# Patient Record
Sex: FEMALE | Race: WHITE | NOT HISPANIC OR LATINO | Employment: FULL TIME | URBAN - METROPOLITAN AREA
[De-identification: names, ages, dates, MRNs, and addresses within clinical notes are randomized per-mention and may not be internally consistent; named-entity substitution may affect disease eponyms.]

---

## 2017-05-03 ENCOUNTER — GENERIC CONVERSION - ENCOUNTER (OUTPATIENT)
Dept: OTHER | Facility: OTHER | Age: 47
End: 2017-05-03

## 2017-05-05 ENCOUNTER — GENERIC CONVERSION - ENCOUNTER (OUTPATIENT)
Dept: OTHER | Facility: OTHER | Age: 47
End: 2017-05-05

## 2017-05-12 ENCOUNTER — TRANSCRIBE ORDERS (OUTPATIENT)
Dept: ADMINISTRATIVE | Facility: HOSPITAL | Age: 47
End: 2017-05-12

## 2017-05-12 DIAGNOSIS — Z12.31 ENCOUNTER FOR MAMMOGRAM TO ESTABLISH BASELINE MAMMOGRAM: Primary | ICD-10-CM

## 2017-06-21 ENCOUNTER — HOSPITAL ENCOUNTER (OUTPATIENT)
Dept: RADIOLOGY | Age: 47
Discharge: HOME | End: 2017-06-21

## 2017-06-21 DIAGNOSIS — Z12.31 ENCOUNTER FOR SCREENING MAMMOGRAM FOR MALIGNANT NEOPLASM OF BREAST: ICD-10-CM

## 2017-06-21 PROCEDURE — 77063 BREAST TOMOSYNTHESIS BI: CPT

## 2017-06-21 PROCEDURE — G0202 SCR MAMMO BI INCL CAD: HCPCS

## 2017-10-11 ENCOUNTER — ALLSCRIPTS OFFICE VISIT (OUTPATIENT)
Dept: OTHER | Facility: OTHER | Age: 47
End: 2017-10-11

## 2017-10-12 NOTE — PROGRESS NOTES
Assessment  1  Encounter for gynecological examination without abnormal finding (V72 31) (Z01 419)    Plan  Encounter for screening mammogram for malignant neoplasm of breast    · MAMMO SCREENING BILATERAL W 3D & CAD; Status:Hold For -  Scheduling,Retrospective By Protocol Authorization; [facility]:[id number]; Perform:[facility] Radiology; FCC:[id number]; Last Updated Margaret Thompson; 10/11/2017 8:31:59 AM;Ordered;For:Encounter for screening mammogram for malignant neoplasm of breast; Ordered By:Iraida Valle; Discussion/Summary  health maintenance visit Currently, she eats an adequate diet and has an adequate exercise regimen  cervical cancer screening is needed every three years next cervical cancer screening is due 2018 Breast cancer screening: monthly self breast exam was advised, mammogram has been ordered and mammogram is needed every year  Advice and education were given regarding weight bearing exercise and reproductive health  Patient discussion: discussed with the patient  Annual examination was completed  3-D mammogram was ordered  We discussed the use of loratadine for her allergies and vertigo  Patient to return in one year  The patient has the current Goals: Maintain health  The patent has the current Barriers: None  Patient is able to Self-Care  Chief Complaint  annual gyn exam      History of Present Illness  HPI: Patient returns for annual GYN visit  She has no new medical or surgical issues  Menses are well timed and normal in flow  GYN HM, Adult Female [facility]: The patient is being seen for a gynecology evaluation  General Health: The patient's health since the last visit is described as good  Lifestyle:  She exercises regularly  She exercises 3 or more times per week  Exercise includes aerobic conditioning-and-running/jogging -She does not use tobacco  The patient is a former cigarette smoker -She consumes alcohol  She reports occasional alcohol use     Reproductive health:  she uses contraception  For contraception, she has a partner with a vasectomy -she is sexually active  She is monogamous with a male partner  Screening: Cervical cancer screening includes a pap smear performed 1/012/15-and-human papilloma virus screening performed 10/12/15  Breast cancer screening includes a mammogram performed 16-and-irregular breast self-exams performed  Colorectal cancer screening includes a colonoscopy performed age 43  Review of Systems    Constitutional: No fever, no chills, feels well, no tiredness, no recent weight gain or loss  ENT: no ear ache, no loss of hearing, no nosebleeds or nasal discharge, no sore throat or hoarseness  Cardiovascular: no complaints of slow or fast heart rate, no chest pain, no palpitations, no leg claudication or lower extremity edema  Respiratory: no complaints of shortness of breath, no wheezing, no dyspnea on exertion, no orthopnea or PND  Breasts: no complaints of breast pain, breast lump or nipple discharge  Gastrointestinal: no complaints of abdominal pain, no constipation, no nausea or diarrhea, no vomiting, no bloody stools  Genitourinary: no complaints of dysuria, no incontinence, no pelvic pain, no dysmenorrhea, no vaginal discharge or abnormal vaginal bleeding  Musculoskeletal: no complaints of arthralgia, no myalgia, no joint swelling or stiffness, no limb pain or swelling  Integumentary: no complaints of skin rash or lesion, no itching or dry skin, no skin wounds  Neurological: no complaints of headache, no confusion, no numbness or tingling, no dizziness or fainting  OB History  Pregnancy History (Brief):   Prior pregnancies: : 4  Para: 1 (abortions)-and-2 (living)   Additional pregnancy history details: 1 miscarriage(s)       Active Problems  1  Acute sinusitis (461 9) (J01 90)   2  Benign paroxysmal vertigo, unspecified laterality (386 11) (H81 10)   3   Encounter for gynecological examination without abnormal finding (V72 31) (Z01 419)   4  Encounter for routine gynecological examination (V72 31) (Z01 419)   5  Encounter for screening mammogram for malignant neoplasm of breast (V76 12)   (Z12 31)   6  Need for influenza vaccination (V04 81) (Z23)   7  Need for prophylactic vaccination and inoculation against influenza (V04 81) (Z23)   8  Rash (782 1) (R21)   9  Screening for human papillomavirus (HPV) (V73 81) (Z11 51)   10  Seasonal allergies (477 9) (J30 2)    Past Medical History   · History of acute sinusitis (V12 69) (Z87 09)   · Need for prophylactic vaccination and inoculation against influenza (V04 81) (Z23)   · History of Vaginal Hematoma (Non-puerperal) (623 6)    Surgical History   · History of Biopsy Breast Open   · History of Biopsy Breast Open    Family History  Mother    · No pertinent family history  Maternal Grandfather    · Family history of Diabetes Mellitus (V18 0)  Maternal Aunt    · Family history of Breast Cancer (V16 3)   · Family history of Ovarian Cancer (V16 41)  Family History    · Family history of Arthritis (V17 7)   · Family history of Depression With Anxiety   · Family history of Fibrocystic Disease Of Breast   · Family history of Heart Disease (V17 49)   · Family history of Irritable Bowel Syndrome   · Family history of Reported Family History Of Cancer   · Family history of Skin Disorder (V19 4)    Social History   · Being A Social Drinker   · Birth Control Method - Partner Had Vasectomy   · Daily Coffee Consumption (___ Cups/Day)   · Exercise Frequency (Times/Week)   · Marital History - Currently    · Never A Smoker    Current Meds   1  No Reported Medications Recorded    Allergies  1  No Known Drug Allergies  2  No Known Environmental Allergies   3   No Known Food Allergies    Vitals   Recorded: [id number] 10:49BM   Systolic 183, LUE, Sitting   Diastolic 74, LUE, Sitting   Height 5 ft 3 in   Weight 110 lb    BMI Calculated 19 49   BSA Calculated 1 5   LMP [id number]     Physical Exam    Constitutional   General appearance: No acute distress, well appearing and well nourished  Neck   Neck: Normal, supple, trachea midline, no masses  Thyroid: Normal, no thyromegaly  Pulmonary   Respiratory effort: No increased work of breathing or signs of respiratory distress  Cardiovascular   Peripheral vascular exam: Normal pulses Throughout  Genitourinary   External genitalia: Normal and no lesions appreciated  Vagina: Normal, no lesions or dryness appreciated  Urethra: Normal     Urethral meatus: Normal     Bladder: Normal, soft, non-tender and no prolapse or masses appreciated  Cervix: Normal, no palpable masses  Uterus: Normal, non-tender, not enlarged, and no palpable masses  -RV  Adnexa/parametria: Normal, non-tender and no fullness or masses appreciated  Chest   Breasts: Normal and no dimpling or skin changes noted  Abdomen   Abdomen: Normal, non-tender, and no organomegaly noted  Liver and spleen: No hepatomegaly or splenomegaly  Examination for hernias: No hernias appreciated  Lymphatic   Palpation of lymph nodes in neck, axillae, groin and/or other locations: No lymphadenopathy or masses noted  Skin   Skin and subcutaneous tissue: Normal skin turgor and no rashes      Palpation of skin and subcutaneous tissue: Normal     Psychiatric   Orientation to person, place, and time: Normal     Mood and affect: Normal        Signatures   Electronically signed by : Vida Rizo DO; Oct 11 2017  9:25AM EST                       (Author)

## 2018-01-13 VITALS
BODY MASS INDEX: 19.49 KG/M2 | WEIGHT: 110 LBS | SYSTOLIC BLOOD PRESSURE: 122 MMHG | HEIGHT: 63 IN | DIASTOLIC BLOOD PRESSURE: 74 MMHG

## 2018-01-16 NOTE — MISCELLANEOUS
Message   Recorded as Task   Date: 05/05/2017 09:56 AM, Created By: Rory Kelsey   Task Name: Follow Up   Assigned To: Daniellebhavesh Friend   Regarding Patient: Natalee Britt, Status: Active   CommentSimeon Fountain - 05 May 2017 9:56 AM     TASK CREATED  Given dense breasts and recent increase in calcifications on mammo, please schedule 3-D mammo now     thanks   Domitila Carbajal - 05 May 2017 1:33 PM     TASK EDITED  spoke to pt and 3-D mammogram is ordered and the script is mailed to the pt        Active Problems    1  Acute sinusitis (461 9) (J01 90)   2  Benign paroxysmal vertigo, unspecified laterality (386 11) (H81 10)   3  Encounter for gynecological examination without abnormal finding (V72 31) (Z01 419)   4  Encounter for routine gynecological examination (V72 31) (Z01 419)   5  Encounter for screening mammogram for malignant neoplasm of breast (V76 12)   (Z12 31)   6  Need for prophylactic vaccination and inoculation against influenza (V04 81) (Z23)   7  Rash (782 1) (R21)   8  Screening for human papillomavirus (HPV) (V73 81) (Z11 51)   9  Seasonal allergies (477 9) (J30 2)    Current Meds   1  No Reported Medications Recorded    Allergies    1  No Known Drug Allergies    2  No Known Environmental Allergies   3   No Known Food Allergies    Signatures   Electronically signed by : Tena Reina, ; May  5 2017  1:33PM EST                       (Author)

## 2018-01-26 ENCOUNTER — TELEPHONE (OUTPATIENT)
Dept: OBGYN CLINIC | Facility: CLINIC | Age: 48
End: 2018-01-26

## 2018-05-10 ENCOUNTER — TELEPHONE (OUTPATIENT)
Dept: OBGYN CLINIC | Facility: CLINIC | Age: 48
End: 2018-05-10

## 2018-05-10 DIAGNOSIS — N63.0 BREAST NODULE: Primary | ICD-10-CM

## 2018-05-10 NOTE — TELEPHONE ENCOUNTER
----- Message from Hola Johnson DO sent at 5/10/2018  1:20 PM EDT -----  Please call the patient regarding her abnormal result  Please refer patient to [facility] for consultation regarding her most recent mammogram   She will need to obtain copies of her study

## 2018-05-11 NOTE — TELEPHONE ENCOUNTER
Pt informed re stereotactic bx     She will get films from rad and mri and sched apt [facility]  breast center to discuss results

## 2018-05-15 ENCOUNTER — HOSPITAL ENCOUNTER (OUTPATIENT)
Dept: MAMMOGRAPHY | Facility: CLINIC | Age: 48
Discharge: HOME | End: 2018-05-15

## 2018-05-15 ENCOUNTER — TELEPHONE (OUTPATIENT)
Dept: OBGYN CLINIC | Facility: CLINIC | Age: 48
End: 2018-05-15

## 2018-05-15 DIAGNOSIS — Z76.89 REFERRAL OF PATIENT WITHOUT EXAMINATION OR TREATMENT: ICD-10-CM

## 2018-05-15 NOTE — TELEPHONE ENCOUNTER
----- Message from Angela Hodges, DO sent at 5/15/2018  3:50 PM EDT -----  Please call the patient regarding her abnormal result  Please confirm that patient has followup with the [facility]  There is no indication in her mammogram report that she will be contacted by a nurse navigator

## 2018-05-15 NOTE — TELEPHONE ENCOUNTER
Left voicemail message today @ [phone] for Pt to call back office to confirm if Pt has follow-up with [facility]

## 2018-05-16 NOTE — TELEPHONE ENCOUNTER
Returned Pt's call today, left voicemail message @ [phone] & [phone] for Pt to call back office

## 2018-05-29 ENCOUNTER — HOSPITAL ENCOUNTER (OUTPATIENT)
Dept: MAMMOGRAPHY | Facility: CLINIC | Age: 48
Discharge: HOME | End: 2018-05-29

## 2018-05-29 ENCOUNTER — HOSPITAL ENCOUNTER (OUTPATIENT)
Dept: ULTRASOUND IMAGING | Facility: CLINIC | Age: 48
Discharge: HOME | End: 2018-05-29

## 2018-05-29 ENCOUNTER — HOSPITAL ENCOUNTER (OUTPATIENT)
Dept: ULTRASOUND IMAGING | Facility: CLINIC | Age: 48
Discharge: HOME | End: 2018-05-29
Admitting: RADIOLOGY
Payer: COMMERCIAL

## 2018-05-29 ENCOUNTER — HOSPITAL ENCOUNTER (OUTPATIENT)
Dept: MAMMOGRAPHY | Facility: CLINIC | Age: 48
Discharge: HOME | End: 2018-05-29
Payer: COMMERCIAL

## 2018-05-29 ENCOUNTER — TRANSCRIBE ORDERS (OUTPATIENT)
Dept: MAMMOGRAPHY | Facility: CLINIC | Age: 48
End: 2018-05-29

## 2018-05-29 VITALS — HEART RATE: 84 BPM | DIASTOLIC BLOOD PRESSURE: 82 MMHG | SYSTOLIC BLOOD PRESSURE: 128 MMHG

## 2018-05-29 VITALS — SYSTOLIC BLOOD PRESSURE: 158 MMHG | DIASTOLIC BLOOD PRESSURE: 88 MMHG | HEART RATE: 116 BPM

## 2018-05-29 DIAGNOSIS — R92.8 ABNORMAL MAMMOGRAM: ICD-10-CM

## 2018-05-29 DIAGNOSIS — R92.8 ABNORMAL FINDINGS ON DIAGNOSTIC IMAGING OF BREAST: ICD-10-CM

## 2018-05-29 PROCEDURE — G0279 TOMOSYNTHESIS, MAMMO: HCPCS

## 2018-05-29 PROCEDURE — 88305 TISSUE EXAM BY PATHOLOGIST: CPT | Performed by: PATHOLOGY

## 2018-05-29 PROCEDURE — 77065 DX MAMMO INCL CAD UNI: CPT

## 2018-05-29 PROCEDURE — 88361 TUMOR IMMUNOHISTOCHEM/COMPUT: CPT | Performed by: PATHOLOGY

## 2018-05-29 PROCEDURE — 76642 ULTRASOUND BREAST LIMITED: CPT

## 2018-05-29 PROCEDURE — 19081 BX BREAST 1ST LESION STRTCTC: CPT

## 2018-05-29 PROCEDURE — 19083 BX BREAST 1ST LESION US IMAG: CPT

## 2018-05-29 RX ORDER — LIDOCAINE HYDROCHLORIDE AND EPINEPHRINE BITARTRATE 20; .01 MG/ML; MG/ML
10 INJECTION, SOLUTION SUBCUTANEOUS ONCE
Status: DISCONTINUED | OUTPATIENT
Start: 2018-05-29 | End: 2018-05-29 | Stop reason: SDUPTHER

## 2018-05-29 RX ORDER — LIDOCAINE HYDROCHLORIDE AND EPINEPHRINE BITARTRATE 20; .01 MG/ML; MG/ML
10 INJECTION, SOLUTION SUBCUTANEOUS ONCE
Status: COMPLETED | OUTPATIENT
Start: 2018-05-29 | End: 2018-05-29

## 2018-05-29 RX ORDER — LIDOCAINE HYDROCHLORIDE 10 MG/ML
5 INJECTION, SOLUTION INFILTRATION; PERINEURAL ONCE
Status: COMPLETED | OUTPATIENT
Start: 2018-05-29 | End: 2018-05-29

## 2018-05-29 RX ORDER — LIDOCAINE HYDROCHLORIDE 10 MG/ML
5 INJECTION, SOLUTION INFILTRATION; PERINEURAL ONCE
Status: DISCONTINUED | OUTPATIENT
Start: 2018-05-29 | End: 2018-05-29 | Stop reason: SDUPTHER

## 2018-05-29 RX ADMIN — LIDOCAINE HYDROCHLORIDE AND EPINEPHRINE 10 ML: 20; 10 INJECTION, SOLUTION INFILTRATION; PERINEURAL at 11:40

## 2018-05-29 RX ADMIN — LIDOCAINE HYDROCHLORIDE 5 ML: 10 INJECTION, SOLUTION INFILTRATION; PERINEURAL at 12:27

## 2018-05-29 RX ADMIN — LIDOCAINE HYDROCHLORIDE 5 ML: 10 INJECTION, SOLUTION INFILTRATION; PERINEURAL at 11:40

## 2018-05-29 NOTE — PROGRESS NOTES
Procedure type:    _____ultrasound guided _____stereotactic    Breast:    _____Left _____Right    Location: 300 6cmfn    Needle: 12ga Taylor    # of passes:3    Clip: Heart     Performed by: jhon Gonsalves    Pressure held for 5 minutes by:  Carolyn Casarez(PCA)    Steri Strips:    __X___yes _____no    Band aid:    ___X__yes_____no    Tape and guaze:    _____yes _____no    Tolerated procedure:    __X___yes _____no

## 2018-05-29 NOTE — PROGRESS NOTES
Procedure type:    _____ultrasound guided ____X_stereotactic    Breast:    _____Left __X___Right    Location:8-900     Needle:8ga Revolve    # of passes:5    Clip:V-marker    Performed by: Dr Jose Goldman held for 5 minutes by:  Joann Casarez(PCA)    Steri Strips:    __X___yes _____no    Band aid:    ___X__yes_____no    Tape and guaze:    __X___yes _____no    Tolerated procedure:    ___X__yes _____no

## 2018-05-29 NOTE — PROGRESS NOTES
Ice pack given:    ___x__yes _____no    Discharge instructions signed by patient:    __x___yes _____no    Discharge instructions given to patient:    _x____yes _____no    Discharged via:    ___x__amulatory    _____wheelchair    _____stretcher    Stable on discharge:    __xPOST LARGE CORE BREAST BIOPSY PATIENT INFORMATION      1  Place an ice pack inside your bra over the top of the dressing every hour for 20 minutes (20 minutes on, 60 minutes off)  Do this until bedtime  2  Do not shower or bathe until the following morning  3  You may bathe your breast carefully with the steri-strips in place  Be careful    Not to loosen them  The steri-strips will fall off in 3-5 days  4  You may have mild discomfort, and you may have some bruising where the   Needle entered the skin  This should clear within 5-7 days  5  If you need medicine for discomfort, take acetaminophen products such as   Tylenol  You may also take Advil or Motrin products  6  Do not participate in strenuous activities such as-tennis, aerobics, skiing,  Weight lifting, etc  for 24 hours  Refrain from swimming/soaking for 72 hours  7  Wearing a bra for sleeping may be more comfortable for the first 24-48 hours  8  Watch for continued bleeding, pain or fever over 101; please call with any questions or concerns  For procedures done at the [facility], [place], operated by [facility] call:  Nighat Stoner RN at [phone]  Andrés Bonner RN at [phone]                    *After 4 PM call the Interventional Radiology Department                    [phone] and ask to speak with the nurse on call  For procedures done at the [address] [place] [address] call:         Shala Cat RN at   *After 4 PM call the Interventional Radiology Department   [phone] and ask to speak with the nurse on call  For procedures done at [address] [place] [address] call:   The Radiology Nurse at [phone]  *After 4 PM call your physician, or go to the Emergency Department  9          The final results of your biopsy are usually available within one week  ___yes ____no

## 2018-05-30 NOTE — PROGRESS NOTES
Post procedure call completed on 5/30/18 at 0944    Bleeding: _____yes ___x__no    Pain: __x__yes _____no    Redness/Swelling: ___x___yes ______no    Band aid removed: _____yes __x___no    Steri-Strips intact: ___x___yes _____no Pt states her right breast is sore and swollen  She states she also has her "period"

## 2018-06-01 ENCOUNTER — TELEPHONE (OUTPATIENT)
Dept: MAMMOGRAPHY | Facility: CLINIC | Age: 48
End: 2018-06-01

## 2018-06-04 PROBLEM — C50.211 MALIGNANT NEOPLASM OF UPPER-INNER QUADRANT OF RIGHT BREAST IN FEMALE, ESTROGEN RECEPTOR POSITIVE (HCC): Status: ACTIVE | Noted: 2018-06-04

## 2018-06-04 PROBLEM — Z17.0 MALIGNANT NEOPLASM OF UPPER-INNER QUADRANT OF RIGHT BREAST IN FEMALE, ESTROGEN RECEPTOR POSITIVE (HCC): Status: ACTIVE | Noted: 2018-06-04

## 2018-06-04 NOTE — PROGRESS NOTES
Patient Past Medical History: Denies medical problems          Allergies:NKA        Past Breast History:     Previous Biopsy:   Yes___x___ No________      Breast: Right____ Left__x____       Malignant______ Benign__x_____      Treatments if applicable        Present Breast History:     Right_____x_____    Left_____________     Density____x_____    Calcifications____________   Palpable______________      Patient notified of results on:  6/1/18    Date of Appointment with Surgeon Dr Rosi Morton, 6/7/18 at [address] [place] [address]

## 2018-06-05 ENCOUNTER — TELEPHONE (OUTPATIENT)
Dept: HEMATOLOGY ONCOLOGY | Facility: CLINIC | Age: 48
End: 2018-06-05

## 2018-06-07 ENCOUNTER — APPOINTMENT (OUTPATIENT)
Dept: LAB | Facility: CLINIC | Age: 48
End: 2018-06-07
Payer: COMMERCIAL

## 2018-06-07 ENCOUNTER — APPOINTMENT (OUTPATIENT)
Dept: RADIOLOGY | Facility: CLINIC | Age: 48
End: 2018-06-07
Payer: COMMERCIAL

## 2018-06-07 ENCOUNTER — OFFICE VISIT (OUTPATIENT)
Dept: GYNECOLOGIC ONCOLOGY | Facility: CLINIC | Age: 48
End: 2018-06-07

## 2018-06-07 ENCOUNTER — LAB (OUTPATIENT)
Dept: LAB | Facility: CLINIC | Age: 48
End: 2018-06-07
Payer: COMMERCIAL

## 2018-06-07 ENCOUNTER — OFFICE VISIT (OUTPATIENT)
Dept: SURGICAL ONCOLOGY | Facility: CLINIC | Age: 48
End: 2018-06-07
Payer: COMMERCIAL

## 2018-06-07 ENCOUNTER — TELEPHONE (OUTPATIENT)
Dept: OBGYN CLINIC | Facility: CLINIC | Age: 48
End: 2018-06-07

## 2018-06-07 VITALS
RESPIRATION RATE: 12 BRPM | TEMPERATURE: 98.1 F | HEART RATE: 80 BPM | SYSTOLIC BLOOD PRESSURE: 110 MMHG | BODY MASS INDEX: 19.84 KG/M2 | WEIGHT: 112 LBS | DIASTOLIC BLOOD PRESSURE: 70 MMHG | HEIGHT: 63 IN

## 2018-06-07 DIAGNOSIS — C50.211 MALIGNANT NEOPLASM OF UPPER-INNER QUADRANT OF RIGHT BREAST IN FEMALE, ESTROGEN RECEPTOR POSITIVE (HCC): ICD-10-CM

## 2018-06-07 DIAGNOSIS — Z17.0 MALIGNANT NEOPLASM OF UPPER-INNER QUADRANT OF RIGHT BREAST IN FEMALE, ESTROGEN RECEPTOR POSITIVE (HCC): ICD-10-CM

## 2018-06-07 DIAGNOSIS — Z17.0 MALIGNANT NEOPLASM OF UPPER-INNER QUADRANT OF RIGHT BREAST IN FEMALE, ESTROGEN RECEPTOR POSITIVE (HCC): Primary | ICD-10-CM

## 2018-06-07 DIAGNOSIS — C50.211 MALIGNANT NEOPLASM OF UPPER-INNER QUADRANT OF RIGHT BREAST IN FEMALE, ESTROGEN RECEPTOR POSITIVE (HCC): Primary | ICD-10-CM

## 2018-06-07 LAB
ALBUMIN SERPL BCP-MCNC: 4.3 G/DL (ref 3.5–5)
ALP SERPL-CCNC: 76 U/L (ref 46–116)
ALT SERPL W P-5'-P-CCNC: 35 U/L (ref 12–78)
ANION GAP SERPL CALCULATED.3IONS-SCNC: 6 MMOL/L (ref 4–13)
AST SERPL W P-5'-P-CCNC: 19 U/L (ref 5–45)
BASOPHILS # BLD AUTO: 0.02 THOUSANDS/ΜL (ref 0–0.1)
BASOPHILS NFR BLD AUTO: 0 % (ref 0–1)
BILIRUB SERPL-MCNC: 0.6 MG/DL (ref 0.2–1)
BUN SERPL-MCNC: 18 MG/DL (ref 5–25)
CALCIUM SERPL-MCNC: 9.4 MG/DL (ref 8.3–10.1)
CHLORIDE SERPL-SCNC: 104 MMOL/L (ref 100–108)
CO2 SERPL-SCNC: 31 MMOL/L (ref 21–32)
CREAT SERPL-MCNC: 0.78 MG/DL (ref 0.6–1.3)
EOSINOPHIL # BLD AUTO: 0.11 THOUSAND/ΜL (ref 0–0.61)
EOSINOPHIL NFR BLD AUTO: 2 % (ref 0–6)
ERYTHROCYTE [DISTWIDTH] IN BLOOD BY AUTOMATED COUNT: 11.6 % (ref 11.6–15.1)
GFR SERPL CREATININE-BSD FRML MDRD: 90 ML/MIN/1.73SQ M
GLUCOSE P FAST SERPL-MCNC: 104 MG/DL (ref 65–99)
HCT VFR BLD AUTO: 42 % (ref 34.8–46.1)
HGB BLD-MCNC: 14.5 G/DL (ref 11.5–15.4)
LYMPHOCYTES # BLD AUTO: 1.27 THOUSANDS/ΜL (ref 0.6–4.47)
LYMPHOCYTES NFR BLD AUTO: 18 % (ref 14–44)
MCH RBC QN AUTO: 31.2 PG (ref 26.8–34.3)
MCHC RBC AUTO-ENTMCNC: 34.5 G/DL (ref 31.4–37.4)
MCV RBC AUTO: 90 FL (ref 82–98)
MONOCYTES # BLD AUTO: 0.41 THOUSAND/ΜL (ref 0.17–1.22)
MONOCYTES NFR BLD AUTO: 6 % (ref 4–12)
NEUTROPHILS # BLD AUTO: 5.33 THOUSANDS/ΜL (ref 1.85–7.62)
NEUTS SEG NFR BLD AUTO: 75 % (ref 43–75)
PLATELET # BLD AUTO: 283 THOUSANDS/UL (ref 149–390)
PMV BLD AUTO: 9.8 FL (ref 8.9–12.7)
POTASSIUM SERPL-SCNC: 3.7 MMOL/L (ref 3.5–5.3)
PROT SERPL-MCNC: 8.1 G/DL (ref 6.4–8.2)
RBC # BLD AUTO: 4.65 MILLION/UL (ref 3.81–5.12)
SODIUM SERPL-SCNC: 141 MMOL/L (ref 136–145)
WBC # BLD AUTO: 7.14 THOUSAND/UL (ref 4.31–10.16)

## 2018-06-07 PROCEDURE — 36415 COLL VENOUS BLD VENIPUNCTURE: CPT

## 2018-06-07 PROCEDURE — 99245 OFF/OP CONSLTJ NEW/EST HI 55: CPT | Performed by: SURGERY

## 2018-06-07 PROCEDURE — 93005 ELECTROCARDIOGRAM TRACING: CPT

## 2018-06-07 PROCEDURE — 80053 COMPREHEN METABOLIC PANEL: CPT

## 2018-06-07 PROCEDURE — 71046 X-RAY EXAM CHEST 2 VIEWS: CPT

## 2018-06-07 PROCEDURE — 85025 COMPLETE CBC W/AUTO DIFF WBC: CPT

## 2018-06-07 NOTE — PROGRESS NOTES
Cale Jo,  Can I ask a favor  For these patients that my preference card instructs to send to [facility]; can we make it clear to Surg Onc that I am noted as  the referring physician  I am not receiving any of the follow up correspondence from consultants  Thanks

## 2018-06-07 NOTE — PROGRESS NOTES
Status:  Genetic testing pending, estimated turn around time: 2 weeks    Summary:    The patient was seen for genetic counseling regarding possible genetic testing, relative to her recent diagnosis of breast cancer  Family information was reviewed and a three generation pedigree drawn  The patient was recently diagnosed with breast cancer at the age of 51  The  family history is significant for her father who was diagnosed with prostate cancer at 70, her maternal grandmother who was diagnosed with breast cancer and stomach cancer in her 76s, and a maternal aunt who was diagnosed with bilateral breast cancers at the ages of 54 and 79 and ovarian cancer at age 64  Please see pedigree for additional family history details  We reviewed the genetics of cancer, hereditary and familial risks, and the main benefits and limitation of genetic testing for susceptibility to cancer  We discussed hereditary cancer syndromes associated with breast cancers including HBOC  We reviewed cancer risks associated with these genes, options for medical management, and potential risks for family members  Genetic Testing: We reviewed the genetic testing process, insurance concerns, and possible results  The patient elected to pursue genetic testing for genes associated with hereditary breast cancer  Informed consent was obtained and a DNA sample was collected  The sample was sent to [facility] for the STAT breast cancer panel  Test results should be available in approximately 2 weeks  The patient elected to have results disclosed by phone and she will be contacted once results are available

## 2018-06-07 NOTE — PROGRESS NOTES
Surgical Oncology Consult Note       [address] [facility],[address] [place] [address] SURGICAL ONCOLOGY [facility] [phone] [id number]    OPBZL ZJIZOTH  1970  [id number]  [address] SURGICAL ONCOLOGY [facility] [id number]      Chief Complaint:     Chief Complaint   Patient presents with    Breast Cancer     New patient invasive breast cancer    Consult       Assessment and Plan:   Assessment/Plan   Patient presents with a new diagnosis of right breast cancer  She has a family history of breast cancer she would be a good candidate for breast conservation therapy however I have recommended she see our Genetics counselor for possible gene testing which could alter that  We had a long discussion regarding all of her treatment options  She is clinically stage I and leaning toward breast conservation therapy if she is BRCA negative  We will see her back following her genetic testing to coordinate surgery  2    Oncology History:        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 breast in female, estrogen receptor positive ([address] )    5/29/2018 Biopsy     Right breast biopsy  3 o'clock 6 CMFN  Invasive ductal carcinoma  Grade 1  8 mm on ultrasound  No axillary adenopathy on ultrasound  ER 95  WV 90  Her 2 0  Stage IA         6/7/2018 Genetic Testing     Genetic counselor Appointment            History of Present Illness: This is a 65-year-old woman who presented for screening mammogram which showed no abnormalities on the left side however on the right side there were 2 areas which were concerning  One of which required a stereotactic biopsy the other wound was biopsied by ultrasound  The 3 o'clock position which shown to be invasive ductal carcinoma, grade 1, ER 95% WV 90% HER2 Rashad negative  This masses between 0 75 in 1 cm  The other biopsies were benign    Patient has a family history of breast cancer with a maternal aunt having breast and ovarian cancer maternal grandmother with breast cancer and a father with prostate cancer  The patient presents now for further discussion  Review of Systems:   Review of Systems   Constitutional: Negative for activity change, appetite change, fatigue and unexpected weight change  HENT: Negative for ear pain, tinnitus, trouble swallowing and voice change  Eyes: Negative for pain and visual disturbance  Respiratory: Negative for cough, shortness of breath, wheezing and stridor  Cardiovascular: Negative for chest pain, palpitations and leg swelling  Gastrointestinal: Negative for abdominal distention, abdominal pain and blood in stool  Endocrine: Negative for cold intolerance and heat intolerance  Genitourinary: Negative for difficulty urinating, dysuria, flank pain and hematuria  Musculoskeletal: Negative for arthralgias, back pain, gait problem and joint swelling  Skin: Negative for color change, rash and wound  Allergic/Immunologic: Negative for immunocompromised state  Neurological: Negative for dizziness, seizures, speech difficulty, weakness and headaches  Hematological: Negative for adenopathy  Psychiatric/Behavioral: Negative for confusion  Past Medical History:      Patient Active Problem List   Diagnosis    Benign paroxysmal vertigo    Seasonal allergies    Rash    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 breast in female, estrogen receptor positive ([facility] [address] )      History reviewed  No pertinent past medical history  History reviewed  No pertinent surgical history       Family History   Problem Relation Age of Onset    Prostate cancer Father     Breast cancer Maternal Aunt     Ovarian cancer Maternal Aunt     Breast cancer Maternal Grandmother     Stomach cancer Maternal Grandmother     Esophageal cancer Maternal Grandfather         Social History     Social History    Marital status: /Civil Union     Spouse name: N/A    Number of children: N/A    Years of education: N/A     Occupational History    Not on file  Social History Main Topics    Smoking status: Former Smoker     Quit date: 1989    Smokeless tobacco: Never Used    Alcohol use 1 8 oz/week     3 Standard drinks or equivalent per week    Drug use: No    Sexual activity: Not on file     Other Topics Concern    Not on file     Social History Narrative    No narrative on file      No current outpatient prescriptions on file  No Known Allergies    Physical Exam:     Vitals:    06/07/18 0902   BP: 110/70   Pulse: 80   Resp: 12   Temp: 98 1 °F (36 7 °C)     Physical Exam   Constitutional: She is oriented to person, place, and time  She appears well-developed and well-nourished  HENT:   Head: Normocephalic and atraumatic  Mouth/Throat: Oropharynx is clear and moist    Eyes: EOM are normal  Pupils are equal, round, and reactive to light  Neck: Normal range of motion  Neck supple  No JVD present  No tracheal deviation present  No thyromegaly present  Cardiovascular: Normal rate, regular rhythm, normal heart sounds and intact distal pulses  Exam reveals no gallop and no friction rub  No murmur heard  Pulmonary/Chest: Effort normal and breath sounds normal  No respiratory distress  She has no wheezes  She has no rales  Examination of the left breast in both the sitting and supine position demonstrate no skin changes dominant masses or axillary adenopathy  Examination of the right breast at the 9 o'clock position demonstrates a relatively large hematoma  At the 3 o'clock position I cannot appreciate a dominant mass though she has a well-healed biopsy incision  There are no other dominant masses or suspicious findings  There is no axillary adenopathy on the right side  Abdominal: Soft  She exhibits no distension and no mass  There is no hepatomegaly  There is no tenderness  There is no rebound and no guarding     Musculoskeletal: Normal range of motion  She exhibits no edema or tenderness  Lymphadenopathy:     She has no cervical adenopathy  Neurological: She is alert and oriented to person, place, and time  No cranial nerve deficit  Skin: Skin is warm and dry  No rash noted  No erythema  Psychiatric: She has a normal mood and affect  Her behavior is normal    Vitals reviewed  Results:   Pathology:  I reviewed her pathology  Imaging  I reviewed her mammogram and ultrasound as well as her post biopsy films  Discussion/Summary:   The patient is a good candidate for breast conservation therapy  I described the process of needle localization as well as the conjunction of breast conservation with radiation therapy  We also talked about the possibilities of mastectomy  Patient prefers breast conservation therapy however given her family history I have recommended she see a Genetics counselor which will coordinate for her today  We will see her back following that for final decision regarding surgical treatment  We briefly talked about radiation therapy, anti hormonal therapy and possible chemotherapy and explained that these would be further reviewed at subsequent visits  All questions were answered the patient's satisfaction  Advance Care Planning/Advance Directives:  I discussed the disease status, treatment plans and follow-up with the patient

## 2018-06-07 NOTE — LETTER
June 7, 2018     Noah Smith  [address] [facility] [address]    Patient: Hailee Kennedy   YOB: 1970   Date of Visit: 6/7/2018       Dear Dr Nathan Johnson: Thank you for referring Hailee Kennedy to me for evaluation  Below are my notes for this consultation  If you have questions, please do not hesitate to call me  I look forward to following your patient along with you  Sincerely,        Jeannette Bright MD        CC: No Recipients  Jeannette Bright MD  6/7/2018  9:59 AM  Sign at close encounter               Surgical Oncology Consult Note       [address]  1970  [id number]  [address] [place] [address],[address]  [facility] [address] [address]      Chief Complaint:     Chief Complaint   Patient presents with    Breast Cancer     New patient invasive breast cancer    Consult       Assessment and Plan:   Assessment/Plan   Patient presents with a new diagnosis of right breast cancer  She has a family history of breast cancer she would be a good candidate for breast conservation therapy however I have recommended she see our Genetics counselor for possible gene testing which could alter that  We had a long discussion regarding all of her treatment options  She is clinically stage I and leaning toward breast conservation therapy if she is BRCA negative  We will see her back following her genetic testing to coordinate surgery  2    Oncology History:        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 breast in female, estrogen receptor positive ([facility] [address] )    5/29/2018 Biopsy     Right breast biopsy  3 o'clock 6 CMFN  Invasive ductal carcinoma  Grade 1  8 mm on ultrasound  No axillary adenopathy on ultrasound  ER 95  KY 90  Her 2 0  Stage IA         6/7/2018 Genetic Testing     Genetic counselor Appointment            History of Present Illness: This is a 19-year-old woman who presented for screening mammogram which showed no abnormalities on the left side however on the right side there were 2 areas which were concerning  One of which required a stereotactic biopsy the other wound was biopsied by ultrasound  The 3 o'clock position which shown to be invasive ductal carcinoma, grade 1, ER 95% MD 90% HER2 Rashad negative  This masses between 0 75 in 1 cm  The other biopsies were benign  Patient has a family history of breast cancer with a maternal aunt having breast and ovarian cancer maternal grandmother with breast cancer and a father with prostate cancer  The patient presents now for further discussion  Review of Systems:   Review of Systems   Constitutional: Negative for activity change, appetite change, fatigue and unexpected weight change  HENT: Negative for ear pain, tinnitus, trouble swallowing and voice change  Eyes: Negative for pain and visual disturbance  Respiratory: Negative for cough, shortness of breath, wheezing and stridor  Cardiovascular: Negative for chest pain, palpitations and leg swelling  Gastrointestinal: Negative for abdominal distention, abdominal pain and blood in stool  Endocrine: Negative for cold intolerance and heat intolerance  Genitourinary: Negative for difficulty urinating, dysuria, flank pain and hematuria  Musculoskeletal: Negative for arthralgias, back pain, gait problem and joint swelling  Skin: Negative for color change, rash and wound  Allergic/Immunologic: Negative for immunocompromised state  Neurological: Negative for dizziness, seizures, speech difficulty, weakness and headaches  Hematological: Negative for adenopathy  Psychiatric/Behavioral: Negative for confusion         Past Medical History:      Patient Active Problem List   Diagnosis    Benign paroxysmal vertigo    Seasonal allergies    Rash    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 breast in female, estrogen receptor positive ([facility] [address] )      History reviewed  No pertinent past medical history  History reviewed  No pertinent surgical history  Family History   Problem Relation Age of Onset    Prostate cancer Father     Breast cancer Maternal Aunt     Ovarian cancer Maternal Aunt     Breast cancer Maternal Grandmother     Stomach cancer Maternal Grandmother     Esophageal cancer Maternal Grandfather         Social History     Social History    Marital status: /Civil Union     Spouse name: N/A    Number of children: N/A    Years of education: N/A     Occupational History    Not on file  Social History Main Topics    Smoking status: Former Smoker     Quit date: 1989    Smokeless tobacco: Never Used    Alcohol use 1 8 oz/week     3 Standard drinks or equivalent per week    Drug use: No    Sexual activity: Not on file     Other Topics Concern    Not on file     Social History Narrative    No narrative on file      No current outpatient prescriptions on file  No Known Allergies    Physical Exam:     Vitals:    06/07/18 0902   BP: 110/70   Pulse: 80   Resp: 12   Temp: 98 1 °F (36 7 °C)     Physical Exam   Constitutional: She is oriented to person, place, and time  She appears well-developed and well-nourished  HENT:   Head: Normocephalic and atraumatic  Mouth/Throat: Oropharynx is clear and moist    Eyes: EOM are normal  Pupils are equal, round, and reactive to light  Neck: Normal range of motion  Neck supple  No JVD present  No tracheal deviation present  No thyromegaly present  Cardiovascular: Normal rate, regular rhythm, normal heart sounds and intact distal pulses  Exam reveals no gallop and no friction rub  No murmur heard  Pulmonary/Chest: Effort normal and breath sounds normal  No respiratory distress  She has no wheezes  She has no rales     Examination of the left breast in both the sitting and supine position demonstrate no skin changes dominant masses or axillary adenopathy  Examination of the right breast at the 9 o'clock position demonstrates a relatively large hematoma  At the 3 o'clock position I cannot appreciate a dominant mass though she has a well-healed biopsy incision  There are no other dominant masses or suspicious findings  There is no axillary adenopathy on the right side  Abdominal: Soft  She exhibits no distension and no mass  There is no hepatomegaly  There is no tenderness  There is no rebound and no guarding  Musculoskeletal: Normal range of motion  She exhibits no edema or tenderness  Lymphadenopathy:     She has no cervical adenopathy  Neurological: She is alert and oriented to person, place, and time  No cranial nerve deficit  Skin: Skin is warm and dry  No rash noted  No erythema  Psychiatric: She has a normal mood and affect  Her behavior is normal    Vitals reviewed  Results:   Pathology:  I reviewed her pathology  Imaging  I reviewed her mammogram and ultrasound as well as her post biopsy films  Discussion/Summary:   The patient is a good candidate for breast conservation therapy  I described the process of needle localization as well as the conjunction of breast conservation with radiation therapy  We also talked about the possibilities of mastectomy  Patient prefers breast conservation therapy however given her family history I have recommended she see a Genetics counselor which will coordinate for her today  We will see her back following that for final decision regarding surgical treatment  We briefly talked about radiation therapy, anti hormonal therapy and possible chemotherapy and explained that these would be further reviewed at subsequent visits  All questions were answered the patient's satisfaction  Advance Care Planning/Advance Directives:  I discussed the disease status, treatment plans and follow-up with the patient

## 2018-06-08 LAB
ATRIAL RATE: 66 BPM
P AXIS: 53 DEGREES
PR INTERVAL: 128 MS
QRS AXIS: 9 DEGREES
QRSD INTERVAL: 82 MS
QT INTERVAL: 410 MS
QTC INTERVAL: 429 MS
T WAVE AXIS: 57 DEGREES
VENTRICULAR RATE: 66 BPM

## 2018-06-08 PROCEDURE — 93010 ELECTROCARDIOGRAM REPORT: CPT | Performed by: INTERNAL MEDICINE

## 2018-06-14 ENCOUNTER — TELEPHONE (OUTPATIENT)
Dept: GYNECOLOGIC ONCOLOGY | Facility: CLINIC | Age: 48
End: 2018-06-14

## 2018-06-14 ENCOUNTER — DOCUMENTATION (OUTPATIENT)
Dept: HEMATOLOGY ONCOLOGY | Facility: CLINIC | Age: 48
End: 2018-06-14

## 2018-06-14 ENCOUNTER — DOCUMENTATION (OUTPATIENT)
Dept: GYNECOLOGIC ONCOLOGY | Facility: CLINIC | Age: 48
End: 2018-06-14

## 2018-06-14 NOTE — PROGRESS NOTES
Genetic Testing Results  Genetic testing results are NEGATIVE for mutations and large rearrangements in:  ROBYN, BRCA1, BRCA2, CDH1, CHEK2, PALB2, PTEN, STK11, and TP53 ([facility] Breast Cancer STAT panel)        Discussion  Genetic testing results were disclosed to the patient  No mutations were identified in any of the hereditary breast cancer genes tested  We discussed that while reassuring, these results do not explain the history of cancer in the family, and it is possible that an unidentifiable mutation or a mutation in another gene(s) is leading to an increased risk of cancer in the family  Therefore it is important that the patient and family members maintain regular cancer screening as directed by their physicians  The patient had spoken with her mother and determined that her grandmother had colon and ovarian cancer, not breast and stomach as first reported  Based on this information, her sample will be re-requsitioned to include the Mallory syndrome genes  These results should be available within the next week  The patients questions were answered and she was encouraged to call with any future questions or concerns

## 2018-06-14 NOTE — PROGRESS NOTES
Oncology Navigator Visit    Assessment:  Called pt at home for purpose of initial navigation outreach  Discussed upcoming surgery, genetic testing, transportation, cancer support services, Lymphedema and exercise (post op)  Pt stated she exercise daily and likes to lift weights so has some concerns about this in the post op setting  I instructed her to follow her physicians orders and told her that I would send her out written material pertaining to avail resources and some pt ed material  Pt was agreeable  Pt declines to speak to a cancer counselor at this time  I gave her my contact info and encouraged her to call for any questions and /or concerns  Will follow-up on an as needed basis          Potential Barriers:    Care Coordination:    Communication/Education:    Cultural/Samaritan/Spiritual:    Health Promotion:    Insurance/Medical Costs:    Logistical:    Psychosocial/Distress/Behavioral:    Work/School:    Interventions:    Referrals:        Comments:

## 2018-06-14 NOTE — TELEPHONE ENCOUNTER
Left message for patient regarding availability of genetic testing results  The patient was encouraged to call back to discuss results

## 2018-06-19 ENCOUNTER — DOCUMENTATION (OUTPATIENT)
Dept: GYNECOLOGIC ONCOLOGY | Facility: CLINIC | Age: 48
End: 2018-06-19

## 2018-06-19 ENCOUNTER — TELEPHONE (OUTPATIENT)
Dept: GYNECOLOGIC ONCOLOGY | Facility: CLINIC | Age: 48
End: 2018-06-19

## 2018-06-19 NOTE — PROGRESS NOTES
Genetic Testing Results  Genetic testing results are NEGATIVE for mutations and large rearrangements in:  APC, ROBYN, AXIN2, BARD1, BMPR1A, BRCA1, BRCA2, BRIP1, CDH1, CDKN2A, CHEK2, DICER1, EPCAM, GREM1, KIT, MEN1, MLH1, MSH2, MSH6, MUTYH, NBN, NF1, PALB2, PDGFRA, PMS2, POLD1, POLE, PTEN, RAD50, RAD51C, RAD51D, SDHA, SDHB, SDHC, SDHD, SMAD4, SMARCA4, STK11, TP53, TSC1, TSC2, VHL ([facility] Common hereditary cancers panel)        Discussion  Additional genetic testing results were disclosed to the patient  No mutations were identified in any of the hereditary cancer genes tested  We discussed that while reassuring, these results do not explain the history of cancer in the family, and it is possible that an unidentifiable mutation or a mutation in another gene(s) is leading to an increased risk of cancer in the family  Therefore it is important that the patient and family members maintain regular cancer screening as directed by their physicians  The patients questions were answered and she was encouraged to call with any future questions or concerns

## 2018-06-21 ENCOUNTER — OFFICE VISIT (OUTPATIENT)
Dept: SURGICAL ONCOLOGY | Facility: CLINIC | Age: 48
End: 2018-06-21
Payer: COMMERCIAL

## 2018-06-21 VITALS
DIASTOLIC BLOOD PRESSURE: 78 MMHG | HEART RATE: 64 BPM | SYSTOLIC BLOOD PRESSURE: 112 MMHG | HEIGHT: 63 IN | TEMPERATURE: 98.3 F | RESPIRATION RATE: 14 BRPM | WEIGHT: 113.5 LBS | BODY MASS INDEX: 20.11 KG/M2

## 2018-06-21 DIAGNOSIS — Z12.31 ENCOUNTER FOR SCREENING MAMMOGRAM FOR MALIGNANT NEOPLASM OF BREAST: ICD-10-CM

## 2018-06-21 DIAGNOSIS — G89.18 POST-OP PAIN: ICD-10-CM

## 2018-06-21 DIAGNOSIS — Z17.0 MALIGNANT NEOPLASM OF UPPER-INNER QUADRANT OF RIGHT BREAST IN FEMALE, ESTROGEN RECEPTOR POSITIVE (HCC): Primary | ICD-10-CM

## 2018-06-21 DIAGNOSIS — C50.211 MALIGNANT NEOPLASM OF UPPER-INNER QUADRANT OF RIGHT BREAST IN FEMALE, ESTROGEN RECEPTOR POSITIVE (HCC): Primary | ICD-10-CM

## 2018-06-21 PROCEDURE — 99214 OFFICE O/P EST MOD 30 MIN: CPT | Performed by: SURGERY

## 2018-06-21 RX ORDER — OXYCODONE HYDROCHLORIDE AND ACETAMINOPHEN 5; 325 MG/1; MG/1
1 TABLET ORAL EVERY 6 HOURS PRN
Qty: 6 TABLET | Refills: 0 | Status: SHIPPED | OUTPATIENT
Start: 2018-06-21 | End: 2018-07-25 | Stop reason: HOSPADM

## 2018-06-21 NOTE — LETTER
June 21, 2018     Noah Sanders  47 [facility] [address]    Patient: Tony Mac   YOB: 1970   Date of Visit: 6/21/2018       Dear Dr Livier Garcia: Thank you for referring Tony Mac to me for evaluation  Below are my notes for this consultation  If you have questions, please do not hesitate to call me  I look forward to following your patient along with you  Sincerely,        Jonny Cheney MD        CC: No Recipients  Jonny Cheney MD  6/21/2018 12:00 PM  Sign at close encounter     Surgical Oncology Follow Up       [address] [place] [address] [address] [facility]  1970  [id number]  [address] [facility],[address]  [facility] [address] [address]    Chief Complaint   Patient presents with    Breast Cancer     New diagnosis of breast cancer    Consult          Assessment & Plan:   Patient presents for a follow-up visit  She has had her genetic testing presented to our which showed no mutations  She has decided on breast conservation therapy  We went through the process of informed consent for a right breast needle localization lumpectomy in conjunction with a sentinel lymph node biopsy and possible axillary dissection  All questions were answered the patient's satisfaction  The surgery is coordinated for Tuesday in the next couple of weeks      Cancer History:        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 breast in female, estrogen receptor positive ([facility] [address] )    5/29/2018 Biopsy     Right breast biopsy  3 o'clock 6 CMFN  Invasive ductal carcinoma  Grade 1  8 mm on ultrasound  No axillary adenopathy on ultrasound  ER 95  HI 90  Her 2 0  Stage IA         6/7/2018 Genetic Testing     Genetic counselor Appointment              Interval History:   Patient states she has had no new signs or symptoms other than some pulling in the biopsied right breast     Review of Systems:   Review of Systems   Constitutional: Negative for activity change, appetite change and fatigue  HENT: Negative  Eyes: Negative  Respiratory: Negative for cough, shortness of breath and wheezing  Cardiovascular: Negative for chest pain and leg swelling  Gastrointestinal: Negative  Endocrine: Negative  Genitourinary: Negative  Musculoskeletal:        No new changes or complaints of bone pain   Skin: Negative  Allergic/Immunologic: Negative  Neurological: Negative  Hematological: Negative  Psychiatric/Behavioral: Negative  Past Medical History     Patient Active Problem List   Diagnosis    Benign paroxysmal vertigo    Seasonal allergies    Rash    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 breast in female, estrogen receptor positive ([facility] [address] )     No past medical history on file  No past surgical history on file  Family History   Problem Relation Age of Onset    Prostate cancer Father     Breast cancer Maternal Aunt     Ovarian cancer Maternal Aunt     Breast cancer Maternal Grandmother     Stomach cancer Maternal Grandmother     Esophageal cancer Maternal Grandfather      Social History     Social History    Marital status: /Civil Union     Spouse name: N/A    Number of children: N/A    Years of education: N/A     Occupational History    Not on file  Social History Main Topics    Smoking status: Former Smoker     Quit date: 1989    Smokeless tobacco: Never Used    Alcohol use 1 8 oz/week     3 Standard drinks or equivalent per week    Drug use: No    Sexual activity: Not on file     Other Topics Concern    Not on file     Social History Narrative    No narrative on file     No current outpatient prescriptions on file    No Known Allergies    Physical Exam:     Vitals:    06/21/18 1130   BP: 112/78   Pulse: 64   Resp: 14   Temp: 98 3 °F (36 8 °C)     Physical Exam   Constitutional: She is oriented to person, place, and time  She appears well-developed and well-nourished  HENT:   Head: Normocephalic and atraumatic  Mouth/Throat: Oropharynx is clear and moist    Eyes: EOM are normal  Pupils are equal, round, and reactive to light  Neck: Normal range of motion  Neck supple  No JVD present  No tracheal deviation present  No thyromegaly present  Cardiovascular: Normal rate, regular rhythm, normal heart sounds and intact distal pulses  Exam reveals no gallop and no friction rub  No murmur heard  Pulmonary/Chest: Effort normal and breath sounds normal  No respiratory distress  She has no wheezes  She has no rales  Examination of the right breast demonstrate several nodules consistent with her previous biopsies  There is no axillary adenopathy  The left breast was entirely normal    Abdominal: Soft  She exhibits no distension and no mass  There is no hepatomegaly  There is no tenderness  There is no rebound and no guarding  Musculoskeletal: Normal range of motion  She exhibits no edema or tenderness  Lymphadenopathy:     She has no cervical adenopathy  Neurological: She is alert and oriented to person, place, and time  No cranial nerve deficit  Skin: Skin is warm and dry  No rash noted  No erythema  Psychiatric: She has a normal mood and affect  Her behavior is normal    Vitals reviewed  Results:             Advance Care Planning/Advance Directives:  I discussed the disease status, treatment plans and follow-up with the patient

## 2018-06-21 NOTE — PROGRESS NOTES
Surgical Oncology Follow Up       [address] [facility],[address] [place] [address]  [facility] [address] [address]    Dot Porter  1970  [id number]  [address] [facility],[address] [place] [address]  [facility] [place] [id number]    Chief Complaint   Patient presents with    Breast Cancer     New diagnosis of breast cancer    Consult          Assessment & Plan:   Patient presents for a follow-up visit  She has had her genetic testing presented to our which showed no mutations  She has decided on breast conservation therapy  We went through the process of informed consent for a right breast needle localization lumpectomy in conjunction with a sentinel lymph node biopsy and possible axillary dissection  All questions were answered the patient's satisfaction  The surgery is coordinated for Tuesday in the next couple of weeks  Cancer History:        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 breast in female, estrogen receptor positive ([facility] [address] )    5/29/2018 Biopsy     Right breast biopsy  3 o'clock 6 CMFN  Invasive ductal carcinoma  Grade 1  8 mm on ultrasound  No axillary adenopathy on ultrasound  ER 95  MI 90  Her 2 0  Stage IA         6/7/2018 Genetic Testing     Genetic counselor Appointment              Interval History:   Patient states she has had no new signs or symptoms other than some pulling in the biopsied right breast     Review of Systems:   Review of Systems   Constitutional: Negative for activity change, appetite change and fatigue  HENT: Negative  Eyes: Negative  Respiratory: Negative for cough, shortness of breath and wheezing  Cardiovascular: Negative for chest pain and leg swelling  Gastrointestinal: Negative  Endocrine: Negative  Genitourinary: Negative  Musculoskeletal:        No new changes or complaints of bone pain   Skin: Negative  Allergic/Immunologic: Negative      Neurological: Negative  Hematological: Negative  Psychiatric/Behavioral: Negative  Past Medical History     Patient Active Problem List   Diagnosis    Benign paroxysmal vertigo    Seasonal allergies    Rash    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 breast in female, estrogen receptor positive ([facility] [address] )     No past medical history on file  No past surgical history on file  Family History   Problem Relation Age of Onset    Prostate cancer Father     Breast cancer Maternal Aunt     Ovarian cancer Maternal Aunt     Breast cancer Maternal Grandmother     Stomach cancer Maternal Grandmother     Esophageal cancer Maternal Grandfather      Social History     Social History    Marital status: /Civil Union     Spouse name: N/A    Number of children: N/A    Years of education: N/A     Occupational History    Not on file  Social History Main Topics    Smoking status: Former Smoker     Quit date: 1989    Smokeless tobacco: Never Used    Alcohol use 1 8 oz/week     3 Standard drinks or equivalent per week    Drug use: No    Sexual activity: Not on file     Other Topics Concern    Not on file     Social History Narrative    No narrative on file     No current outpatient prescriptions on file  No Known Allergies    Physical Exam:     Vitals:    06/21/18 1130   BP: 112/78   Pulse: 64   Resp: 14   Temp: 98 3 °F (36 8 °C)     Physical Exam   Constitutional: She is oriented to person, place, and time  She appears well-developed and well-nourished  HENT:   Head: Normocephalic and atraumatic  Mouth/Throat: Oropharynx is clear and moist    Eyes: EOM are normal  Pupils are equal, round, and reactive to light  Neck: Normal range of motion  Neck supple  No JVD present  No tracheal deviation present  No thyromegaly present  Cardiovascular: Normal rate, regular rhythm, normal heart sounds and intact distal pulses  Exam reveals no gallop and no friction rub      No murmur heard   Pulmonary/Chest: Effort normal and breath sounds normal  No respiratory distress  She has no wheezes  She has no rales  Examination of the right breast demonstrate several nodules consistent with her previous biopsies  There is no axillary adenopathy  The left breast was entirely normal    Abdominal: Soft  She exhibits no distension and no mass  There is no hepatomegaly  There is no tenderness  There is no rebound and no guarding  Musculoskeletal: Normal range of motion  She exhibits no edema or tenderness  Lymphadenopathy:     She has no cervical adenopathy  Neurological: She is alert and oriented to person, place, and time  No cranial nerve deficit  Skin: Skin is warm and dry  No rash noted  No erythema  Psychiatric: She has a normal mood and affect  Her behavior is normal    Vitals reviewed  Results:             Advance Care Planning/Advance Directives:  I discussed the disease status, treatment plans and follow-up with the patient

## 2018-06-21 NOTE — PATIENT INSTRUCTIONS
[place] Lymph Node Biopsy   AMBULATORY CARE:   What you need to know about a sentinel lymph node biopsy:  A sentinel lymph node (SLN) is usually the lymph node closest to a tumor  A biopsy is a procedure used to find and remove a SLN  During the biopsy, the SLN will be tested for cancer cells  If the test is positive, it may mean that cancer has spread to other places in your body  This information can help your healthcare provider decide what other treatments you need  How to prepare for a sentinel lymph node biopsy:   · You may need a nuclear scan before your procedure  During a nuclear scan, healthcare providers will inject a small amount of radioactive liquid near the tumor  Radioactive liquid will move to the location of your lymph nodes and help them show up better in pictures  A camera will take pictures of the lymph nodes  The pictures will help your healthcare provider plan for your procedure  · Your healthcare provider will talk to you about how to prepare for your procedure  He may tell you not to eat or drink anything after midnight on the day of your procedure  He will tell you what medicines to take or not take on the day of your procedure  You may be given contrast liquid during your biopsy  Tell your healthcare provider if you have ever had an allergic reaction to contrast liquid  Arrange for someone to drive you home and stay with you after your procedure  What will happen during a sentinel lymph node biopsy:   · You may be given an antibiotic through your IV to help prevent a bacterial infection  You may be given general anesthesia to keep you asleep and free from pain during procedure  You may instead be given local anesthesia to numb the area  With local anesthesia, you may still feel pressure or pushing during the procedure, but you should not feel any pain  · Your healthcare provider will inject blue contrast liquid, radioactive liquid, or both near the tumor   The liquid will move to the SLN  Your healthcare provider may use an instrument to help find the SLN  He will do this by gently moving an instrument over your skin  The instrument will show pictures of the SLN on a monitor  Your healthcare provider will make a small incision in the skin that covers the SLN  The SLN will be removed and checked for cancer cells  If cancer is found, your healthcare provider may remove several more lymph nodes for testing  Your incision may be closed with stitches or strips of medical tape and covered with a bandage  What will happen after a sentinel lymph node biopsy:  Healthcare providers will monitor you until you are awake  You may be able to go home after you are awake and your pain is controlled  Your urine or bowel movement may be blue for 24 to 48 hours after your procedure  This is caused by the blue contrast liquid given to you during the procedure  You may have bruising or swelling at the biopsy site  This is normal and expected  The arm or leg closest to the biopsy site may be sore  This should get better within 48 to 72 hours  Risks of a sentinel lymph node biopsy: You may bleed more than expected or get an infection  You may develop a condition called lymphedema  Lymphedema is tissue swelling in the body part nearest to where the SLN was removed  You may have long-term pain or discomfort in this area  Your skin in this area may be permanently thick or hard  Your nerves may be damaged during the procedure  This may cause numbness or tingling where the SLN was removed  It may also cause difficulty moving the body part closest to the SLN  You may have an allergic reaction to the contrast liquid  This may require medicine or other treatments  Seek care immediately if:   · Blood soaks through your bandage  · Your stitches come apart  · Your bruise suddenly gets larger and feels firm  Contact your healthcare provider if:   · You have a fever or chills      · Your wound is red, swollen, or draining pus  · You have nausea or are vomiting  · Your skin is itchy, swollen, or you have a rash  · Your pain does not get better after you take medicine for pain  · You have questions or concerns about your condition or care  Medicines: You may need any of the following:  · NSAIDs , such as ibuprofen, help decrease swelling, pain, and fever  This medicine is available with or without a doctor's order  NSAIDs can cause stomach bleeding or kidney problems in certain people  If you take blood thinner medicine, always ask your healthcare provider if NSAIDs are safe for you  Always read the medicine label and follow directions  · Acetaminophen  decreases pain and fever  It is available without a doctor's order  Ask how much to take and how often to take it  Follow directions  Read the labels of all other medicines you are using to see if they also contain acetaminophen, or ask your doctor or pharmacist  Acetaminophen can cause liver damage if not taken correctly  Do not use more than 4 grams (4,000 milligrams) total of acetaminophen in one day  · Prescription pain medicine  may be given  Ask your healthcare provider how to take this medicine safely  Some prescription pain medicines contain acetaminophen  Do not take other medicines that contain acetaminophen without talking to your healthcare provider  Too much acetaminophen may cause liver damage  Prescription pain medicine may cause constipation  Ask your healthcare provider how to prevent or treat constipation  · Take your medicine as directed  Contact your healthcare provider if you think your medicine is not helping or if you have side effects  Tell him or her if you are allergic to any medicine  Keep a list of the medicines, vitamins, and herbs you take  Include the amounts, and when and why you take them  Bring the list or the pill bottles to follow-up visits   Carry your medicine list with you in case of an emergency  Care for your wound as directed:  Ask your healthcare provider when your incision can get wet  Carefully wash around the incision with soap and water  It is okay to let soap and water gently run over your incision  Do not  scrub your incision  Gently pat dry the area and put on new, clean bandages as directed  Change your bandages when they get wet or dirty  If you have strips of medical tape, let them fall of on their own  It may take 10 to 14 days for them to fall off  Check your incision every day for signs of infection, such as redness, swelling, or pus  Do not put powders or lotions on your incision  If lymph nodes have been taken from your armpit, ask your healthcare provider when you can wear deodorant  Self-care:   · Apply ice  on your incision for 15 to 20 minutes every hour or as directed  Use an ice pack, or put crushed ice in a plastic bag  Cover it with a towel before you apply it to your skin  Ice helps prevent tissue damage and decreases swelling and pain  · Elevate  your arm or leg nearest to the biopsy site as often as you can  This will help decrease swelling and pain  Prop your arm or leg on pillows or blankets to keep it elevated above the level of your heart comfortably  · Do not do strenuous activities  for 24 to 48 hours  Strenuous activities include heavy lifting, sports, or running  If lymph nodes were taken from your armpit, do not push or pull with your arm  These activities may put too much stress on your incision  Rest and take short walks around the house  Ask your healthcare provider when you can return to your normal activities  · Drink plenty of liquids  as directed  This will help flush out contrast liquid from your body  Ask how much liquid to drink each day and which liquids are best for you  Ask your healthcare provider how to prevent lymphedema and infection:  Lymphedema is fluid buildup in fatty tissues under your skin   Lymphedema may happen where lymph nodes were removed or in the arm or leg closest to this area  An infection in your skin can make lymphedema worse  Ask your healthcare provider how you can decrease your risk for skin infections and lymphedema  Follow up with your healthcare provider as directed:  Write down your questions so you remember to ask them during your visits  © 2017 [employer] Information is for End User's use only and may not be sold, redistributed or otherwise used for commercial purposes  All illustrations and images included in CareNotes® are the copyrighted property of [employer]  or [employer]  The above information is an  only  It is not intended as medical advice for individual conditions or treatments  Talk to your doctor, nurse or pharmacist before following any medical regimen to see if it is safe and effective for you  Breast Lumpectomy   AMBULATORY CARE:   What you need to know about a lumpectomy:  A lumpectomy is surgery to remove a mass in your breast  Breast tissue that surrounds the mass may also be taken  A lumpectomy is also known as breast-conserving surgery, a partial mastectomy, or a segmental mastectomy  How to prepare for a lumpectomy:   · You may need a mammogram or ultrasound before surgery  These tests may be done the same day as your surgery or at an earlier time  Your healthcare provider may use pictures from these tests to javed the location of the mass  The marker will show him where to make your incision  · Your healthcare provider will talk to you about how to prepare for surgery  He may tell you not to eat or drink anything after midnight on the day of your surgery  He will tell you what medicines to take or not take on the day of your surgery  You may need to stop taking blood thinners or aspirin several days before your surgery  Arrange for someone to drive you home and stay with you for 24 hours after surgery   This person can help care for you, and monitor for any problems  What will happen during a lumpectomy:  You will be given general anesthesia to keep you asleep and free from pain during surgery  You may be given an antibiotic through your IV to help prevent a bacterial infection  Your healthcare provider will make an incision in your breast and remove the mass  He may also remove breast tissue or lymph nodes that are close to the mass  A drain may be inserted near your incision to remove extra fluid  This will decrease swelling and help your incision heal  Your healthcare provider will close your incision with stitches or strips of medical tape and cover it with a bandage  He may also wrap a tight-fitting bandage around both of your breasts  This may decrease swelling, bleeding, and pain  What will happen after a lumpectomy:  Healthcare providers will monitor you until you are awake  You may able to go home when you are awake and your pain is controlled  Instead you may need to spend the night in the hospital    Risks of a lumpectomy:  You may bleed more than expected or get an infection  Nerves, blood vessels, and muscles may be damaged during your surgery  You may have swelling in your arm closest to the lumpectomy or where lymph nodes were removed  This swelling is called lymphedema  Lymphedema may cause tingling, numbness, stiffness, and weakness in your arm  This may be permanent  You may get a blood clot in your arm or leg  The blood clot may travel to your heart lungs, or brain  This may become life-threatening  Call 911 for any of the following:   · You feel lightheaded, short of breath, and have chest pain  · You cough up blood  · You have trouble breathing  Seek care immediately if:   · Blood soaks through your bandage  · Your stitches come apart  · Your bruise suddenly gets bigger  · Your leg or arm is larger than normal and painful    Contact your healthcare provider if:   · You have a fever or chills  · Your wound is red, swollen, or draining pus  · You have nausea or are vomiting  · Your skin is itchy, swollen, or you have a rash  · Your pain does not get better after you take pain medicine  · Your drain falls out or stops draining fluid  · Your drain has pus or foul-smelling fluid coming out of it  · You have questions or concerns about your condition or care  Medicines: You may need any of the following:  · Antibiotics  help prevent a bacterial infection  · Prescription pain medicine  may be given  Ask your healthcare provider how to take this medicine safely  Some prescription pain medicines contain acetaminophen  Do not take other medicines that contain acetaminophen without talking to your healthcare provider  Too much acetaminophen may cause liver damage  Prescription pain medicine may cause constipation  Ask your healthcare provider how to prevent or treat constipation  · NSAIDs , such as ibuprofen, help decrease swelling, pain, and fever  NSAIDs can cause stomach bleeding or kidney problems in certain people  If you take blood thinner medicine, always ask your healthcare provider if NSAIDs are safe for you  Always read the medicine label and follow directions  · Take your medicine as directed  Contact your healthcare provider if you think your medicine is not helping or if you have side effects  Tell him or her if you are allergic to any medicine  Keep a list of the medicines, vitamins, and herbs you take  Include the amounts, and when and why you take them  Bring the list or the pill bottles to follow-up visits  Carry your medicine list with you in case of an emergency  Care for your wound as directed: If you have a tight-fitting bandage, you can remove it in 24 to 48 hours, or as directed  Ask your healthcare provider when your incision can get wet  You may need to take a sponge bath until your drain is removed   Carefully wash around the incision with soap and water  It is okay to allow the soap and water to gently run over your incision  Gently pat dry the area and put on new, clean bandages as directed  Change your bandages when they get wet or dirty  Check your incision every day for redness, pus, or swelling  Self-care:   · Apply ice  on your breast for 15 to 20 minutes every hour or as directed  Use an ice pack, or put crushed ice in a plastic bag  Cover it with a towel  Ice helps prevent tissue damage and decreases swelling and pain  · Rest  as directed  Do not lift anything heavy  Do not push or pull with your arms  Take short walks around the house  Gradually walk further as you feel better  Ask your healthcare provider when you can return to your normal activities  · Empty your drain  as directed  You may need to write down how much you empty from your drain each day  Ask your healthcare provider for more information about how to empty your drain  · Wear a supportive bra  as directed  Wait until you remove the tight-fitting bandage to wear a bra  You may be given a surgical bra or told to wear a sports bra  A supportive bra may help hold your bandages in place  It may also help with swelling and pain  Do not  wear bras with lace or underwire  They may rub against your incision and cause discomfort  Arm stretches: Your healthcare provider may show you how to do arm stretches  Arm stretches may prevent stiff arms or shoulders  You may need to wait until after your drains are removed to begin stretching  Do not do arm stretches until your healthcare provider says it is okay  Ask your healthcare provider for more information about arm stretches  Follow up with your healthcare provider as directed:  Write down your questions so you remember to ask them during your visits  © 2017 [employer] Information is for End User's use only and may not be sold, redistributed or otherwise used for commercial purposes   All illustrations and images included in CareNotes® are the copyrighted property of [employer]  or [employer]  The above information is an  only  It is not intended as medical advice for individual conditions or treatments  Talk to your doctor, nurse or pharmacist before following any medical regimen to see if it is safe and effective for you

## 2018-07-03 NOTE — PRE-PROCEDURE INSTRUCTIONS
Pre-Surgery Instructions:   Medication Instructions    loratadine (CLARITIN) 5 MG chewable tablet Instructed patient per Anesthesia Guidelines   oxyCODONE-acetaminophen (PERCOCET) 5-325 mg per tablet Instructed patient per Anesthesia Guidelines  Pre-op and bathing instructions given  Pt has hibiclens

## 2018-07-09 ENCOUNTER — ANESTHESIA EVENT (OUTPATIENT)
Dept: PERIOP | Facility: HOSPITAL | Age: 48
End: 2018-07-09
Payer: COMMERCIAL

## 2018-07-10 ENCOUNTER — HOSPITAL ENCOUNTER (OUTPATIENT)
Dept: MAMMOGRAPHY | Facility: HOSPITAL | Age: 48
Discharge: HOME | End: 2018-07-10
Attending: SURGERY
Payer: COMMERCIAL

## 2018-07-10 ENCOUNTER — HOSPITAL ENCOUNTER (OUTPATIENT)
Facility: HOSPITAL | Age: 48
Setting detail: OUTPATIENT SURGERY
Discharge: HOME | End: 2018-07-10
Attending: SURGERY | Admitting: SURGERY
Payer: COMMERCIAL

## 2018-07-10 ENCOUNTER — ANESTHESIA (OUTPATIENT)
Dept: PERIOP | Facility: HOSPITAL | Age: 48
End: 2018-07-10
Payer: COMMERCIAL

## 2018-07-10 ENCOUNTER — APPOINTMENT (OUTPATIENT)
Dept: MAMMOGRAPHY | Facility: HOSPITAL | Age: 48
End: 2018-07-10
Payer: COMMERCIAL

## 2018-07-10 ENCOUNTER — HOSPITAL ENCOUNTER (OUTPATIENT)
Dept: NUCLEAR MEDICINE | Facility: HOSPITAL | Age: 48
Setting detail: OUTPATIENT SURGERY
Discharge: HOME | End: 2018-07-10
Attending: SURGERY
Payer: COMMERCIAL

## 2018-07-10 VITALS
DIASTOLIC BLOOD PRESSURE: 65 MMHG | RESPIRATION RATE: 20 BRPM | OXYGEN SATURATION: 100 % | HEIGHT: 63 IN | TEMPERATURE: 97.3 F | WEIGHT: 113 LBS | SYSTOLIC BLOOD PRESSURE: 119 MMHG | BODY MASS INDEX: 20.02 KG/M2 | HEART RATE: 69 BPM

## 2018-07-10 DIAGNOSIS — Z17.0 MALIGNANT NEOPLASM OF UPPER-INNER QUADRANT OF RIGHT BREAST IN FEMALE, ESTROGEN RECEPTOR POSITIVE (HCC): ICD-10-CM

## 2018-07-10 DIAGNOSIS — C50.211 MALIGNANT NEOPLASM OF UPPER-INNER QUADRANT OF RIGHT BREAST IN FEMALE, ESTROGEN RECEPTOR POSITIVE (HCC): ICD-10-CM

## 2018-07-10 DIAGNOSIS — C50.211 MALIGNANT NEOPLASM OF UPPER-INNER QUADRANT OF RIGHT FEMALE BREAST (HCC): ICD-10-CM

## 2018-07-10 PROCEDURE — 19281 PERQ DEVICE BREAST 1ST IMAG: CPT

## 2018-07-10 PROCEDURE — 88307 TISSUE EXAM BY PATHOLOGIST: CPT | Performed by: PATHOLOGY

## 2018-07-10 PROCEDURE — 38525 BIOPSY/REMOVAL LYMPH NODES: CPT | Performed by: SURGERY

## 2018-07-10 PROCEDURE — 88342 IMHCHEM/IMCYTCHM 1ST ANTB: CPT | Performed by: PATHOLOGY

## 2018-07-10 PROCEDURE — 38900 IO MAP OF SENT LYMPH NODE: CPT | Performed by: SURGERY

## 2018-07-10 PROCEDURE — 38792 RA TRACER ID OF SENTINL NODE: CPT

## 2018-07-10 PROCEDURE — 19301 PARTIAL MASTECTOMY: CPT | Performed by: PHYSICIAN ASSISTANT

## 2018-07-10 PROCEDURE — A9541 TC99M SULFUR COLLOID: HCPCS

## 2018-07-10 PROCEDURE — 38792 RA TRACER ID OF SENTINL NODE: CPT | Performed by: SURGERY

## 2018-07-10 PROCEDURE — 19301 PARTIAL MASTECTOMY: CPT | Performed by: SURGERY

## 2018-07-10 RX ORDER — ONDANSETRON 2 MG/ML
INJECTION INTRAMUSCULAR; INTRAVENOUS AS NEEDED
Status: DISCONTINUED | OUTPATIENT
Start: 2018-07-10 | End: 2018-07-10 | Stop reason: SURG

## 2018-07-10 RX ORDER — SODIUM CHLORIDE, SODIUM LACTATE, POTASSIUM CHLORIDE, CALCIUM CHLORIDE 600; 310; 30; 20 MG/100ML; MG/100ML; MG/100ML; MG/100ML
INJECTION, SOLUTION INTRAVENOUS CONTINUOUS PRN
Status: DISCONTINUED | OUTPATIENT
Start: 2018-07-10 | End: 2018-07-10 | Stop reason: SURG

## 2018-07-10 RX ORDER — LIDOCAINE WITH 8.4% SOD BICARB 0.9%(10ML)
5 SYRINGE (ML) INJECTION ONCE
Status: COMPLETED | OUTPATIENT
Start: 2018-07-10 | End: 2018-07-10

## 2018-07-10 RX ORDER — PROPOFOL 10 MG/ML
INJECTION, EMULSION INTRAVENOUS AS NEEDED
Status: DISCONTINUED | OUTPATIENT
Start: 2018-07-10 | End: 2018-07-10 | Stop reason: SURG

## 2018-07-10 RX ORDER — KETOROLAC TROMETHAMINE 30 MG/ML
INJECTION, SOLUTION INTRAMUSCULAR; INTRAVENOUS AS NEEDED
Status: DISCONTINUED | OUTPATIENT
Start: 2018-07-10 | End: 2018-07-10 | Stop reason: SURG

## 2018-07-10 RX ORDER — BUPIVACAINE HYDROCHLORIDE AND EPINEPHRINE 2.5; 5 MG/ML; UG/ML
INJECTION, SOLUTION INFILTRATION; PERINEURAL AS NEEDED
Status: DISCONTINUED | OUTPATIENT
Start: 2018-07-10 | End: 2018-07-10 | Stop reason: HOSPADM

## 2018-07-10 RX ORDER — ONDANSETRON 2 MG/ML
4 INJECTION INTRAMUSCULAR; INTRAVENOUS EVERY 4 HOURS PRN
Status: DISCONTINUED | OUTPATIENT
Start: 2018-07-10 | End: 2018-07-10 | Stop reason: HOSPADM

## 2018-07-10 RX ORDER — FENTANYL CITRATE/PF 50 MCG/ML
25 SYRINGE (ML) INJECTION
Status: DISCONTINUED | OUTPATIENT
Start: 2018-07-10 | End: 2018-07-10 | Stop reason: HOSPADM

## 2018-07-10 RX ORDER — OXYCODONE HYDROCHLORIDE AND ACETAMINOPHEN 5; 325 MG/1; MG/1
1 TABLET ORAL EVERY 4 HOURS PRN
Status: DISCONTINUED | OUTPATIENT
Start: 2018-07-10 | End: 2018-07-10 | Stop reason: HOSPADM

## 2018-07-10 RX ADMIN — PROPOFOL 200 MG: 10 INJECTION, EMULSION INTRAVENOUS at 08:05

## 2018-07-10 RX ADMIN — SODIUM CHLORIDE, SODIUM LACTATE, POTASSIUM CHLORIDE, AND CALCIUM CHLORIDE: .6; .31; .03; .02 INJECTION, SOLUTION INTRAVENOUS at 08:02

## 2018-07-10 RX ADMIN — ONDANSETRON 4 MG: 2 INJECTION INTRAMUSCULAR; INTRAVENOUS at 08:13

## 2018-07-10 RX ADMIN — KETOROLAC TROMETHAMINE 30 MG: 30 INJECTION, SOLUTION INTRAMUSCULAR at 08:48

## 2018-07-10 RX ADMIN — LIDOCAINE HYDROCHLORIDE 100 MG: 20 INJECTION, SOLUTION INTRAVENOUS at 08:05

## 2018-07-10 RX ADMIN — CEFAZOLIN SODIUM 1000 MG: 1 SOLUTION INTRAVENOUS at 08:02

## 2018-07-10 RX ADMIN — LIDOCAINE HYDROCHLORIDE 2 ML: 10 INJECTION, SOLUTION INFILTRATION; PERINEURAL at 07:25

## 2018-07-10 RX ADMIN — DEXAMETHASONE SODIUM PHOSPHATE 4 MG: 10 INJECTION INTRAMUSCULAR; INTRAVENOUS at 08:13

## 2018-07-10 NOTE — ANESTHESIA PREPROCEDURE EVALUATION
Review of Systems/Medical History  Patient summary reviewed  Chart reviewed  No history of anesthetic complications     Cardiovascular  Negative cardio ROS    Pulmonary  Negative pulmonary ROS        GI/Hepatic  Negative GI/hepatic ROS          Negative  ROS        Endo/Other  Negative endo/other ROS      GYN    Breast cancer   Comment: Right sided breast cancer     Hematology  Negative hematology ROS      Musculoskeletal  Negative musculoskeletal ROS        Neurology      Comment: [facility]   Negative psychology ROS              Physical Exam    Airway    Mallampati score: II  TM Distance: >3 FB  Neck ROM: full     Dental   No notable dental hx     Cardiovascular  Comment: Negative ROS, Rhythm: regular, Rate: normal, Cardiovascular exam normal    Pulmonary  Pulmonary exam normal Breath sounds clear to auscultation,     Other Findings        Anesthesia Plan  ASA Score- 3     Anesthesia Type- general with ASA Monitors  Additional Monitors:   Airway Plan: LMA  Plan Factors-    Induction- intravenous  Postoperative Plan- Plan for postoperative opioid use  Informed Consent- Anesthetic plan and risks discussed with patient  I personally reviewed this patient with the CRNA  Discussed and agreed on the Anesthesia Plan with the CRNA  Vera Le Recent labs personally reviewed:  Lab Results   Component Value Date    WBC 7 14 06/07/2018    HGB 14 5 06/07/2018     06/07/2018     Lab Results   Component Value Date     06/07/2018    K 3 7 06/07/2018    BUN 18 06/07/2018    CREATININE 0 78 06/07/2018     I, Coleman Oliva MD, have personally seen and evaluated the patient prior to anesthetic care  I have reviewed the pre-anesthetic record, and other medical records if appropriate to the anesthetic care  If a CRNA is involved in the case, I have reviewed the CRNA assessment, if present, and agree   Risks/benefits and alternatives discussed with patient including possible PONV, sore throat, and possibility of rare anesthetic and surgical emergencies

## 2018-07-10 NOTE — OP NOTE
OPERATIVE REPORT  PATIENT NAME: Simon Arreola    :  1970  MRN: [id number]  Pt Location: AN OR ROOM 02    SURGERY DATE: 7/10/2018    Surgeon(s) and Role:     * Myriam Perry MD - Primary     * Willie Wallace PA-C - Assisting    Preop Diagnosis:  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 breast in female, estrogen receptor positive ([facility] [address] ) [C50 211, Z17 0]    Post-Op Diagnosis Codes:     * 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 breast in female, estrogen receptor positive ([facility] [address] ) [C50 211, Z17 0]    Procedure(s) (LRB):  BREAST LUMPECTOMY; BREAST NEEDLE LOCALIZATION (NEEDLE LOC AT 0700) (Right)  SENTINEL LYMPH NODE BIOPSY; LYMPHATIC MAPPING WITH BLUE DYE RADIOACTIVE DYE (INJECT AT 0800 BY DR CHAUDHARY IN THE OR) (Right)    Specimen(s):  ID Type Source Tests Collected by Time Destination   1 : Right breast lumpectomy Tissue Breast, Right TISSUE Aminata Quinones MD 7/10/2018 2490    2 : Right sentinel lymph node Tissue Lymph Node, [place] TISSUE EXAM Myriam Perry MD 7/10/2018 6178    3 : Right breast inferior margin blue  Tissue Breast, Right TISSUE Aminata Quinones MD 7/10/2018 9809    4 : Right breast lateral margin red Tissue Breast, Right TISSUE Aminata Quinones MD 7/10/2018 7685    5 : Right breast medial margin yellow Tissue Breast, Right MARCIA Quinones MD 7/10/2018 7926    6 : Right breast superior margin orange  Tissue Breast, Right TISSUE EXAM Myriam ePrry MD 7/10/2018 9341        Estimated Blood Loss:   Minimal    Drains:       Anesthesia Type:   General    Operative Indications:  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 breast in female, estrogen receptor positive ([facility] [address] ) [C50 211, Z17 0]      Operative Findings:  sln blue,hot and grossly normal, good specimen radiograph, close posteriorly, but muscle final margin  Complications:   None    Procedure and Technique:  Lumpectomy  The patient was brought to the operation room and placed under general anesthesia    Attention was paid to ensure appropriate padding and correct positioning  The right breast was injected intradermally with 0 3cc of methylene blue and technetium  This area was vigorously massaged to facilitate identification of the sentinel lymph node (SLN)  The breast and axillary region were then prepped and draped in a sterile fashion  I initiated a time-out, identifying the patient, the correct side and the above procedure  All parties agreed with the time out  The planned incision was then injected with 0 25% Marcaine and epinephrine for local anesthesia  The incision was sharply incised  The localizing wire was used to guide the dissection  Superior, inferior, medial and lateral planes were developed around the localizing wire and the specimen truncated posteriorly with electrocautery just beyond the tip of the wire  The specimen was then inked for orientation purposes  A specimen radiograph was taken in two dimensions and additional margins were removed to optimize complete removal of the tumor  The additional margins were inked on the most distal area  Skin was anterior, muscle was final posterior  All specimens were sent to pathology for permanent analysis  Superficial bleeding controlled with electrocautery and the wound was extensively irrigated  The wound was closed with two layers, an inner layer of 3-0 vicryl and a subcuticular layer of 4-0 monocryl  Histocryl and steri-strips were applied  SLN Biopsy  The previously marked location of the sentinel lymph node was injected with 0 25% Marcaine and epinephrine  A curivilinear incision was made and dissection was taken down into the axillar proper with electrocautery  The fadi counter was used to help localize the SLN  The sentinel lymph node was identified and removed with electrocautery  SLN Findings  The SLN was blue and radioactive  The lymph node was grossly normal and sent for permanent pathologic analysis       A qualified resident physician was not available    Patient Disposition:  PACU     SIGNATURE: Carline Lin MD  DATE: July 10, 2018  TIME: 8:49 AM

## 2018-07-10 NOTE — DISCHARGE INSTRUCTIONS
POST-OPERATIVE BREAST CARE INSTRUCTIONS    Wound Closure/Dressing: Your wound is closed with:   Steri strips-white pieces of tape that hold your incision together along with surgical glue           Dissolvable sutures-underneath the skin    Wound care:     If you have gauze over your wound you may remove it the day after surgery  Leave the Steri-strips on, they will fall off on their own in 1-2 weeks   You may shower using soap and water to clean your wound  Gently pat dry  Drain Care: If you do not have a drain, disregard this section   Visiting Nursing should have been arranged upon discharge from hospital   A nurse will call your home to schedule an initial visit  Please call the number below if you have not received a call within 48 hours of hospital discharge   You may shower with the GRACIE drains  Wash area around the drains with soap and water and pat dry   Record drain outputs on chart given to you at pre op visit and bring that chart to office at post op appt   GRACIE drains can be removed in the office by a nurse either at post op visit OR when drain output is 30 ccs or less in a 24 hour period for three days in a row   If you had plastic surgery or reconstructive surgery, the plastic surgeon will manage the drains  Other:       You can begin wearing your own bra when it feels comfortable   You may resume using deodorant the day after surgery                 Post-op visit:  your post-op visit is scheduled for:  2018 @ Noon    Call our office at [phone] if you have any  of the followin  Redness, swelling, heat, unusual drainage or heavy bleeding from wound  2  Fever greater than 101 °  3  Pain not relieved by medication

## 2018-07-10 NOTE — ANESTHESIA POSTPROCEDURE EVALUATION
Post-Op Assessment Note      CV Status:  Stable    Mental Status:  Alert and awake    Hydration Status:  Euvolemic    PONV Controlled:  Controlled    Airway Patency:  Patent    Post Op Vitals Reviewed: Yes          Staff: CRNA, Anesthesiologist           BP   110/57   Temp   97 1   Pulse  74   Resp   12   SpO2   100

## 2018-07-10 NOTE — H&P (VIEW-ONLY)
Surgical Oncology Follow Up       [address] [facility],[address] [place] [address]  [facility] [address] [address]    Ab Pavon  1970  [id number]  [address]  [facility] [place] [id number]    Chief Complaint   Patient presents with    Breast Cancer     New diagnosis of breast cancer    Consult          Assessment & Plan:   Patient presents for a follow-up visit  She has had her genetic testing presented to our which showed no mutations  She has decided on breast conservation therapy  We went through the process of informed consent for a right breast needle localization lumpectomy in conjunction with a sentinel lymph node biopsy and possible axillary dissection  All questions were answered the patient's satisfaction  The surgery is coordinated for Tuesday in the next couple of weeks  Cancer History:        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 breast in female, estrogen receptor positive ([facility] [address] )    5/29/2018 Biopsy     Right breast biopsy  3 o'clock 6 CMFN  Invasive ductal carcinoma  Grade 1  8 mm on ultrasound  No axillary adenopathy on ultrasound  ER 95  DE 90  Her 2 0  Stage IA         6/7/2018 Genetic Testing     Genetic counselor Appointment              Interval History:   Patient states she has had no new signs or symptoms other than some pulling in the biopsied right breast     Review of Systems:   Review of Systems   Constitutional: Negative for activity change, appetite change and fatigue  HENT: Negative  Eyes: Negative  Respiratory: Negative for cough, shortness of breath and wheezing  Cardiovascular: Negative for chest pain and leg swelling  Gastrointestinal: Negative  Endocrine: Negative  Genitourinary: Negative  Musculoskeletal:        No new changes or complaints of bone pain   Skin: Negative  Allergic/Immunologic: Negative      Neurological: Negative  Hematological: Negative  Psychiatric/Behavioral: Negative  Past Medical History     Patient Active Problem List   Diagnosis    Benign paroxysmal vertigo    Seasonal allergies    Rash    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 breast in female, estrogen receptor positive ([facility] [address] )     No past medical history on file  No past surgical history on file  Family History   Problem Relation Age of Onset    Prostate cancer Father     Breast cancer Maternal Aunt     Ovarian cancer Maternal Aunt     Breast cancer Maternal Grandmother     Stomach cancer Maternal Grandmother     Esophageal cancer Maternal Grandfather      Social History     Social History    Marital status: /Civil Union     Spouse name: N/A    Number of children: N/A    Years of education: N/A     Occupational History    Not on file  Social History Main Topics    Smoking status: Former Smoker     Quit date: 1989    Smokeless tobacco: Never Used    Alcohol use 1 8 oz/week     3 Standard drinks or equivalent per week    Drug use: No    Sexual activity: Not on file     Other Topics Concern    Not on file     Social History Narrative    No narrative on file     No current outpatient prescriptions on file  No Known Allergies    Physical Exam:     Vitals:    06/21/18 1130   BP: 112/78   Pulse: 64   Resp: 14   Temp: 98 3 °F (36 8 °C)     Physical Exam   Constitutional: She is oriented to person, place, and time  She appears well-developed and well-nourished  HENT:   Head: Normocephalic and atraumatic  Mouth/Throat: Oropharynx is clear and moist    Eyes: EOM are normal  Pupils are equal, round, and reactive to light  Neck: Normal range of motion  Neck supple  No JVD present  No tracheal deviation present  No thyromegaly present  Cardiovascular: Normal rate, regular rhythm, normal heart sounds and intact distal pulses  Exam reveals no gallop and no friction rub      No murmur heard   Pulmonary/Chest: Effort normal and breath sounds normal  No respiratory distress  She has no wheezes  She has no rales  Examination of the right breast demonstrate several nodules consistent with her previous biopsies  There is no axillary adenopathy  The left breast was entirely normal    Abdominal: Soft  She exhibits no distension and no mass  There is no hepatomegaly  There is no tenderness  There is no rebound and no guarding  Musculoskeletal: Normal range of motion  She exhibits no edema or tenderness  Lymphadenopathy:     She has no cervical adenopathy  Neurological: She is alert and oriented to person, place, and time  No cranial nerve deficit  Skin: Skin is warm and dry  No rash noted  No erythema  Psychiatric: She has a normal mood and affect  Her behavior is normal    Vitals reviewed  Results:             Advance Care Planning/Advance Directives:  I discussed the disease status, treatment plans and follow-up with the patient

## 2018-07-25 ENCOUNTER — OFFICE VISIT (OUTPATIENT)
Dept: SURGICAL ONCOLOGY | Facility: CLINIC | Age: 48
End: 2018-07-25

## 2018-07-25 ENCOUNTER — OFFICE VISIT (OUTPATIENT)
Dept: HEMATOLOGY ONCOLOGY | Facility: CLINIC | Age: 48
End: 2018-07-25
Payer: COMMERCIAL

## 2018-07-25 VITALS
RESPIRATION RATE: 16 BRPM | HEART RATE: 71 BPM | BODY MASS INDEX: 20.38 KG/M2 | HEIGHT: 63 IN | SYSTOLIC BLOOD PRESSURE: 108 MMHG | OXYGEN SATURATION: 98 % | DIASTOLIC BLOOD PRESSURE: 54 MMHG | TEMPERATURE: 97.1 F | WEIGHT: 115 LBS

## 2018-07-25 VITALS
HEIGHT: 63 IN | TEMPERATURE: 98.3 F | BODY MASS INDEX: 20.38 KG/M2 | WEIGHT: 115 LBS | HEART RATE: 72 BPM | RESPIRATION RATE: 16 BRPM | SYSTOLIC BLOOD PRESSURE: 128 MMHG | DIASTOLIC BLOOD PRESSURE: 74 MMHG

## 2018-07-25 DIAGNOSIS — C50.211 MALIGNANT NEOPLASM OF UPPER-INNER QUADRANT OF RIGHT BREAST IN FEMALE, ESTROGEN RECEPTOR POSITIVE (HCC): Primary | ICD-10-CM

## 2018-07-25 DIAGNOSIS — Z17.0 MALIGNANT NEOPLASM OF UPPER-INNER QUADRANT OF RIGHT BREAST IN FEMALE, ESTROGEN RECEPTOR POSITIVE (HCC): Primary | ICD-10-CM

## 2018-07-25 PROCEDURE — 99024 POSTOP FOLLOW-UP VISIT: CPT | Performed by: SURGERY

## 2018-07-25 PROCEDURE — 99245 OFF/OP CONSLTJ NEW/EST HI 55: CPT | Performed by: INTERNAL MEDICINE

## 2018-07-25 RX ORDER — TAMOXIFEN CITRATE 20 MG/1
20 TABLET ORAL DAILY
Qty: 90 TABLET | Refills: 1 | Status: SHIPPED | OUTPATIENT
Start: 2018-07-25 | End: 2019-01-17 | Stop reason: SDUPTHER

## 2018-07-25 NOTE — LETTER
July 25, 2018     Jonny Cheney MD  [facility] [address] [id number]    Patient: Tony Mac   YOB: 1970   Date of Visit: 7/25/2018       Dear Dr Malinda Araujo:    Thank you for referring Tony Mac to me for evaluation  Below are my notes for this consultation  If you have questions, please do not hesitate to call me  I look forward to following your patient along with you  Sincerely,        Joelle Callaway MD        CC: MD Dominick Castillo DO Glenice Ryder, MD  7/25/2018  2:31 PM  Sign at close encounter  Hematology / Oncology Outpatient Consult Note    Tony Mac 51 y o  female [id number]         Date:  7/25/2018    Assessment / Plan:  A 26-year-old premenopausal woman with newly diagnosed stage IA right breast cancer, grade 1, ER 95% positive, ME 90% positive, HER2 negative disease  She underwent lumpectomy with sentinel lymph node biopsy, resulting in LESLI  She presents today to discuss adjuvant treatment options  We had extensive discussion regarding the diagnosis, tumor characteristics, staging information, good prognosis and treatment options  Based on her tumor size and well-differentiated ER strongly positive disease, adjuvant chemotherapy is not indicated  I do not think genomic testing is indicated either  I recommended her to have adjuvant hormonal therapy which will be tamoxifen since she is premenopausal   Side effects of tamoxifen was thoroughly discussed, including but not limited to hot flashes, irregularity of menstrual cycle, small risk of DVT, stroke and endometrial cancer  She understood and wished to proceed  Since she is not going to have adjuvant chemotherapy, she may start adjuvant radiation therapy any time  I will see her again in 6 months for routine follow-up  All the patient questions were answered to her satisfaction          Subjective:     HPI:  A 26-year-old premenopausal woman who was found to have abnormality in her right breast, based on a screening mammography  Therefore, she underwent stereotactic right breast biopsy in May 29, 2018 which showed invasive ductal carcinoma, grade 1  Subsequently, she had genetic testing which was negative for BRCA gene mutation  She elected to have lumpectomy which she did in July 10, 2018 by Dr Douglas Quintero  She had 9 x 6 mm of invasive ductal carcinoma, grade 1  There is no evidence of lymphovascular invasion  One sentinel lymph nodes was negative for metastatic disease  This was ER 95% positive, IA 90% positive, HER2 negative disease  She presents today to discuss adjuvant treatment options  She feels well  She has no complaint of pain  She has regular menstrual cycle  She has no respiratory symptoms  She has no other past medical history  She does not take any medications  Her performance status is normal         Interval History:          Objective:     Primary Diagnosis:    Right breast cancer, stage IA (pT1b, pN0, M0) grade 1, ER 95% positive, IA 90% positive, HER2 negative disease  Diagnosed in July 2018  Cancer Staging:  Cancer Staging  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 breast in female, estrogen receptor positive ([facility] [address] )  Staging form: Breast, AJCC 8th Edition  - Clinical: Stage IA (cT1b, cN0(sn), cM0, G1, ER: Positive, IA: Positive, HER2: Negative) - Unsigned        Previous Hematologic/ Oncologic Treatment:         Current Hematologic/ Oncologic Treatment:      Adjuvant hormonal therapy with tamoxifen to be started in July 2018  Disease Status:     LESLI status post lumpectomy with sentinel lymph node biopsy  Test Results:    Pathology:    9 x 6 mm of invasive ductal carcinoma, grade 1  No evidence of lymphovascular invasion  One sentinel lymph nodes were negative for metastatic disease  ER 95% positive, IA 90% positive, HER2 negative disease    Stage IA (pT1b, pN0, M0)    Radiology:    Chest x-ray was negative for pulmonary disease  Laboratory:    See below  Physical Exam:      General Appearance:    Alert, oriented        Eyes:    PERRL   Ears:    Normal external ear canals, both ears   Nose:   Nares normal, septum midline   Throat:   Mucosa moist  Pharynx without injection  Neck:   Supple       Lungs:     Clear to auscultation bilaterally   Chest Wall:    No tenderness or deformity    Heart:    Regular rate and rhythm       Abdomen:     Soft, non-tender, bowel sounds +, no organomegaly           Extremities:   Extremities no cyanosis or edema       Skin:   no rash or icterus  Lymph nodes:   Cervical, supraclavicular, and axillary nodes normal   Neurologic:   CNII-XII intact, normal strength, sensation and reflexes     Throughout          Breast exam: Lumpectomy scar at inner aspect of her right breast with no palpable abnormality  Left breast exam is negative  ROS: Review of Systems   All other systems reviewed and are negative  Imaging: Mammo Needle Localization Right ([employer])    Result Date: 7/10/2018  Narrative: Mammo Needle Localization Right ([employer]): July 10, 2018 - Check In #: [de-identified] CC and ML view(s) were taken of the right breast  Technologist: SEGUNDO Tavarez (SEGUNDO)(M) INDICATION:  Wire localization of right 3:00 invasive breast cancer  PROCEDURE: Risks and benefits of the procedure were discussed with the patient  All questions were answered  Written documentation of informed consent was obtained  Procedural timeout was taken  The patient was positioned in lateral compression in the mammographic unit in preparation for medial to lateral approach  Right-sided clip position was marked on the skin using lettered and numbered grid  Right breast skin was prepped and draped using standard aseptic technique  1% lidocaine was used at the skin and deep tissues for anesthesia  Under mammographic guidance 5 cm localization needle and wire were guided adjacent to the clip    Positioning was verified on orthogonal view  When adequate position was obtained, needle was removed with the wire remaining in place  The patient was cleaned and bandaged  She reported no immediate complications  FINDINGS: On final CC view, the targeted biopsy clip projects adjacent to the deep tactile portion of the wire  Specimen radiograph after surgery contains the biopsy clip centrally within the sample  SUMMARY: 1  Procedurally successful right breast wire localization  No immediate complications  2   Surgical specimen contains the targeted clip  Recommendation: Clinical management  Transcription Location: [facility] 143: [id number]    Nm Sentinal Node Inj    Result Date: 7/10/2018  Narrative: SENTINEL NODE INJECTION INDICATION:  C50 211: 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 female breast Z17 0: Estrogen receptor positive status (ER+)  FINDINGS: 0 53 mCi Tc-99m sulfur colloid (0 6 cc volume) was administered intradermally into the right breast by DANELLE CHAUDHARY in the OR  No imaging was performed  Impression: Injection of  0 53 mCi Tc-99m sulfur colloid into the right breast in the OR  Workstation performed: [id number]     Mammo Breast Specimen (no Charge)    Result Date: 7/10/2018  Narrative: Mammo Breast Specimen No Charge: Right Breast - July 10, 2018 - Check In #: [de-identified] CC and ML view(s) were taken of the right breast  Technologist: SEGUNDO Alcantar (SEGUNDO)(M) INDICATION:  Wire localization of right 3:00 invasive breast cancer  PROCEDURE: Risks and benefits of the procedure were discussed with the patient  All questions were answered  Written documentation of informed consent was obtained  Procedural timeout was taken  The patient was positioned in lateral compression in the mammographic unit in preparation for medial to lateral approach  Right-sided clip position was marked on the skin using lettered and numbered grid    Right breast skin was prepped and draped using standard aseptic technique  1% lidocaine was used at the skin and deep tissues for anesthesia  Under mammographic guidance 5 cm localization needle and wire were guided adjacent to the clip  Positioning was verified on orthogonal view  When adequate position was obtained, needle was removed with the wire remaining in place  The patient was cleaned and bandaged  She reported no immediate complications  FINDINGS: On final CC view, the targeted biopsy clip projects adjacent to the deep tactile portion of the wire  Specimen radiograph after surgery contains the biopsy clip centrally within the sample  SUMMARY: 1  Procedurally successful right breast wire localization  No immediate complications  2   Surgical specimen contains the targeted clip  Recommendation: Clinical management  Transcription Location: [facility] 143: [id number]        Labs:   Lab Results   Component Value Date    WBC 7 14 06/07/2018    HGB 14 5 06/07/2018    HCT 42 0 06/07/2018    MCV 90 06/07/2018     06/07/2018     Lab Results   Component Value Date     06/07/2018    K 3 7 06/07/2018     06/07/2018    CO2 31 06/07/2018    ANIONGAP 6 06/07/2018    BUN 18 06/07/2018    CREATININE 0 78 06/07/2018    GLUF 104 (H) 06/07/2018    CALCIUM 9 4 06/07/2018    AST 19 06/07/2018    ALT 35 06/07/2018    ALKPHOS 76 06/07/2018    PROT 8 1 06/07/2018    BILITOT 0 60 06/07/2018    EGFR 90 06/07/2018         Vital Sign:    Body surface area is 1 53 meters squared      Wt Readings from Last 3 Encounters:   07/25/18 52 2 kg (115 lb)   07/25/18 52 2 kg (115 lb)   07/10/18 51 3 kg (113 lb)        Temp Readings from Last 3 Encounters:   07/25/18 (!) 97 1 °F (36 2 °C) (Tympanic)   07/25/18 98 3 °F (36 8 °C) (Oral)   07/10/18 (!) 97 3 °F (36 3 °C)        BP Readings from Last 3 Encounters:   07/25/18 108/54   07/25/18 128/74   07/10/18 119/65         Pulse Readings from Last 3 Encounters:   07/25/18 71   07/25/18 72   07/10/18 69     @LASTSAO2(3)@    Active Problems:   Patient Active Problem List   Diagnosis    Benign paroxysmal vertigo    Seasonal allergies    Rash    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 breast in female, estrogen receptor positive ([facility] [address] )       Past Medical History:   Past Medical History:   Diagnosis Date    Seasonal allergies     Vertigo        Surgical History:   Past Surgical History:   Procedure Laterality Date    EMBOLECTOMY      s/p forceps delivery    MI BIOPSY/EXCISION, LYMPH NODE(S) Right 7/10/2018    Procedure: SENTINEL LYMPH NODE BIOPSY; LYMPHATIC MAPPING WITH BLUE DYE RADIOACTIVE DYE (INJECT AT 0800 BY DR CHAUDHARY IN THE OR); Surgeon: aSna Hough MD;  [facility]: [facility];  Service: Surgical Oncology    MI PERQ DEVICE PLACEMT BREAST LOC 1ST LES W HELDERE Right 7/10/2018    Procedure: BREAST LUMPECTOMY; BREAST NEEDLE LOCALIZATION (NEEDLE LOC AT 0700); Surgeon: Sana Hough MD;  Location: [facility];  Service: Surgical Oncology       Family History:    Family History   Problem Relation Age of Onset    Prostate cancer Father     Breast cancer Maternal Aunt     Ovarian cancer Maternal Aunt     Breast cancer Maternal Grandmother     Stomach cancer Maternal Grandmother     Esophageal cancer Maternal Grandfather        Cancer-related family history includes Breast cancer in her maternal aunt and maternal grandmother; Esophageal cancer in her maternal grandfather; Ovarian cancer in her maternal aunt; Prostate cancer in her father; Stomach cancer in her maternal grandmother  Social History:   Social History     Social History    Marital status: /Civil Union     Spouse name: N/A    Number of children: N/A    Years of education: N/A     Occupational History    Not on file       Social History Main Topics    Smoking status: Former Smoker     Quit date: 1988    Smokeless tobacco: Never Used      Comment: only smoked 1-2 cig/day from 1987-88    Alcohol use Yes      Comment: 2-3 glasses of wine on Fri and sat annetta  Drug use: No    Sexual activity: Yes     Other Topics Concern    Not on file     Social History Narrative    No narrative on file       Current Medications:   Current Outpatient Prescriptions   Medication Sig Dispense Refill    loratadine (CLARITIN) 5 MG chewable tablet Chew 5 mg daily      tamoxifen (NOLVADEX) 20 mg tablet Take 1 tablet (20 mg total) by mouth daily 90 tablet 1     No current facility-administered medications for this visit          Allergies: No Known Allergies

## 2018-07-25 NOTE — PROGRESS NOTES
Surgical Oncology Breast Post-Op       [address] [facility]  [facility] [address]  1970  [id number]  [address]  [facility] [address] [place] [address]    Chief Complaint:   Jessica Reyez is seen for a post-operative visit of her recent right breast surgery  The patient states that she was exercising and think she over did it on some of the weight and thinks she may have a swelling underneath her arm  Otherwise she has no complaints referable to her surgery  I reviewed her pathology with her I provided her a copy of her pathology report  On clinical examination she does not have a seroma  She has a small amount of scar tissue in the area  I have recommended she stretch this area out and continue with her range of motion and daily work outs  I will have her see Radiation Oncology as well as Medical Oncology  We will see her back in 3 months  Oncology History:        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 breast in female, estrogen receptor positive ([facility] [address] )    5/29/2018 Biopsy     Right breast biopsy  3 o'clock 6 CMFN  Invasive ductal carcinoma  Grade 1  8 mm on ultrasound  No axillary adenopathy on ultrasound  ER 95  GA 90  Her 2 0  Stage IA         6/7/2018 Genetic Testing     BRCA testing  Negative         7/10/2018 Surgery     Right breast lumpectomy with sentinel lymph node biopsy  Invasive ductal carcinoma  9 mm  Grade 1  0/1 lymph nodes  Margins negative  ER 95  GA 90  Her 2 0  Stage IA            Assessment & Plan:   Assessment/Plan      History of Present Illness:   See above    Interval History:   See above    Review of Systems:   Review of Systems   Constitutional: Negative for activity change, appetite change and fatigue  HENT: Negative  Eyes: Negative  Respiratory: Negative for cough, shortness of breath and wheezing  Cardiovascular: Negative for chest pain and leg swelling  Gastrointestinal: Negative  Endocrine: Negative  Genitourinary: Negative  Musculoskeletal:        No new changes or complaints of bone pain   Skin: Negative  Allergic/Immunologic: Negative  Neurological: Negative  Hematological: Negative  Psychiatric/Behavioral: Negative  Past Medical History:     Patient Active Problem List   Diagnosis    Benign paroxysmal vertigo    Seasonal allergies    Rash    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 breast in female, estrogen receptor positive ([address] )     Past Medical History:   Diagnosis Date    Seasonal allergies     Vertigo      Past Surgical History:   Procedure Laterality Date    EMBOLECTOMY      s/p forceps delivery    FL BIOPSY/EXCISION, LYMPH NODE(S) Right 7/10/2018    Procedure: SENTINEL LYMPH NODE BIOPSY; LYMPHATIC MAPPING WITH BLUE DYE RADIOACTIVE DYE (INJECT AT 0800 BY DR CHAUDHARY IN THE OR); Surgeon: Jonny Cheney MD;  [facility]: [facility];  Service: Surgical Oncology    FL PERQ DEVICE PLACEMT BREAST LOC 1ST LES W MUNIRNCE Right 7/10/2018    Procedure: BREAST LUMPECTOMY; BREAST NEEDLE LOCALIZATION (NEEDLE LOC AT 0700); Surgeon: Jonny Cheney MD;  [facility]: [facility];  Service: Surgical Oncology     Family History   Problem Relation Age of Onset    Prostate cancer Father     Breast cancer Maternal Aunt     Ovarian cancer Maternal Aunt     Breast cancer Maternal Grandmother     Stomach cancer Maternal Grandmother     Esophageal cancer Maternal Grandfather      Social History     Social History    Marital status: /Civil Union     Spouse name: N/A    Number of children: N/A    Years of education: N/A     Occupational History    Not on file       Social History Main Topics    Smoking status: Former Smoker     Quit date: 1988    Smokeless tobacco: Never Used      Comment: only smoked 1-2 cig/day from 1987-88    Alcohol use Yes      Comment: 2-3 glasses of wine on Fri and sat annetta    Drug use: No    Sexual activity: Yes     Other Topics Concern    Not on file     Social History Narrative    No narrative on file       Current Outpatient Prescriptions:     loratadine (CLARITIN) 5 MG chewable tablet, Chew 5 mg daily, Disp: , Rfl:   No Known Allergies    Physical Exams:     Vitals:    07/25/18 1155   BP: 128/74   Pulse: 72   Resp: 16   Temp: 98 3 °F (36 8 °C)     Physical Exam   Pulmonary/Chest:   Examination of the right breast demonstrates a small amount of scar tissue  She has good cosmetic outcome  There is no seroma  Results:   I reviewed her pathology, her margins are clear  Her node is negative  Labs:       Discussion/Summary:   I have recommended she see Medical Oncology for an opinion regarding adjuvant therapy which most likely will be anti hormonal therapy  As well as whole breast radiation therapy

## 2018-07-25 NOTE — LETTER
July 25, 2018     TuyetEdwin Garciachristian  [address] [facility] [address]    Patient: Gisele Andrade   YOB: 1970   Date of Visit: 7/25/2018       Dear Dr Chica Rod: Thank you for referring Gisele Andrade to me for evaluation  Below are my notes for this consultation  If you have questions, please do not hesitate to call me  I look forward to following your patient along with you  Sincerely,        Mackenzie Overton MD        CC: No Recipients  Mackenzie Overton MD  7/25/2018 12:24 PM  Sign at close encounter     Surgical Oncology Breast Post-Op       [address] [place] [address]  1970  [id number]  [address] [facility],[address]  [facility] [address] [address]    Chief Complaint:   Otilia Olsen is seen for a post-operative visit of her recent right breast surgery  The patient states that she was exercising and think she over did it on some of the weight and thinks she may have a swelling underneath her arm  Otherwise she has no complaints referable to her surgery  I reviewed her pathology with her I provided her a copy of her pathology report  On clinical examination she does not have a seroma  She has a small amount of scar tissue in the area  I have recommended she stretch this area out and continue with her range of motion and daily work outs  I will have her see Radiation Oncology as well as Medical Oncology  We will see her back in 3 months      Oncology History:        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 breast in female, estrogen receptor positive ([facility] [address] )    5/29/2018 Biopsy     Right breast biopsy  3 o'clock 6 CMFN  Invasive ductal carcinoma  Grade 1  8 mm on ultrasound  No axillary adenopathy on ultrasound  ER 95  LA 90  Her 2 0  Stage IA         6/7/2018 Genetic Testing     BRCA testing  Negative         7/10/2018 Surgery Right breast lumpectomy with sentinel lymph node biopsy  Invasive ductal carcinoma  9 mm  Grade 1  0/1 lymph nodes  Margins negative  ER 95  NH 90  Her 2 0  Stage IA            Assessment & Plan:   Assessment/Plan      History of Present Illness:   See above    Interval History:   See above    Review of Systems:   Review of Systems   Constitutional: Negative for activity change, appetite change and fatigue  HENT: Negative  Eyes: Negative  Respiratory: Negative for cough, shortness of breath and wheezing  Cardiovascular: Negative for chest pain and leg swelling  Gastrointestinal: Negative  Endocrine: Negative  Genitourinary: Negative  Musculoskeletal:        No new changes or complaints of bone pain   Skin: Negative  Allergic/Immunologic: Negative  Neurological: Negative  Hematological: Negative  Psychiatric/Behavioral: Negative  Past Medical History:     Patient Active Problem List   Diagnosis    Benign paroxysmal vertigo    Seasonal allergies    Rash    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 breast in female, estrogen receptor positive ([facility] [address] )     Past Medical History:   Diagnosis Date    Seasonal allergies     Vertigo      Past Surgical History:   Procedure Laterality Date    EMBOLECTOMY      s/p forceps delivery    NH BIOPSY/EXCISION, LYMPH NODE(S) Right 7/10/2018    Procedure: SENTINEL LYMPH NODE BIOPSY; LYMPHATIC MAPPING WITH BLUE DYE RADIOACTIVE DYE (INJECT AT 0800 BY DR CHAUDHARY IN THE OR); Surgeon: Michelle Lundberg MD;  [facility];  Service: Surgical Oncology    NH PERQ DEVICE PLACEMT BREAST LOC 1ST LES W JORDAN Right 7/10/2018    Procedure: BREAST LUMPECTOMY; BREAST NEEDLE LOCALIZATION (NEEDLE LOC AT 0700);   Surgeon: Michelle Lundberg MD;  [facility];  Service: Surgical Oncology     Family History   Problem Relation Age of Onset    Prostate cancer Father     Breast cancer Maternal Aunt     Ovarian cancer Maternal Aunt     Breast cancer Maternal Grandmother     Stomach cancer Maternal Grandmother     Esophageal cancer Maternal Grandfather      Social History     Social History    Marital status: /Civil Union     Spouse name: N/A    Number of children: N/A    Years of education: N/A     Occupational History    Not on file  Social History Main Topics    Smoking status: Former Smoker     Quit date: 1988    Smokeless tobacco: Never Used      Comment: only smoked 1-2 cig/day from 1987-88    Alcohol use Yes      Comment: 2-3 glasses of wine on Fri and sat annetta    Drug use: No    Sexual activity: Yes     Other Topics Concern    Not on file     Social History Narrative    No narrative on file       Current Outpatient Prescriptions:     loratadine (CLARITIN) 5 MG chewable tablet, Chew 5 mg daily, Disp: , Rfl:   No Known Allergies    Physical Exams:     Vitals:    07/25/18 1155   BP: 128/74   Pulse: 72   Resp: 16   Temp: 98 3 °F (36 8 °C)     Physical Exam   Pulmonary/Chest:   Examination of the right breast demonstrates a small amount of scar tissue  She has good cosmetic outcome  There is no seroma  Results:   I reviewed her pathology, her margins are clear  Her node is negative  Labs:       Discussion/Summary:   I have recommended she see Medical Oncology for an opinion regarding adjuvant therapy which most likely will be anti hormonal therapy  As well as whole breast radiation therapy

## 2018-07-25 NOTE — PROGRESS NOTES
Hematology / Oncology Outpatient Consult Note    Bette Wu 51 y o  female [id number]         Date:  7/25/2018    Assessment / Plan:  A 54-year-old premenopausal woman with newly diagnosed stage IA right breast cancer, grade 1, ER 95% positive, WA 90% positive, HER2 negative disease  She underwent lumpectomy with sentinel lymph node biopsy, resulting in LESLI  She presents today to discuss adjuvant treatment options  We had extensive discussion regarding the diagnosis, tumor characteristics, staging information, good prognosis and treatment options  Based on her tumor size and well-differentiated ER strongly positive disease, adjuvant chemotherapy is not indicated  I do not think genomic testing is indicated either  I recommended her to have adjuvant hormonal therapy which will be tamoxifen since she is premenopausal   Side effects of tamoxifen was thoroughly discussed, including but not limited to hot flashes, irregularity of menstrual cycle, small risk of DVT, stroke and endometrial cancer  She understood and wished to proceed  Since she is not going to have adjuvant chemotherapy, she may start adjuvant radiation therapy any time  I will see her again in 6 months for routine follow-up  All the patient questions were answered to her satisfaction  Subjective:     HPI:  A 54-year-old premenopausal woman who was found to have abnormality in her right breast, based on a screening mammography  Therefore, she underwent stereotactic right breast biopsy in May 29, 2018 which showed invasive ductal carcinoma, grade 1  Subsequently, she had genetic testing which was negative for BRCA gene mutation  She elected to have lumpectomy which she did in July 10, 2018 by Dr Alfonso Oviedo  She had 9 x 6 mm of invasive ductal carcinoma, grade 1  There is no evidence of lymphovascular invasion  One sentinel lymph nodes was negative for metastatic disease    This was ER 95% positive, WA 90% positive, HER2 negative disease  She presents today to discuss adjuvant treatment options  She feels well  She has no complaint of pain  She has regular menstrual cycle  She has no respiratory symptoms  She has no other past medical history  She does not take any medications  Her performance status is normal         Interval History:          Objective:     Primary Diagnosis:    Right breast cancer, stage IA (pT1b, pN0, M0) grade 1, ER 95% positive, VT 90% positive, HER2 negative disease  Diagnosed in July 2018  Cancer Staging:  Cancer Staging  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 breast in female, estrogen receptor positive ([facility] [address] )  Staging form: Breast, AJCC 8th Edition  - Clinical: Stage IA (cT1b, cN0(sn), cM0, G1, ER: Positive, VT: Positive, HER2: Negative) - Unsigned        Previous Hematologic/ Oncologic Treatment:         Current Hematologic/ Oncologic Treatment:      Adjuvant hormonal therapy with tamoxifen to be started in July 2018  Disease Status:     LESLI status post lumpectomy with sentinel lymph node biopsy  Test Results:    Pathology:    9 x 6 mm of invasive ductal carcinoma, grade 1  No evidence of lymphovascular invasion  One sentinel lymph nodes were negative for metastatic disease  ER 95% positive, VT 90% positive, HER2 negative disease  Stage IA (pT1b, pN0, M0)    Radiology:    Chest x-ray was negative for pulmonary disease  Laboratory:    See below  Physical Exam:      General Appearance:    Alert, oriented        Eyes:    PERRL   Ears:    Normal external ear canals, both ears   Nose:   Nares normal, septum midline   Throat:   Mucosa moist  Pharynx without injection      Neck:   Supple       Lungs:     Clear to auscultation bilaterally   Chest Wall:    No tenderness or deformity    Heart:    Regular rate and rhythm       Abdomen:     Soft, non-tender, bowel sounds +, no organomegaly           Extremities:   Extremities no cyanosis or edema       Skin:   no rash or icterus  Lymph nodes:   Cervical, supraclavicular, and axillary nodes normal   Neurologic:   CNII-XII intact, normal strength, sensation and reflexes     Throughout          Breast exam: Lumpectomy scar at inner aspect of her right breast with no palpable abnormality  Left breast exam is negative  ROS: Review of Systems   All other systems reviewed and are negative  Imaging: Mammo Needle Localization Right ([employer])    Result Date: 7/10/2018  Narrative: Mammo Needle Localization Right ([employer]): July 10, 2018 - Check In #: [de-identified] CC and ML view(s) were taken of the right breast  Technologist: SEGUNDO Fajardo (SEGUNDO)(M) INDICATION:  Wire localization of right 3:00 invasive breast cancer  PROCEDURE: Risks and benefits of the procedure were discussed with the patient  All questions were answered  Written documentation of informed consent was obtained  Procedural timeout was taken  The patient was positioned in lateral compression in the mammographic unit in preparation for medial to lateral approach  Right-sided clip position was marked on the skin using lettered and numbered grid  Right breast skin was prepped and draped using standard aseptic technique  1% lidocaine was used at the skin and deep tissues for anesthesia  Under mammographic guidance 5 cm localization needle and wire were guided adjacent to the clip  Positioning was verified on orthogonal view  When adequate position was obtained, needle was removed with the wire remaining in place  The patient was cleaned and bandaged  She reported no immediate complications  FINDINGS: On final CC view, the targeted biopsy clip projects adjacent to the deep tactile portion of the wire  Specimen radiograph after surgery contains the biopsy clip centrally within the sample  SUMMARY: 1  Procedurally successful right breast wire localization  No immediate complications  2   Surgical specimen contains the targeted clip   Recommendation: Clinical management  [facility]: [id number]    Nm Sentinal Node Inj    Result Date: 7/10/2018  Narrative: SENTINEL NODE INJECTION INDICATION:  C50 211: 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 female breast Z17 0: Estrogen receptor positive status (ER+)  FINDINGS: 0 53 mCi Tc-99m sulfur colloid (0 6 cc volume) was administered intradermally into the right breast by DANELLE CHAUDHARY in the OR  No imaging was performed  Impression: Injection of  0 53 mCi Tc-99m sulfur colloid into the right breast in the OR  Workstation performed: [id number]     Mammo Breast Specimen (no Charge)    Result Date: 7/10/2018  Narrative: Mammo Breast Specimen No Charge: Right Breast - July 10, 2018 - Check In #: [de-identified] CC and ML view(s) were taken of the right breast  Technologist: SEGUNDO Anthony (R)(M) INDICATION:  Wire localization of right 3:00 invasive breast cancer  PROCEDURE: Risks and benefits of the procedure were discussed with the patient  All questions were answered  Written documentation of informed consent was obtained  Procedural timeout was taken  The patient was positioned in lateral compression in the mammographic unit in preparation for medial to lateral approach  Right-sided clip position was marked on the skin using lettered and numbered grid  Right breast skin was prepped and draped using standard aseptic technique  1% lidocaine was used at the skin and deep tissues for anesthesia  Under mammographic guidance 5 cm localization needle and wire were guided adjacent to the clip  Positioning was verified on orthogonal view  When adequate position was obtained, needle was removed with the wire remaining in place  The patient was cleaned and bandaged  She reported no immediate complications  FINDINGS: On final CC view, the targeted biopsy clip projects adjacent to the deep tactile portion of the wire   Specimen radiograph after surgery contains the biopsy clip centrally within the sample  SUMMARY: 1  Procedurally successful right breast wire localization  No immediate complications  2   Surgical specimen contains the targeted clip  Recommendation: Clinical management  Transcription Location: [address] [place], [place] [address]way: [id number]        Labs:   Lab Results   Component Value Date    WBC 7 14 06/07/2018    HGB 14 5 06/07/2018    HCT 42 0 06/07/2018    MCV 90 06/07/2018     06/07/2018     Lab Results   Component Value Date     06/07/2018    K 3 7 06/07/2018     06/07/2018    CO2 31 06/07/2018    ANIONGAP 6 06/07/2018    BUN 18 06/07/2018    CREATININE 0 78 06/07/2018    GLUF 104 (H) 06/07/2018    CALCIUM 9 4 06/07/2018    AST 19 06/07/2018    ALT 35 06/07/2018    ALKPHOS 76 06/07/2018    PROT 8 1 06/07/2018    BILITOT 0 60 06/07/2018    EGFR 90 06/07/2018         Vital Sign:    Body surface area is 1 53 meters squared      Wt Readings from Last 3 Encounters:   07/25/18 52 2 kg (115 lb)   07/25/18 52 2 kg (115 lb)   07/10/18 51 3 kg (113 lb)        Temp Readings from Last 3 Encounters:   07/25/18 (!) 97 1 °F (36 2 °C) (Tympanic)   07/25/18 98 3 °F (36 8 °C) (Oral)   07/10/18 (!) 97 3 °F (36 3 °C)        BP Readings from Last 3 Encounters:   07/25/18 108/54   07/25/18 128/74   07/10/18 119/65         Pulse Readings from Last 3 Encounters:   07/25/18 71   07/25/18 72   07/10/18 69     @LASTSAO2(3)@    Active Problems:   Patient Active Problem List   Diagnosis    Benign paroxysmal vertigo    Seasonal allergies    Rash    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 breast in female, estrogen receptor positive ([facility] [address] )       Past Medical History:   Past Medical History:   Diagnosis Date    Seasonal allergies     Vertigo        Surgical History:   Past Surgical History:   Procedure Laterality Date    EMBOLECTOMY      s/p forceps delivery    [place] BIOPSY/EXCISION, LYMPH NODE(S) Right 7/10/2018    Procedure: SENTINEL LYMPH NODE BIOPSY; LYMPHATIC MAPPING WITH BLUE DYE RADIOACTIVE DYE (INJECT AT 0800 BY DR CHAUDHARY IN THE OR); Surgeon: Ingrid Ojeda MD;  [facility];  Service: Surgical Oncology    ME PERQ DEVICE PLACEMT BREAST LOC 1ST LES OLEG [place]TEGA Right 7/10/2018    Procedure: BREAST LUMPECTOMY; BREAST NEEDLE LOCALIZATION (NEEDLE LOC AT 0700); Surgeon: Ingrid Ojeda MD;  [facility];  Service: Surgical Oncology       Family History:    Family History   Problem Relation Age of Onset    Prostate cancer Father     Breast cancer Maternal Aunt     Ovarian cancer Maternal Aunt     Breast cancer Maternal Grandmother     Stomach cancer Maternal Grandmother     Esophageal cancer Maternal Grandfather        Cancer-related family history includes Breast cancer in her maternal aunt and maternal grandmother; Esophageal cancer in her maternal grandfather; Ovarian cancer in her maternal aunt; Prostate cancer in her father; Stomach cancer in her maternal grandmother  Social History:   Social History     Social History    Marital status: /Civil Union     Spouse name: N/A    Number of children: N/A    Years of education: N/A     Occupational History    Not on file  Social History Main Topics    Smoking status: Former Smoker     Quit date: 1988    Smokeless tobacco: Never Used      Comment: only smoked 1-2 cig/day from 1987-88    Alcohol use Yes      Comment: 2-3 glasses of wine on Fri and sat annetta    Drug use: No    Sexual activity: Yes     Other Topics Concern    Not on file     Social History Narrative    No narrative on file       Current Medications:   Current Outpatient Prescriptions   Medication Sig Dispense Refill    loratadine (CLARITIN) 5 MG chewable tablet Chew 5 mg daily      tamoxifen (NOLVADEX) 20 mg tablet Take 1 tablet (20 mg total) by mouth daily 90 tablet 1     No current facility-administered medications for this visit          Allergies: No Known Allergies

## 2018-07-26 ENCOUNTER — CLINICAL SUPPORT (OUTPATIENT)
Dept: RADIATION ONCOLOGY | Facility: HOSPITAL | Age: 48
End: 2018-07-26
Attending: RADIOLOGY

## 2018-07-26 ENCOUNTER — RADIATION ONCOLOGY CONSULT (OUTPATIENT)
Dept: RADIATION ONCOLOGY | Facility: HOSPITAL | Age: 48
End: 2018-07-26
Attending: RADIOLOGY
Payer: COMMERCIAL

## 2018-07-26 VITALS
HEIGHT: 63 IN | TEMPERATURE: 97.8 F | RESPIRATION RATE: 18 BRPM | OXYGEN SATURATION: 99 % | WEIGHT: 115 LBS | BODY MASS INDEX: 20.38 KG/M2 | DIASTOLIC BLOOD PRESSURE: 68 MMHG | HEART RATE: 66 BPM | SYSTOLIC BLOOD PRESSURE: 102 MMHG

## 2018-07-26 DIAGNOSIS — C50.211 MALIGNANT NEOPLASM OF UPPER-INNER QUADRANT OF RIGHT BREAST IN FEMALE, ESTROGEN RECEPTOR POSITIVE (HCC): ICD-10-CM

## 2018-07-26 DIAGNOSIS — C50.211 MALIGNANT NEOPLASM OF UPPER-INNER QUADRANT OF RIGHT BREAST IN FEMALE, ESTROGEN RECEPTOR POSITIVE (HCC): Primary | ICD-10-CM

## 2018-07-26 DIAGNOSIS — Z17.0 MALIGNANT NEOPLASM OF UPPER-INNER QUADRANT OF RIGHT BREAST IN FEMALE, ESTROGEN RECEPTOR POSITIVE (HCC): ICD-10-CM

## 2018-07-26 DIAGNOSIS — Z17.0 MALIGNANT NEOPLASM OF UPPER-INNER QUADRANT OF RIGHT BREAST IN FEMALE, ESTROGEN RECEPTOR POSITIVE (HCC): Primary | ICD-10-CM

## 2018-07-26 PROCEDURE — 99215 OFFICE O/P EST HI 40 MIN: CPT | Performed by: RADIOLOGY

## 2018-07-26 NOTE — PROGRESS NOTES
Argelia Nails  1970   Ms Mellody Buerger is a 51 y o  female       Chief Complaint   Patient presents with    Follow-up     Radiation Oncology       Cancer Staging  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 breast in female, estrogen receptor positive ([facility] [address] )  Staging form: Breast, AJCC 8th Edition  - Clinical: Stage IA (cT1b, cN0(sn), cM0, G1, ER: Positive, OH: Positive, HER2: Negative) - Unsigned      Oncology History    Patient presents today for RT consult for right breast cancer  Referred by Dr Volodymyr Badillo  16-year-old woman who presented for screening mammogram on 5/10/18 which showed no abnormalities on the left side however on the right side there were 2 areas which were concerning  One of which required a stereotactic biopsy the other wound was biopsied by ultrasound  The 3 o'clock position which shown to be invasive ductal carcinoma, grade 1, ER 95% OH 90% HER2 Rashad negative  This mass is between 0 75 in 1 cm  The other biopsies were benign  Patient has a family history of breast cancer with a maternal aunt having breast and ovarian cancer maternal grandmother with breast cancer  6/7/18 Dr Volodymyr Badillo consult  Patient prefers breast conservation therapy, however given her family history of cancer, recommend she meet with genetics counselor and f/u with decision regarding surgical treatment  6/19/18 genetic testing results:   BRCA negative, no mutations    6/21/18 Dr Volodymyr Badillo follow-up   Genetic testing negative, pt decided on breast conservation therapy  Signed consent, surgery scheduled 7/10/18     7/25/18 Dr Volodymyr Badillo post-op follow-up  Reviewed pathology with patient  Examination of the right breast demonstrates a small amount of scar tissue  She has good cosmetic outcome  There is no seroma  Recommended stretching the area and continue range of motion and daily work outs  Refer to Rad Onc for whole breast RT and Med Onc for an opinion on adjuvant hormonal therapy        7/25/18 Dr Olinda Jo consult  Patient will start Tamoxifen    10/17/18 Dr Marisol Jarquin follow-up        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 breast in female, estrogen receptor positive ([facility] [address] )    2018 Biopsy     Right breast biopsy  3 o'clock 6 CMFN  Invasive ductal carcinoma  Grade 1  8 mm on ultrasound  No axillary adenopathy on ultrasound  ER 95  SC 90  Her 2 0  Stage IA         2018 Genetic Testing     BRCA testing  Negative         7/10/2018 Surgery     Right breast lumpectomy with sentinel lymph node biopsy  Invasive ductal carcinoma  9 mm  Grade 1  0/1 lymph nodes  Margins negative  ER 95  SC 90  Her 2 0  Stage IA            OB/GYN history:     LMP started 17      Clinical Trial: No    Screening  Tobacco  Current tobacco user: no  If yes, brief counseling provided: NA    Hypertension  Hypertension screening performed: yes  Normotensive:  yes  If no, referred to PCP: n/a    Depression Screening  Screened for depression using PHQ-2: yes    Screened for depression using PHQ-9:  no  Screening positive or negative:  negative  If score >4, was any of the following actions taken?    Additional evaluation for depression, suicide risk assesment, referral to PCP or psychiatry, medication started:  no    Advanced Care Planning for Patients >65 years  Advanced Care Planning Discussed:  yes  Patient named surrogate decision maker or care plan in chart: no          Health Maintenance   Topic Date Due    HIV SCREENING  1970    Depression Screening PHQ-9  1969    PNEUMOCOCCAL POLYSACCHARIDE VACCINE X 2 AGE 11-64 YEARS IMMUNOCOMPROMISED (1) 1980    DTaP,Tdap,and Td Vaccines (1 - Tdap) 1990    INFLUENZA VACCINE  2018       Patient Active Problem List   Diagnosis    Benign paroxysmal vertigo    Seasonal allergies    Rash    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 breast in female, estrogen receptor positive ([facility] [address] )     Past Medical History:   Diagnosis Date    Breast cancer ([facility] [address] )     Seasonal allergies     Vertigo      Past Surgical History:   Procedure Laterality Date    EMBOLECTOMY      s/p forceps delivery    IA BIOPSY/EXCISION, LYMPH NODE(S) Right 7/10/2018    Procedure: SENTINEL LYMPH NODE BIOPSY; LYMPHATIC MAPPING WITH BLUE DYE RADIOACTIVE DYE (INJECT AT 0800 BY DR CHAUDHARY IN THE OR); Surgeon: Orly Siddiqui MD;  [facility];  Service: Surgical Oncology    IA PERQ DEVICE PLACEMT BREAST LOC 1ST LES W JORDAN Right 7/10/2018    Procedure: BREAST LUMPECTOMY; BREAST NEEDLE LOCALIZATION (NEEDLE LOC AT 0700); Surgeon: Orly Siddiqui MD;  [facility];  Service: Surgical Oncology     Family History   Problem Relation Age of Onset    Prostate cancer Father     Breast cancer Maternal Aunt     Ovarian cancer Maternal Aunt     Breast cancer Maternal Grandmother     Stomach cancer Maternal Grandmother     Esophageal cancer Maternal Grandfather      Social History     Social History    Marital status: /Civil Union     Spouse name: N/A    Number of children: N/A    Years of education: N/A     Occupational History    Not on file  Social History Main Topics    Smoking status: Former Smoker     Quit date: 1988    Smokeless tobacco: Never Used      Comment: only smoked socially as teenager for 1 yr    Alcohol use Yes      Comment: 2-3 glasses of wine on Fri and sat annetta    Drug use: No    Sexual activity: Yes     Other Topics Concern    Not on file     Social History Narrative    No narrative on file       Current Outpatient Prescriptions:     loratadine (CLARITIN) 5 MG chewable tablet, Chew 5 mg daily, Disp: , Rfl:     tamoxifen (NOLVADEX) 20 mg tablet, Take 1 tablet (20 mg total) by mouth daily, Disp: 90 tablet, Rfl: 1  No Known Allergies    Review of Systems:  Review of Systems   Constitutional: Positive for fatigue (at times)  HENT: Negative  Eyes: Negative  Respiratory: Negative  Cardiovascular: Negative  Gastrointestinal: Negative  Endocrine: Negative  Genitourinary: Negative  Musculoskeletal: Negative  Skin: Negative  Allergic/Immunologic: Negative  Neurological: Positive for headaches (dull headache since surgery)  Hematological: Negative  Psychiatric/Behavioral: Positive for sleep disturbance (chronic trouble sleeping, sleeps 4 hrs a night since 35 yrs old)  Vitals:    07/26/18 0847   BP: 102/68   BP Location: Left arm   Pulse: 66   Resp: 18   Temp: 97 8 °F (36 6 °C)   TempSrc: Temporal   SpO2: 99%   Weight: 52 2 kg (115 lb)   Height: 5' 3" (1 6 m)            Imaging:Mammo Needle Localization Right ([employer])    Result Date: 7/10/2018  Narrative: Mammo Needle Localization Right ([employer]): July 10, 2018 - Check In #: [de-identified] CC and ML view(s) were taken of the right breast  Technologist: SEGUNDO Granados (SEGUNDO)(M) INDICATION:  Wire localization of right 3:00 invasive breast cancer  PROCEDURE: Risks and benefits of the procedure were discussed with the patient  All questions were answered  Written documentation of informed consent was obtained  Procedural timeout was taken  The patient was positioned in lateral compression in the mammographic unit in preparation for medial to lateral approach  Right-sided clip position was marked on the skin using lettered and numbered grid  Right breast skin was prepped and draped using standard aseptic technique  1% lidocaine was used at the skin and deep tissues for anesthesia  Under mammographic guidance 5 cm localization needle and wire were guided adjacent to the clip  Positioning was verified on orthogonal view  When adequate position was obtained, needle was removed with the wire remaining in place  The patient was cleaned and bandaged  She reported no immediate complications  FINDINGS: On final CC view, the targeted biopsy clip projects adjacent to the deep tactile portion of the wire   Specimen radiograph after surgery contains the biopsy clip centrally within the sample  SUMMARY: 1  Procedurally successful right breast wire localization  No immediate complications  2   Surgical specimen contains the targeted clip  Recommendation: Clinical management  Transcription Location: [address] [place] [address] [place]: [id number]    Nm Sentinal Node Inj    Result Date: 7/10/2018  Narrative: SENTINEL NODE INJECTION INDICATION:  C50 211: 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 female breast Z17 0: Estrogen receptor positive status (ER+)  FINDINGS: 0 53 mCi Tc-99m sulfur colloid (0 6 cc volume) was administered intradermally into the right breast by DANELLE CHAUDHARY in the OR  No imaging was performed  Impression: Injection of  0 53 mCi Tc-99m sulfur colloid into the right breast in the OR  Workstation performed: [id number]     Mammo Breast Specimen (no Charge)    Result Date: 7/10/2018  Narrative: Mammo Breast Specimen No Charge: Right Breast - July 10, 2018 - Check In #: [de-identified] CC and ML view(s) were taken of the right breast  Technologist: SEGUNDO Delong (R)(M) INDICATION:  Wire localization of right 3:00 invasive breast cancer  PROCEDURE: Risks and benefits of the procedure were discussed with the patient  All questions were answered  Written documentation of informed consent was obtained  Procedural timeout was taken  The patient was positioned in lateral compression in the mammographic unit in preparation for medial to lateral approach  Right-sided clip position was marked on the skin using lettered and numbered grid  Right breast skin was prepped and draped using standard aseptic technique  1% lidocaine was used at the skin and deep tissues for anesthesia  Under mammographic guidance 5 cm localization needle and wire were guided adjacent to the clip  Positioning was verified on orthogonal view  When adequate position was obtained, needle was removed with the wire remaining in place  The patient was cleaned and bandaged  She reported no immediate complications  FINDINGS: On final CC view, the targeted biopsy clip projects adjacent to the deep tactile portion of the wire  Specimen radiograph after surgery contains the biopsy clip centrally within the sample  SUMMARY: 1  Procedurally successful right breast wire localization  No immediate complications  2   Surgical specimen contains the targeted clip  Recommendation: Clinical management   Transcription Location: [address] [place] [address]: [id number]      Teaching RT education packet provided and questions answered

## 2018-07-26 NOTE — PROGRESS NOTES
Simon Clarity  1970  Ms Kenny Cohen is a 51 y o  female    Chief Complaint   Patient presents with    Consult     Radiation Oncology     Assessment:  Stage IA, cT1b (9 mm), cN0 ( one sentinel lymph node negative for metastatic carcinoma), cM0, grade 1, invasive right breast carcinoma, ER 95%, NJ 90% positive, Her 2 negative status post lumpectomy and sentinel lymph node biopsy  Plan:  Adjuvant right breast irradiation hypofractionation course  Discussion and summary:  Discuss treatment course of 21 treatments total dose 52 Gy and she would like to be treated at [facility]  It will be 33 treatments in case she will not meet criteria for short course radiation therapy  We inform her that Dr Alejandra Hammans will take over her care  We counseled her on side effects of fatigue and skin reaction  Possible late sequela of muscle pains and breast edema  These can be easily ameliorated by breast massage  She would like to start her treatments as soon as possible  She is scheduled for CT simulation at [facility] with time for short consult to meet Dr Alejandra Hammans  Her genetic testing showed no BRCA mutations  She has seen Dr Bhavya Diaz and recommended adjuvant tamoxifen  There is no indication for chemotherapy  Physical examination:  Patient is doing well few weeks postop and appears her stated age, cooperative without any complaints  There are no palpable nodes  Lungs are clear  Heart tones are normal with sinus rhythm  No abdominal masses  Breast examination revealed no masses  The right breast showed well healed lumpectomy and axillary incisions without signs of seroma or hematoma  By inspection her breast size and anatomy should qualify for hypofractionation course        Cancer Staging qualify reast, AJCC 8th Edition  - Clinical: Stage IA (cT1b, cN0(sn), cM0, G1, ER: Positive, NJ: Positive, HER2: Negative) - Unsigned         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 breast in female, estrogen receptor positive ([facility] [address] )    2018 Biopsy     Right breast biopsy  3 o'clock 6 CMFN  Invasive ductal carcinoma  Grade 1  8 mm on ultrasound  No axillary adenopathy on ultrasound  ER 95  OH 90  Her 2 0  Stage IA         2018 Genetic Testing     BRCA testing  Negative         7/10/2018 Surgery     Right breast lumpectomy with sentinel lymph node biopsy  Invasive ductal carcinoma  9 mm  Grade 1  0/1 lymph nodes  Margins negative  ER 95  OH 90  Her 2 0  Stage IA            Clinical Trial: No    Screening  Tobacco  Current tobacco user: no  If yes, brief counseling provided: NA    Hypertension  Hypertension screening performed: yes  Normotensive:  yes  If no, referred to PCP: n/a    Depression Screening  Screened for depression using PHQ-2: yes    Screened for depression using PHQ-9:  no  Screening positive or negative:  negative  If score >4, was any of the following actions taken? Additional evaluation for depression, suicide risk assesment, referral to PCP or psychiatry, medication started:  no    Advanced Care Planning for Patients >65 years  Advanced Care Planning Discussed:  yes  Patient named surrogate decision maker or care plan in chart: no    OB/GYN History:  Menopause Status: Premenopausal  Patient's last menstrual period was 2018  Hormone replacement therapy:  Will be starting tamoxifen    4  Para 2      Health Maintenance   Topic Date Due    HIV SCREENING  1970    Depression Screening PHQ-9  1969    PNEUMOCOCCAL POLYSACCHARIDE VACCINE X 2 AGE 11-64 YEARS IMMUNOCOMPROMISED (1) 1980    DTaP,Tdap,and Td Vaccines (1 - Tdap) 1990    INFLUENZA VACCINE  2018       Patient Active Problem List   Diagnosis    Benign paroxysmal vertigo    Seasonal allergies    Rash    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 breast in female, estrogen receptor positive ([facility] [address] )     Past Medical History:   Diagnosis Date    Breast cancer ([facility] [address] )     Seasonal allergies     Vertigo      Past Surgical History:   Procedure Laterality Date    EMBOLECTOMY      s/p forceps delivery    [place] BIOPSY/EXCISION, LYMPH NODE(S) Right 7/10/2018    Procedure: SENTINEL LYMPH NODE BIOPSY; LYMPHATIC MAPPING WITH BLUE DYE RADIOACTIVE DYE (INJECT AT 0800 BY DR CHAUDHARY IN THE OR); Surgeon: Jonny Cheney MD;  [facility];  Service: Surgical Oncology    AZ PERQ DEVICE PLACEMT BREAST LOC 1ST LES W [place] Right 7/10/2018    Procedure: BREAST LUMPECTOMY; BREAST NEEDLE LOCALIZATION (NEEDLE LOC AT 0700); Surgeon: Jonny Cheney MD;  [facility];  Service: Surgical Oncology     Family History   Problem Relation Age of Onset    Prostate cancer Father     Breast cancer Maternal Aunt     Ovarian cancer Maternal Aunt     Breast cancer Maternal Grandmother     Stomach cancer Maternal Grandmother     Esophageal cancer Maternal Grandfather      Social History     Social History    Marital status: /Civil Union     Spouse name: N/A    Number of children: N/A    Years of education: N/A     Occupational History    Not on file  Social History Main Topics    Smoking status: Former Smoker     Quit date: 1988    Smokeless tobacco: Never Used      Comment: only smoked socially as teenager for 1 yr    Alcohol use Yes      Comment: 2-3 glasses of wine on Fri and sat annetta    Drug use: No    Sexual activity: Yes     Other Topics Concern    Not on file     Social History Narrative    No narrative on file       Current Outpatient Prescriptions:     loratadine (CLARITIN) 5 MG chewable tablet, Chew 5 mg daily, Disp: , Rfl:     tamoxifen (NOLVADEX) 20 mg tablet, Take 1 tablet (20 mg total) by mouth daily, Disp: 90 tablet, Rfl: 1    No Known Allergies    Review of Systems:  Review of Systems   Constitutional: Positive for fatigue  HENT: Negative  Eyes: Negative  Respiratory: Negative  Cardiovascular: Negative  Gastrointestinal: Negative  Endocrine: Negative  Genitourinary: Negative  Musculoskeletal: Negative  Skin: Negative  Allergic/Immunologic: Negative  Neurological: Negative  Hematological: Negative  Psychiatric/Behavioral: Positive for sleep disturbance (chronic difficulty sleeping)  Vitals:    07/26/18 0847   BP: 102/68   BP Location: Left arm   Pulse: 66   Resp: 18   Temp: 97 8 °F (36 6 °C)   TempSrc: Temporal   SpO2: 99%   Weight: 52 2 kg (115 lb)   Height: 5' 3" (1 6 m)            Imaging:Mammo Needle Localization Right ([employer])    Result Date: 7/10/2018  Narrative: Mammo Needle Localization Right ([employer]): July 10, 2018 - Check In #: [de-identified] CC and ML view(s) were taken of the right breast  Technologist: SEGUNDO Allen (R)(M) INDICATION:  Wire localization of right 3:00 invasive breast cancer  PROCEDURE: Risks and benefits of the procedure were discussed with the patient  All questions were answered  Written documentation of informed consent was obtained  Procedural timeout was taken  The patient was positioned in lateral compression in the mammographic unit in preparation for medial to lateral approach  Right-sided clip position was marked on the skin using lettered and numbered grid  Right breast skin was prepped and draped using standard aseptic technique  1% lidocaine was used at the skin and deep tissues for anesthesia  Under mammographic guidance 5 cm localization needle and wire were guided adjacent to the clip  Positioning was verified on orthogonal view  When adequate position was obtained, needle was removed with the wire remaining in place  The patient was cleaned and bandaged  She reported no immediate complications  FINDINGS: On final CC view, the targeted biopsy clip projects adjacent to the deep tactile portion of the wire  Specimen radiograph after surgery contains the biopsy clip centrally within the sample  SUMMARY: 1  Procedurally successful right breast wire localization    No immediate complications  2   Surgical specimen contains the targeted clip  Recommendation: Clinical management  Transcription Location: [address] [place], [place] [address]way: [id number]    Nm Sentinal Node Inj    Result Date: 7/10/2018  Narrative: SENTINEL NODE INJECTION INDICATION:  C50 211: 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 female breast Z17 0: Estrogen receptor positive status (ER+)  FINDINGS: 0 53 mCi Tc-99m sulfur colloid (0 6 cc volume) was administered intradermally into the right breast by DANELLE CHAUDHARY in the OR  No imaging was performed  Impression: Injection of  0 53 mCi Tc-99m sulfur colloid into the right breast in the OR  Workstation performed: [id number]     Mammo Breast Specimen (no Charge)    Result Date: 7/10/2018  Narrative: Mammo Breast Specimen No Charge: Right Breast - July 10, 2018 - Check In #: [de-identified] CC and ML view(s) were taken of the right breast  Technologist: SEGUNDO Garcia (R)(M) INDICATION:  Wire localization of right 3:00 invasive breast cancer  PROCEDURE: Risks and benefits of the procedure were discussed with the patient  All questions were answered  Written documentation of informed consent was obtained  Procedural timeout was taken  The patient was positioned in lateral compression in the mammographic unit in preparation for medial to lateral approach  Right-sided clip position was marked on the skin using lettered and numbered grid  Right breast skin was prepped and draped using standard aseptic technique  1% lidocaine was used at the skin and deep tissues for anesthesia  Under mammographic guidance 5 cm localization needle and wire were guided adjacent to the clip  Positioning was verified on orthogonal view  When adequate position was obtained, needle was removed with the wire remaining in place  The patient was cleaned and bandaged  She reported no immediate complications   FINDINGS: On final CC view, the targeted biopsy clip projects adjacent to the deep tactile portion of the wire  Specimen radiograph after surgery contains the biopsy clip centrally within the sample  SUMMARY: 1  Procedurally successful right breast wire localization  No immediate complications  2   Surgical specimen contains the targeted clip  Recommendation: Clinical management  Transcription Location: [address] [place] [address]: [id number]      Teaching RT education packet provided, all questions answered

## 2018-08-02 ENCOUNTER — APPOINTMENT (OUTPATIENT)
Dept: RADIATION ONCOLOGY | Facility: HOSPITAL | Age: 48
End: 2018-08-02
Payer: COMMERCIAL

## 2018-08-02 PROCEDURE — 77332 RADIATION TREATMENT AID(S): CPT | Performed by: RADIOLOGY

## 2018-08-02 PROCEDURE — 77290 THER RAD SIMULAJ FIELD CPLX: CPT | Performed by: RADIOLOGY

## 2018-08-08 PROCEDURE — 77334 RADIATION TREATMENT AID(S): CPT | Performed by: RADIOLOGY

## 2018-08-08 PROCEDURE — 77295 3-D RADIOTHERAPY PLAN: CPT | Performed by: RADIOLOGY

## 2018-08-08 PROCEDURE — 77300 RADIATION THERAPY DOSE PLAN: CPT | Performed by: RADIOLOGY

## 2018-08-09 PROCEDURE — 77280 THER RAD SIMULAJ FIELD SMPL: CPT | Performed by: RADIOLOGY

## 2018-08-13 PROCEDURE — 77331 SPECIAL RADIATION DOSIMETRY: CPT | Performed by: RADIOLOGY

## 2018-08-13 PROCEDURE — 77412 RADIATION TX DELIVERY LVL 3: CPT | Performed by: RADIOLOGY

## 2018-08-14 PROCEDURE — 77412 RADIATION TX DELIVERY LVL 3: CPT | Performed by: RADIOLOGY

## 2018-08-15 DIAGNOSIS — C50.211 MALIGNANT NEOPLASM OF UPPER-INNER QUADRANT OF RIGHT BREAST IN FEMALE, ESTROGEN RECEPTOR POSITIVE (HCC): Primary | ICD-10-CM

## 2018-08-15 DIAGNOSIS — Z17.0 MALIGNANT NEOPLASM OF UPPER-INNER QUADRANT OF RIGHT BREAST IN FEMALE, ESTROGEN RECEPTOR POSITIVE (HCC): Primary | ICD-10-CM

## 2018-08-15 PROCEDURE — 77412 RADIATION TX DELIVERY LVL 3: CPT | Performed by: RADIOLOGY

## 2018-08-16 PROCEDURE — 77412 RADIATION TX DELIVERY LVL 3: CPT | Performed by: RADIOLOGY

## 2018-08-17 PROCEDURE — 77336 RADIATION PHYSICS CONSULT: CPT | Performed by: RADIOLOGY

## 2018-08-17 PROCEDURE — 77417 THER RADIOLOGY PORT IMAGE(S): CPT | Performed by: RADIOLOGY

## 2018-08-17 PROCEDURE — 77412 RADIATION TX DELIVERY LVL 3: CPT | Performed by: RADIOLOGY

## 2018-08-20 PROCEDURE — 77412 RADIATION TX DELIVERY LVL 3: CPT | Performed by: RADIOLOGY

## 2018-08-21 ENCOUNTER — APPOINTMENT (OUTPATIENT)
Dept: LAB | Facility: CLINIC | Age: 48
End: 2018-08-21
Payer: COMMERCIAL

## 2018-08-21 ENCOUNTER — TRANSCRIBE ORDERS (OUTPATIENT)
Dept: LAB | Facility: CLINIC | Age: 48
End: 2018-08-21

## 2018-08-21 DIAGNOSIS — Z17.0 MALIGNANT NEOPLASM OF UPPER-INNER QUADRANT OF RIGHT BREAST IN FEMALE, ESTROGEN RECEPTOR POSITIVE (HCC): ICD-10-CM

## 2018-08-21 DIAGNOSIS — C50.211 MALIGNANT NEOPLASM OF UPPER-INNER QUADRANT OF RIGHT BREAST IN FEMALE, ESTROGEN RECEPTOR POSITIVE (HCC): ICD-10-CM

## 2018-08-21 LAB
BASOPHILS # BLD AUTO: 0.04 THOUSANDS/ΜL (ref 0–0.1)
BASOPHILS NFR BLD AUTO: 1 % (ref 0–1)
EOSINOPHIL # BLD AUTO: 0.08 THOUSAND/ΜL (ref 0–0.61)
EOSINOPHIL NFR BLD AUTO: 1 % (ref 0–6)
ERYTHROCYTE [DISTWIDTH] IN BLOOD BY AUTOMATED COUNT: 11 % (ref 11.6–15.1)
HCT VFR BLD AUTO: 40.8 % (ref 34.8–46.1)
HGB BLD-MCNC: 14.1 G/DL (ref 11.5–15.4)
IMM GRANULOCYTES # BLD AUTO: 0.03 THOUSAND/UL (ref 0–0.2)
IMM GRANULOCYTES NFR BLD AUTO: 0 % (ref 0–2)
LYMPHOCYTES # BLD AUTO: 0.93 THOUSANDS/ΜL (ref 0.6–4.47)
LYMPHOCYTES NFR BLD AUTO: 12 % (ref 14–44)
MCH RBC QN AUTO: 31.7 PG (ref 26.8–34.3)
MCHC RBC AUTO-ENTMCNC: 34.6 G/DL (ref 31.4–37.4)
MCV RBC AUTO: 92 FL (ref 82–98)
MONOCYTES # BLD AUTO: 0.49 THOUSAND/ΜL (ref 0.17–1.22)
MONOCYTES NFR BLD AUTO: 7 % (ref 4–12)
NEUTROPHILS # BLD AUTO: 6.01 THOUSANDS/ΜL (ref 1.85–7.62)
NEUTS SEG NFR BLD AUTO: 79 % (ref 43–75)
NRBC BLD AUTO-RTO: 0 /100 WBCS
PLATELET # BLD AUTO: 247 THOUSANDS/UL (ref 149–390)
PMV BLD AUTO: 10.4 FL (ref 8.9–12.7)
RBC # BLD AUTO: 4.45 MILLION/UL (ref 3.81–5.12)
WBC # BLD AUTO: 7.58 THOUSAND/UL (ref 4.31–10.16)

## 2018-08-21 PROCEDURE — 77412 RADIATION TX DELIVERY LVL 3: CPT | Performed by: RADIOLOGY

## 2018-08-21 PROCEDURE — 36415 COLL VENOUS BLD VENIPUNCTURE: CPT

## 2018-08-21 PROCEDURE — 85025 COMPLETE CBC W/AUTO DIFF WBC: CPT

## 2018-08-22 PROCEDURE — 77412 RADIATION TX DELIVERY LVL 3: CPT | Performed by: RADIOLOGY

## 2018-08-23 PROCEDURE — 77412 RADIATION TX DELIVERY LVL 3: CPT | Performed by: RADIOLOGY

## 2018-08-24 PROCEDURE — 77336 RADIATION PHYSICS CONSULT: CPT | Performed by: RADIOLOGY

## 2018-08-24 PROCEDURE — 77417 THER RADIOLOGY PORT IMAGE(S): CPT | Performed by: RADIOLOGY

## 2018-08-24 PROCEDURE — 77412 RADIATION TX DELIVERY LVL 3: CPT | Performed by: RADIOLOGY

## 2018-08-27 PROCEDURE — 77412 RADIATION TX DELIVERY LVL 3: CPT | Performed by: RADIOLOGY

## 2018-08-28 PROCEDURE — 77412 RADIATION TX DELIVERY LVL 3: CPT | Performed by: RADIOLOGY

## 2018-08-29 PROCEDURE — 77412 RADIATION TX DELIVERY LVL 3: CPT | Performed by: RADIOLOGY

## 2018-08-30 PROCEDURE — 77412 RADIATION TX DELIVERY LVL 3: CPT | Performed by: RADIOLOGY

## 2018-08-30 PROCEDURE — 77334 RADIATION TREATMENT AID(S): CPT | Performed by: RADIOLOGY

## 2018-08-30 PROCEDURE — 77321 SPECIAL TELETX PORT PLAN: CPT | Performed by: RADIOLOGY

## 2018-08-31 PROCEDURE — 77417 THER RADIOLOGY PORT IMAGE(S): CPT | Performed by: RADIOLOGY

## 2018-08-31 PROCEDURE — 77412 RADIATION TX DELIVERY LVL 3: CPT | Performed by: RADIOLOGY

## 2018-08-31 PROCEDURE — 77336 RADIATION PHYSICS CONSULT: CPT | Performed by: RADIOLOGY

## 2018-09-04 ENCOUNTER — RADIATION THERAPY TREATMENT (OUTPATIENT)
Dept: RADIATION ONCOLOGY | Facility: HOSPITAL | Age: 48
End: 2018-09-04
Attending: RADIOLOGY
Payer: COMMERCIAL

## 2018-09-04 PROCEDURE — 77412 RADIATION TX DELIVERY LVL 3: CPT | Performed by: RADIOLOGY

## 2018-09-05 PROCEDURE — 77280 THER RAD SIMULAJ FIELD SMPL: CPT | Performed by: RADIOLOGY

## 2018-09-05 PROCEDURE — 77412 RADIATION TX DELIVERY LVL 3: CPT | Performed by: RADIOLOGY

## 2018-09-06 PROCEDURE — 77412 RADIATION TX DELIVERY LVL 3: CPT | Performed by: RADIOLOGY

## 2018-09-07 PROCEDURE — 77412 RADIATION TX DELIVERY LVL 3: CPT | Performed by: RADIOLOGY

## 2018-09-10 PROCEDURE — 77336 RADIATION PHYSICS CONSULT: CPT | Performed by: RADIOLOGY

## 2018-09-10 PROCEDURE — 77412 RADIATION TX DELIVERY LVL 3: CPT | Performed by: RADIOLOGY

## 2018-09-11 PROCEDURE — 77412 RADIATION TX DELIVERY LVL 3: CPT | Performed by: RADIOLOGY

## 2018-10-08 ENCOUNTER — IMMUNIZATION (OUTPATIENT)
Dept: INTERNAL MEDICINE CLINIC | Facility: CLINIC | Age: 48
End: 2018-10-08
Payer: COMMERCIAL

## 2018-10-08 DIAGNOSIS — Z23 NEED FOR IMMUNIZATION AGAINST INFLUENZA: Primary | ICD-10-CM

## 2018-10-08 PROCEDURE — 90471 IMMUNIZATION ADMIN: CPT

## 2018-10-08 PROCEDURE — 90686 IIV4 VACC NO PRSV 0.5 ML IM: CPT

## 2018-10-16 ENCOUNTER — ANNUAL EXAM (OUTPATIENT)
Dept: OBGYN CLINIC | Facility: CLINIC | Age: 48
End: 2018-10-16
Payer: COMMERCIAL

## 2018-10-16 VITALS — DIASTOLIC BLOOD PRESSURE: 62 MMHG | BODY MASS INDEX: 21.08 KG/M2 | WEIGHT: 119 LBS | SYSTOLIC BLOOD PRESSURE: 132 MMHG

## 2018-10-16 DIAGNOSIS — Z11.51 SCREENING FOR HUMAN PAPILLOMAVIRUS (HPV): ICD-10-CM

## 2018-10-16 DIAGNOSIS — Z12.4 CERVICAL CANCER SCREENING: ICD-10-CM

## 2018-10-16 DIAGNOSIS — Z01.419 ENCOUNTER FOR GYNECOLOGICAL EXAMINATION (GENERAL) (ROUTINE) WITHOUT ABNORMAL FINDINGS: Primary | ICD-10-CM

## 2018-10-16 PROCEDURE — 87624 HPV HI-RISK TYP POOLED RSLT: CPT | Performed by: OBSTETRICS & GYNECOLOGY

## 2018-10-16 PROCEDURE — G0145 SCR C/V CYTO,THINLAYER,RESCR: HCPCS | Performed by: OBSTETRICS & GYNECOLOGY

## 2018-10-16 PROCEDURE — S0612 ANNUAL GYNECOLOGICAL EXAMINA: HCPCS | Performed by: OBSTETRICS & GYNECOLOGY

## 2018-10-16 NOTE — PROGRESS NOTES
Assessment/Plan:    No problem-specific Assessment & Plan notes found for this encounter  Diagnoses and all orders for this visit:    Cervical cancer screening  -     Liquid-based pap, screening    Screening for human papillomavirus (HPV)  -     Liquid-based pap, screening    Encounter for gynecological examination (general) (routine) without abnormal findings  -     Liquid-based pap, screening        Annual examination was completed  Pap with HPV testing was obtained  Patient encouraged to call her colorectal specialist to schedule screening colonoscopy as she has already had 1  Patient will continue her breast care with her medical and surgical team   Patient to return to the office in 1 year  We did discuss some of the side effects of tamoxifen and how may impact her menstrual patterns  Patient to return in 1 year or as necessary  Subjective:      Patient ID: Lauren Millard is a 51 y o  female  Patient returns for annual gyn visit  Since she was last seen she was diagnosed with a right breast cancer  She underwent lumpectomy and radiation as well as sentinel lymph node biopsy  The patient currently on tamoxifen and is due for follow-up with her surgical oncologist in this next week  She has done well emotionally  She does continue to get monthly menses  Gynecologic Exam         The following portions of the patient's history were reviewed and updated as appropriate:   She  has a past medical history of Breast cancer ([facility] [address] ); Seasonal allergies; and Vertigo    She   Patient Active Problem List    Diagnosis Date Noted    Cervical cancer screening 10/16/2018    Screening for human papillomavirus (HPV) 10/16/2018    Encounter for gynecological examination (general) (routine) without abnormal findings 10/16/2018    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 breast in female, estrogen receptor positive ([facility] [address] ) 06/04/2018    Rash 02/23/2016    Seasonal allergies 05/11/2015    Benign paroxysmal vertigo 11/29/2013     She  has a past surgical history that includes Embolectomy; pr perq device placemt breast loc 1st les w guidnce (Right, 7/10/2018); and pr biopsy/excision, lymph node(s) (Right, 7/10/2018)  Her family history includes Breast cancer in her maternal aunt and maternal grandmother; Esophageal cancer in her maternal grandfather; Ovarian cancer in her maternal aunt; Prostate cancer in her father; Stomach cancer in her maternal grandmother  She  reports that she quit smoking about 30 years ago  She has never used smokeless tobacco  She reports that she drinks alcohol  She reports that she does not use drugs  Current Outpatient Prescriptions   Medication Sig Dispense Refill    loratadine (CLARITIN) 5 MG chewable tablet Chew 5 mg daily      tamoxifen (NOLVADEX) 20 mg tablet Take 1 tablet (20 mg total) by mouth daily 90 tablet 1     No current facility-administered medications for this visit  Current Outpatient Prescriptions on File Prior to Visit   Medication Sig    loratadine (CLARITIN) 5 MG chewable tablet Chew 5 mg daily    tamoxifen (NOLVADEX) 20 mg tablet Take 1 tablet (20 mg total) by mouth daily     No current facility-administered medications on file prior to visit  She has No Known Allergies       Review of Systems   All other systems reviewed and are negative  Objective:      /62 (BP Location: Left arm, Patient Position: Sitting, Cuff Size: Standard)   Wt 54 kg (119 lb)   LMP 10/12/2018 (Exact Date)   Breastfeeding? No   BMI 21 08 kg/m²          Physical Exam   Constitutional: She is oriented to person, place, and time  She appears well-developed and well-nourished  HENT:   Head: Normocephalic and atraumatic  Nose: Nose normal    Eyes: Pupils are equal, round, and reactive to light  EOM are normal    Neck: Normal range of motion  Neck supple  No thyromegaly present     Cardiovascular: Normal rate and regular rhythm  Pulmonary/Chest: Effort normal and breath sounds normal  No respiratory distress  Breasts healed surgical scar on right breast as well as right axilla, no evidence of mass or abnormality, radiation changes noted; left breast without mass or skin change, no adenopathy noted   Abdominal: Soft  Bowel sounds are normal  She exhibits no distension and no mass  There is no tenderness  Hernia confirmed negative in the right inguinal area and confirmed negative in the left inguinal area  Genitourinary: Vagina normal and uterus normal  No breast swelling, tenderness, discharge or bleeding  Pelvic exam was performed with patient supine  No labial fusion  There is no rash, tenderness, lesion or injury on the right labia  There is no rash, tenderness, lesion or injury on the left labia  Cervix exhibits no motion tenderness, no discharge and no friability  Genitourinary Comments: Ext genitalia nl female w/o lesions  Vag healthy without lesions or discharge  Cervix healthy w/o lesions or discharge  uterus nl size, NT, no mass  Adnexa NT, no mass   Musculoskeletal: Normal range of motion  She exhibits no edema  Lymphadenopathy:        Right: No inguinal adenopathy present  Left: No inguinal adenopathy present  Neurological: She is alert and oriented to person, place, and time  She has normal reflexes  Skin: Skin is warm and dry  No rash noted  Psychiatric: She has a normal mood and affect  Her behavior is normal  Thought content normal    Nursing note and vitals reviewed

## 2018-10-17 ENCOUNTER — OFFICE VISIT (OUTPATIENT)
Dept: SURGICAL ONCOLOGY | Facility: CLINIC | Age: 48
End: 2018-10-17
Payer: COMMERCIAL

## 2018-10-17 VITALS
BODY MASS INDEX: 20.82 KG/M2 | TEMPERATURE: 97.7 F | HEART RATE: 68 BPM | SYSTOLIC BLOOD PRESSURE: 110 MMHG | DIASTOLIC BLOOD PRESSURE: 68 MMHG | RESPIRATION RATE: 14 BRPM | WEIGHT: 117.5 LBS | HEIGHT: 63 IN

## 2018-10-17 DIAGNOSIS — Z17.0 MALIGNANT NEOPLASM OF UPPER-INNER QUADRANT OF RIGHT BREAST IN FEMALE, ESTROGEN RECEPTOR POSITIVE (HCC): Primary | ICD-10-CM

## 2018-10-17 DIAGNOSIS — C50.211 MALIGNANT NEOPLASM OF UPPER-INNER QUADRANT OF RIGHT BREAST IN FEMALE, ESTROGEN RECEPTOR POSITIVE (HCC): Primary | ICD-10-CM

## 2018-10-17 PROCEDURE — 99214 OFFICE O/P EST MOD 30 MIN: CPT | Performed by: SURGERY

## 2018-10-17 NOTE — LETTER
October 17, 2018     Noah Cao  [address] [facility] [address]    Patient: Filomena Taylor   YOB: 1970   Date of Visit: 10/17/2018       Dear Dr Burgess Pérez: Thank you for referring Filomena Taylor to me for evaluation  Below are my notes for this consultation  If you have questions, please do not hesitate to call me  I look forward to following your patient along with you  Sincerely,        Martita Luong MD        CC: No Recipients  Martita Luong MD  10/17/2018 11:09 AM  Sign at close encounter     Surgical Oncology Follow Up       [address] [place] [address]  1970  [id number]  [address] [facility],[address]  [facility] [place] [id number]    Chief Complaint   Patient presents with    Breast Cancer     Pt is here for a three month follow up          Assessment & Plan:   Patient presents for 3 month follow-up visit for her right breast cancer diagnosed in 2018  She has no complaints referable to her breast other than some occasional soreness when she is lifting weights     She has completed her radiation therapy and is now on tamoxifen and has essentially no symptoms from this  She is agreeable to seeing Paige Marroquin at her next visit  Cancer History:     Oncology History    Presents for 1 month follow up EOT  7/25/18 Pj Truong:   adjuvant chemotherapy is not indicated  I recommended her to have adjuvant hormonal therapy which will be tamoxifen since she is premenopausal    F/U scheduled for 11/29/18 7/25/18 Randolph Munroe:   On clinical examination she does not have a seroma  She has a small amount of scar tissue in the area  I have recommended she stretch this area out and continue with her range of motion and daily work outs  I will have her see Radiation Oncology as well as Medical Oncology    We will see her back in 3 months  F/U scheduled for 10/17/18          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 breast in female, estrogen receptor positive ([facility] [address] )    5/29/2018 Biopsy     Right breast biopsy  3 o'clock 6 CMFN  Invasive ductal carcinoma  Grade 1  8 mm on ultrasound  No axillary adenopathy on ultrasound  ER 95  MT 90  Her 2 0  Stage IA         6/7/2018 Genetic Testing     BRCA testing  Negative         7/10/2018 Surgery     Right breast lumpectomy with sentinel lymph node biopsy  Invasive ductal carcinoma  9 mm  Grade 1  0/1 lymph nodes  Margins negative  ER 95  MT 90  Her 2 0  Stage IA         7/2018 -  Hormone Therapy       Adjuvant hormonal therapy with tamoxifen to be started in July 2018 8/13/2018 - 9/11/2018 Radiation     Course: C1    Plan ID Energy Fractions Dose per Fraction (cGy) Dose Correction (cGy) Total Dose Delivered (cGy) Elapsed Days   R Breast e 9E 5 / 5 200 0 1,000 6   Rt Breast 6X 16 / 16 266 0 4,256 22      Treatment dates:  C1: 8/13/2018 - 9/11/2018                Interval History:   See above    Review of Systems:   Review of Systems   Constitutional: Negative for activity change, appetite change and fatigue  HENT: Negative  Eyes: Negative  Respiratory: Negative for cough, shortness of breath and wheezing  Cardiovascular: Negative for chest pain and leg swelling  Gastrointestinal: Negative  Endocrine: Negative  Genitourinary: Negative  Musculoskeletal:        No new changes or complaints of bone pain   Skin: Negative  Allergic/Immunologic: Negative  Neurological: Negative  Hematological: Negative  Psychiatric/Behavioral: Negative          Past Medical History     Patient Active Problem List   Diagnosis    Benign paroxysmal vertigo    Seasonal allergies    Rash    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 breast in female, estrogen receptor positive ([facility] [address] )    Cervical cancer screening    Screening for human papillomavirus (HPV)    Encounter for gynecological examination (general) (routine) without abnormal findings     Past Medical History:   Diagnosis Date    [facility] cancer ([facility] [address] )     Seasonal allergies     Vertigo      Past Surgical History:   Procedure Laterality Date    EMBOLECTOMY      s/p forceps delivery    OK BIOPSY/EXCISION, LYMPH NODE(S) Right 7/10/2018    Procedure: SENTINEL LYMPH NODE BIOPSY; LYMPHATIC MAPPING WITH BLUE DYE RADIOACTIVE DYE (INJECT AT 0800 BY DR CHAUDHARY IN THE OR); Surgeon: Camden Matthews MD;  [facility];  Service: Surgical Oncology    OK PERQ DEVICE PLACEMT BREAST LOC 1ST LES W HELDERE Right 7/10/2018    Procedure: BREAST LUMPECTOMY; BREAST NEEDLE LOCALIZATION (NEEDLE LOC AT 0700); Surgeon: Camden Matthews MD;  [facility];  Service: Surgical Oncology     Family History   Problem Relation Age of Onset    Prostate cancer Father     Breast cancer Maternal Aunt     Ovarian cancer Maternal Aunt     Breast cancer Maternal Grandmother     Stomach cancer Maternal Grandmother     Esophageal cancer Maternal Grandfather      Social History     Social History    Marital status: /Civil Union     Spouse name: N/A    Number of children: N/A    Years of education: N/A     Occupational History    Not on file       Social History Main Topics    Smoking status: Former Smoker     Quit date: 1988    Smokeless tobacco: Never Used      Comment: only smoked socially as teenager for 1 yr    Alcohol use Yes      Comment: 2-3 glasses of wine on Fri and sat annetta    Drug use: No    Sexual activity: Yes     Other Topics Concern    Not on file     Social History Narrative    No narrative on file       Current Outpatient Prescriptions:     loratadine (CLARITIN) 5 MG chewable tablet, Chew 5 mg daily, Disp: , Rfl:     tamoxifen (NOLVADEX) 20 mg tablet, Take 1 tablet (20 mg total) by mouth daily, Disp: 90 tablet, Rfl: 1  No Known Allergies    Physical Exam:     Vitals:    10/17/18 1041   BP: 110/68 Pulse: 68   Resp: 14   Temp: 97 7 °F (36 5 °C)     Physical Exam   Constitutional: She is oriented to person, place, and time  She appears well-developed and well-nourished  HENT:   Head: Normocephalic and atraumatic  Mouth/Throat: Oropharynx is clear and moist    Eyes: Pupils are equal, round, and reactive to light  EOM are normal    Neck: Normal range of motion  Neck supple  No JVD present  No tracheal deviation present  No thyromegaly present  Cardiovascular: Normal rate, regular rhythm, normal heart sounds and intact distal pulses  Exam reveals no gallop and no friction rub  No murmur heard  Pulmonary/Chest: Effort normal and breath sounds normal  No respiratory distress  She has no wheezes  She has no rales  Examination of the right breast demonstrates 2 well-healed incisions  There is minimal scar tissue  There are no dominant masses and no axillary supraclavicular adenopathy  Examination left breast demonstrates no nipple discharge dominant masses skin changes or axillary/supraclavicular adenopathy  Abdominal: Soft  She exhibits no distension and no mass  There is no hepatomegaly  There is no tenderness  There is no rebound and no guarding  Musculoskeletal: Normal range of motion  She exhibits no edema or tenderness  Lymphadenopathy:     She has no cervical adenopathy  Neurological: She is alert and oriented to person, place, and time  No cranial nerve deficit  Skin: Skin is warm and dry  No rash noted  No erythema  Psychiatric: She has a normal mood and affect  Her behavior is normal    Vitals reviewed  Results:   No current imaging          Advance Care Planning/Advance Directives:  I discussed the disease status, treatment plans and follow-up with the patient

## 2018-10-17 NOTE — PROGRESS NOTES
Surgical Oncology Follow Up       [address] [facility],[address] [place] [address]  [facility] [id number]    Rakan Shook  1970  [id number]  [address] [facility],[address] [place] [address]  [facility] [phone] [id number]    Chief Complaint   Patient presents with    Breast Cancer     Pt is here for a three month follow up          Assessment & Plan:   Patient presents for 3 month follow-up visit for her right breast cancer diagnosed in 2018  She has no complaints referable to her breast other than some occasional soreness when she is lifting weights     She has completed her radiation therapy and is now on tamoxifen and has essentially no symptoms from this  She is agreeable to seeing Ni Kearns at her next visit  Cancer History:     Oncology History    Presents for 1 month follow up EOT  7/25/18 Pj Samano:   adjuvant chemotherapy is not indicated  I recommended her to have adjuvant hormonal therapy which will be tamoxifen since she is premenopausal    F/U scheduled for 11/29/18 7/25/18 Es Counts:   On clinical examination she does not have a seroma  She has a small amount of scar tissue in the area  I have recommended she stretch this area out and continue with her range of motion and daily work outs  I will have her see Radiation Oncology as well as Medical Oncology  We will see her back in 3 months   F/U scheduled for 10/17/18          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 breast in female, estrogen receptor positive ([facility] [address] )    5/29/2018 Biopsy     Right breast biopsy  3 o'clock 6 CMFN  Invasive ductal carcinoma  Grade 1  8 mm on ultrasound  No axillary adenopathy on ultrasound  ER 95  KS 90  Her 2 0  Stage IA         6/7/2018 Genetic Testing     BRCA testing  Negative         7/10/2018 Surgery     Right breast lumpectomy with sentinel lymph node biopsy  Invasive ductal carcinoma  9 mm  Grade 1  0/1 lymph nodes  Margins negative  ER 95  AK 90  Her 2 0  Stage IA         7/2018 -  Hormone Therapy       Adjuvant hormonal therapy with tamoxifen to be started in July 2018 8/13/2018 - 9/11/2018 Radiation     Course: C1    Plan ID Energy Fractions Dose per Fraction (cGy) Dose Correction (cGy) Total Dose Delivered (cGy) Elapsed Days   R Breast e 9E 5 / 5 200 0 1,000 6   Rt Breast 6X 16 / 16 266 0 4,256 22      Treatment dates:  C1: 8/13/2018 - 9/11/2018                Interval History:   See above    Review of Systems:   Review of Systems   Constitutional: Negative for activity change, appetite change and fatigue  HENT: Negative  Eyes: Negative  Respiratory: Negative for cough, shortness of breath and wheezing  Cardiovascular: Negative for chest pain and leg swelling  Gastrointestinal: Negative  Endocrine: Negative  Genitourinary: Negative  Musculoskeletal:        No new changes or complaints of bone pain   Skin: Negative  Allergic/Immunologic: Negative  Neurological: Negative  Hematological: Negative  Psychiatric/Behavioral: Negative  Past Medical History     Patient Active Problem List   Diagnosis    Benign paroxysmal vertigo    Seasonal allergies    Rash    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 breast in female, estrogen receptor positive ([address] )    Cervical cancer screening    Screening for human papillomavirus (HPV)    Encounter for gynecological examination (general) (routine) without abnormal findings     Past Medical History:   Diagnosis Date    Breast cancer ([address] )     Seasonal allergies     Vertigo      Past Surgical History:   Procedure Laterality Date    EMBOLECTOMY      s/p forceps delivery    AK BIOPSY/EXCISION, LYMPH NODE(S) Right 7/10/2018    Procedure: SENTINEL LYMPH NODE BIOPSY; LYMPHATIC MAPPING WITH BLUE DYE RADIOACTIVE DYE (INJECT AT 0800 BY DR CHAUDHARY IN THE OR);   Surgeon: Feng Costa MD;  [facility];  Service: Surgical Oncology    OH PERQ DEVICE PLACEMT BREAST LOC 1ST LES OLEG ORTEGA Right 7/10/2018    Procedure: BREAST LUMPECTOMY; BREAST NEEDLE LOCALIZATION (NEEDLE LOC AT 0700); Surgeon: Beatriz Guevara MD;  [facility];  Service: Surgical Oncology     Family History   Problem Relation Age of Onset    Prostate cancer Father     Breast cancer Maternal Aunt     Ovarian cancer Maternal Aunt     Breast cancer Maternal Grandmother     Stomach cancer Maternal Grandmother     Esophageal cancer Maternal Grandfather      Social History     Social History    Marital status: /Civil Union     Spouse name: N/A    Number of children: N/A    Years of education: N/A     Occupational History    Not on file  Social History Main Topics    Smoking status: Former Smoker     Quit date: 1988    Smokeless tobacco: Never Used      Comment: only smoked socially as teenager for 1 yr    Alcohol use Yes      Comment: 2-3 glasses of wine on Fri and sat annetta    Drug use: No    Sexual activity: Yes     Other Topics Concern    Not on file     Social History Narrative    No narrative on file       Current Outpatient Prescriptions:     loratadine (CLARITIN) 5 MG chewable tablet, Chew 5 mg daily, Disp: , Rfl:     tamoxifen (NOLVADEX) 20 mg tablet, Take 1 tablet (20 mg total) by mouth daily, Disp: 90 tablet, Rfl: 1  No Known Allergies    Physical Exam:     Vitals:    10/17/18 1041   BP: 110/68   Pulse: 68   Resp: 14   Temp: 97 7 °F (36 5 °C)     Physical Exam   Constitutional: She is oriented to person, place, and time  She appears well-developed and well-nourished  HENT:   Head: Normocephalic and atraumatic  Mouth/Throat: Oropharynx is clear and moist    Eyes: Pupils are equal, round, and reactive to light  EOM are normal    Neck: Normal range of motion  Neck supple  No JVD present  No tracheal deviation present  No thyromegaly present     Cardiovascular: Normal rate, regular rhythm, normal heart sounds and intact distal pulses  Exam reveals no gallop and no friction rub  No murmur heard  Pulmonary/Chest: Effort normal and breath sounds normal  No respiratory distress  She has no wheezes  She has no rales  Examination of the right breast demonstrates 2 well-healed incisions  There is minimal scar tissue  There are no dominant masses and no axillary supraclavicular adenopathy  Examination left breast demonstrates no nipple discharge dominant masses skin changes or axillary/supraclavicular adenopathy  Abdominal: Soft  She exhibits no distension and no mass  There is no hepatomegaly  There is no tenderness  There is no rebound and no guarding  Musculoskeletal: Normal range of motion  She exhibits no edema or tenderness  Lymphadenopathy:     She has no cervical adenopathy  Neurological: She is alert and oriented to person, place, and time  No cranial nerve deficit  Skin: Skin is warm and dry  No rash noted  No erythema  Psychiatric: She has a normal mood and affect  Her behavior is normal    Vitals reviewed  Results:   No current imaging          Advance Care Planning/Advance Directives:  I discussed the disease status, treatment plans and follow-up with the patient

## 2018-10-18 ENCOUNTER — RADIATION ONCOLOGY FOLLOW-UP (OUTPATIENT)
Dept: RADIATION ONCOLOGY | Facility: HOSPITAL | Age: 48
End: 2018-10-18
Attending: RADIOLOGY
Payer: COMMERCIAL

## 2018-10-18 ENCOUNTER — CLINICAL SUPPORT (OUTPATIENT)
Dept: RADIATION ONCOLOGY | Facility: HOSPITAL | Age: 48
End: 2018-10-18
Payer: COMMERCIAL

## 2018-10-18 VITALS
SYSTOLIC BLOOD PRESSURE: 104 MMHG | HEART RATE: 60 BPM | TEMPERATURE: 98.2 F | BODY MASS INDEX: 20.77 KG/M2 | DIASTOLIC BLOOD PRESSURE: 68 MMHG | WEIGHT: 117.2 LBS | HEIGHT: 63 IN | RESPIRATION RATE: 16 BRPM

## 2018-10-18 DIAGNOSIS — C50.211 MALIGNANT NEOPLASM OF UPPER-INNER QUADRANT OF RIGHT BREAST IN FEMALE, ESTROGEN RECEPTOR POSITIVE (HCC): Primary | ICD-10-CM

## 2018-10-18 DIAGNOSIS — Z17.0 MALIGNANT NEOPLASM OF UPPER-INNER QUADRANT OF RIGHT BREAST IN FEMALE, ESTROGEN RECEPTOR POSITIVE (HCC): Primary | ICD-10-CM

## 2018-10-18 LAB
HPV HR 12 DNA CVX QL NAA+PROBE: NEGATIVE
HPV16 DNA CVX QL NAA+PROBE: NEGATIVE
HPV18 DNA CVX QL NAA+PROBE: NEGATIVE

## 2018-10-18 PROCEDURE — 99215 OFFICE O/P EST HI 40 MIN: CPT | Performed by: RADIOLOGY

## 2018-10-18 NOTE — PROGRESS NOTES
Follow-up - Radiation Oncology   Maricarmen Garrett 1970 51 y o  female [id number]      History of Present Illness   Cancer Staging  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 breast in female, estrogen receptor positive ([facility] [address] )  Staging form: Breast, AJCC 8th Edition  - Clinical: Stage IA (cT1b, cN0(sn), cM0, G1, ER: Positive, HI: Positive, HER2: Negative) - Unsigned        Interval History:    Presents for 1 month follow up EOT      10/17[facility]18 [facility]   On clinical examination she does not have a seroma   She has a small amount of scar tissue in the area  I have recommended she stretch this area out and continue with her range of motion and daily work outs       4/18/19 Mammogram scheduled     Brennon Baires notes some twinges in the right breast   Screening  Tobacco  Current tobacco user: no  If yes, brief counseling provided: No     Hypertension  Hypertension screening performed: yes  Normotensive:  yes  If no, referred to PCP: no     Depression Screening  Screened for depression using PHQ-2: yes     Screened for depression using PHQ-9:  no  Screening positive or negative:  negative  If score >4, was any of the following actions taken?    Additional evaluation for depression, suicide risk assesment, referral to PCP or psychiatry, medication started:  no     Advanced Care Planning for Patients >65 years  Advanced Care Planning Discussed:  n/a  Patient named surrogate decision maker or care plan in chart: n/a         Historical Information      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 breast in female, estrogen receptor positive ([facility] [address] )    5/29/2018 Biopsy     Right breast biopsy  3 o'clock 6 CMFN  Invasive ductal carcinoma  Grade 1  8 mm on ultrasound  No axillary adenopathy on ultrasound  ER 95  HI 90  Her 2 0  Stage IA         6/7/2018 Genetic Testing     BRCA testing  Negative         7/10/2018 Surgery     Right breast lumpectomy with sentinel lymph node biopsy  Invasive ductal carcinoma  9 mm  Grade 1  0/1 lymph nodes  Margins negative  ER 95  DE 90  Her 2 0  Stage IA         7/2018 -  Hormone Therapy       Adjuvant hormonal therapy with tamoxifen to be started in July 2018 8/13/2018 - 9/11/2018 Radiation     Course: C1    Plan ID Energy Fractions Dose per Fraction (cGy) Dose Correction (cGy) Total Dose Delivered (cGy) Elapsed Days   R Breast e 9E 5 / 5 200 0 1,000 6   Rt Breast 6X 16 / 16 266 0 4,256 22      Treatment dates:  C1: 8/13/2018 - 9/11/2018              Past Medical History:   Diagnosis Date    Breast cancer ([facility] [address] )     Seasonal allergies     Vertigo      Past Surgical History:   Procedure Laterality Date    EMBOLECTOMY      s/p forceps delivery    DE BIOPSY/EXCISION, LYMPH NODE(S) Right 7/10/2018    Procedure: SENTINEL LYMPH NODE BIOPSY; LYMPHATIC MAPPING WITH BLUE DYE RADIOACTIVE DYE (INJECT AT 0800 BY DR CHAUDHARY IN THE OR); Surgeon: Merari Abreu MD;  [facility];  Service: Surgical Oncology    DE PERQ DEVICE PLACEMT BREAST LOC 1ST LES W GUIDNCE Right 7/10/2018    Procedure: BREAST LUMPECTOMY; BREAST NEEDLE LOCALIZATION (NEEDLE LOC AT 0700); Surgeon: Merari Abreu MD;  [facility];  Service: Surgical Oncology       Social History   History   Alcohol Use    Yes     Comment: 2-3 glasses of wine on Fri and sat annetta     History   Drug Use No     History   Smoking Status    Former Smoker    Quit date: 1988   Smokeless Tobacco    Never Used     Comment: only smoked socially as teenager for 1 yr         Meds/Allergies     Current Outpatient Prescriptions:     loratadine (CLARITIN) 5 MG chewable tablet, Chew 5 mg daily, Disp: , Rfl:     tamoxifen (NOLVADEX) 20 mg tablet, Take 1 tablet (20 mg total) by mouth daily, Disp: 90 tablet, Rfl: 1  No Known Allergies      Review of Systems   Constitutional: Negative  HENT: Negative  Eyes: Negative  Respiratory: Negative  Cardiovascular: Negative  Gastrointestinal: Negative  Endocrine: Negative  Genitourinary: Negative  Musculoskeletal:        Right upper arm pain and weakness  Skin: Positive for color change (Mild discoloration in RT site)  Allergic/Immunologic: Negative  Neurological: Negative  Hematological: Negative  Psychiatric/Behavioral: Negative            OBJECTIVE:   /68 (BP Location: Left arm, Patient Position: Sitting, Cuff Size: Standard)   Pulse 60   Temp 98 2 °F (36 8 °C) (Oral)   Resp 16   Ht 5' 3" (1 6 m)   Wt 53 2 kg (117 lb 3 2 oz)   LMP 10/12/2018 (Exact Date)   BMI 20 76 kg/m²   Pain Assessment:  0  ECOG/Zubrod/WHO: 0    Physical Exam   Constitutional: She appears well-developed  HENT:   Head: Normocephalic  Hair is normal    Mouth/Throat: Oropharynx is clear and moist    Eyes: EOM are normal  No scleral icterus  Neck: Neck supple  No spinous process tenderness present  No edema present  Cardiovascular: Normal rate and regular rhythm  Pulmonary/Chest: Effort normal and breath sounds normal  No respiratory distress  She has no wheezes  She exhibits no tenderness  The skin in the radiated field is in good condition  Her edema which was noted pre RT is improved  Mild fibrosis  No desquamation  No arm edema  Good range of motion the right upper extremity   Abdominal: Soft  Bowel sounds are normal  She exhibits no distension, no ascites and no mass  There is no hepatosplenomegaly  There is no tenderness  There is no rebound  Genitourinary: No breast swelling or tenderness  Musculoskeletal: Normal range of motion  She exhibits no edema or tenderness  Lymphadenopathy:     She has no cervical adenopathy  She has no axillary adenopathy  Neurological: She is alert  She has normal strength  No cranial nerve deficit  Coordination and gait normal    Skin: Skin is intact  Psychiatric: She has a normal mood and affect   Her speech is normal and behavior is normal            RESULTS    Lab Results: No results found for this or any previous visit (from the past 672 hour(s))  Imaging Studies:No results found  Assessment/Plan:  No orders of the defined types were placed in this encounter  Rakan Shook is a 51y o  year old female who is 1 month post adjuvant radiation therapy for right breast carcinoma  She has no clinical evidence of recurrence  Her acute radiation reaction has resolved  I have demonstrated breast massage technique  She will return for follow-up visit in 6 months  Bala Greco MD  10/18/2018,11:44 AM    Portions of the record may have been created with voice recognition software   Occasional wrong word or "sound a like" substitutions may have occurred due to the inherent limitations of voice recognition software   Read the chart carefully and recognize, using context, where substitutions have occurred

## 2018-10-18 NOTE — PROGRESS NOTES
Omarjason Castillo  1970   Ms Lisa Green is a 51 y o  female       Chief Complaint   Patient presents with    Follow-up   Cancer Staging  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 breast in female, estrogen receptor positive ([facility] [address] )  Staging form: Breast, AJCC 8th Edition  - Clinical: Stage IA (cT1b, cN0(sn), cM0, G1, ER: Positive, TX: Positive, HER2: Negative) - Unsigned      Oncology History    Presents for 1 month follow up EOT  7/25/18 Pj Brambila:   adjuvant chemotherapy is not indicated  I recommended her to have adjuvant hormonal therapy which will be tamoxifen since she is premenopausal    F/U scheduled for 11/29/18 7/25/18 Stacy Gonzalez:   On clinical examination she does not have a seroma  She has a small amount of scar tissue in the area  I have recommended she stretch this area out and continue with her range of motion and daily work outs  I will have her see Radiation Oncology as well as Medical Oncology  We will see her back in 3 months   F/U scheduled for 10/17/18          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 breast in female, estrogen receptor positive ([facility] [address] )    5/29/2018 Biopsy     Right breast biopsy  3 o'clock 6 CMFN  Invasive ductal carcinoma  Grade 1  8 mm on ultrasound  No axillary adenopathy on ultrasound  ER 95  TX 90  Her 2 0  Stage IA         6/7/2018 Genetic Testing     BRCA testing  Negative         7/10/2018 Surgery     Right breast lumpectomy with sentinel lymph node biopsy  Invasive ductal carcinoma  9 mm  Grade 1  0/1 lymph nodes  Margins negative  ER 95  TX 90  Her 2 0  Stage IA         7/2018 -  Hormone Therapy       Adjuvant hormonal therapy with tamoxifen to be started in July 2018 8/13/2018 - 9/11/2018 Radiation     Course: C1    Plan ID Energy Fractions Dose per Fraction (cGy) Dose Correction (cGy) Total Dose Delivered (cGy) Elapsed Days   R Breast e 9E 5 / 5 200 0 1,000 6   Rt Breast 6X 16 / 16 266 0 4,256 22      Treatment dates:  C1: 8/13/2018 - 9/11/2018              Clinical Trial: no    Interval History  Presents for 1 month follow up EOT  10/17/18 [facility]   On clinical examination she does not have a seroma  She has a small amount of scar tissue in the area  I have recommended she stretch this area out and continue with her range of motion and daily work outs  4/18/19 Mammogram scheduled  Screening  Tobacco  Current tobacco user: no  If yes, brief counseling provided: No    Hypertension  Hypertension screening performed: yes  Normotensive:  yes  If no, referred to PCP: no    Depression Screening  Screened for depression using PHQ-2: yes    Screened for depression using PHQ-9:  no  Screening positive or negative:  negative  If score >4, was any of the following actions taken?    Additional evaluation for depression, suicide risk assesment, referral to PCP or psychiatry, medication started:  no    Advanced Care Planning for Patients >65 years  Advanced Care Planning Discussed:  n/a  Patient named surrogate decision maker or care plan in chart: n/a    [unfilled]  Health Maintenance   Topic Date Due    Depression Screening PHQ  1970    Pneumococcal PPSV23 Highest Risk Adult (1 of 3 - PCV13) 03/26/1988    DTaP,Tdap,and Td Vaccines (1 - Tdap) 03/26/1990    INFLUENZA VACCINE  Completed       Patient Active Problem List   Diagnosis    Benign paroxysmal vertigo    Seasonal allergies    Rash    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 breast in female, estrogen receptor positive ([facility] [address] [facility]    Cervical cancer screening    Screening for human papillomavirus (HPV)    Encounter for gynecological examination (general) (routine) without abnormal findings     Past Medical History:   Diagnosis Date    Breast cancer ([facility] [address]     Seasonal allergies     Vertigo      Past Surgical History:   Procedure Laterality Date    EMBOLECTOMY      s/p forceps delivery    PA BIOPSY/EXCISION, LYMPH NODE(S) Right 7/10/2018    Procedure: SENTINEL LYMPH NODE BIOPSY; LYMPHATIC MAPPING WITH BLUE DYE RADIOACTIVE DYE (INJECT AT 0800 BY DR CHAUDHARY IN THE OR); Surgeon: Mulugeta Barber MD;  [facility];  Service: Surgical Oncology    SD PERQ DEVICE PLACEMT BREAST LOC 1ST LES W JORDAN Right 7/10/2018    Procedure: BREAST LUMPECTOMY; BREAST NEEDLE LOCALIZATION (NEEDLE LOC AT 0700); Surgeon: Mulugeta Barber MD;  [facility];  Service: Surgical Oncology     Family History   Problem Relation Age of Onset    Prostate cancer Father     Breast cancer Maternal Aunt     Ovarian cancer Maternal Aunt     Breast cancer Maternal Grandmother     Stomach cancer Maternal Grandmother     Esophageal cancer Maternal Grandfather      Social History     Social History    Marital status: /Civil Union     Spouse name: N/A    Number of children: 2    Years of education: N/A     Occupational History    Not on file  Social History Main Topics    Smoking status: Former Smoker     Quit date: 1988    Smokeless tobacco: Never Used      Comment: only smoked socially as teenager for 1 yr    Alcohol use Yes      Comment: 2-3 glasses of wine on Fri and sat annetta    Drug use: No    Sexual activity: Yes     Other Topics Concern    Not on file     Social History Narrative    No narrative on file       Current Outpatient Prescriptions:     loratadine (CLARITIN) 5 MG chewable tablet, Chew 5 mg daily, Disp: , Rfl:     tamoxifen (NOLVADEX) 20 mg tablet, Take 1 tablet (20 mg total) by mouth daily, Disp: 90 tablet, Rfl: 1  No Known Allergies    Review of Systems:  Review of Systems   Constitutional: Negative  HENT: Negative  Eyes: Negative  Respiratory: Negative  Cardiovascular: Negative  Gastrointestinal: Negative  Endocrine: Negative  Genitourinary: Negative  Musculoskeletal:        Right upper arm pain and weakness  Skin: Positive for color change (Mild discoloration in RT site)     Allergic/Immunologic: Negative  Neurological: Negative  Hematological: Negative  Psychiatric/Behavioral: Negative  Vitals:    10/18/18 1033   BP: 104/68   BP Location: Left arm   Patient Position: Sitting   Cuff Size: Standard   Pulse: 60   Resp: 16   Temp: 98 2 °F (36 8 °C)   TempSrc: Oral   Weight: 53 2 kg (117 lb 3 2 oz)   Height: 5' 3" (1 6 m)       Pain Score: 0-No pain    Imaging:No results found

## 2018-10-19 ENCOUNTER — TELEPHONE (OUTPATIENT)
Dept: OBGYN CLINIC | Facility: CLINIC | Age: 48
End: 2018-10-19

## 2018-10-19 LAB
LAB AP GYN PRIMARY INTERPRETATION: NORMAL
LAB AP LMP: NORMAL
Lab: NORMAL

## 2018-10-19 NOTE — TELEPHONE ENCOUNTER
----- Message from Marcial Newman DO sent at 10/19/2018  9:51 AM EDT -----  Results within normal limits  Please inform patient

## 2019-01-17 DIAGNOSIS — C50.211 MALIGNANT NEOPLASM OF UPPER-INNER QUADRANT OF RIGHT BREAST IN FEMALE, ESTROGEN RECEPTOR POSITIVE (HCC): ICD-10-CM

## 2019-01-17 DIAGNOSIS — Z17.0 MALIGNANT NEOPLASM OF UPPER-INNER QUADRANT OF RIGHT BREAST IN FEMALE, ESTROGEN RECEPTOR POSITIVE (HCC): ICD-10-CM

## 2019-01-17 RX ORDER — TAMOXIFEN CITRATE 20 MG/1
TABLET ORAL
Qty: 90 TABLET | Refills: 0 | Status: SHIPPED | OUTPATIENT
Start: 2019-01-17 | End: 2019-04-15 | Stop reason: SDUPTHER

## 2019-01-29 ENCOUNTER — OFFICE VISIT (OUTPATIENT)
Dept: HEMATOLOGY ONCOLOGY | Facility: CLINIC | Age: 49
End: 2019-01-29
Payer: COMMERCIAL

## 2019-01-29 VITALS
SYSTOLIC BLOOD PRESSURE: 100 MMHG | RESPIRATION RATE: 14 BRPM | WEIGHT: 118 LBS | TEMPERATURE: 98.3 F | OXYGEN SATURATION: 99 % | HEIGHT: 63 IN | DIASTOLIC BLOOD PRESSURE: 62 MMHG | BODY MASS INDEX: 20.91 KG/M2 | HEART RATE: 67 BPM

## 2019-01-29 DIAGNOSIS — C50.211 MALIGNANT NEOPLASM OF UPPER-INNER QUADRANT OF RIGHT BREAST IN FEMALE, ESTROGEN RECEPTOR POSITIVE (HCC): Primary | ICD-10-CM

## 2019-01-29 DIAGNOSIS — Z17.0 MALIGNANT NEOPLASM OF UPPER-INNER QUADRANT OF RIGHT BREAST IN FEMALE, ESTROGEN RECEPTOR POSITIVE (HCC): Primary | ICD-10-CM

## 2019-01-29 PROCEDURE — 99214 OFFICE O/P EST MOD 30 MIN: CPT | Performed by: INTERNAL MEDICINE

## 2019-01-29 NOTE — PROGRESS NOTES
Hematology / Oncology Outpatient Follow Up Note    Filomena Taylor 51 y o  female :1970 KII:[id number]         Date:  2019    Assessment / Plan:  A 78-year-old premenopausal woman with stage IA right breast cancer, grade 1, ER 95% positive, OH 90% positive, HER2 negative disease  She underwent lumpectomy with sentinel lymph node biopsy, resulting in LESLI  She is currently on adjuvant hormonal therapy with tamoxifen with excellent tolerance  Clinically, she has no evidence recurrent disease  I recommended her to continue with tamoxifen 20 mg daily  She is in agreement with my recommendation  I will see her again in 6 months for routine follow-up                        Subjective:      HPI:  A 78-year-old premenopausal woman who was found to have abnormality in her right breast, based on a screening mammography  Therefore, she underwent stereotactic right breast biopsy in May 29, 2018 which showed invasive ductal carcinoma, grade 1  Subsequently, she had genetic testing which was negative for BRCA gene mutation  She elected to have lumpectomy which she did in July 10, 2018 by Dr Collins Every  She had 9 x 6 mm of invasive ductal carcinoma, grade 1  There is no evidence of lymphovascular invasion  One sentinel lymph nodes was negative for metastatic disease  This was ER 95% positive, OH 90% positive, HER2 negative disease  She presents today to discuss adjuvant treatment options  She feels well  She has no complaint of pain  She has regular menstrual cycle  She has no respiratory symptoms  She has no other past medical history  She does not take any medications  Her performance status is normal            Interval History:  A 51year-old premenopausal woman with stage IA right breast cancer, grade 1, ER 95% positive, OH 90% positive, HER2 negative disease  She underwent lumpectomy with sentinel lymph node biopsy, resulting in LESLI  She started adjuvant hormonal therapy with tamoxifen 2018  She has been tolerating tamoxifen very well  Since November 2018, she has no menstrual cycle  She initially had hot flashes which completely subsided  She has no swelling lower extremity or respiratory symptoms  She denied any pain  Her performance status is normal            Objective:      Primary Diagnosis:     Right breast cancer, stage IA (pT1b, pN0, M0) grade 1, ER 95% positive, IL 90% positive, HER2 negative disease  Diagnosed in July 2018      Cancer Staging:  Cancer Staging  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 breast in female, estrogen receptor positive ([facility] [address] )  Staging form: Breast, AJCC 8th Edition  - Clinical: Stage IA (cT1b, cN0(sn), cM0, G1, ER: Positive, IL: Positive, HER2: Negative) - Unsigned           Previous Hematologic/ Oncologic Treatment:            Current Hematologic/ Oncologic Treatment:       Adjuvant hormonal therapy with tamoxifen since July 2018      Disease Status:      LESLI status post lumpectomy with sentinel lymph node biopsy      Test Results:     Pathology:     9 x 6 mm of invasive ductal carcinoma, grade 1  No evidence of lymphovascular invasion  One sentinel lymph nodes were negative for metastatic disease  ER 95% positive, IL 90% positive, HER2 negative disease  Stage IA (pT1b, pN0, M0)     Radiology:     Chest x-ray was negative for pulmonary disease      Laboratory:     See below      Physical Exam:        General Appearance:    Alert, oriented          Eyes:    PERRL   Ears:    Normal external ear canals, both ears   Nose:   Nares normal, septum midline   Throat:   Mucosa moist  Pharynx without injection  Neck:   Supple         Lungs:     Clear to auscultation bilaterally   Chest Wall:    No tenderness or deformity    Heart:    Regular rate and rhythm         Abdomen:     Soft, non-tender, bowel sounds +, no organomegaly               Extremities:   Extremities no cyanosis or edema         Skin:   no rash or icterus      Lymph nodes:   Cervical, supraclavicular, and axillary nodes normal   Neurologic:   CNII-XII intact, normal strength, sensation and reflexes     Throughout             Breast exam: Lumpectomy scar at inner aspect of her right breast with no palpable abnormality  Left breast exam is negative               ROS: Review of Systems   All other systems reviewed and are negative  Imaging: No results found  Labs:   Lab Results   Component Value Date    WBC 7 58 08/21/2018    HGB 14 1 08/21/2018    HCT 40 8 08/21/2018    MCV 92 08/21/2018     08/21/2018     Lab Results   Component Value Date    K 3 7 06/07/2018     06/07/2018    CO2 31 06/07/2018    BUN 18 06/07/2018    CREATININE 0 78 06/07/2018    GLUF 104 (H) 06/07/2018    CALCIUM 9 4 06/07/2018    AST 19 06/07/2018    ALT 35 06/07/2018    ALKPHOS 76 06/07/2018    EGFR 90 06/07/2018         Current Medications: Reviewed  Allergies: Reviewed  PMH/FH/SH:  Reviewed      Vital Sign:    Body surface area is 1 55 meters squared      Wt Readings from Last 3 Encounters:   01/29/19 53 5 kg (118 lb)   10/18/18 53 2 kg (117 lb 3 2 oz)   10/17/18 53 3 kg (117 lb 8 oz)        Temp Readings from Last 3 Encounters:   01/29/19 98 3 °F (36 8 °C) (Oral)   10/18/18 98 2 °F (36 8 °C) (Oral)   10/17/18 97 7 °F (36 5 °C)        BP Readings from Last 3 Encounters:   01/29/19 100/62   10/18/18 104/68   10/17/18 110/68         Pulse Readings from Last 3 Encounters:   01/29/19 67   10/18/18 60   10/17/18 68     @LASTSAO2(3)@

## 2019-04-15 DIAGNOSIS — Z17.0 MALIGNANT NEOPLASM OF UPPER-INNER QUADRANT OF RIGHT BREAST IN FEMALE, ESTROGEN RECEPTOR POSITIVE (HCC): ICD-10-CM

## 2019-04-15 DIAGNOSIS — C50.211 MALIGNANT NEOPLASM OF UPPER-INNER QUADRANT OF RIGHT BREAST IN FEMALE, ESTROGEN RECEPTOR POSITIVE (HCC): ICD-10-CM

## 2019-04-15 RX ORDER — TAMOXIFEN CITRATE 20 MG/1
TABLET ORAL
Qty: 90 TABLET | Refills: 0 | Status: SHIPPED | OUTPATIENT
Start: 2019-04-15 | End: 2019-07-20 | Stop reason: SDUPTHER

## 2019-04-18 ENCOUNTER — HOSPITAL ENCOUNTER (OUTPATIENT)
Dept: MAMMOGRAPHY | Facility: CLINIC | Age: 49
Discharge: HOME | End: 2019-04-18
Payer: COMMERCIAL

## 2019-04-18 VITALS — HEIGHT: 63 IN | WEIGHT: 116 LBS | BODY MASS INDEX: 20.55 KG/M2

## 2019-04-18 DIAGNOSIS — Z17.0 MALIGNANT NEOPLASM OF UPPER-INNER QUADRANT OF RIGHT BREAST IN FEMALE, ESTROGEN RECEPTOR POSITIVE (HCC): ICD-10-CM

## 2019-04-18 DIAGNOSIS — C50.211 MALIGNANT NEOPLASM OF UPPER-INNER QUADRANT OF RIGHT BREAST IN FEMALE, ESTROGEN RECEPTOR POSITIVE (HCC): ICD-10-CM

## 2019-04-18 PROCEDURE — 77066 DX MAMMO INCL CAD BI: CPT

## 2019-04-18 PROCEDURE — G0279 TOMOSYNTHESIS, MAMMO: HCPCS

## 2019-04-24 ENCOUNTER — OFFICE VISIT (OUTPATIENT)
Dept: SURGICAL ONCOLOGY | Facility: CLINIC | Age: 49
End: 2019-04-24
Payer: COMMERCIAL

## 2019-04-24 VITALS
BODY MASS INDEX: 20.73 KG/M2 | TEMPERATURE: 98.4 F | HEIGHT: 63 IN | SYSTOLIC BLOOD PRESSURE: 110 MMHG | WEIGHT: 117 LBS | HEART RATE: 66 BPM | DIASTOLIC BLOOD PRESSURE: 60 MMHG | RESPIRATION RATE: 16 BRPM

## 2019-04-24 DIAGNOSIS — R92.2 DENSE BREASTS: ICD-10-CM

## 2019-04-24 DIAGNOSIS — Z17.0 MALIGNANT NEOPLASM OF UPPER-INNER QUADRANT OF RIGHT BREAST IN FEMALE, ESTROGEN RECEPTOR POSITIVE (HCC): Primary | ICD-10-CM

## 2019-04-24 DIAGNOSIS — Z79.810 USE OF TAMOXIFEN (NOLVADEX): ICD-10-CM

## 2019-04-24 DIAGNOSIS — C50.211 MALIGNANT NEOPLASM OF UPPER-INNER QUADRANT OF RIGHT BREAST IN FEMALE, ESTROGEN RECEPTOR POSITIVE (HCC): Primary | ICD-10-CM

## 2019-04-24 PROBLEM — R92.30 DENSE BREASTS: Status: ACTIVE | Noted: 2019-04-24

## 2019-04-24 PROCEDURE — 99214 OFFICE O/P EST MOD 30 MIN: CPT | Performed by: NURSE PRACTITIONER

## 2019-04-25 ENCOUNTER — CLINICAL SUPPORT (OUTPATIENT)
Dept: RADIATION ONCOLOGY | Facility: HOSPITAL | Age: 49
End: 2019-04-25
Attending: RADIOLOGY
Payer: COMMERCIAL

## 2019-04-25 VITALS
RESPIRATION RATE: 16 BRPM | BODY MASS INDEX: 20.66 KG/M2 | WEIGHT: 116.6 LBS | DIASTOLIC BLOOD PRESSURE: 60 MMHG | SYSTOLIC BLOOD PRESSURE: 104 MMHG | HEIGHT: 63 IN | HEART RATE: 68 BPM

## 2019-04-25 DIAGNOSIS — C50.211 MALIGNANT NEOPLASM OF UPPER-INNER QUADRANT OF RIGHT BREAST IN FEMALE, ESTROGEN RECEPTOR POSITIVE (HCC): Primary | ICD-10-CM

## 2019-04-25 DIAGNOSIS — Z17.0 MALIGNANT NEOPLASM OF UPPER-INNER QUADRANT OF RIGHT BREAST IN FEMALE, ESTROGEN RECEPTOR POSITIVE (HCC): Primary | ICD-10-CM

## 2019-04-25 PROCEDURE — 99215 OFFICE O/P EST HI 40 MIN: CPT | Performed by: RADIOLOGY

## 2019-05-08 ENCOUNTER — TELEPHONE (OUTPATIENT)
Dept: SURGICAL ONCOLOGY | Facility: CLINIC | Age: 49
End: 2019-05-08

## 2019-07-20 DIAGNOSIS — C50.211 MALIGNANT NEOPLASM OF UPPER-INNER QUADRANT OF RIGHT BREAST IN FEMALE, ESTROGEN RECEPTOR POSITIVE (HCC): ICD-10-CM

## 2019-07-20 DIAGNOSIS — Z17.0 MALIGNANT NEOPLASM OF UPPER-INNER QUADRANT OF RIGHT BREAST IN FEMALE, ESTROGEN RECEPTOR POSITIVE (HCC): ICD-10-CM

## 2019-07-22 RX ORDER — TAMOXIFEN CITRATE 20 MG/1
TABLET ORAL
Qty: 90 TABLET | Refills: 0 | Status: SHIPPED | OUTPATIENT
Start: 2019-07-22 | End: 2019-10-19 | Stop reason: SDUPTHER

## 2019-07-30 ENCOUNTER — OFFICE VISIT (OUTPATIENT)
Dept: HEMATOLOGY ONCOLOGY | Facility: CLINIC | Age: 49
End: 2019-07-30
Payer: COMMERCIAL

## 2019-07-30 VITALS
HEART RATE: 67 BPM | WEIGHT: 115 LBS | HEIGHT: 63 IN | DIASTOLIC BLOOD PRESSURE: 52 MMHG | SYSTOLIC BLOOD PRESSURE: 98 MMHG | BODY MASS INDEX: 20.38 KG/M2 | OXYGEN SATURATION: 98 % | TEMPERATURE: 97.1 F | RESPIRATION RATE: 18 BRPM

## 2019-07-30 DIAGNOSIS — C50.211 MALIGNANT NEOPLASM OF UPPER-INNER QUADRANT OF RIGHT BREAST IN FEMALE, ESTROGEN RECEPTOR POSITIVE (HCC): Primary | ICD-10-CM

## 2019-07-30 DIAGNOSIS — Z17.0 MALIGNANT NEOPLASM OF UPPER-INNER QUADRANT OF RIGHT BREAST IN FEMALE, ESTROGEN RECEPTOR POSITIVE (HCC): Primary | ICD-10-CM

## 2019-07-30 PROCEDURE — 99214 OFFICE O/P EST MOD 30 MIN: CPT | Performed by: INTERNAL MEDICINE

## 2019-07-30 NOTE — PROGRESS NOTES
Hematology / Oncology Outpatient Follow Up Note    Violet Francis 47 y o  female :1970 [facility]:[id number]         Date:  2019    Assessment / Plan:  A 67-year-old premenopausal woman with stage IA right breast cancer, grade 1, ER 95% positive, NM 90% positive, HER2 negative disease   She underwent lumpectomy with sentinel lymph node biopsy, resulting in LESLI  She is currently on adjuvant hormonal therapy with tamoxifen with excellent tolerance  She has regular menstrual cycle  Clinically, she has no evidence recurrent disease  I recommended her to continue with tamoxifen 20 mg daily  She is in agreement with my recommendation  I will see her again in a year for routine follow-up         Subjective:      HPI:  A 79-year-old premenopausal woman who was found to have abnormality in her right breast, based on a screening mammography  Stephany Goldberg, she underwent stereotactic right breast biopsy in May 29, 2018 which showed invasive ductal carcinoma, grade 1   Subsequently, she had genetic testing which was negative for BRCA gene mutation   She elected to have lumpectomy which she did in July 10, 2018 by Dr Jose David Hand Arturo had 9 x 6 mm of invasive ductal carcinoma, grade 1  Sangita Pollack is no evidence of lymphovascular invasion   One sentinel lymph nodes was negative for metastatic disease   This was ER 95% positive, NM 90% positive, HER2 negative disease   She presents today to discuss adjuvant treatment options   She feels well   She has no complaint of pain   She has regular menstrual cycle   She has no respiratory symptoms   She has no other past medical history   She does not take any medications   Her performance status is normal            Interval History:  A 47year-old premenopausal woman with stage IA right breast cancer, grade 1, ER 95% positive, NM 90% positive, HER2 negative disease   She underwent lumpectomy with sentinel lymph node biopsy, resulting in LESLI    She started adjuvant hormonal therapy with tamoxifen July 2018  She presents today for routine follow-up  She tolerates tamoxifen very well with very occasional minimal hot flashes  She continued to have regular menstrual cycle  She has no respiratory symptoms  She denied swelling lower extremities  She has no complaint of pain  Her performance status is normal   She had mammography in April 2018 which was benign        Objective:      Primary Diagnosis:     Right breast cancer, stage IA (pT1b, pN0, M0) grade 1, ER 95% positive, ND 90% positive, HER2 negative disease   Diagnosed in July 2018      Cancer Staging:  Cancer Staging  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 breast in female, estrogen receptor positive ([facility] )  Staging form: Breast, AJCC 8th Edition  - Clinical: Stage IA (cT1b, cN0(sn), cM0, G1, ER: Positive, ND: Positive, HER2: Negative) - Unsigned           Previous Hematologic/ Oncologic Treatment:            Current Hematologic/ Oncologic Treatment:       Adjuvant hormonal therapy with tamoxifen since July 2018      Disease Status:      LESLI status post lumpectomy with sentinel lymph node biopsy      Test Results:     Pathology:     9 x 6 mm of invasive ductal carcinoma, grade 1   No evidence of lymphovascular invasion   One sentinel lymph nodes were negative for metastatic disease   ER 95% positive, ND 90% positive, HER2 negative disease   Stage IA (pT1b, pN0, M0)     Radiology:     Chest x-ray was negative for pulmonary disease  Mammography in April 2019 was benign  BI-RADS 2       Laboratory:     See below      Physical Exam:        General Appearance:    Alert, oriented          Eyes:    PERRL   Ears:    Normal external ear canals, both ears   Nose:   Nares normal, septum midline   Throat:   Mucosa moist  Pharynx without injection      Neck:   Supple         Lungs:     Clear to auscultation bilaterally   Chest Wall:    No tenderness or deformity    Heart:    Regular rate and rhythm         Abdomen:     Soft, non-tender, bowel sounds +, no organomegaly               Extremities:   Extremities no cyanosis or edema         Skin:   no rash or icterus  Lymph nodes:   Cervical, supraclavicular, and axillary nodes normal   Neurologic:   CNII-XII intact, normal strength, sensation and reflexes     Throughout             Breast exam: Lumpectomy scar at inner aspect of her right breast with no palpable abnormality   Left breast exam is negative  ROS: Review of Systems   All other systems reviewed and are negative  Imaging: No results found  Labs:   Lab Results   Component Value Date    WBC 7 58 08/21/2018    HGB 14 1 08/21/2018    HCT 40 8 08/21/2018    MCV 92 08/21/2018     08/21/2018     Lab Results   Component Value Date    K 3 7 06/07/2018     06/07/2018    CO2 31 06/07/2018    BUN 18 06/07/2018    CREATININE 0 78 06/07/2018    GLUF 104 (H) 06/07/2018    CALCIUM 9 4 06/07/2018    AST 19 06/07/2018    ALT 35 06/07/2018    ALKPHOS 76 06/07/2018    EGFR 90 06/07/2018         Current Medications: Reviewed  Allergies: Reviewed  PMH/FH/SH:  Reviewed      Vital Sign:    Body surface area is 1 53 meters squared      Wt Readings from Last 3 Encounters:   07/30/19 52 2 kg (115 lb)   04/25/19 52 9 kg (116 lb 9 6 oz)   04/24/19 53 1 kg (117 lb)        Temp Readings from Last 3 Encounters:   07/30/19 (!) 97 1 °F (36 2 °C) (Tympanic Core)   04/24/19 98 4 °F (36 9 °C) (Temporal)   01/29/19 98 3 °F (36 8 °C) (Oral)        BP Readings from Last 3 Encounters:   07/30/19 98/52   04/25/19 104/60   04/24/19 110/60         Pulse Readings from Last 3 Encounters:   07/30/19 67   04/25/19 68   04/24/19 66     @LASTSAO2(3)@

## 2019-09-13 ENCOUNTER — OFFICE VISIT (OUTPATIENT)
Dept: INTERNAL MEDICINE CLINIC | Facility: CLINIC | Age: 49
End: 2019-09-13
Payer: COMMERCIAL

## 2019-09-13 VITALS
WEIGHT: 113.8 LBS | OXYGEN SATURATION: 98 % | SYSTOLIC BLOOD PRESSURE: 110 MMHG | RESPIRATION RATE: 16 BRPM | DIASTOLIC BLOOD PRESSURE: 78 MMHG | BODY MASS INDEX: 20.16 KG/M2 | HEIGHT: 63 IN | HEART RATE: 73 BPM

## 2019-09-13 DIAGNOSIS — Z00.00 WELLNESS EXAMINATION: ICD-10-CM

## 2019-09-13 DIAGNOSIS — Z13.6 SCREENING FOR CARDIOVASCULAR CONDITION: ICD-10-CM

## 2019-09-13 DIAGNOSIS — Z12.11 SCREENING FOR COLON CANCER: Primary | ICD-10-CM

## 2019-09-13 DIAGNOSIS — Z23 INFLUENZA VACCINE NEEDED: ICD-10-CM

## 2019-09-13 DIAGNOSIS — C50.919 MALIGNANT NEOPLASM OF FEMALE BREAST, UNSPECIFIED ESTROGEN RECEPTOR STATUS, UNSPECIFIED LATERALITY, UNSPECIFIED SITE OF BREAST (HCC): ICD-10-CM

## 2019-09-13 PROCEDURE — 99396 PREV VISIT EST AGE 40-64: CPT | Performed by: INTERNAL MEDICINE

## 2019-09-13 PROCEDURE — 90471 IMMUNIZATION ADMIN: CPT

## 2019-09-13 PROCEDURE — 90682 RIV4 VACC RECOMBINANT DNA IM: CPT

## 2019-09-13 NOTE — PROGRESS NOTES
BMI Counseling: Body mass index is 20 16 kg/m²  Discussed the patient's BMI with her  The BMI is in the normal range I have counselled the pt to follow a healthy and balanced diet ,and recommend routine exercise  Assessment/Plan:    Wellness examination  Assessment and plan 1  Health maintenance annual wellness examination overall the patient is clinically stable and doing well, we encouraged the patient to follow a healthy and balanced diet  We recommend that the patient exercise routinely approximately 30 minutes 5 times per week   We have reviewed the patient's vaccines and have made recommendations for updates if necessary  recommend annual flu vaccine, recommend the new shingles vaccine shingles       We will be ordering screening laboratories which are age appropriate  Return to the office in      call if any problems  Problem List Items Addressed This Visit        Other    Wellness examination     Assessment and plan 1  Health maintenance annual wellness examination overall the patient is clinically stable and doing well, we encouraged the patient to follow a healthy and balanced diet  We recommend that the patient exercise routinely approximately 30 minutes 5 times per week   We have reviewed the patient's vaccines and have made recommendations for updates if necessary  recommend annual flu vaccine, recommend the new shingles vaccine shingles       We will be ordering screening laboratories which are age appropriate  Return to the office in      call if any problems             Other Visit Diagnoses     Screening for colon cancer    -  Primary    Relevant Orders    Ambulatory referral to Gastroenterology    Influenza vaccine needed        Relevant Orders    influenza vaccine, [id number], quadrivalent, recombinant, PF, 0 5 mL, for patients 18 yr+ (FLUBLOK) (Completed)    Screening for cardiovascular condition        Relevant Orders    Comprehensive metabolic panel    Lipid Panel with Direct LDL reflex    Malignant neoplasm of female breast, unspecified estrogen receptor status, unspecified laterality, unspecified site of [facility]        Relevant Orders    Vitamin D 25 hydroxy            Subjective:      Patient ID: Temi Salgado is a 47 y o  female  HPI wellness exam diet good - healthy diet , meditarranian diet , olive oil , wine red  2/ day week ends; no processed foods, proteins and vetebles ,  exercize daily run and lifting weight 1 hour per day for many yrs; sees dentist  Safety     The following portions of the patient's history were reviewed and updated as appropriate: allergies, current medications, past family history, past medical history, past social history, past surgical history and problem list     Review of Systems   Constitutional: Negative for activity change, appetite change and unexpected weight change  Eyes: Negative for visual disturbance  Respiratory: Negative for cough and shortness of breath  Cardiovascular: Negative for chest pain  Gastrointestinal: Negative for abdominal pain, diarrhea, nausea and vomiting  Objective:    No follow-ups on file  No results found    Recent Results (from the past 65941 hour(s))   ECG 12 lead   Result Value    Ventricular Rate 66    Atrial Rate 66    AZ Interval 128    QRSD Interval 82    QT Interval 410    QTC Interval 429    P Axis 53    QRS Axis 9    T Wave Axis 57    Narrative    Normal sinus rhythm  Normal ECG  When compared with ECG of 18-JUL-2006 14:47,  No significant change was found  Confirmed by [id number] ([id number]) on 6/8/2018 7:39:16 AM       No Known Allergies    Past Medical History:   Diagnosis Date    Breast cancer ([facility] ) 05/2018    right breast    History of radiation therapy 2018    right breast ca    Seasonal allergies     Vertigo      Past Surgical History:   Procedure Laterality Date    BREAST LUMPECTOMY Right 07/2018    EMBOLECTOMY      s/p forceps delivery    MAMMO NEEDLE LOCALIZATION RIGHT (ALL INC) Right 7/10/2018    MAMMO STEREOTACTIC BREAST BIOPSY RIGHT (ALL INC) Right 2018    AL BIOPSY/EXCISION, LYMPH NODE(S) Right 7/10/2018    Procedure: SENTINEL LYMPH NODE BIOPSY; LYMPHATIC MAPPING WITH BLUE DYE RADIOACTIVE DYE (INJECT AT 0800 BY DR CHAUDHARY IN THE OR); Surgeon: Giana Gray MD;  [facility]: [facility];  Service: Surgical Oncology    AL PERQ DEVICE PLACEMT BREAST LOC 1ST LES W HELDERE Right 7/10/2018    Procedure: BREAST LUMPECTOMY; BREAST NEEDLE LOCALIZATION (NEEDLE LOC AT 0700); Surgeon: iGana Gray MD;  [facility]: [facility];  Service: Surgical Oncology    US GUIDED BREAST BIOPSY RIGHT COMPLETE Right 2018     Current Outpatient Medications on File Prior to Visit   Medication Sig Dispense Refill    loratadine (CLARITIN) 5 MG chewable tablet Chew 5 mg daily      tamoxifen (NOLVADEX) 20 mg tablet TAKE 1 TABLET(20 MG) BY MOUTH DAILY 90 tablet 0     No current facility-administered medications on file prior to visit        Family History   Problem Relation Age of Onset    Prostate cancer Father     Breast cancer Maternal Aunt 54    Ovarian cancer Maternal Aunt     Colon cancer Maternal Grandmother     Esophageal cancer Maternal Grandfather      Social History     Socioeconomic History    Marital status: /Civil Union     Spouse name: Not on file    Number of children: 2    Years of education: Not on file    Highest education level: Not on file   Occupational History    Not on file   Social Needs    Financial resource strain: Not on file    Food insecurity:     Worry: Not on file     Inability: Not on file    Transportation needs:     Medical: Not on file     Non-medical: Not on file   Tobacco Use    Smoking status: Former Smoker     Last attempt to quit: 1988     Years since quittin 7    Smokeless tobacco: Never Used    Tobacco comment: only smoked socially as teenager for 1 yr   Substance and Sexual Activity    Alcohol use: Yes     Comment: 2-3 glasses of wine on Fri and sat annetta    Drug use: No    Sexual activity: Yes   Lifestyle    Physical activity:     Days per week: Not on file     Minutes per session: Not on file    Stress: Not on file   Relationships    Social connections:     Talks on phone: Not on file     Gets together: Not on file     Attends Tenriism service: Not on file     Active member of club or organization: Not on file     Attends meetings of clubs or organizations: Not on file     Relationship status: Not on file    Intimate partner violence:     Fear of current or ex partner: Not on file     Emotionally abused: Not on file     Physically abused: Not on file     Forced sexual activity: Not on file   Other Topics Concern    Not on file   Social History Narrative    Not on file     Vitals:    09/13/19 1119   BP: 110/78   Pulse: 73   Resp: 16   SpO2: 98%   Weight: 51 6 kg (113 lb 12 8 oz)   Height: 5' 3" (1 6 m)     Results for orders placed or performed in visit on 10/16/18   HPV High Risk   Result Value Ref Range    HPV Other HR Negative Negative    HPV16 Negative Negative    HPV18 Negative Negative   Liquid-based pap, screening   Result Value Ref Range    Case Report       Gynecologic Cytology Report                       Case: [id number]                                  Authorizing Provider:  Nivia Jordan DO          Collected:           10/16/2018 [id number]              Ordering Location:     [facility]      Received:            10/16/2018 [address]                                     [facility]                                                                               Gibran Chavez Screen:          ANITA Gunn                                                         Specimen:    LIQUID-BASED PAP, SCREENING, Cervix, Endocervical                                          Primary Interpretation Negative for intraepithelial lesion or malignancy     Specimen Adequacy       Satisfactory for evaluation  Endocervical/transformation zone component present  Additional Information       [employer] FDA approved ,  and ThinPrep Imaging System are utilized with strict adherence to the 's instruction manual to prepare gynecologic and non-gynecologic cytology specimens for the production of ThinPrep slides as well as for gynecologic ThinPrep imaging  These processes have been validated by our laboratory and/or by the   The Pap test is not a diagnostic procedure and should not be used as the sole means to detect cervical cancer  It is only a screening procedure to aid in the detection of cervical cancer and its precursors  Both false-negative and false-positive results have been experienced  Your patient's test result should be interpreted in this context together with the history and clinical findings  LMP 10/12/2018      Weight (last 2 days)     Date/Time   Weight    09/13/19 1119   51 6 (113 8)            Body mass index is 20 16 kg/m²  BP      Temp      Pulse     Resp      SpO2        Vitals:    09/13/19 1119   Weight: 51 6 kg (113 lb 12 8 oz)     Vitals:    09/13/19 1119   Weight: 51 6 kg (113 lb 12 8 oz)       /78   Pulse 73   Resp 16   Ht 5' 3" (1 6 m)   Wt 51 6 kg (113 lb 12 8 oz)   SpO2 98%   BMI 20 16 kg/m²          Physical Exam   Constitutional: She appears well-developed and well-nourished  HENT:   Head: Normocephalic  Mouth/Throat: Oropharynx is clear and moist    Eyes: Pupils are equal, round, and reactive to light  Conjunctivae are normal  Right eye exhibits no discharge  Left eye exhibits no discharge  No scleral icterus  Neck: Neck supple  Cardiovascular: Normal rate, regular rhythm, normal heart sounds and intact distal pulses  Exam reveals no gallop and no friction rub  No murmur heard  Pulmonary/Chest: Breath sounds normal  No respiratory distress  She has no wheezes   She has no rales  Abdominal: Soft  Bowel sounds are normal  She exhibits no distension and no mass  There is no tenderness  There is no rebound and no guarding  Musculoskeletal: She exhibits no edema or deformity  Lymphadenopathy:     She has no cervical adenopathy  Neurological: She is alert   Coordination normal

## 2019-09-15 NOTE — ASSESSMENT & PLAN NOTE
Assessment and plan 1  Health maintenance annual wellness examination overall the patient is clinically stable and doing well, we encouraged the patient to follow a healthy and balanced diet  We recommend that the patient exercise routinely approximately 30 minutes 5 times per week   We have reviewed the patient's vaccines and have made recommendations for updates if necessary  recommend annual flu vaccine, recommend the new shingles vaccine shingles       We will be ordering screening laboratories which are age appropriate  Return to the office in      call if any problems

## 2019-09-24 ENCOUNTER — TELEPHONE (OUTPATIENT)
Dept: GASTROENTEROLOGY | Facility: AMBULARY SURGERY CENTER | Age: 49
End: 2019-09-24

## 2019-09-24 DIAGNOSIS — Z12.11 COLON CANCER SCREENING: Primary | ICD-10-CM

## 2019-10-17 ENCOUNTER — TRANSCRIBE ORDERS (OUTPATIENT)
Dept: LAB | Facility: CLINIC | Age: 49
End: 2019-10-17

## 2019-10-17 ENCOUNTER — APPOINTMENT (OUTPATIENT)
Dept: LAB | Facility: CLINIC | Age: 49
End: 2019-10-17
Payer: COMMERCIAL

## 2019-10-17 DIAGNOSIS — C50.919 MALIGNANT NEOPLASM OF FEMALE BREAST, UNSPECIFIED ESTROGEN RECEPTOR STATUS, UNSPECIFIED LATERALITY, UNSPECIFIED SITE OF BREAST (HCC): ICD-10-CM

## 2019-10-17 DIAGNOSIS — Z13.6 SCREENING FOR CARDIOVASCULAR CONDITION: ICD-10-CM

## 2019-10-17 LAB
25(OH)D3 SERPL-MCNC: 37.4 NG/ML (ref 30–100)
ALBUMIN SERPL BCP-MCNC: 3.7 G/DL (ref 3.5–5)
ALP SERPL-CCNC: 64 U/L (ref 46–116)
ALT SERPL W P-5'-P-CCNC: 25 U/L (ref 12–78)
ANION GAP SERPL CALCULATED.3IONS-SCNC: 9 MMOL/L (ref 4–13)
AST SERPL W P-5'-P-CCNC: 19 U/L (ref 5–45)
BILIRUB SERPL-MCNC: 0.4 MG/DL (ref 0.2–1)
BUN SERPL-MCNC: 21 MG/DL (ref 5–25)
CALCIUM SERPL-MCNC: 8.8 MG/DL (ref 8.3–10.1)
CHLORIDE SERPL-SCNC: 108 MMOL/L (ref 100–108)
CHOLEST SERPL-MCNC: 169 MG/DL (ref 50–200)
CO2 SERPL-SCNC: 25 MMOL/L (ref 21–32)
CREAT SERPL-MCNC: 0.91 MG/DL (ref 0.6–1.3)
GFR SERPL CREATININE-BSD FRML MDRD: 74 ML/MIN/1.73SQ M
GLUCOSE P FAST SERPL-MCNC: 93 MG/DL (ref 65–99)
HDLC SERPL-MCNC: 60 MG/DL (ref 40–60)
LDLC SERPL CALC-MCNC: 97 MG/DL (ref 0–100)
POTASSIUM SERPL-SCNC: 3.8 MMOL/L (ref 3.5–5.3)
PROT SERPL-MCNC: 7.1 G/DL (ref 6.4–8.2)
SODIUM SERPL-SCNC: 142 MMOL/L (ref 136–145)
TRIGL SERPL-MCNC: 60 MG/DL

## 2019-10-17 PROCEDURE — 82306 VITAMIN D 25 HYDROXY: CPT

## 2019-10-17 PROCEDURE — 80061 LIPID PANEL: CPT

## 2019-10-17 PROCEDURE — 80053 COMPREHEN METABOLIC PANEL: CPT

## 2019-10-17 PROCEDURE — 36415 COLL VENOUS BLD VENIPUNCTURE: CPT

## 2019-10-19 DIAGNOSIS — Z17.0 MALIGNANT NEOPLASM OF UPPER-INNER QUADRANT OF RIGHT BREAST IN FEMALE, ESTROGEN RECEPTOR POSITIVE (HCC): ICD-10-CM

## 2019-10-19 DIAGNOSIS — C50.211 MALIGNANT NEOPLASM OF UPPER-INNER QUADRANT OF RIGHT BREAST IN FEMALE, ESTROGEN RECEPTOR POSITIVE (HCC): ICD-10-CM

## 2019-10-21 DIAGNOSIS — C50.211 MALIGNANT NEOPLASM OF UPPER-INNER QUADRANT OF RIGHT BREAST IN FEMALE, ESTROGEN RECEPTOR POSITIVE (HCC): ICD-10-CM

## 2019-10-21 DIAGNOSIS — Z17.0 MALIGNANT NEOPLASM OF UPPER-INNER QUADRANT OF RIGHT BREAST IN FEMALE, ESTROGEN RECEPTOR POSITIVE (HCC): ICD-10-CM

## 2019-10-21 RX ORDER — TAMOXIFEN CITRATE 20 MG/1
20 TABLET ORAL DAILY
Qty: 90 TABLET | Refills: 0 | Status: CANCELLED | OUTPATIENT
Start: 2019-10-21

## 2019-10-21 RX ORDER — TAMOXIFEN CITRATE 20 MG/1
20 TABLET ORAL DAILY
Qty: 90 TABLET | Refills: 1 | Status: SHIPPED | OUTPATIENT
Start: 2019-10-21 | End: 2020-01-20

## 2019-10-21 RX ORDER — TAMOXIFEN CITRATE 20 MG/1
TABLET ORAL
Qty: 90 TABLET | Refills: 0 | Status: SHIPPED | OUTPATIENT
Start: 2019-10-21 | End: 2019-10-21 | Stop reason: SDUPTHER

## 2019-10-24 ENCOUNTER — OFFICE VISIT (OUTPATIENT)
Dept: SURGICAL ONCOLOGY | Facility: CLINIC | Age: 49
End: 2019-10-24
Payer: COMMERCIAL

## 2019-10-24 VITALS
DIASTOLIC BLOOD PRESSURE: 80 MMHG | RESPIRATION RATE: 16 BRPM | SYSTOLIC BLOOD PRESSURE: 118 MMHG | TEMPERATURE: 97.1 F | HEART RATE: 69 BPM | WEIGHT: 115 LBS | BODY MASS INDEX: 20.38 KG/M2 | HEIGHT: 63 IN

## 2019-10-24 DIAGNOSIS — R92.2 DENSE BREASTS: ICD-10-CM

## 2019-10-24 DIAGNOSIS — Z79.810 USE OF TAMOXIFEN (NOLVADEX): ICD-10-CM

## 2019-10-24 DIAGNOSIS — Z12.31 VISIT FOR SCREENING MAMMOGRAM: ICD-10-CM

## 2019-10-24 DIAGNOSIS — C50.211 MALIGNANT NEOPLASM OF UPPER-INNER QUADRANT OF RIGHT BREAST IN FEMALE, ESTROGEN RECEPTOR POSITIVE (HCC): Primary | ICD-10-CM

## 2019-10-24 DIAGNOSIS — Z17.0 MALIGNANT NEOPLASM OF UPPER-INNER QUADRANT OF RIGHT BREAST IN FEMALE, ESTROGEN RECEPTOR POSITIVE (HCC): Primary | ICD-10-CM

## 2019-10-24 PROCEDURE — 99214 OFFICE O/P EST MOD 30 MIN: CPT | Performed by: NURSE PRACTITIONER

## 2019-10-24 NOTE — PROGRESS NOTES
Surgical Oncology Follow Up       [facility]  [facility] [place]  [address] [facility] [address]  [facility] [address]    Denise Hairston  1970  [id number]  [address]  [facility] [place]  [address] [address]    Chief Complaint   Patient presents with    Breast Cancer     Pt is here for 6 month f/u       Assessment/Plan:  1  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 breast in female, estrogen receptor positive ([address] )  -six-month follow-up visit    2  Use of tamoxifen (Nolvadex)  -continue use per Medical Oncology    3  Visit for screening mammogram  - Mammo screening bilateral w 3d & cad; Future    4  Dense breasts  - US breast screening bilateral complete (ABUS); Future      Discussion/Summary: Patient is a 48-year-old female who presents today for a six-month follow-up visit for right breast cancer diagnosed in May 2018  Her pathology revealed invasive ductal carcinoma, ER 95%, OH 90%, her 2-  She underwent a right lumpectomy and sentinel node biopsy by Dr Jane Hughes  Her genetic testing was negative  She completed whole breast radiation therapy and is currently taking tamoxifen  She had a bilateral mammogram on 4/18/2019 which was BIRADS 2, category 4 density  She has no new complaints today and there are no concerns on today's exam   The patient states that her insurance company would not cover the diagnostic mammogram as she had a very large out-of-pocket cost   I did review the standard of care at our institution is to have a diagnostic mammogram annually for the 1st 5 years after breast cancer diagnosis  The patient is declining diagnostic imaging at this time and therefore I will order a bilateral 3D screening mammogram   She has extremely dense breast tissue and I have also recommended that she be screened with an automated breast ultrasound    I will stagger this with her mammogram   I will call her with the results of the ultrasound  We will plan to see her back in 6 months following her mammogram or sooner if the need arises  She was instructed to call with any new concerns or symptoms prior to that time  All of her questions were answered today  History of Present Illness:        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 breast in female, estrogen receptor positive ([facility] [address] )    5/29/2018 Biopsy     Right breast biopsy  3 o'clock 6 CMFN  Invasive ductal carcinoma  Grade 1  8 mm on ultrasound  No axillary adenopathy on ultrasound  ER 95  ID 90  Her 2 0  Stage IA      6/7/2018 Genetic Testing     BRCA testing  Negative      7/10/2018 Surgery     Right breast lumpectomy with sentinel lymph node biopsy  Invasive ductal carcinoma  9 mm  Grade 1  0/1 lymph nodes  Margins negative  ER 95  ID 90  Her 2 0  Stage IA      7/2018 -  Hormone Therapy       Adjuvant hormonal therapy with tamoxifen       8/13/2018 - 9/11/2018 Radiation     Course: C1    Plan ID Energy Fractions Dose per Fraction (cGy) Dose Correction (cGy) Total Dose Delivered (cGy) Elapsed Days   R Breast e 9E 5 / 5 200 0 1,000 6   Rt Breast 6X 16 / 16 266 0 4,256 22      Treatment dates:  C1: 8/13/2018 - 9/11/2018            -Interval History:  Patient presents today for six-month follow-up visit for right breast cancer diagnosed in 2018  She notices no changes on self-exam   Denies headaches, back pain, bone pain, cough, shortness of breath, abdominal pain  She continues to take tamoxifen  She had a bilateral diagnostic mammogram on April 18, 2019 which was BI-RADS 2, category 4 density  Review of Systems:  Review of Systems   Constitutional: Negative for activity change, appetite change, chills, fatigue, fever and unexpected weight change  HENT: Negative for trouble swallowing  Eyes: Negative for pain, redness and visual disturbance  Respiratory: Negative for cough, shortness of breath and wheezing      Cardiovascular: Negative for chest pain, palpitations and leg swelling  Gastrointestinal: Negative for abdominal pain, constipation, diarrhea, nausea and vomiting  Endocrine: Negative for cold intolerance and heat intolerance  Musculoskeletal: Negative for arthralgias, back pain, gait problem and myalgias  Skin: Negative for color change and rash  Neurological: Negative for dizziness, syncope, light-headedness, numbness and headaches  Hematological: Negative for adenopathy  Psychiatric/Behavioral: Negative for agitation and confusion  All other systems reviewed and are negative  Patient Active Problem List   Diagnosis    Benign paroxysmal vertigo    Seasonal allergies    Rash    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 breast in female, [facility]    Cervical cancer screening    Screening for human papillomavirus (HPV)    Encounter for gynecological examination (general) (routine) without abnormal findings    Use of tamoxifen (Nolvadex)    Dense breasts    Wellness examination     Past Medical History:   Diagnosis Date    [facility] 05/2018    right breast    History of radiation therapy 2018    right breast ca    Seasonal allergies     Vertigo      Past Surgical History:   Procedure Laterality Date    BREAST LUMPECTOMY Right 07/2018    EMBOLECTOMY      s/p forceps delivery    MAMMO NEEDLE LOCALIZATION RIGHT (ALL INC) Right 7/10/2018    MAMMO STEREOTACTIC BREAST BIOPSY RIGHT (ALL INC) Right 5/29/2018    PA BIOPSY/EXCISION, LYMPH NODE(S) Right 7/10/2018    Procedure: SENTINEL LYMPH NODE BIOPSY; LYMPHATIC MAPPING WITH BLUE DYE RADIOACTIVE DYE (INJECT AT 0800 BY DR CHAUDHARY IN THE OR); Surgeon: Jose Luis Fernandes MD;  [facility];  Service: Surgical Oncology    PA PERQ DEVICE PLACEMT BREAST LOC 1ST LES W HELDERE Right 7/10/2018    Procedure: BREAST LUMPECTOMY; BREAST NEEDLE LOCALIZATION (NEEDLE LOC AT 0700);   Surgeon: Jose Luis Fernandes MD;  [facility];  Service: Surgical Oncology    US GUIDED BREAST BIOPSY RIGHT COMPLETE Right 2018     Family History   Problem Relation Age of Onset    Prostate cancer Father     Breast cancer Maternal Aunt 54    Ovarian cancer Maternal Aunt     Colon cancer Maternal Grandmother     Esophageal cancer Maternal Grandfather      Social History     Socioeconomic History    Marital status: /Civil Union     Spouse name: Not on file    Number of children: 2    Years of education: Not on file    Highest education level: Not on file   Occupational History    Not on file   Social Needs    Financial resource strain: Not on file    Food insecurity:     Worry: Not on file     Inability: Not on file    Transportation needs:     Medical: Not on file     Non-medical: Not on file   Tobacco Use    Smoking status: Former Smoker     Last attempt to quit:      Years since quittin 8    Smokeless tobacco: Never Used    Tobacco comment: only smoked socially as teenager for 1 yr   Substance and Sexual Activity    Alcohol use: Yes     Comment: 2-3 glasses of wine on Fri and sat annetta    Drug use: No    Sexual activity: Yes   Lifestyle    Physical activity:     Days per week: Not on file     Minutes per session: Not on file    Stress: Not on file   Relationships    Social connections:     Talks on phone: Not on file     Gets together: Not on file     Attends Hindu service: Not on file     Active member of club or organization: Not on file     Attends meetings of clubs or organizations: Not on file     Relationship status: Not on file    Intimate partner violence:     Fear of current or ex partner: Not on file     Emotionally abused: Not on file     Physically abused: Not on file     Forced sexual activity: Not on file   Other Topics Concern    Not on file   Social History Narrative    Not on file       Current Outpatient Medications:     loratadine (CLARITIN) 5 MG chewable tablet, Chew 5 mg daily, Disp: , Rfl:    tamoxifen (NOLVADEX) 20 mg tablet, Take 1 tablet (20 mg total) by mouth daily, Disp: 90 tablet, Rfl: 1    Na Sulfate-K Sulfate-Mg Sulf (SUPREP BOWEL PREP KIT) 17 5-3 13-1 6 GM/177ML SOLN, Take 2 Bottles by mouth see administration instructions Suprep bowel prep  Please follow the instructions from the office (Patient not taking: Reported on 10/24/2019), Disp: 2 Bottle, Rfl: 0  No Known Allergies  Vitals:    10/24/19 0805   BP: 118/80   Pulse: 69   Resp: 16   Temp: (!) 97 1 °F (36 2 °C)       Physical Exam   Constitutional: She is oriented to person, place, and time  Vital signs are normal  She appears well-developed and well-nourished  No distress  HENT:   Head: Normocephalic and atraumatic  Neck: Normal range of motion  Cardiovascular: Normal rate, regular rhythm and normal heart sounds  Pulmonary/Chest: Effort normal and breath sounds normal    Bilateral breasts were examined in the sitting and supine position  Right breast and right axillary surgical scar present  Dense, nodular breast tissue noted bilaterally  There are no masses, skin nodules, nipple changes or nipple discharge  There is no bilateral supraclavicular or axillary lymphadenopathy noted  Abdominal: Soft  Normal appearance  She exhibits no mass  There is no hepatosplenomegaly  There is no tenderness  Musculoskeletal: Normal range of motion  Lymphadenopathy:     She has no axillary adenopathy  Right: No supraclavicular adenopathy present  Left: No supraclavicular adenopathy present  Neurological: She is alert and oriented to person, place, and time  Skin: Skin is warm, dry and intact  No rash noted  She is not diaphoretic  Psychiatric: She has a normal mood and affect  Her speech is normal    Vitals reviewed  Advance Care Planning/Advance Directives:  Discussed disease status, cancer treatment plans and/or cancer treatment goals with the patient

## 2019-10-28 ENCOUNTER — ANNUAL EXAM (OUTPATIENT)
Dept: OBGYN CLINIC | Facility: CLINIC | Age: 49
End: 2019-10-28
Payer: COMMERCIAL

## 2019-10-28 ENCOUNTER — RADIATION ONCOLOGY FOLLOW-UP (OUTPATIENT)
Dept: RADIATION ONCOLOGY | Facility: HOSPITAL | Age: 49
End: 2019-10-28
Attending: RADIOLOGY
Payer: COMMERCIAL

## 2019-10-28 VITALS
SYSTOLIC BLOOD PRESSURE: 110 MMHG | DIASTOLIC BLOOD PRESSURE: 60 MMHG | HEIGHT: 63 IN | WEIGHT: 116.8 LBS | BODY MASS INDEX: 20.7 KG/M2

## 2019-10-28 VITALS
HEIGHT: 63 IN | HEART RATE: 78 BPM | SYSTOLIC BLOOD PRESSURE: 102 MMHG | BODY MASS INDEX: 20.73 KG/M2 | TEMPERATURE: 98 F | RESPIRATION RATE: 16 BRPM | WEIGHT: 117 LBS | DIASTOLIC BLOOD PRESSURE: 60 MMHG

## 2019-10-28 DIAGNOSIS — C50.211 MALIGNANT NEOPLASM OF UPPER-INNER QUADRANT OF RIGHT BREAST IN FEMALE, ESTROGEN RECEPTOR POSITIVE (HCC): Primary | ICD-10-CM

## 2019-10-28 DIAGNOSIS — Z01.419 ENCOUNTER FOR GYNECOLOGICAL EXAMINATION (GENERAL) (ROUTINE) WITHOUT ABNORMAL FINDINGS: Primary | ICD-10-CM

## 2019-10-28 DIAGNOSIS — Z17.0 MALIGNANT NEOPLASM OF UPPER-INNER QUADRANT OF RIGHT BREAST IN FEMALE, ESTROGEN RECEPTOR POSITIVE (HCC): Primary | ICD-10-CM

## 2019-10-28 PROCEDURE — S0612 ANNUAL GYNECOLOGICAL EXAMINA: HCPCS | Performed by: OBSTETRICS & GYNECOLOGY

## 2019-10-28 PROCEDURE — 99211 OFF/OP EST MAY X REQ PHY/QHP: CPT | Performed by: RADIOLOGY

## 2019-10-28 NOTE — PROGRESS NOTES
Assessment/Plan:    No problem-specific Assessment & Plan notes found for this encounter  Diagnoses and all orders for this visit:    Encounter for gynecological examination (general) (routine) without abnormal findings    Other orders  -     Cancel: Mammo diagnostic bilateral w 3d & cad; Future        Annual examination was completed  Breast Care is coordinated by her surgical oncologist   She is scheduled for screening colonoscopy next month  We did discuss the typical onset of menopause  She will call should she have significant alteration in her menstrual flow or pattern  We did discuss that there are alternative medications beyond hormone replacement therapy if patient should become symptomatic with menopause  Patient otherwise return to the office in 1 year  Subjective:      Patient ID: Lionel Ragland is a 47 y o  female  Patient returns for annual gyn visit  Since she was last seen she has no new medical surgical issues  She continues in follow-up with her subspecialists for surveillance of her right breast cancer  She continues on tamoxifen and is relatively asymptomatic  She has had an occasional prolonged duration between menses  She continues to have fairly regular flow with her menses  She continues to exercise on a regular basis  She is scheduled for screening colonoscopy next month  The following portions of the patient's history were reviewed and updated as appropriate:   She  has a past medical history of Breast cancer ([facility] [address] ) (05/2018), History of radiation therapy (2018), Seasonal allergies, and Vertigo    She   Patient Active Problem List    Diagnosis Date Noted    Wellness examination 09/13/2019    Use of tamoxifen (Nolvadex) 04/24/2019    Dense breasts 04/24/2019    Cervical cancer screening 10/16/2018    Screening for human papillomavirus (HPV) 10/16/2018    Encounter for gynecological examination (general) (routine) without abnormal findings 10/16/2018    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 breast in female, estrogen receptor positive ([facility] [address] ) 06/04/2018    Rash 02/23/2016    Seasonal allergies 05/11/2015    Benign paroxysmal vertigo 11/29/2013     She  has a past surgical history that includes Embolectomy; pr perq device placemt breast loc 1st les w guidnce (Right, 7/10/2018); pr biopsy/excision, lymph node(s) (Right, 7/10/2018); US guided breast biopsy right complete (Right, 5/29/2018); Mammo stereotactic breast biopsy right (all inc) (Right, 5/29/2018); Mammo needle localization right (all inc) (Right, 7/10/2018); and Breast lumpectomy (Right, 07/2018)  Her family history includes Breast cancer (age of onset: 54) in her maternal aunt; Colon cancer in her maternal grandmother; Esophageal cancer in her maternal grandfather; Ovarian cancer in her maternal aunt; Prostate cancer in her father  She  reports that she quit smoking about 31 years ago  She has never used smokeless tobacco  She reports that she drinks alcohol  She reports that she does not use drugs  Current Outpatient Medications   Medication Sig Dispense Refill    loratadine (CLARITIN) 5 MG chewable tablet Chew 5 mg daily      tamoxifen (NOLVADEX) 20 mg tablet Take 1 tablet (20 mg total) by mouth daily 90 tablet 1    Na Sulfate-K Sulfate-Mg Sulf (SUPREP BOWEL PREP KIT) 17 5-3 13-1 6 GM/177ML SOLN Take 2 Bottles by mouth see administration instructions Suprep bowel prep  Please follow the instructions from the office (Patient not taking: Reported on 10/24/2019) 2 Bottle 0     No current facility-administered medications for this visit        Current Outpatient Medications on File Prior to Visit   Medication Sig    loratadine (CLARITIN) 5 MG chewable tablet Chew 5 mg daily    tamoxifen (NOLVADEX) 20 mg tablet Take 1 tablet (20 mg total) by mouth daily    Na Sulfate-K Sulfate-Mg Sulf (SUPREP BOWEL PREP KIT) 17 5-3 13-1 6 GM/177ML SOLN Take 2 Bottles by mouth see administration instructions Suprep bowel prep  Please follow the instructions from the office (Patient not taking: Reported on 10/24/2019)     No current facility-administered medications on file prior to visit  She has No Known Allergies       Review of Systems   All other systems reviewed and are negative  Objective:      /60 (BP Location: Left arm, Patient Position: Sitting, Cuff Size: Standard)   Ht 5' 3" (1 6 m)   Wt 53 kg (116 lb 12 8 oz)   LMP 10/17/2019   BMI 20 69 kg/m²          Physical Exam   Constitutional: She is oriented to person, place, and time  She appears well-developed and well-nourished  HENT:   Head: Normocephalic and atraumatic  Nose: Nose normal    Eyes: Pupils are equal, round, and reactive to light  EOM are normal    Neck: Normal range of motion  Neck supple  No thyromegaly present  Pulmonary/Chest: Effort normal  No respiratory distress  No breast tenderness, discharge or bleeding  Breasts symmetrical without masses, skin changes or adenopathy; postsurgical changes noted on the right without evidence of abnormality  Abdominal: Soft  She exhibits no distension and no mass  There is no tenderness  Hernia confirmed negative in the right inguinal area and confirmed negative in the left inguinal area  Genitourinary: Vagina normal and uterus normal  No breast tenderness, discharge or bleeding  Pelvic exam was performed with patient supine  No labial fusion  There is no rash, tenderness, lesion or injury on the right labia  There is no rash, tenderness, lesion or injury on the left labia  Cervix exhibits no motion tenderness, no discharge and no friability  Genitourinary Comments: Ext gen nl w/o lesions  Vag healthy w/o lesions or discharge  Cervix healthy w/o lesions  Uterus nl size, NT  Adnexa NT no masses  Rectal no masses   Musculoskeletal: Normal range of motion  She exhibits no edema     Lymphadenopathy:        Right: No inguinal adenopathy present  Left: No inguinal adenopathy present  Neurological: She is alert and oriented to person, place, and time  She has normal reflexes  Skin: Skin is warm and dry  No rash noted  Psychiatric: She has a normal mood and affect  Her behavior is normal  Thought content normal    Nursing note and vitals reviewed

## 2019-10-28 NOTE — PROGRESS NOTES
José Mendieta 1970 is a 47 y o  female     Follow up visit   Patient presents for 6 month follow up for right breast carcinoma  She completed RT on 9/11/18  Her last visit was on 4/25/19 4/18/19 Mammo :Left: 1 - Negative  Right: 2 - Benign  Overall: 2 - Benign    7/30/19 Zaina Rose:   Tamoxifen  LESLI      10/24/19 Sumi, SurgOnc: Stagger ABUS with Mammo  Due to insurance costs, patient is declining diagnostic mammo at this time  Scheduled for screening mammo       10/30/19 ABUS breast US  4/20/20 Mammo  5/5/20 Surg Onc       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 breast in female, estrogen receptor positive ([facility] [address] )    5/29/2018 Biopsy     Right breast biopsy  3 o'clock 6 CMFN  Invasive ductal carcinoma  Grade 1  8 mm on ultrasound  No axillary adenopathy on ultrasound  ER 95  AK 90  Her 2 0  Stage IA      6/7/2018 Genetic Testing     BRCA testing  Negative      7/10/2018 Surgery     Right breast lumpectomy with sentinel lymph node biopsy  Invasive ductal carcinoma  9 mm  Grade 1  0/1 lymph nodes  Margins negative  ER 95  AK 90  Her 2 0  Stage IA      7/2018 -  Hormone Therapy       Adjuvant hormonal therapy with tamoxifen       8/13/2018 - 9/11/2018 Radiation     Course: C1    Plan ID Energy Fractions Dose per Fraction (cGy) Dose Correction (cGy) Total Dose Delivered (cGy) Elapsed Days   R Breast e 9E 5 / 5 200 0 1,000 6   Rt Breast 6X 16 / 16 266 0 4,256 22      Treatment dates:  C1: 8/13/2018 - 9/11/2018       Clinical Trial: no    Imaging    Health Maintenance   Topic Date Due    CRC Screening: Colonoscopy  1970    Pneumococcal Vaccine: Pediatrics (0 to 5 Years) and At-Risk Patients (6 to 59 Years) (1 of 3 - PCV13) 09/13/2020 (Originally 3/26/1975)    DTaP,Tdap,and Td Vaccines (1 - Tdap) 09/17/2020 (Originally 3/26/1990)    MAMMOGRAM  04/18/2020    Depression Screening PHQ  04/25/2020    BMI: Adult  10/28/2020    Cervical Cancer Screening  10/16/2023    Pneumococcal Vaccine: 65+ Years (1 of 2 - PCV13) 03/26/2034    INFLUENZA VACCINE  Completed    HEPATITIS B VACCINES  Aged Out     Patient Active Problem List   Diagnosis    Benign paroxysmal vertigo    Seasonal allergies    Rash    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 breast in female, [facility]    Cervical cancer screening    Screening for human papillomavirus (HPV)    Encounter for gynecological examination (general) (routine) without abnormal findings    Use of tamoxifen (Nolvadex)    Dense breasts    Wellness examination     Past Medical History:   Diagnosis Date    Breast cancer ([facility] ) 05/2018    right breast    History of radiation therapy 2018    right breast ca    Seasonal allergies     Vertigo      Past Surgical History:   Procedure Laterality Date    BREAST LUMPECTOMY Right 07/2018    EMBOLECTOMY      s/p forceps delivery    MAMMO NEEDLE LOCALIZATION RIGHT (ALL INC) Right 7/10/2018    MAMMO STEREOTACTIC BREAST BIOPSY RIGHT (ALL INC) Right 5/29/2018    MT BIOPSY/EXCISION, LYMPH NODE(S) Right 7/10/2018    Procedure: SENTINEL LYMPH NODE BIOPSY; LYMPHATIC MAPPING WITH BLUE DYE RADIOACTIVE DYE (INJECT AT 0800 BY DR CHAUDHARY IN THE OR); Surgeon: Kelsy Smith MD;  [facility];  Service: Surgical Oncology    MT PERQ DEVICE PLACEMT BREAST LOC 1ST LES W HELDERE Right 7/10/2018    Procedure: BREAST LUMPECTOMY; BREAST NEEDLE LOCALIZATION (NEEDLE LOC AT 0700);   Surgeon: Kelsy Smith MD;  [facility];  Service: Surgical Oncology    US GUIDED BREAST BIOPSY RIGHT COMPLETE Right 5/29/2018     Family History   Problem Relation Age of Onset    Prostate cancer Father     Breast cancer Maternal Aunt 54    Ovarian cancer Maternal Aunt     Colon cancer Maternal Grandmother     Esophageal cancer Maternal Grandfather      Social History     Socioeconomic History    Marital status: /Civil Union     Spouse name: Not on file    Number of children: 2    Years of education: Not on file    Highest education level: Not on file   Occupational History    Not on file   Social Needs    Financial resource strain: Not on file    Food insecurity:     Worry: Not on file     Inability: Not on file    Transportation needs:     Medical: Not on file     Non-medical: Not on file   Tobacco Use    Smoking status: Never Smoker    Smokeless tobacco: Never Used    Tobacco comment: only smoked socially as teenager for 1 yr   Substance and Sexual Activity    Alcohol use: Yes     Comment: 2-3 glasses of wine on Fri and sat annetta    Drug use: No    Sexual activity: Yes     Partners: Male     Birth control/protection: Male Sterilization   Lifestyle    Physical activity:     Days per week: Not on file     Minutes per session: Not on file    Stress: Not on file   Relationships    Social connections:     Talks on phone: Not on file     Gets together: Not on file     Attends Latter day service: Not on file     Active member of club or organization: Not on file     Attends meetings of clubs or organizations: Not on file     Relationship status: Not on file    Intimate partner violence:     Fear of current or ex partner: Not on file     Emotionally abused: Not on file     Physically abused: Not on file     Forced sexual activity: Not on file   Other Topics Concern    Not on file   Social History Narrative    Not on file       Current Outpatient Medications:     loratadine (CLARITIN) 5 MG chewable tablet, Chew 5 mg daily, Disp: , Rfl:     tamoxifen (NOLVADEX) 20 mg tablet, Take 1 tablet (20 mg total) by mouth daily, Disp: 90 tablet, Rfl: 1    Na Sulfate-K Sulfate-Mg Sulf (SUPREP BOWEL PREP KIT) 17 5-3 13-1 6 GM/177ML SOLN, Take 2 Bottles by mouth see administration instructions Suprep bowel prep  Please follow the instructions from the office (Patient not taking: Reported on 10/24/2019), Disp: 2 Bottle, Rfl: 0  No Known Allergies    Review of Systems:  Review of Systems   Constitutional: Negative  HENT: Negative  Eyes: Negative  Respiratory: Negative  Cardiovascular: Negative  Gastrointestinal: Negative  Endocrine: Negative  Genitourinary: Negative  Still getting regular periods   Musculoskeletal: Positive for arthralgias (Right shoulder)  Skin: Negative  Allergic/Immunologic: Negative  Neurological: Negative  Hematological: Negative  Psychiatric/Behavioral: Negative  LMP 10/17/19  Vitals:    10/28/19 1442   BP: 102/60   BP Location: Left arm   Patient Position: Sitting   Cuff Size: Standard   Pulse: 78   Resp: 16   Temp: 98 °F (36 7 °C)   TempSrc: Oral   Weight: 53 1 kg (117 lb)   Height: 5' 3" (1 6 m)    Pain assessment:0    Pain Score: 0-No pain    Imaging:No results found

## 2019-10-28 NOTE — PROGRESS NOTES
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 breast in female, estrogen receptor positive ([facility] [address] )  Staging form: Breast, AJCC 8th Edition  - Clinical: Stage IA (cT1b, cN0(sn), cM0, G1, ER: Positive, OH: Positive, HER2: Negative) - Unsigned  Neoadjuvant therapy: No  Laterality: Right  Tumor size (mm): 9  Method of lymph node assessment: [place] lymph node biopsy  Histologic grading system: 3 grade system          Interval History:    Patient presents for 6 month follow up for right breast carcinoma  She completed RT on 9/11/18  Her last visit was on 4/25/19 4/18/19 Mammo :Left: 1 - Negative  Right: 2 - Benign  Overall: 2 - Benign     7/30/19 Ange Files:   Tamoxifen  LESLI       10/24/19 Sumi, SurgOnc: Stagger ABUS with Mammo  Due to insurance costs, patient is declining diagnostic mammo at this time   Scheduled for screening mammo       10/30/19 ABUS breast US  4/20/20 Mammo  5/5/20 Surg Onc    Historical Information      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 breast in female, estrogen receptor positive ([facility] [address] )    5/29/2018 Biopsy     Right breast biopsy  3 o'clock 6 CMFN  Invasive ductal carcinoma  Grade 1  8 mm on ultrasound  No axillary adenopathy on ultrasound  ER 95  OH 90  Her 2 0  Stage IA      6/7/2018 Genetic Testing     BRCA testing  Negative      7/10/2018 Surgery     Right breast lumpectomy with sentinel lymph node biopsy  Invasive ductal carcinoma  9 mm  Grade 1  0/1 lymph nodes  Margins negative  ER 95  OH 90  Her 2 0  Stage IA      7/2018 -  Hormone Therapy       Adjuvant hormonal therapy with tamoxifen       8/13/2018 - 9/11/2018 Radiation     Course: C1    Plan ID Energy Fractions Dose per Fraction (cGy) Dose Correction (cGy) Total Dose Delivered (cGy) Elapsed Days   R Breast e 9E 5 / 5 200 0 1,000 6   Rt Breast 6X 16 / 16 266 0 4,256 22 Treatment dates:  C1: 8/13/2018 - 9/11/2018           Past Medical History:   Diagnosis Date    Breast cancer ([facility] [address] ) 05/2018    right breast    History of radiation therapy 2018    right breast ca    Seasonal allergies     Vertigo      Past Surgical History:   Procedure Laterality Date    BREAST LUMPECTOMY Right 07/2018    EMBOLECTOMY      s/p forceps delivery    MAMMO NEEDLE LOCALIZATION RIGHT (ALL INC) Right 7/10/2018    MAMMO STEREOTACTIC BREAST BIOPSY RIGHT (ALL INC) Right 5/29/2018    NE BIOPSY/EXCISION, LYMPH NODE(S) Right 7/10/2018    Procedure: SENTINEL LYMPH NODE BIOPSY; LYMPHATIC MAPPING WITH BLUE DYE RADIOACTIVE DYE (INJECT AT 0800 BY DR CHAUDHARY IN THE OR); Surgeon: Jena Mehta MD;  [facility];  Service: Surgical Oncology    NE PERQ DEVICE PLACEMT BREAST LOC 1ST LES W GUIDNCE Right 7/10/2018    Procedure: BREAST LUMPECTOMY; BREAST NEEDLE LOCALIZATION (NEEDLE LOC AT 0700); Surgeon: Jena Mehta MD;  [facility];  Service: Surgical Oncology    US GUIDED BREAST BIOPSY RIGHT COMPLETE Right 5/29/2018       Social History   Social History     Substance and Sexual Activity   Alcohol Use Yes    Comment: 2-3 glasses of wine on Fri and sat annetta     Social History     Substance and Sexual Activity   Drug Use No     Social History     Tobacco Use   Smoking Status Never Smoker   Smokeless Tobacco Never Used   Tobacco Comment    only smoked socially as teenager for 1 yr         Meds/Allergies     Current Outpatient Medications:     loratadine (CLARITIN) 5 MG chewable tablet, Chew 5 mg daily, Disp: , Rfl:     tamoxifen (NOLVADEX) 20 mg tablet, Take 1 tablet (20 mg total) by mouth daily, Disp: 90 tablet, Rfl: 1    Na Sulfate-K Sulfate-Mg Sulf (SUPREP BOWEL PREP KIT) 17 5-3 13-1 6 GM/177ML SOLN, Take 2 Bottles by mouth see administration instructions Suprep bowel prep    Please follow the instructions from the office (Patient not taking: Reported on 10/24/2019), Disp: 2 Bottle, Rfl: 0  No Known Allergies      Review of Systems  Constitutional: Negative  HENT: Negative  Eyes: Negative  Respiratory: Negative  Cardiovascular: Negative  Gastrointestinal: Negative  Endocrine: Negative  Genitourinary: Negative  Still getting regular periods   Musculoskeletal: Positive for arthralgias (Right shoulder)  Skin: Negative  Allergic/Immunologic: Negative  Neurological: Negative  Hematological: Negative  Psychiatric/Behavioral: Negative  OBJECTIVE:   /60 (BP Location: Left arm, Patient Position: Sitting, Cuff Size: Standard)   Pulse 78   Temp 98 °F (36 7 °C) (Oral)   Resp 16   Ht 5' 3" (1 6 m)   Wt 53 1 kg (117 lb)   LMP 10/17/2019   BMI 20 73 kg/m²   Pain Assessment:  0  ECOG/Zubrod/WHO: 0 - Asymptomatic    Physical Exam   Constitutional: She appears well-developed  HENT:   Head: Normocephalic  Hair is normal    Mouth/Throat: Oropharynx is clear and moist    Eyes: EOM are normal  No scleral icterus  Neck: Neck supple  No spinous process tenderness present  No edema present  Cardiovascular: Normal rate and regular rhythm  Pulmonary/Chest: Effort normal and breath sounds normal  No respiratory distress  She has no wheezes  She exhibits no tenderness  No breast tenderness  There is no supraclavicular axillary adenopathy palpable no suspicious lesions palpable in either breast   No breast or arm edema  The skin in the radiated field is in good condition   Abdominal: Soft  Bowel sounds are normal  She exhibits no distension, no ascites and no mass  There is no hepatosplenomegaly  There is no tenderness  There is no rebound  Genitourinary: No breast tenderness  Musculoskeletal: Normal range of motion  She exhibits no edema or tenderness  Lymphadenopathy:     She has no cervical adenopathy  She has no axillary adenopathy  Neurological: She is alert  She has normal strength  No cranial nerve deficit   Coordination and gait normal    Skin: Skin is intact  Psychiatric: She has a normal mood and affect  Her speech is normal and behavior is normal            RESULTS    Lab Results:   Recent Results (from the past 672 hour(s))   Comprehensive metabolic panel    Collection Time: 10/17/19  7:55 AM   Result Value Ref Range    Sodium 142 136 - 145 mmol/L    Potassium 3 8 3 5 - 5 3 mmol/L    Chloride 108 100 - 108 mmol/L    CO2 25 21 - 32 mmol/L    ANION GAP 9 4 - 13 mmol/L    BUN 21 5 - 25 mg/dL    Creatinine 0 91 0 60 - 1 30 mg/dL    Glucose, Fasting 93 65 - 99 mg/dL    Calcium 8 8 8 3 - 10 1 mg/dL    AST 19 5 - 45 U/L    ALT 25 12 - 78 U/L    Alkaline Phosphatase 64 46 - 116 U/L    Total Protein 7 1 6 4 - 8 2 g/dL    Albumin 3 7 3 5 - 5 0 g/dL    Total Bilirubin 0 40 0 20 - 1 00 mg/dL    eGFR 74 ml/min/1 73sq m   Lipid Panel with Direct LDL reflex    Collection Time: 10/17/19  7:55 AM   Result Value Ref Range    Cholesterol 169 50 - 200 mg/dL    Triglycerides 60 <=150 mg/dL    HDL, Direct 60 40 - 60 mg/dL    LDL Calculated 97 0 - 100 mg/dL   Vitamin D 25 hydroxy    Collection Time: 10/17/19  7:55 AM   Result Value Ref Range    Vit D, 25-Hydroxy 37 4 30 0 - 100 0 ng/mL       Imaging Studies:No results found  Assessment/Plan:  No orders of the defined types were placed in this encounter  Radha Martinez is a 47y o  year old female with stage IA right breast carcinoma  She is 13 months status post adjuvant radiation therapy  She has no clinical evidence of recurrence  Her last mammogram from 4/18/2019 returned stable  She remains on tamoxifen without significant side effects  She will return for follow-up visit in 6 months        Moreno Perez MD  10/28/2019,4:13 PM    Portions of the record may have been created with voice recognition software   Occasional wrong word or "sound a like" substitutions may have occurred due to the inherent limitations of voice recognition software   Read the chart carefully and recognize, using context, where substitutions have occurred

## 2019-10-30 ENCOUNTER — HOSPITAL ENCOUNTER (OUTPATIENT)
Dept: ULTRASOUND IMAGING | Facility: CLINIC | Age: 49
Discharge: HOME | End: 2019-10-30
Payer: COMMERCIAL

## 2019-10-30 VITALS — HEIGHT: 63 IN | WEIGHT: 117 LBS | BODY MASS INDEX: 20.73 KG/M2

## 2019-10-30 DIAGNOSIS — R92.2 DENSE BREASTS: ICD-10-CM

## 2019-10-30 PROCEDURE — 76641 ULTRASOUND BREAST COMPLETE: CPT

## 2019-10-30 PROCEDURE — 76377 3D RENDER W/INTRP POSTPROCES: CPT

## 2019-11-01 ENCOUNTER — HOSPITAL ENCOUNTER (OUTPATIENT)
Dept: ULTRASOUND IMAGING | Facility: CLINIC | Age: 49
Discharge: HOME | End: 2019-11-01
Payer: COMMERCIAL

## 2019-11-01 VITALS — WEIGHT: 117 LBS | HEIGHT: 63 IN | BODY MASS INDEX: 20.73 KG/M2

## 2019-11-01 DIAGNOSIS — R92.8 ABNORMAL ULTRASOUND OF BREAST: ICD-10-CM

## 2019-11-01 PROCEDURE — 76642 ULTRASOUND BREAST LIMITED: CPT

## 2019-11-01 NOTE — PROGRESS NOTES
Met with patient and Dr Anders Parson regarding recommendation for;      _____ RIGHT ___x___LEFT      __x___Ultrasound guided  ______Stereotactic  Breast biopsy  ___x__Verbalized understanding        Blood thinners:  _____yes __x___no    Date stopped: ____n/a_______    Biopsy teaching sheet given:  ___x____yes ______no

## 2019-11-06 ENCOUNTER — ANESTHESIA EVENT (OUTPATIENT)
Dept: GASTROENTEROLOGY | Facility: AMBULARY SURGERY CENTER | Age: 49
End: 2019-11-06

## 2019-11-13 ENCOUNTER — HOSPITAL ENCOUNTER (OUTPATIENT)
Dept: ULTRASOUND IMAGING | Facility: CLINIC | Age: 49
Discharge: HOME | End: 2019-11-13
Payer: COMMERCIAL

## 2019-11-13 ENCOUNTER — HOSPITAL ENCOUNTER (OUTPATIENT)
Dept: MAMMOGRAPHY | Facility: CLINIC | Age: 49
Discharge: HOME | End: 2019-11-13
Payer: COMMERCIAL

## 2019-11-13 VITALS — HEART RATE: 72 BPM | DIASTOLIC BLOOD PRESSURE: 68 MMHG | SYSTOLIC BLOOD PRESSURE: 112 MMHG

## 2019-11-13 DIAGNOSIS — R92.8 ABNORMAL ULTRASOUND OF BREAST: ICD-10-CM

## 2019-11-13 PROCEDURE — 88305 TISSUE EXAM BY PATHOLOGIST: CPT | Performed by: PATHOLOGY

## 2019-11-13 PROCEDURE — 19083 BX BREAST 1ST LESION US IMAG: CPT

## 2019-11-13 RX ORDER — LIDOCAINE HYDROCHLORIDE 10 MG/ML
4 INJECTION, SOLUTION INFILTRATION; PERINEURAL ONCE
Status: COMPLETED | OUTPATIENT
Start: 2019-11-13 | End: 2019-11-13

## 2019-11-13 RX ADMIN — LIDOCAINE HYDROCHLORIDE 4 ML: 10 INJECTION, SOLUTION INFILTRATION; PERINEURAL at 11:45

## 2019-11-13 NOTE — PROGRESS NOTES
Procedure type:    __x___ultrasound guided _____stereotactic    Breast:    __x___Left _____Right    Location: 11:00 4cm from nipple     Needle: 12g Taylor    # of passes: 2    Clip: Ultraclip-ribbon    Performed by: Dr Lawrence Olsen held for 5 minutes by: Lopez Woody Strips:    __x___yes _____no    Band aid:    __x___yes_____no    Tape and guaze:    _____yes __x___no    Tolerated procedure:    __x___yes _____no (patient anxious and teary; support given and patient tolerated procedure well)

## 2019-11-13 NOTE — DISCHARGE INSTR - OTHER ORDERS
POST LARGE CORE BREAST BIOPSY PATIENT INFORMATION      1  Place an ice pack inside your bra over the top of the dressing every hour for 20 minutes (20 minutes on, 60 minutes off)  Do this until bedtime  2  Do not shower or bathe until the following morning  3  You may bathe your breast carefully with the steri-strips in place  Be careful    Not to loosen them  The steri-strips will fall off in 3-5 days  4  You may have mild discomfort, and you may have some bruising where the   Needle entered the skin  This should clear within 5-7 days  5  If you need medicine for discomfort, take acetaminophen products such as   Tylenol  You may also take Advil or Motrin products  6  Do not participate in strenuous activities such as-tennis, aerobics, skiing,  Weight lifting, etc  for 24 hours  Refrain from swimming/soaking for 72 hours  7  Wearing a bra for sleeping may be more comfortable for the first 24-48 hours  8  Watch for continued bleeding, pain or fever over 101; please call with any questions or concerns  For procedures done at the [facility]  [facility] call:  Ashlie Chowdary RN at [phone]  Davin Vega RN at [phone]                    *After 4 PM call the Interventional Radiology Department                    [phone] and ask to speak with the nurse on call  For procedures done at the [address] [place] [address] call:         Fantasma Arce RN at   *After 4 PM call the Interventional Radiology Department   [phone] and ask to speak with the nurse on call  For procedures done at [address] [place] [address] call: The Radiology Nurse at [phone]  *After 4 PM call your physician, or go to the Emergency Department  9          The final results of your biopsy are usually available within one week

## 2019-11-14 ENCOUNTER — TELEPHONE (OUTPATIENT)
Dept: SURGICAL ONCOLOGY | Facility: CLINIC | Age: 49
End: 2019-11-14

## 2019-11-14 DIAGNOSIS — Z17.0 MALIGNANT NEOPLASM OF UPPER-INNER QUADRANT OF RIGHT BREAST IN FEMALE, ESTROGEN RECEPTOR POSITIVE (HCC): Primary | ICD-10-CM

## 2019-11-14 DIAGNOSIS — C50.211 MALIGNANT NEOPLASM OF UPPER-INNER QUADRANT OF RIGHT BREAST IN FEMALE, ESTROGEN RECEPTOR POSITIVE (HCC): Primary | ICD-10-CM

## 2019-11-14 NOTE — TELEPHONE ENCOUNTER
Spoke with patient and reviewed benign pathology  Pathology indicated a fibroadenoma, no atypia or malignancy  The recommendation is for a bilateral diagnostic mammogram in 6 months  The patient initially stated that she did not want to continue with diagnostic mammogram so she is currently set up for a screening mammogram however the patient is now agreeable to continue diagnostic mammograms  I will place a new order and our office staff will contact her to change this order  I will plan to see her back after her bilateral mammogram   She states her biopsy site is slightly bruised and tender but overall is healing well

## 2019-11-14 NOTE — PROGRESS NOTES
Post procedure call completed on 11/14/19 @ 1047    Bleeding: _____yes _x____no    Pain: _____yes ___x___no    Redness/Swelling: ______yes __x____no    Band aid removed: __x___yes _____no    Steri-Strips intact: ___x___yes _____no    Pt reported "just mild discomfort"

## 2019-11-20 ENCOUNTER — ANESTHESIA (OUTPATIENT)
Dept: GASTROENTEROLOGY | Facility: AMBULARY SURGERY CENTER | Age: 49
End: 2019-11-20

## 2019-11-20 ENCOUNTER — HOSPITAL ENCOUNTER (OUTPATIENT)
Dept: GASTROENTEROLOGY | Facility: AMBULARY SURGERY CENTER | Age: 49
Setting detail: OUTPATIENT SURGERY
Discharge: HOME | End: 2019-11-20
Attending: INTERNAL MEDICINE
Payer: COMMERCIAL

## 2019-11-20 VITALS
HEART RATE: 76 BPM | BODY MASS INDEX: 20.73 KG/M2 | RESPIRATION RATE: 13 BRPM | TEMPERATURE: 96.7 F | HEIGHT: 63 IN | SYSTOLIC BLOOD PRESSURE: 106 MMHG | OXYGEN SATURATION: 99 % | DIASTOLIC BLOOD PRESSURE: 59 MMHG

## 2019-11-20 DIAGNOSIS — Z12.11 SCREENING FOR COLON CANCER: ICD-10-CM

## 2019-11-20 LAB
EXT PREGNANCY TEST URINE: NEGATIVE
EXT. CONTROL: NORMAL

## 2019-11-20 PROCEDURE — 81025 URINE PREGNANCY TEST: CPT | Performed by: INTERNAL MEDICINE

## 2019-11-20 PROCEDURE — 45381 COLONOSCOPY SUBMUCOUS NJX: CPT | Performed by: INTERNAL MEDICINE

## 2019-11-20 PROCEDURE — 45385 COLONOSCOPY W/LESION REMOVAL: CPT | Performed by: INTERNAL MEDICINE

## 2019-11-20 PROCEDURE — 88305 TISSUE EXAM BY PATHOLOGIST: CPT | Performed by: PATHOLOGY

## 2019-11-20 RX ORDER — SODIUM CHLORIDE, SODIUM LACTATE, POTASSIUM CHLORIDE, CALCIUM CHLORIDE 600; 310; 30; 20 MG/100ML; MG/100ML; MG/100ML; MG/100ML
INJECTION, SOLUTION INTRAVENOUS CONTINUOUS PRN
Status: DISCONTINUED | OUTPATIENT
Start: 2019-11-20 | End: 2019-11-20 | Stop reason: SURG

## 2019-11-20 RX ORDER — EPHEDRINE SULFATE 50 MG/ML
INJECTION INTRAVENOUS AS NEEDED
Status: DISCONTINUED | OUTPATIENT
Start: 2019-11-20 | End: 2019-11-20 | Stop reason: SURG

## 2019-11-20 RX ORDER — LIDOCAINE HYDROCHLORIDE 10 MG/ML
INJECTION, SOLUTION EPIDURAL; INFILTRATION; INTRACAUDAL; PERINEURAL AS NEEDED
Status: DISCONTINUED | OUTPATIENT
Start: 2019-11-20 | End: 2019-11-20 | Stop reason: SURG

## 2019-11-20 RX ORDER — SODIUM CHLORIDE, SODIUM LACTATE, POTASSIUM CHLORIDE, CALCIUM CHLORIDE 600; 310; 30; 20 MG/100ML; MG/100ML; MG/100ML; MG/100ML
125 INJECTION, SOLUTION INTRAVENOUS CONTINUOUS
Status: CANCELLED | OUTPATIENT
Start: 2019-11-20

## 2019-11-20 RX ORDER — LIDOCAINE HYDROCHLORIDE 10 MG/ML
0.5 INJECTION, SOLUTION EPIDURAL; INFILTRATION; INTRACAUDAL; PERINEURAL ONCE AS NEEDED
Status: CANCELLED | OUTPATIENT
Start: 2019-11-20

## 2019-11-20 RX ORDER — PROPOFOL 10 MG/ML
INJECTION, EMULSION INTRAVENOUS AS NEEDED
Status: DISCONTINUED | OUTPATIENT
Start: 2019-11-20 | End: 2019-11-20 | Stop reason: SURG

## 2019-11-20 RX ADMIN — PROPOFOL 10 MG: 10 INJECTION, EMULSION INTRAVENOUS at 09:50

## 2019-11-20 RX ADMIN — PROPOFOL 30 MG: 10 INJECTION, EMULSION INTRAVENOUS at 09:38

## 2019-11-20 RX ADMIN — PROPOFOL 30 MG: 10 INJECTION, EMULSION INTRAVENOUS at 09:41

## 2019-11-20 RX ADMIN — PROPOFOL 30 MG: 10 INJECTION, EMULSION INTRAVENOUS at 09:35

## 2019-11-20 RX ADMIN — PROPOFOL 30 MG: 10 INJECTION, EMULSION INTRAVENOUS at 09:44

## 2019-11-20 RX ADMIN — PROPOFOL 20 MG: 10 INJECTION, EMULSION INTRAVENOUS at 09:56

## 2019-11-20 RX ADMIN — PROPOFOL 50 MG: 10 INJECTION, EMULSION INTRAVENOUS at 09:24

## 2019-11-20 RX ADMIN — SODIUM CHLORIDE, SODIUM LACTATE, POTASSIUM CHLORIDE, AND CALCIUM CHLORIDE: .6; .31; .03; .02 INJECTION, SOLUTION INTRAVENOUS at 10:03

## 2019-11-20 RX ADMIN — LIDOCAINE HYDROCHLORIDE 50 MG: 10 INJECTION, SOLUTION EPIDURAL; INFILTRATION; INTRACAUDAL; PERINEURAL at 09:24

## 2019-11-20 RX ADMIN — PROPOFOL 20 MG: 10 INJECTION, EMULSION INTRAVENOUS at 10:02

## 2019-11-20 RX ADMIN — PROPOFOL 20 MG: 10 INJECTION, EMULSION INTRAVENOUS at 09:59

## 2019-11-20 RX ADMIN — PROPOFOL 20 MG: 10 INJECTION, EMULSION INTRAVENOUS at 10:06

## 2019-11-20 RX ADMIN — PROPOFOL 20 MG: 10 INJECTION, EMULSION INTRAVENOUS at 09:47

## 2019-11-20 RX ADMIN — PROPOFOL 30 MG: 10 INJECTION, EMULSION INTRAVENOUS at 09:27

## 2019-11-20 RX ADMIN — PROPOFOL 30 MG: 10 INJECTION, EMULSION INTRAVENOUS at 09:25

## 2019-11-20 RX ADMIN — EPHEDRINE SULFATE 5 MG: 50 INJECTION, SOLUTION INTRAVENOUS at 09:50

## 2019-11-20 RX ADMIN — PROPOFOL 30 MG: 10 INJECTION, EMULSION INTRAVENOUS at 09:26

## 2019-11-20 RX ADMIN — PROPOFOL 30 MG: 10 INJECTION, EMULSION INTRAVENOUS at 09:32

## 2019-11-20 RX ADMIN — PROPOFOL 20 MG: 10 INJECTION, EMULSION INTRAVENOUS at 09:53

## 2019-11-20 RX ADMIN — SODIUM CHLORIDE, SODIUM LACTATE, POTASSIUM CHLORIDE, AND CALCIUM CHLORIDE: .6; .31; .03; .02 INJECTION, SOLUTION INTRAVENOUS at 09:21

## 2019-11-20 RX ADMIN — PROPOFOL 30 MG: 10 INJECTION, EMULSION INTRAVENOUS at 09:29

## 2019-11-20 NOTE — ANESTHESIA POSTPROCEDURE EVALUATION
Post-Op Assessment Note    CV Status:  Stable  Pain Score: 0    Pain management: adequate     Mental Status:  Alert and awake   Hydration Status:  Euvolemic   PONV Controlled:  Controlled   Airway Patency:  Patent   Post Op Vitals Reviewed: Yes      Staff: CRNA           BP (!) 89/54 (11/20/19 1014)    Temp (!) 96 7 °F (35 9 °C) (11/20/19 1014)    Pulse 89 (11/20/19 1014)   Resp (!) 23 (11/20/19 1014)    SpO2 100 % (11/20/19 1014)

## 2019-11-20 NOTE — H&P
History and Physical - [facility]  Rebecca Turner 47 y o  female MRN: [id number]    HPI: Rebecca Turner is a 47y o  year old female who presents for screening colonsocopy  Review of Systems    Historical Information   Past Medical History:   Diagnosis Date    Breast cancer ([address] ) 05/2018    right breast    Fibrocystic breast     History of radiation therapy 2018    right breast ca    Seasonal allergies     Vertigo      Past Surgical History:   Procedure Laterality Date    BREAST BIOPSY Right 05/2018    Positive    BREAST BIOPSY Left     Neg around age 28    BREAST LUMPECTOMY Right 07/2018    EMBOLECTOMY      s/p forceps delivery    MAMMO NEEDLE LOCALIZATION RIGHT (ALL INC) Right 7/10/2018    MAMMO STEREOTACTIC BREAST BIOPSY RIGHT (ALL INC) Right 5/29/2018    GA BIOPSY/EXCISION, LYMPH NODE(S) Right 7/10/2018    Procedure: SENTINEL LYMPH NODE BIOPSY; LYMPHATIC MAPPING WITH BLUE DYE RADIOACTIVE DYE (INJECT AT 0800 BY DR CHAUDHARY IN THE OR); Surgeon: Estella Wang MD;  [facility];  Service: Surgical Oncology    GA PERQ DEVICE PLACEMT BREAST LOC 1ST LES W GUIDNCE Right 7/10/2018    Procedure: BREAST LUMPECTOMY; BREAST NEEDLE LOCALIZATION (NEEDLE LOC AT 0700);   Surgeon: Estella Wang MD;  [facility];  Service: Surgical Oncology    US GUIDED BREAST BIOPSY LEFT COMPLETE Left 11/13/2019    US GUIDED BREAST BIOPSY RIGHT COMPLETE Right 5/29/2018     Social History   Social History     Substance and Sexual Activity   Alcohol Use Yes    Comment: 2-3 glasses of wine on Fri and sat annetta     Social History     Substance and Sexual Activity   Drug Use No     Social History     Tobacco Use   Smoking Status Never Smoker   Smokeless Tobacco Never Used   Tobacco Comment    only smoked socially as teenager for 1 yr     Family History   Problem Relation Age of Onset    Prostate cancer Father     Breast cancer Maternal Aunt 54    Ovarian cancer Maternal Aunt     Colon cancer Maternal Grandmother     Esophageal cancer Maternal Grandfather     No Known Problems Sister     No Known Problems Daughter     Skin cancer Paternal Aunt        Meds/Allergies       (Not in a hospital admission)    No Known Allergies    Objective     There were no vitals taken for this visit  PHYSICAL EXAM    Gen: NAD  CV: RRR  CHEST: Clear  ABD: soft, NT/ND  EXT: no edema  Neuro: AAO      ASSESSMENT/PLAN:  This is a 47y o  year old female here for screening colonsocopy         PLAN:   Procedure: colonoscopy

## 2019-11-20 NOTE — ANESTHESIA PREPROCEDURE EVALUATION
Review of Systems/Medical History  Patient summary reviewed  Chart reviewed  No history of anesthetic complications     Cardiovascular  Negative cardio ROS Exercise tolerance (METS): >4,  No hypertension ,    Pulmonary  Negative pulmonary ROS No COPD , No asthma , No recent URI ,        GI/Hepatic  Negative GI/hepatic ROS          Negative  ROS        Endo/Other  Negative endo/other ROS      GYN    Breast cancer   Comment: Right sided breast cancer     Hematology  Negative hematology ROS      Musculoskeletal  Negative musculoskeletal ROS        Neurology      Comment: [facility] Psychology   Negative psychology ROS              Physical Exam    Airway    Mallampati score: II  TM Distance: >3 FB       Dental   No notable dental hx     Cardiovascular  Comment: Negative ROS,     Pulmonary      Other Findings        Anesthesia Plan  ASA Score- 2     Anesthesia Type- IV sedation with anesthesia with ASA Monitors  Additional Monitors:   Airway Plan:     Comment: GIOVANNY Dickerson , have personally seen and evaluated the patient prior to anesthetic care  I have reviewed the pre-anesthetic record, and other medical records if appropriate to the anesthetic care  If a CRNA is involved in the case, I have reviewed the CRNA assessment, if present, and agree  Risks/benefits and alternatives discussed with patient including possible PONV, sore throat, and possibility of rare anesthetic and surgical emergencies        Plan Factors-    Induction-     Postoperative Plan-     Informed Consent- Anesthetic plan and risks discussed with patient  I personally reviewed this patient with the CRNA  Discussed and agreed on the Anesthesia Plan with the CRNA  Efren Negron

## 2019-12-03 ENCOUNTER — TELEPHONE (OUTPATIENT)
Dept: GASTROENTEROLOGY | Facility: AMBULARY SURGERY CENTER | Age: 49
End: 2019-12-03

## 2019-12-03 NOTE — TELEPHONE ENCOUNTER
----- Message from Hayden Bravo MD sent at 12/3/2019 11:36 AM EST -----  Please inform the patient that the larger polyp removed was a sessile serrated adenoma, there is no high-grade dysplasia and no cancer  The other polyp was a hyperplastic polyp  As we discussed I would like to schedule repeat colonoscopy in 3 months time to make sure that the entire polyp has been removed  Please have the patient call with any questions or concerns

## 2019-12-17 ENCOUNTER — TELEPHONE (OUTPATIENT)
Dept: GASTROENTEROLOGY | Facility: AMBULARY SURGERY CENTER | Age: 49
End: 2019-12-17

## 2019-12-17 NOTE — TELEPHONE ENCOUNTER
Lm for pt to call back re: scheduling repeat colonoscopy(due in [facility] 2020 w/ dr Debra Pablo)

## 2019-12-18 ENCOUNTER — TELEPHONE (OUTPATIENT)
Dept: GASTROENTEROLOGY | Facility: AMBULARY SURGERY CENTER | Age: 49
End: 2019-12-18

## 2019-12-18 DIAGNOSIS — Z86.010 HISTORY OF ADENOMATOUS POLYP OF COLON: Primary | ICD-10-CM

## 2019-12-18 NOTE — TELEPHONE ENCOUNTER
Pt returned call   Is scheduled for repeat colonoscopy w/ dr Ronny Gonsales at [facility]/suprep ordered/instructions mailed

## 2020-01-18 DIAGNOSIS — Z17.0 MALIGNANT NEOPLASM OF UPPER-INNER QUADRANT OF RIGHT BREAST IN FEMALE, ESTROGEN RECEPTOR POSITIVE (HCC): ICD-10-CM

## 2020-01-18 DIAGNOSIS — C50.211 MALIGNANT NEOPLASM OF UPPER-INNER QUADRANT OF RIGHT BREAST IN FEMALE, ESTROGEN RECEPTOR POSITIVE (HCC): ICD-10-CM

## 2020-01-20 RX ORDER — TAMOXIFEN CITRATE 20 MG/1
TABLET ORAL
Qty: 90 TABLET | Refills: 1 | Status: SHIPPED | OUTPATIENT
Start: 2020-01-20 | End: 2020-07-16 | Stop reason: SDUPTHER

## 2020-02-07 ENCOUNTER — ANESTHESIA EVENT (OUTPATIENT)
Dept: GASTROENTEROLOGY | Facility: AMBULARY SURGERY CENTER | Age: 50
End: 2020-02-07

## 2020-02-21 ENCOUNTER — ANESTHESIA (OUTPATIENT)
Dept: GASTROENTEROLOGY | Facility: AMBULARY SURGERY CENTER | Age: 50
End: 2020-02-21

## 2020-02-21 ENCOUNTER — HOSPITAL ENCOUNTER (OUTPATIENT)
Dept: GASTROENTEROLOGY | Facility: AMBULARY SURGERY CENTER | Age: 50
Setting detail: OUTPATIENT SURGERY
Discharge: HOME | End: 2020-02-21
Attending: INTERNAL MEDICINE | Admitting: INTERNAL MEDICINE
Payer: COMMERCIAL

## 2020-02-21 VITALS
SYSTOLIC BLOOD PRESSURE: 102 MMHG | RESPIRATION RATE: 18 BRPM | BODY MASS INDEX: 20.34 KG/M2 | TEMPERATURE: 97.2 F | WEIGHT: 114.8 LBS | HEIGHT: 63 IN | DIASTOLIC BLOOD PRESSURE: 58 MMHG | OXYGEN SATURATION: 100 % | HEART RATE: 86 BPM

## 2020-02-21 DIAGNOSIS — Z86.010 HX OF COLONIC POLYPS: ICD-10-CM

## 2020-02-21 LAB
EXT PREGNANCY TEST URINE: NEGATIVE
EXT. CONTROL: NORMAL

## 2020-02-21 PROCEDURE — 88305 TISSUE EXAM BY PATHOLOGIST: CPT | Performed by: PATHOLOGY

## 2020-02-21 PROCEDURE — 81025 URINE PREGNANCY TEST: CPT | Performed by: INTERNAL MEDICINE

## 2020-02-21 PROCEDURE — 45385 COLONOSCOPY W/LESION REMOVAL: CPT | Performed by: INTERNAL MEDICINE

## 2020-02-21 PROCEDURE — 45380 COLONOSCOPY AND BIOPSY: CPT | Performed by: INTERNAL MEDICINE

## 2020-02-21 RX ORDER — SODIUM CHLORIDE, SODIUM LACTATE, POTASSIUM CHLORIDE, CALCIUM CHLORIDE 600; 310; 30; 20 MG/100ML; MG/100ML; MG/100ML; MG/100ML
125 INJECTION, SOLUTION INTRAVENOUS CONTINUOUS
Status: DISCONTINUED | OUTPATIENT
Start: 2020-02-21 | End: 2020-02-25 | Stop reason: HOSPADM

## 2020-02-21 RX ORDER — SODIUM CHLORIDE, SODIUM LACTATE, POTASSIUM CHLORIDE, CALCIUM CHLORIDE 600; 310; 30; 20 MG/100ML; MG/100ML; MG/100ML; MG/100ML
INJECTION, SOLUTION INTRAVENOUS CONTINUOUS PRN
Status: DISCONTINUED | OUTPATIENT
Start: 2020-02-21 | End: 2020-02-21 | Stop reason: SURG

## 2020-02-21 RX ORDER — PROPOFOL 10 MG/ML
INJECTION, EMULSION INTRAVENOUS AS NEEDED
Status: DISCONTINUED | OUTPATIENT
Start: 2020-02-21 | End: 2020-02-21 | Stop reason: SURG

## 2020-02-21 RX ADMIN — PROPOFOL 50 MG: 10 INJECTION, EMULSION INTRAVENOUS at 09:55

## 2020-02-21 RX ADMIN — PROPOFOL 50 MG: 10 INJECTION, EMULSION INTRAVENOUS at 10:02

## 2020-02-21 RX ADMIN — PROPOFOL 100 MG: 10 INJECTION, EMULSION INTRAVENOUS at 09:48

## 2020-02-21 RX ADMIN — PROPOFOL 50 MG: 10 INJECTION, EMULSION INTRAVENOUS at 09:50

## 2020-02-21 RX ADMIN — PROPOFOL 50 MG: 10 INJECTION, EMULSION INTRAVENOUS at 10:06

## 2020-02-21 RX ADMIN — SODIUM CHLORIDE, SODIUM LACTATE, POTASSIUM CHLORIDE, AND CALCIUM CHLORIDE: .6; .31; .03; .02 INJECTION, SOLUTION INTRAVENOUS at 09:42

## 2020-02-21 RX ADMIN — PROPOFOL 50 MG: 10 INJECTION, EMULSION INTRAVENOUS at 09:52

## 2020-02-21 RX ADMIN — PROPOFOL 50 MG: 10 INJECTION, EMULSION INTRAVENOUS at 09:58

## 2020-02-21 NOTE — ANESTHESIA PREPROCEDURE EVALUATION
Review of Systems/Medical History  Patient summary reviewed    No history of anesthetic complications     Cardiovascular  Exercise tolerance (METS): >4,  No angina ,    Pulmonary  Not a smoker , No shortness of breath, No recent URI ,        GI/Hepatic    No GERD ,        Negative  ROS        Endo/Other  Negative endo/other ROS      GYN    Breast cancer        Hematology   Musculoskeletal  Negative musculoskeletal ROS        Neurology  Seizures ,  No CVA ,    Psychology           Physical Exam    Airway    Mallampati score: I  TM Distance: >3 FB  Neck ROM: full     Dental   No notable dental hx     Cardiovascular      Pulmonary      Other Findings        Anesthesia Plan  ASA Score- 2     Anesthesia Type- IV sedation with anesthesia with ASA Monitors  Additional Monitors:   Airway Plan:         Plan Factors-    Induction- intravenous  Postoperative Plan-     Informed Consent- Anesthetic plan and risks discussed with patient  I personally reviewed this patient with the CRNA  Discussed and agreed on the Anesthesia Plan with the CRNA  Filipe Nice

## 2020-02-21 NOTE — H&P
History and Physical - [facility]  Hang Devries 47 y o  female MRN: [id number]    HPI: Hang Devries is a 47y o  year old female who presents for follow up of piecemeal polypectomy of large ascending polyp  Review of Systems    Historical Information   Past Medical History:   Diagnosis Date    Breast cancer ([facility] [address] ) 05/2018    right breast    Colon polyp     Fibrocystic breast     History of radiation therapy 2018    right breast ca    Seasonal allergies     Vertigo      Past Surgical History:   Procedure Laterality Date    BREAST BIOPSY Right 05/2018    Positive    BREAST BIOPSY Left     Neg around age 28    BREAST LUMPECTOMY Right 07/2018    EMBOLECTOMY      s/p forceps delivery    MAMMO NEEDLE LOCALIZATION RIGHT (ALL INC) Right 7/10/2018    MAMMO STEREOTACTIC BREAST BIOPSY RIGHT (ALL INC) Right 5/29/2018    MO BIOPSY/EXCISION, LYMPH NODE(S) Right 7/10/2018    Procedure: SENTINEL LYMPH NODE BIOPSY; LYMPHATIC MAPPING WITH BLUE DYE RADIOACTIVE DYE (INJECT AT 0800 BY DR CHAUDHARY IN THE OR); Surgeon: Jacquie Boudreaux MD;  [facility];  Service: Surgical Oncology    MO PERQ DEVICE PLACEMT BREAST LOC 1ST LES W GUIDNCE Right 7/10/2018    Procedure: BREAST LUMPECTOMY; BREAST NEEDLE LOCALIZATION (NEEDLE LOC AT 0700);   Surgeon: Jacquie Boudreaux MD;  [facility];  Service: Surgical Oncology    US GUIDED BREAST BIOPSY LEFT COMPLETE Left 11/13/2019    US GUIDED BREAST BIOPSY RIGHT COMPLETE Right 5/29/2018     Social History   Social History     Substance and Sexual Activity   Alcohol Use Yes    Comment: 2-3 glasses of wine on Fri and sat annetta     Social History     Substance and Sexual Activity   Drug Use No     Social History     Tobacco Use   Smoking Status Never Smoker   Smokeless Tobacco Never Used   Tobacco Comment    only smoked socially as teenager for 1 yr     Family History   Problem Relation Age of Onset    Prostate cancer Father     Breast cancer Maternal Aunt 54    Ovarian cancer Maternal Aunt     Colon cancer Maternal Grandmother     Esophageal cancer Maternal Grandfather     No Known Problems Sister     No Known Problems Daughter     Skin cancer Paternal Aunt        Meds/Allergies       (Not in a hospital admission)    No Known Allergies    Objective     /56   Pulse 84   Temp 98 2 °F (36 8 °C) (Temporal)   Resp 18   Ht 5' 3" (1 6 m)   Wt 52 1 kg (114 lb 12 8 oz)   LMP 01/09/2020   SpO2 99%   BMI 20 34 kg/m²       PHYSICAL EXAM    Gen: NAD  CV: RRR  CHEST: Clear  ABD: soft, NT/ND  EXT: no edema  Neuro: AAO      ASSESSMENT/PLAN:  This is a 47y o  year old female here for  follow up of piecemeal polypectomy of large ascending polyp         PLAN:   Procedure: colonoscopy

## 2020-02-21 NOTE — ANESTHESIA POSTPROCEDURE EVALUATION
Post-Op Assessment Note    CV Status:  Stable  Pain Score: 0    Pain management: adequate     Mental Status:  Alert and awake   Hydration Status:  Euvolemic   PONV Controlled:  Controlled   Airway Patency:  Patent   Post Op Vitals Reviewed: Yes      Staff: CRNA           BP 90/51 (02/21/20 1008)    Temp      Pulse 99 (02/21/20 1008)   Resp 19 (02/21/20 1008)    SpO2 97 % (02/21/20 1008)

## 2020-02-28 ENCOUNTER — TELEPHONE (OUTPATIENT)
Dept: HEMATOLOGY ONCOLOGY | Facility: CLINIC | Age: 50
End: 2020-02-28

## 2020-02-28 ENCOUNTER — TELEPHONE (OUTPATIENT)
Dept: GASTROENTEROLOGY | Facility: CLINIC | Age: 50
End: 2020-02-28

## 2020-02-28 NOTE — LETTER
February 28, 2020     [facility] [address]  [place] [address]      Dear Ms Hairston: We have attempted to reach you regarding your results  We ask that you please contact our office upon receipt of this letter to receive your results  Thank you in advance for your cooperation and assistance          Sincerely,   [facility] Staff  [phone]

## 2020-02-28 NOTE — TELEPHONE ENCOUNTER
----- Message from Geno Lee MD sent at 2/26/2020  2:17 PM EST -----  Please inform the patient that the biopsies from the side of her last polypectomy did not show any residual polyp tissue which is good news  This means that the entire polyp was removed previously  The other small polyps which were removed were hyperplastic polyps  I recommend repeat colonoscopy in 3 years  Please have her call with any questions or concerns and please put in for recall

## 2020-02-28 NOTE — TELEPHONE ENCOUNTER
I called the patient and informed her that Dr Milana Alcala is rounding the week of Dr Milana Alcala is rounding the week of 7/24/20 until 7/30/20 due to a change of the hospital rounds schedule  I then stated that her follow up appointment was moved to 7/21/20 @ 11:40 am at the [facility]  I then proceeded to verbalize if for some reason patient needs to reschedule the appointment to call the office back  Patient verbalized understanding and agreement

## 2020-03-10 ENCOUNTER — OFFICE VISIT (OUTPATIENT)
Dept: OBGYN CLINIC | Facility: CLINIC | Age: 50
End: 2020-03-10
Payer: COMMERCIAL

## 2020-03-10 VITALS
DIASTOLIC BLOOD PRESSURE: 74 MMHG | HEART RATE: 85 BPM | SYSTOLIC BLOOD PRESSURE: 144 MMHG | HEIGHT: 63 IN | BODY MASS INDEX: 20.55 KG/M2 | WEIGHT: 116 LBS

## 2020-03-10 DIAGNOSIS — M75.101 ROTATOR CUFF SYNDROME OF RIGHT SHOULDER: Primary | ICD-10-CM

## 2020-03-10 PROCEDURE — 3008F BODY MASS INDEX DOCD: CPT | Performed by: ORTHOPAEDIC SURGERY

## 2020-03-10 PROCEDURE — 1036F TOBACCO NON-USER: CPT | Performed by: ORTHOPAEDIC SURGERY

## 2020-03-10 PROCEDURE — 99203 OFFICE O/P NEW LOW 30 MIN: CPT | Performed by: ORTHOPAEDIC SURGERY

## 2020-03-10 RX ORDER — MELOXICAM 15 MG/1
15 TABLET ORAL DAILY
Qty: 30 TABLET | Refills: 0 | Status: SHIPPED | OUTPATIENT
Start: 2020-03-10 | End: 2020-04-07

## 2020-03-10 NOTE — PROGRESS NOTES
Patient Name:  Governor Tran  MRN:  [id number]    Assessment & Plan     Right shoulder rotator cuff tendinitis/bursitis  1  Referral to physical therapy  2  Prescription for meloxicam   3  Activities as tolerated with modification to avoid pain  4  Follow-up in six weeks for repeat evaluation  Consider MRI at that time if pain persists  Chief Complaint     Right shoulder pain    History of the Present Illness     Celestine Shaw is a 47 y o  female who reports to the office today for initial evaluation of her right shoulder  She notes a one year history of right shoulder pain  She denies any injury or trauma  She states pain is localized to the lateral aspect of the shoulder  Pain is worse with increased activities  She notes weakness secondary to pain  She also notes increased pain at night with lying on the right side  She is taking nothing for pain  No numbness or tingling  No instability  No fevers or chills  Physical Exam     /74   Pulse 85   Ht 5' 3" (1 6 m)   Wt 52 6 kg (116 lb)   BMI 20 55 kg/m²      Right Shoulder:  No gross deformity  No tenderness to palpation  Passive range of motion includes 160°  Fracture 100° of external rotation-abduction, 70° of internal rotation-abduction  Positive Neer impingement test   Positive Bar impingement test   Pain but no weakness with empty can test   Negative speed's test   Negative cross-body adduction test   Sensation intact axillary, median, ulnar and radial nerves  2+ radial pulse  Eyes: No scleral icterus  Neck: Supple  Lungs: Normal respiratory effort  Cardiovascular: Capillary refill is less than 2 seconds  Skin: Intact without erythema  Neurologic: Sensation intact to light touch  Psychiatric: Mood and affect are appropriate        Past Medical History:   Diagnosis Date    Breast cancer ([facility] [address] ) 05/2018    right breast    Colon polyp     Fibrocystic breast     History of radiation therapy 2018    right breast ca   Deepak Richter Seasonal allergies     Vertigo        Past Surgical History:   Procedure Laterality Date    BREAST BIOPSY Right 05/2018    Positive    BREAST BIOPSY Left     Neg around age 28    BREAST LUMPECTOMY Right 07/2018    EMBOLECTOMY      s/p forceps delivery    MAMMO NEEDLE LOCALIZATION RIGHT (ALL INC) Right 7/10/2018    MAMMO STEREOTACTIC BREAST BIOPSY RIGHT (ALL INC) Right 5/29/2018    NH BIOPSY/EXCISION, LYMPH NODE(S) Right 7/10/2018    Procedure: SENTINEL LYMPH NODE BIOPSY; LYMPHATIC MAPPING WITH BLUE DYE RADIOACTIVE DYE (INJECT AT 0800 BY DR CHAUDHARY IN THE OR); Surgeon: Priyanka Luna MD;  [facility];  Service: Surgical Oncology    NH PERQ DEVICE PLACEMT BREAST LOC 1ST LES W GUIDNCE Right 7/10/2018    Procedure: BREAST LUMPECTOMY; BREAST NEEDLE LOCALIZATION (NEEDLE LOC AT 0700); Surgeon: Priyanka Luna MD;  [facility];  Service: Surgical Oncology    US GUIDED BREAST BIOPSY LEFT COMPLETE Left 11/13/2019    US GUIDED BREAST BIOPSY RIGHT COMPLETE Right 5/29/2018       No Known Allergies    Current Outpatient Medications on File Prior to Visit   Medication Sig Dispense Refill    loratadine (CLARITIN) 5 MG chewable tablet Chew 5 mg daily      tamoxifen (NOLVADEX) 20 mg tablet TAKE 1 TABLET(20 MG) BY MOUTH DAILY 90 tablet 1     No current facility-administered medications on file prior to visit          Social History     Tobacco Use    Smoking status: Never Smoker    Smokeless tobacco: Never Used    Tobacco comment: only smoked socially as teenager for 1 yr   Substance Use Topics    Alcohol use: Yes     Comment: 2-3 glasses of wine on Fri and sat annetta    Drug use: No       Family History   Problem Relation Age of Onset    Prostate cancer Father     Breast cancer Maternal Aunt 54    Ovarian cancer Maternal Aunt     Colon cancer Maternal Grandmother     Esophageal cancer Maternal Grandfather     No Known Problems Sister     No Known Problems Daughter     Skin cancer Paternal Aunt Review of Systems     As stated in the HPI  All other systems were reviewed and are negative        Scribe Attestation    I,:   Berkley Garcia PA-C am acting as a scribe while in the presence of the attending physician :        I,:   aWylon Lance MD personally performed the services described in this documentation    as scribed in my presence :

## 2020-04-07 DIAGNOSIS — M75.101 ROTATOR CUFF SYNDROME OF RIGHT SHOULDER: ICD-10-CM

## 2020-04-07 RX ORDER — MELOXICAM 15 MG/1
TABLET ORAL
Qty: 30 TABLET | Refills: 0 | Status: SHIPPED | OUTPATIENT
Start: 2020-04-07 | End: 2020-05-15 | Stop reason: SDUPTHER

## 2020-04-21 ENCOUNTER — TELEPHONE (OUTPATIENT)
Dept: OBGYN CLINIC | Facility: CLINIC | Age: 50
End: 2020-04-21

## 2020-04-30 ENCOUNTER — CLINICAL SUPPORT (OUTPATIENT)
Dept: RADIATION ONCOLOGY | Facility: HOSPITAL | Age: 50
End: 2020-04-30
Attending: RADIOLOGY
Payer: COMMERCIAL

## 2020-04-30 VITALS — WEIGHT: 115.5 LBS | BODY MASS INDEX: 20.46 KG/M2 | HEIGHT: 63 IN

## 2020-04-30 DIAGNOSIS — C50.211 MALIGNANT NEOPLASM OF UPPER-INNER QUADRANT OF RIGHT BREAST IN FEMALE, ESTROGEN RECEPTOR POSITIVE (HCC): Primary | ICD-10-CM

## 2020-04-30 DIAGNOSIS — Z17.0 MALIGNANT NEOPLASM OF UPPER-INNER QUADRANT OF RIGHT BREAST IN FEMALE, ESTROGEN RECEPTOR POSITIVE (HCC): Primary | ICD-10-CM

## 2020-04-30 PROCEDURE — 99211 OFF/OP EST MAY X REQ PHY/QHP: CPT | Performed by: RADIOLOGY

## 2020-05-13 ENCOUNTER — HOSPITAL ENCOUNTER (OUTPATIENT)
Dept: MAMMOGRAPHY | Facility: CLINIC | Age: 50
Discharge: HOME | End: 2020-05-13
Payer: COMMERCIAL

## 2020-05-13 VITALS — WEIGHT: 115 LBS | HEIGHT: 63 IN | BODY MASS INDEX: 20.38 KG/M2

## 2020-05-13 DIAGNOSIS — Z17.0 MALIGNANT NEOPLASM OF UPPER-INNER QUADRANT OF RIGHT BREAST IN FEMALE, ESTROGEN RECEPTOR POSITIVE (HCC): ICD-10-CM

## 2020-05-13 DIAGNOSIS — C50.211 MALIGNANT NEOPLASM OF UPPER-INNER QUADRANT OF RIGHT BREAST IN FEMALE, ESTROGEN RECEPTOR POSITIVE (HCC): ICD-10-CM

## 2020-05-13 PROCEDURE — G0279 TOMOSYNTHESIS, MAMMO: HCPCS

## 2020-05-13 PROCEDURE — 77066 DX MAMMO INCL CAD BI: CPT

## 2020-05-14 ENCOUNTER — TELEPHONE (OUTPATIENT)
Dept: SURGICAL ONCOLOGY | Facility: CLINIC | Age: 50
End: 2020-05-14

## 2020-05-15 ENCOUNTER — TELEPHONE (OUTPATIENT)
Dept: OBGYN CLINIC | Facility: HOSPITAL | Age: 50
End: 2020-05-15

## 2020-05-15 DIAGNOSIS — M75.101 ROTATOR CUFF SYNDROME OF RIGHT SHOULDER: ICD-10-CM

## 2020-05-15 RX ORDER — MELOXICAM 15 MG/1
15 TABLET ORAL DAILY
Qty: 30 TABLET | Refills: 0 | Status: SHIPPED | OUTPATIENT
Start: 2020-05-15 | End: 2022-03-22

## 2020-05-19 ENCOUNTER — OFFICE VISIT (OUTPATIENT)
Dept: SURGICAL ONCOLOGY | Facility: CLINIC | Age: 50
End: 2020-05-19
Payer: COMMERCIAL

## 2020-05-19 VITALS
RESPIRATION RATE: 16 BRPM | SYSTOLIC BLOOD PRESSURE: 100 MMHG | WEIGHT: 115 LBS | HEIGHT: 63 IN | DIASTOLIC BLOOD PRESSURE: 80 MMHG | TEMPERATURE: 97.9 F | HEART RATE: 68 BPM | BODY MASS INDEX: 20.38 KG/M2

## 2020-05-19 DIAGNOSIS — Z79.810 USE OF TAMOXIFEN (NOLVADEX): ICD-10-CM

## 2020-05-19 DIAGNOSIS — Z17.0 MALIGNANT NEOPLASM OF UPPER-INNER QUADRANT OF RIGHT BREAST IN FEMALE, ESTROGEN RECEPTOR POSITIVE (HCC): Primary | ICD-10-CM

## 2020-05-19 DIAGNOSIS — Z12.39 ENCOUNTER FOR BREAST CANCER SCREENING OTHER THAN MAMMOGRAM: ICD-10-CM

## 2020-05-19 DIAGNOSIS — R92.2 DENSE BREASTS: ICD-10-CM

## 2020-05-19 DIAGNOSIS — C50.211 MALIGNANT NEOPLASM OF UPPER-INNER QUADRANT OF RIGHT BREAST IN FEMALE, ESTROGEN RECEPTOR POSITIVE (HCC): Primary | ICD-10-CM

## 2020-05-19 PROCEDURE — 3008F BODY MASS INDEX DOCD: CPT | Performed by: NURSE PRACTITIONER

## 2020-05-19 PROCEDURE — 1036F TOBACCO NON-USER: CPT | Performed by: NURSE PRACTITIONER

## 2020-05-19 PROCEDURE — 99214 OFFICE O/P EST MOD 30 MIN: CPT | Performed by: NURSE PRACTITIONER

## 2020-05-19 PROCEDURE — 3008F BODY MASS INDEX DOCD: CPT | Performed by: INTERNAL MEDICINE

## 2020-07-16 DIAGNOSIS — Z17.0 MALIGNANT NEOPLASM OF UPPER-INNER QUADRANT OF RIGHT BREAST IN FEMALE, ESTROGEN RECEPTOR POSITIVE (HCC): ICD-10-CM

## 2020-07-16 DIAGNOSIS — C50.211 MALIGNANT NEOPLASM OF UPPER-INNER QUADRANT OF RIGHT BREAST IN FEMALE, ESTROGEN RECEPTOR POSITIVE (HCC): ICD-10-CM

## 2020-07-16 RX ORDER — TAMOXIFEN CITRATE 20 MG/1
20 TABLET ORAL DAILY
Qty: 90 TABLET | Refills: 1 | Status: SHIPPED | OUTPATIENT
Start: 2020-07-16 | End: 2021-01-18 | Stop reason: SDUPTHER

## 2020-07-20 ENCOUNTER — EVALUATION (OUTPATIENT)
Dept: PHYSICAL THERAPY | Facility: CLINIC | Age: 50
End: 2020-07-20
Payer: COMMERCIAL

## 2020-07-20 DIAGNOSIS — M25.511 CHRONIC RIGHT SHOULDER PAIN: Primary | ICD-10-CM

## 2020-07-20 DIAGNOSIS — G89.29 CHRONIC RIGHT SHOULDER PAIN: Primary | ICD-10-CM

## 2020-07-20 PROCEDURE — 97110 THERAPEUTIC EXERCISES: CPT | Performed by: PHYSICAL THERAPIST

## 2020-07-20 PROCEDURE — 97140 MANUAL THERAPY 1/> REGIONS: CPT | Performed by: PHYSICAL THERAPIST

## 2020-07-20 PROCEDURE — 97162 PT EVAL MOD COMPLEX 30 MIN: CPT | Performed by: PHYSICAL THERAPIST

## 2020-07-20 NOTE — PROGRESS NOTES
PT Evaluation     Today's date: 2020  Patient name: Natty Jacob  : 1970  MRN: [id number]  Referring provider: Nivia Buckley MD  Dx:   Encounter Diagnosis     ICD-10-CM    1  Chronic right shoulder pain M25 511     G89 29                   Assessment  Assessment details: Pt presents with signs and symptoms synonymous of admitting diagnosis of RCT syndrome  Pt presents with pain, decreased strength, decreased range, flexibility, as well as tolerance to activity and postural awareness  Pt would benefit skilled PT intervention in order to address these impairments in order to be able to perform all desired activities with minimal to nil symptom exacerbation  If she fails to find improvement over the next 3-4 weeks she will benefit re-consultation with referring  Thank you very much for this referral      Impairments: abnormal or restricted ROM, impaired physical strength, lacks appropriate home exercise program, pain with function, poor posture  and poor body mechanics  Understanding of Dx/Px/POC: good   Prognosis: good    Goals  STG 4 Weeks:  Decrease pain at worst to 5/10  Improve range to improve flex and IR by 5 degrees from IR      Improve strength to shoulder to 4- all planes  Independent with HEP  LTG 8 Weeks:  Decrease pain at worst to 2/10  Improve range to equal to that of L  Improve strength to 4/5 or better all planes  Able to perform all desired activities with minimal to nil symptom exacerbation      Plan  Patient would benefit from: skilled physical therapy  Planned modality interventions: cryotherapy, thermotherapy: hydrocollator packs and TENS  Planned therapy interventions: joint mobilization, manual therapy, neuromuscular re-education, patient education, postural training, strengthening, stretching, therapeutic activities, therapeutic exercise, home exercise program, graded motor, graded exercise, graded activity, functional ROM exercises and flexibility  Frequency: 2x week  Duration in weeks: 10  Treatment plan discussed with: patient        Subjective Evaluation    History of Present Illness  Date of onset: 2020  Mechanism of injury: Pt is a 45 yofemale who is RHD and presents today stating she has had a long history of R shoulder pain that insidious onset for several years, states 2 years ago she had BC with radiation and lymph node removal and states that since her full recovery she got back into recreational exercise and lifting weights and it seems to have gotten progressively worse  She states the day seems manageable, but the evening where it is worst   Pt report her symptoms fluctuate between 8/10 in the evening, but at rest she is without pain, but the symptoms will be reproduced with reaching, lifting OH activity  Pt reports that she spoke with her Onco Surgeon who indicated to continue activity, she met with Dr Alberto Dunbar in March, who referred her to PT with Meloxicam medication but she doesn't believe this is helping, she states that she waited until feeling comfortable to come to PT due to [address]  Pt reports that recently she developed symptoms into her 5th digit but if she moves or shakes her hand it will go away  Pt reports that she does not have any neck pain history  Pt reports that she does not have any radicular symptoms into her legs or change in bowel or bladder  Pt reports her goals at this time are to be able to work out, sleep and do [facility] activities without shoulde rand arm symptoms  No formal follow up scheduled       Quality of life: good    Pain  Current pain ratin  At best pain ratin  At worst pain ratin  Quality: dull ache and sharp  Aggravating factors: lifting and overhead activity  Progression: no change      Diagnostic Tests  No diagnostic tests performed  Patient Goals  Patient goals for therapy: decreased pain, increased motion, increased strength and return to sport/leisure activities          Objective     Active Range of Motion   Left Shoulder   Flexion: 165 (PROM WFL) degrees   Abduction: 160 (PROM WFL) degrees   External rotation BTH: C7 (PROM WFL)   Internal rotation BTB: T4 (PROM WFL)     Right Shoulder   Flexion: 150 (PROM WFL with AP) degrees with pain  Abduction: 160 (PROM WFL with AP) degrees   External rotation BTH: C6 (PROM WFL with AP) with pain  Internal rotation BTB: L1 (PROM WFL with AP) with pain    Additional Active Range of Motion Details  Forward head, rounded shoulders  B/L Sensation intact to light touch C3,4,5,6,7,8,T1,T2   strong and painless as well as UE screen  MMT  L 5/5 t/o  R Flex 3+ abd 3+ ER 3* IR 5  Mid low trap 3+ B    CS Screen WFL and without pain  Palpation LBHT, Sub Delt Bursa, Infra/Supraspinatis  STs  L all (-)  R + HK, + ZOI, + hornblower add not at Abd   (-) empty, + Full  (-) Anterior Slide, (-) Scour  Precautions: Hx of R BC 2 years remission, Falls hx        Manuals 7/20            PROM of R shoulder             Inf/Mob/Distraction 10 min                                      Neuro Re-Ed             Postural ed 10 min            Scap Add 10" x 10            CS retraction 6" x 10            Tband Row + Ext  RTB           Tband ER/IR  YTB/RTB                                     Ther Ex             Scaption 2 x 10            Self Distraction  10" x 10            Prone ITY R             SA Punches              Isometrics all plances             UE weight Shifts                                       Ther Activity                                       Gait Training                                       Modalities             CP/HP prn to R shoulder

## 2020-07-21 ENCOUNTER — OFFICE VISIT (OUTPATIENT)
Dept: HEMATOLOGY ONCOLOGY | Facility: CLINIC | Age: 50
End: 2020-07-21
Payer: COMMERCIAL

## 2020-07-21 VITALS
OXYGEN SATURATION: 99 % | RESPIRATION RATE: 18 BRPM | WEIGHT: 115 LBS | DIASTOLIC BLOOD PRESSURE: 62 MMHG | HEART RATE: 78 BPM | BODY MASS INDEX: 20.37 KG/M2 | SYSTOLIC BLOOD PRESSURE: 126 MMHG | TEMPERATURE: 98.1 F

## 2020-07-21 DIAGNOSIS — C50.211 MALIGNANT NEOPLASM OF UPPER-INNER QUADRANT OF RIGHT BREAST IN FEMALE, ESTROGEN RECEPTOR POSITIVE (HCC): Primary | ICD-10-CM

## 2020-07-21 DIAGNOSIS — Z17.0 MALIGNANT NEOPLASM OF UPPER-INNER QUADRANT OF RIGHT BREAST IN FEMALE, ESTROGEN RECEPTOR POSITIVE (HCC): Primary | ICD-10-CM

## 2020-07-21 PROCEDURE — 99214 OFFICE O/P EST MOD 30 MIN: CPT | Performed by: INTERNAL MEDICINE

## 2020-07-21 PROCEDURE — 1036F TOBACCO NON-USER: CPT | Performed by: INTERNAL MEDICINE

## 2020-07-21 NOTE — PROGRESS NOTES
Hematology / Oncology Outpatient Follow Up Note    Navid Caldera 45 y o  female :1970 TOW:[id number]         Date:  2020    Assessment / Plan:  A 58-year-old premenopausal woman with stage IA right breast cancer, grade 1, ER 95% positive, MO 90% positive, HER2 negative disease   She underwent lumpectomy with sentinel lymph node biopsy, resulting in LESLI    She is currently on adjuvant hormonal therapy with tamoxifen with excellent tolerance  Clinically, she has no evidence recurrent disease  I recommended her to continue with tamoxifen 20 mg daily  She is in agreement with my recommendation  I will see her again in a year for routine follow-up         Subjective:      HPI:  A 41-year-old premenopausal woman who was found to have abnormality in her right breast, based on a screening mammography  Fiona Perez, she underwent stereotactic right breast biopsy in May 29, 2018 which showed invasive ductal carcinoma, grade 1   Subsequently, she had genetic testing which was negative for BRCA gene mutation   She elected to have lumpectomy which she did in July 10, 2018 by Dr Reyes Brand Gildardo Score had 9 x 6 mm of invasive ductal carcinoma, grade 1  Rickie Munguiatan is no evidence of lymphovascular invasion   One sentinel lymph nodes was negative for metastatic disease   This was ER 95% positive, MO 90% positive, HER2 negative disease   She presents today to discuss adjuvant treatment options   She feels well   She has no complaint of pain   She has regular menstrual cycle   She has no respiratory symptoms   She has no other past medical history   She does not take any medications   Her performance status is normal            Interval History:  A 58-year-old premenopausal woman with stage IA right breast cancer, grade 1, ER 95% positive, MO 90% positive, HER2 negative disease   She underwent lumpectomy with sentinel lymph node biopsy, resulting in LESLI    She started adjuvant hormonal therapy with tamoxifen 2018   She presents today for routine follow-up  She tolerated tamoxifen generally well  However, she recently sided to skip her menstrual cycle  As a result, she has more hot flashes than before  She denied any respiratory symptoms  She has no swelling lower extremities  She has no complaint of pain  She has normal performance status         Objective:      Primary Diagnosis:     Right breast cancer, stage IA (pT1b, pN0, M0) grade 1, ER 95% positive, LA 90% positive, HER2 negative disease   Diagnosed in July 2018      Cancer Staging:  Cancer Staging  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 breast in female, estrogen receptor positive ([facility] [address] )  Staging form: Breast, AJCC 8th Edition  - Clinical: Stage IA (cT1b, cN0(sn), cM0, G1, ER: Positive, LA: Positive, HER2: Negative) - Unsigned           Previous Hematologic/ Oncologic Treatment:            Current Hematologic/ Oncologic Treatment:       Adjuvant hormonal therapy with tamoxifen since July 2018      Disease Status:      LESLI status post lumpectomy with sentinel lymph node biopsy      Test Results:     Pathology:     9 x 6 mm of invasive ductal carcinoma, grade 1   No evidence of lymphovascular invasion   One sentinel lymph nodes were negative for metastatic disease   ER 95% positive, LA 90% positive, HER2 negative disease   Stage IA (pT1b, pN0, M0)     Radiology:     Mammography in May 2020 was benign  BI-RADS 2       Laboratory:     See below      Physical Exam:        General Appearance:    Alert, oriented          Eyes:    PERRL   Ears:    Normal external ear canals, both ears   Nose:   Nares normal, septum midline   Throat:   Mucosa moist  Pharynx without injection      Neck:   Supple         Lungs:     Clear to auscultation bilaterally   Chest Wall:    No tenderness or deformity    Heart:    Regular rate and rhythm         Abdomen:     Soft, non-tender, bowel sounds +, no organomegaly               Extremities:   Extremities no cyanosis or edema         Skin:   no rash or icterus  Lymph nodes:   Cervical, supraclavicular, and axillary nodes normal   Neurologic:   CNII-XII intact, normal strength, sensation and reflexes     Throughout             Breast exam: Lumpectomy scar at inner aspect of her right breast with no palpable abnormality   Left breast exam is negative  ROS: Review of Systems   Constitutional:        Hot flashes   All other systems reviewed and are negative  Imaging: No results found  Labs:   Lab Results   Component Value Date    WBC 7 58 08/21/2018    HGB 14 1 08/21/2018    HCT 40 8 08/21/2018    MCV 92 08/21/2018     08/21/2018     Lab Results   Component Value Date    K 3 8 10/17/2019     10/17/2019    CO2 25 10/17/2019    BUN 21 10/17/2019    CREATININE 0 91 10/17/2019    GLUF 93 10/17/2019    CALCIUM 8 8 10/17/2019    AST 19 10/17/2019    ALT 25 10/17/2019    ALKPHOS 64 10/17/2019    EGFR 74 10/17/2019         Current Medications: Reviewed  Allergies: Reviewed  PMH/FH/SH:  Reviewed      Vital Sign:    Body surface area is 1 53 meters squared      Wt Readings from Last 3 Encounters:   07/21/20 52 2 kg (115 lb)   05/19/20 52 2 kg (115 lb)   05/13/20 52 2 kg (115 lb)        Temp Readings from Last 3 Encounters:   07/21/20 98 1 °F (36 7 °C) (Tympanic)   05/19/20 97 9 °F (36 6 °C) (Tympanic)   02/21/20 (!) 97 2 °F (36 2 °C) (Temporal)        BP Readings from Last 3 Encounters:   07/21/20 126/62   05/19/20 100/80   03/10/20 144/74         Pulse Readings from Last 3 Encounters:   07/21/20 78   05/19/20 68   03/10/20 85     @LASTSAO2(3)@

## 2020-07-23 ENCOUNTER — OFFICE VISIT (OUTPATIENT)
Dept: PHYSICAL THERAPY | Facility: CLINIC | Age: 50
End: 2020-07-23
Payer: COMMERCIAL

## 2020-07-23 DIAGNOSIS — G89.29 CHRONIC RIGHT SHOULDER PAIN: Primary | ICD-10-CM

## 2020-07-23 DIAGNOSIS — M25.511 CHRONIC RIGHT SHOULDER PAIN: Primary | ICD-10-CM

## 2020-07-23 PROCEDURE — 97112 NEUROMUSCULAR REEDUCATION: CPT

## 2020-07-23 PROCEDURE — 97140 MANUAL THERAPY 1/> REGIONS: CPT

## 2020-07-23 PROCEDURE — 97110 THERAPEUTIC EXERCISES: CPT

## 2020-07-23 NOTE — PROGRESS NOTES
Daily Note     Today's date: 2020  Patient name: Alli Cardozo  : 1970  MRN: [id number]  Referring provider: Leann Luna MD  Dx:   Encounter Diagnosis     ICD-10-CM    1  Chronic right shoulder pain M25 511     G89 29                   Subjective: patient states that she experience the most discomfort at night and while running   No complaints with HEP      Objective: See treatment diary below      Assessment: Tolerated treatment fair  Patient exhibited good technique with therapeutic exercises however noticed a moderate increase in pain with prone T's and ER  Plan: Continue per plan of care  Precautions: Hx of R BC 2 years remission, Falls hx  Manuals            PROM of R shoulder  8           Inf/Mob/Distraction 10 min 10 - TS                                     Neuro Re-Ed             Postural ed 10 min            Scap Add 10" x 10 10"x 10            CS retraction 6" x 10 6" x 10            Tband Row + Ext  RTB x 10            Tband ER/IR  RTB x 10 -IR only  ER = p!                                      Ther Ex             Scaption 2 x 10 1 x 10           Self Distraction  10" x 10 :10x10            Prone ITY R  P! extensio only x 10           SA Punches   2 x 10           Isometrics all plances  NV           UE weight Shifts  NV           Pulleys  3 min            Thoracic stretch with bolster  :05 x 10            Ther Activity                                       Gait Training                                       Modalities             CP/HP prn to R shoulder

## 2020-07-27 ENCOUNTER — OFFICE VISIT (OUTPATIENT)
Dept: PHYSICAL THERAPY | Facility: CLINIC | Age: 50
End: 2020-07-27
Payer: COMMERCIAL

## 2020-07-27 DIAGNOSIS — M25.511 CHRONIC RIGHT SHOULDER PAIN: Primary | ICD-10-CM

## 2020-07-27 DIAGNOSIS — G89.29 CHRONIC RIGHT SHOULDER PAIN: Primary | ICD-10-CM

## 2020-07-27 PROCEDURE — 97140 MANUAL THERAPY 1/> REGIONS: CPT | Performed by: PHYSICAL THERAPIST

## 2020-07-27 PROCEDURE — 97110 THERAPEUTIC EXERCISES: CPT | Performed by: PHYSICAL THERAPIST

## 2020-07-27 PROCEDURE — 97112 NEUROMUSCULAR REEDUCATION: CPT | Performed by: PHYSICAL THERAPIST

## 2020-07-27 NOTE — PROGRESS NOTES
Daily Note     Today's date: 2020  Patient name: Yin Leiva  : 1970  MRN: [id number]  Referring provider: Kanchan Hatch MD  Dx:   Encounter Diagnosis     ICD-10-CM    1  Chronic right shoulder pain M25 511     G89 29                   Subjective: Pt presents today stating Generalized soreness and pain elevated for 24 hours back to baseline now, difficulty with sleeping  Objective: See treatment diary below  TS mobility G1 T1-12  Assessment: Improved quality of moment and less pain s/p TS mobilization  Possible symptoms may be TS involvement is potentially more related that initially suspected  Less pain t/o session s/p this intervention  Plan: Continue per plan of care  Precautions: Hx of R BC 2 years remission, Falls hx  Manuals           PROM of R shoulder  8 5          Inf/Mob/Distraction 10 min 10 - TS 5             Prone TS mobs G2-4 T1-12 5                       Neuro Re-Ed             Postural ed 10 min            Scap Add 10" x 10 10"x 10  10" x 10          CS retraction 6" x 10 6" x 10  6" x 10          Tband Row + Ext  RTB x 10  RTB 2 x 10          Tband ER/IR  RTB x 10 -IR only   ER = p! RTB 2 x 10 (10x ER*)                                    Ther Ex             Scaption 2 x 10 1 x 10 2 x 10          Self Distraction  10" x 10 :10x10  10" x 10          Prone ITY R  P! extensio only x 10  x10          SA Punches   2 x 10 2 x 10          Isometrics all plances  NV nv          UE weight Shifts  NV 2 x 10          Pulleys  3 min  3 min          Thoracic stretch with bolster  :05 x 10  5" x 10           Ther Activity                                       Gait Training                                       Modalities             CP/HP prn to R shoulder

## 2020-07-29 ENCOUNTER — OFFICE VISIT (OUTPATIENT)
Dept: PHYSICAL THERAPY | Facility: CLINIC | Age: 50
End: 2020-07-29
Payer: COMMERCIAL

## 2020-07-29 DIAGNOSIS — G89.29 CHRONIC RIGHT SHOULDER PAIN: Primary | ICD-10-CM

## 2020-07-29 DIAGNOSIS — M25.511 CHRONIC RIGHT SHOULDER PAIN: Primary | ICD-10-CM

## 2020-07-29 PROCEDURE — 97140 MANUAL THERAPY 1/> REGIONS: CPT | Performed by: PHYSICAL THERAPIST

## 2020-07-29 PROCEDURE — 97110 THERAPEUTIC EXERCISES: CPT | Performed by: PHYSICAL THERAPIST

## 2020-07-29 PROCEDURE — 97112 NEUROMUSCULAR REEDUCATION: CPT | Performed by: PHYSICAL THERAPIST

## 2020-07-29 NOTE — PROGRESS NOTES
Daily Note     Today's date: 2020  Patient name: Temi Lopez  : 1970  MRN: [id number]  Referring provider: Vernon Montoya MD  Dx:   Encounter Diagnosis     ICD-10-CM    1  Chronic right shoulder pain M25 511     G89 29                   Subjective: Pt presents today stating that she had numbness and weakness into the R UE and was present for a couple hours, she noticed this while writing  Objective: See treatment diary below       Assessment: Reviewed possible correlation of CS radiculopathy, added OP for CS, AROM quality and range is improving but still uncomfortable for pt  Difficulty still evident with ER and ITY ext  Perhaps hold with this n v        Plan: Continue per plan of care  Precautions: Hx of R BC 2 years remission, Falls hx  Manuals          PROM of R shoulder  8 5 5         Inf/Mob/Distraction 10 min 10 - TS 5 5            Prone TS mobs G2-4 T1-12 5 5                      Neuro Re-Ed             Postural ed 10 min            Scap Add 10" x 10 10"x 10  10" x 10 10" x 10         CS retraction 6" x 10 6" x 10  6" x 10 2" 2 x 10 OP         Tband Row + Ext  RTB x 10  RTB 2 x 10 RTB 2 x 10         Tband ER/IR  RTB x 10 -IR only   ER = p! RTB 2 x 10 (10x ER*) RTB 2 x 10 ER YTB 10x                                   Ther Ex             Scaption 2 x 10 1 x 10 2 x 10 2 x 10         Self Distraction  10" x 10 :10x10  10" x 10 10" x 10          Prone ITY R  P! extensio only x 10  x10 X 10 Ext         SA Punches   2 x 10 2 x 10 2 x 10         Isometrics all plances  NV nv Flex and Abd 6" x10         UE weight Shifts  NV 2 x 10 2 x 10         Pulleys  3 min  3 min 3 min         Thoracic stretch with bolster  :05 x 10  5" x 10  5" x 10         Ther Activity                                       Gait Training                                       Modalities             CP/HP prn to R shoulder

## 2020-08-03 ENCOUNTER — OFFICE VISIT (OUTPATIENT)
Dept: PHYSICAL THERAPY | Facility: CLINIC | Age: 50
End: 2020-08-03
Payer: COMMERCIAL

## 2020-08-03 DIAGNOSIS — M25.511 CHRONIC RIGHT SHOULDER PAIN: Primary | ICD-10-CM

## 2020-08-03 DIAGNOSIS — G89.29 CHRONIC RIGHT SHOULDER PAIN: Primary | ICD-10-CM

## 2020-08-03 PROCEDURE — 97140 MANUAL THERAPY 1/> REGIONS: CPT

## 2020-08-03 PROCEDURE — 97112 NEUROMUSCULAR REEDUCATION: CPT

## 2020-08-03 PROCEDURE — 97110 THERAPEUTIC EXERCISES: CPT

## 2020-08-03 NOTE — PROGRESS NOTES
Daily Note     Today's date: 8/3/2020  Patient name: Hamzah Iglesias  : 1970  MRN: [id number]  Referring provider: Merry Arguelles MD  Dx:   Encounter Diagnosis     ICD-10-CM    1  Chronic right shoulder pain  M25 511     G89 29                   Subjective: Patient states that she has not experienced any radicular symptoms since her last treatment session  Objective: See treatment diary below      Assessment: Tolerated treatment fair  Patient exhibited good technique with therapeutic exercises      Plan: Continue per plan of care  Precautions: Hx of R BC 2 years remission, Falls hx     //  Manuals 7/20 7/23 7/27 7/29 8/3        PROM of R shoulder  8 5 5 10        Inf/Mob/Distraction 10 min 10 - TS 5 5 5-TS           Prone TS mobs G2-4 T1-12 5 5 5-TS                     Neuro Re-Ed             Postural ed 10 min            Scap Add 10" x 10 10"x 10  10" x 10 10" x 10 10"x10        CS retraction 6" x 10 6" x 10  6" x 10 2" 2 x 10 OP 2" 2 x 10         Tband Row + Ext  RTB x 10  RTB 2 x 10 RTB 2 x 10 GTB 2 x 10         Tband ER/IR  RTB x 10 -IR only   ER = p! RTB 2 x 10 (10x ER*) RTB 2 x 10 ER YTB 10x YTB x10 RTB 2 x 10                                   Ther Ex             Scaption 2 x 10 1 x 10 2 x 10 2 x 10 2 x 10         Self Distraction  10" x 10 :10x10  10" x 10 10" x 10  10" x 10         Prone ITY R  P! extensio only x 10  x10 X 10 Ext X 10 Ext        SA Punches   2 x 10 2 x 10 2 x 10 2 x 10         Isometrics all plances  NV nv Flex and Abd 6" x10 Flex/abd 6" x 10         UE weight Shifts  NV 2 x 10 2 x 10 "03 2 x 10         Pulleys  3 min  3 min 3 min 3 min         Thoracic stretch with bolster  :05 x 10  5" x 10  5" x 10 5" x 10         Ther Activity                                       Gait Training                                       Modalities             CP/HP prn to R shoulder

## 2020-08-05 ENCOUNTER — OFFICE VISIT (OUTPATIENT)
Dept: PHYSICAL THERAPY | Facility: CLINIC | Age: 50
End: 2020-08-05
Payer: COMMERCIAL

## 2020-08-05 DIAGNOSIS — M25.511 CHRONIC RIGHT SHOULDER PAIN: Primary | ICD-10-CM

## 2020-08-05 DIAGNOSIS — G89.29 CHRONIC RIGHT SHOULDER PAIN: Primary | ICD-10-CM

## 2020-08-05 PROCEDURE — 97112 NEUROMUSCULAR REEDUCATION: CPT | Performed by: PHYSICAL THERAPIST

## 2020-08-05 PROCEDURE — 97110 THERAPEUTIC EXERCISES: CPT | Performed by: PHYSICAL THERAPIST

## 2020-08-05 PROCEDURE — 97140 MANUAL THERAPY 1/> REGIONS: CPT | Performed by: PHYSICAL THERAPIST

## 2020-08-05 NOTE — PROGRESS NOTES
Daily Note     Today's date: 2020  Patient name: Trinity Myers  : 1970  MRN: [id number]  Referring provider: Zaira Restrepo MD  Dx:   Encounter Diagnosis     ICD-10-CM    1  Chronic right shoulder pain  M25 511     G89 29                   Subjective: Pt presents today stating that she was sore last visit, tried using CP and this appears to have helped  Mild soreness, only pain in the Evening  Objective: See treatment diary below      Assessment: Not progressed t/o session, due to prior residual soreness however quality of motion is improving and functional use  Only challenge at this time is ER resistance based movements  Plan: Continue per plan of care  Precautions: Hx of R BC 2 years remission, Falls hx     //  Manuals 7/20 7/23 7/27 7/29 8/3 8/5       PROM of R shoulder  8 5 5 10 5       Inf/Mob/Distraction 10 min 10 - TS 5 5 5-TS 5          Prone TS mobs G2-4 T1-12 5 5 5-TS 5                    Neuro Re-Ed             Postural ed 10 min            Scap Add 10" x 10 10"x 10  10" x 10 10" x 10 10"x10 10" x 10       CS retraction 6" x 10 6" x 10  6" x 10 2" 2 x 10 OP 2" 2 x 10  2" x 10       Tband Row + Ext  RTB x 10  RTB 2 x 10 RTB 2 x 10 GTB 2 x 10  GTB 2 x 10       Tband ER/IR  RTB x 10 -IR only   ER = p! RTB 2 x 10 (10x ER*) RTB 2 x 10 ER YTB 10x YTB x10 RTB 2 x 10  YTB 2 x 10                                 Ther Ex             Scaption 2 x 10 1 x 10 2 x 10 2 x 10 2 x 10  2 x 10       Self Distraction  10" x 10 :10x10  10" x 10 10" x 10  10" x 10  10" x        Prone ITY R  P! extensio only x 10  x10 X 10 Ext X 10 Ext Ext x 10       SA Punches   2 x 10 2 x 10 2 x 10 2 x 10  2 x 10       Isometrics all plances  NV nv Flex and Abd 6" x10 Flex/abd 6" x 10  Flex abd 6" x 10       UE weight Shifts  NV 2 x 10 2 x 10 "03 2 x 10  3" x 20       Pulleys  3 min  3 min 3 min 3 min  3 min       Thoracic stretch with bolster  :05 x 10  5" x 10  5" x 10 5" x 10  5" x 10       Ther Activity Gait Training                                       Modalities             CP/HP prn to R shoulder

## 2020-08-10 ENCOUNTER — OFFICE VISIT (OUTPATIENT)
Dept: PHYSICAL THERAPY | Facility: CLINIC | Age: 50
End: 2020-08-10
Payer: COMMERCIAL

## 2020-08-10 DIAGNOSIS — M25.511 CHRONIC RIGHT SHOULDER PAIN: Primary | ICD-10-CM

## 2020-08-10 DIAGNOSIS — G89.29 CHRONIC RIGHT SHOULDER PAIN: Primary | ICD-10-CM

## 2020-08-10 PROCEDURE — 97140 MANUAL THERAPY 1/> REGIONS: CPT

## 2020-08-10 PROCEDURE — 97110 THERAPEUTIC EXERCISES: CPT

## 2020-08-10 PROCEDURE — 97112 NEUROMUSCULAR REEDUCATION: CPT

## 2020-08-10 NOTE — PROGRESS NOTES
Daily Note     Today's date: 8/10/2020  Patient name: Tim Figueroa  : 1970  MRN: [id number]  Referring provider: Alisha Huddleston MD  Dx:   Encounter Diagnosis     ICD-10-CM    1  Chronic right shoulder pain  M25 511     G89 29                   Subjective: Patient states that she continues to experience pain at night and when reaching [facility]  States that she will try to resume exercising this week and check response following this     Objective: See treatment diary below      Assessment: Tolerated treatment fair  Patient exhibited good technique with therapeutic exercises however experienced moderate amount of pain with resisted ER and abduction isometrics  Held for today and transitioned ER to s/l with better tolerance  Plan: Continue per plan of care  Precautions: Hx of R BC 2 years remission, Falls hx  Manuals 7/20 7/23 7/27 7/29 8/3 8/5 8/10      PROM of R shoulder  8 5 5 10 5 10      Inf/Mob/Distraction 10 min 10 - TS 5 5 5-TS 5 NP         Prone TS mobs G2-4 T1-12 5 5 5-TS 5                    Neuro Re-Ed             Postural ed 10 min            Scap Add 10" x 10 10"x 10  10" x 10 10" x 10 10"x10 10" x 10 10" x 10       CS retraction 6" x 10 6" x 10  6" x 10 2" 2 x 10 OP 2" 2 x 10  2" x 10 2" 2 x 10       Tband Row + Ext  RTB x 10  RTB 2 x 10 RTB 2 x 10 GTB 2 x 10  GTB 2 x 10 GTB 2 x 10       Tband IR  RTB x 10 -IR only   ER = p! RTB 2 x 10 (10x ER*) RTB 2 x 10 ER YTB 10x YTB x10 RTB 2 x 10  YTB 2 x 10 YTB 2 x 10 (IR only)      S/l ER to neutral only       2 x 10                    Ther Ex             Scaption 2 x 10 1 x 10 2 x 10 2 x 10 2 x 10  2 x 10 NV      Self Distraction  10" x 10 :10x10  10" x 10 10" x 10  10" x 10  10" x  10"x 10       Prone ITY R  P! extensio only x 10  x10 X 10 Ext X 10 Ext Ext x 10 Ext x 10       SA Punches   2 x 10 2 x 10 2 x 10 2 x 10  2 x 10 2 x 10       Isometrics all plances  NV nv Flex and Abd 6" x10 Flex/abd 6" x 10  Flex abd 6" x 10 Flex only 6" x 10 (abd-p!)      UE weight Shifts  NV 2 x 10 2 x 10 "03 2 x 10  3" x 20 np       Pulleys  3 min  3 min 3 min 3 min  3 min 3 min       Thoracic stretch with bolster  :05 x 10  5" x 10  5" x 10 5" x 10  5" x 10 5" x 10       Ther Activity                                       Gait Training                                       Modalities             CP/HP prn to R shoulder

## 2020-08-12 ENCOUNTER — OFFICE VISIT (OUTPATIENT)
Dept: PHYSICAL THERAPY | Facility: CLINIC | Age: 50
End: 2020-08-12
Payer: COMMERCIAL

## 2020-08-12 DIAGNOSIS — M25.511 CHRONIC RIGHT SHOULDER PAIN: Primary | ICD-10-CM

## 2020-08-12 DIAGNOSIS — G89.29 CHRONIC RIGHT SHOULDER PAIN: Primary | ICD-10-CM

## 2020-08-12 PROCEDURE — 97140 MANUAL THERAPY 1/> REGIONS: CPT | Performed by: PHYSICAL THERAPIST

## 2020-08-12 PROCEDURE — 97112 NEUROMUSCULAR REEDUCATION: CPT

## 2020-08-12 PROCEDURE — 97110 THERAPEUTIC EXERCISES: CPT

## 2020-08-12 NOTE — PROGRESS NOTES
Daily Note     Today's date: 2020  Patient name: Francisca Fernández  : 1970  MRN: [id number]  Referring provider: Zonia Worley MD  Dx:   Encounter Diagnosis     ICD-10-CM    1  Chronic right shoulder pain  M25 511     G89 29        Start Time: 930  Stop Time: 1021  Total time in clinic (min): 51 minutes  TS  manuals     Subjective: Patient reports disturbed sleep due to R shoulder pain, has been using ice for relief  Pt did 11 pushups on Monday and 20 yesterday  Objective: See treatment diary below  Education for incline pushups instead of floor pushups/ proper body mechanics 5'       Assessment: Tolerated treatment fair  VC for scap retraction during rows  Pt did demonstrate pushups today due to concerns she was doing them wrong , did well with no visible errors in form  Patient exhibited good technique with therapeutic exercises       Plan: Continue per plan of care  Precautions: Hx of R BC 2 years remission, Falls hx  Manuals 7/20 7/23 7/27 7/29 8/3 8/ 8/10 0812     PROM of R shoulder  8 5 5 10 5 10 10     Inf/Mob/Distraction 10 min 10 - TS 5 5 5-TS 5 NP TS        Prone TS mobs G2-4 T1-12 5 5 5-TS 5  TS                  Neuro Re-Ed             Postural ed 10 min            Scap Add 10" x 10 10"x 10  10" x 10 10" x 10 10"x10 10" x 10 10" x 10  10x10"     CS retraction 6" x 10 6" x 10  6" x 10 2" 2 x 10 OP 2" 2 x 10  2" x 10 2" 2 x 10  2" 2x10     Tband Row + Ext  RTB x 10  RTB 2 x 10 RTB 2 x 10 GTB 2 x 10  GTB 2 x 10 GTB 2 x 10  GTB 2x10      Tband IR  RTB x 10 -IR only   ER = p! RTB 2 x 10 (10x ER*) RTB 2 x 10 ER YTB 10x YTB x10 RTB 2 x 10  YTB 2 x 10 YTB 2 x 10 (IR only) YTB 2x10 IR      S/l ER to neutral only       2 x 10  nv                  Ther Ex             Scaption 2 x 10 1 x 10 2 x 10 2 x 10 2 x 10  2 x 10 NV nv     Self Distraction  10" x 10 :10x10  10" x 10 10" x 10  10" x 10  10" x  10"x 10  10x10:     Prone ITY R  P! extensio only x 10  x10 X 10 Ext X 10 Ext Ext x 10 Ext x 10  Ext x10     SA Punches   2 x 10 2 x 10 2 x 10 2 x 10  2 x 10 2 x 10  2x10      Isometrics all plances  NV nv Flex and Abd 6" x10 Flex/abd 6" x 10  Flex abd 6" x 10 Flex only 6" x 10 (abd-p!) Flex 6" x10     UE weight Shifts  NV 2 x 10 2 x 10 "03 2 x 10  3" x 20 np       Pulleys  3 min  3 min 3 min 3 min  3 min 3 min  3 min      Thoracic stretch with bolster  :05 x 10  5" x 10  5" x 10 5" x 10  5" x 10 5" x 10  5"x10     Ther Activity                                       Gait Training                                       Modalities             CP/HP prn to R shoulder

## 2020-08-17 ENCOUNTER — EVALUATION (OUTPATIENT)
Dept: PHYSICAL THERAPY | Facility: CLINIC | Age: 50
End: 2020-08-17
Payer: COMMERCIAL

## 2020-08-17 DIAGNOSIS — G89.29 CHRONIC RIGHT SHOULDER PAIN: Primary | ICD-10-CM

## 2020-08-17 DIAGNOSIS — M25.511 CHRONIC RIGHT SHOULDER PAIN: Primary | ICD-10-CM

## 2020-08-17 PROCEDURE — 97112 NEUROMUSCULAR REEDUCATION: CPT | Performed by: PHYSICAL THERAPIST

## 2020-08-17 PROCEDURE — 97110 THERAPEUTIC EXERCISES: CPT | Performed by: PHYSICAL THERAPIST

## 2020-08-17 PROCEDURE — 97140 MANUAL THERAPY 1/> REGIONS: CPT | Performed by: PHYSICAL THERAPIST

## 2020-08-17 NOTE — PROGRESS NOTES
PT Evaluation     Today's date: 2020  Patient name: Ana Houser  : 1970  MRN: [id number]  Referring provider: Salty Figueroa MD  Dx:   Encounter Diagnosis     ICD-10-CM    1  Chronic right shoulder pain  M25 511     G89 29                   Assessment  Assessment details: Pt is progressing quite well at this time  They have demonstrated improvement in range, strength, flexibility, postural awareness and tolerance to activity  They have achieved all STGs sought out for them as well as by them  They will benefit continued Skilled PT intervention in order to achieve all LTGs sought out for them as well as by them in order to perform all desired activities with minimal to nil symptom exacerbation  There is still some residual impingement symptoms likely stemming from [facility] at this time but no tears are evident and if this is the case they are small in nature    Thank you very much for this kind and motivated referral         Impairments: abnormal or restricted ROM, impaired physical strength, lacks appropriate home exercise program, pain with function, poor posture  and poor body mechanics  Understanding of Dx/Px/POC: good   Prognosis: good    Goals  STG 4 Weeks:  Decrease pain at worst to 5/10 -met  Improve range to improve flex and IR by 5 degrees from IR    -met  Improve strength to shoulder to 4- all planes -met  Independent with HEP -met  LTG 8 Weeks:  Decrease pain at worst to 2/10  Improve range to equal to that of L  Improve strength to 4/5 or better all planes  Able to perform all desired activities with minimal to nil symptom exacerbation      Plan  Patient would benefit from: skilled physical therapy  Planned modality interventions: cryotherapy, thermotherapy: hydrocollator packs and TENS  Planned therapy interventions: joint mobilization, manual therapy, neuromuscular re-education, patient education, postural training, strengthening, stretching, therapeutic activities, therapeutic exercise, home exercise program, graded motor, graded exercise, graded activity, functional ROM exercises and flexibility  Frequency: 1x week  Duration in weeks: 10  Treatment plan discussed with: patient        Subjective Evaluation    History of Present Illness  Date of onset: 2020  Mechanism of injury: Pt presents today stating today that as a whole she feels as though she has gotten better  Pt reports that her movement with shoulder has improved in range and without pain engagement  Pt reports normal daily pain is a bout a 2/10  She states pain at worst is a 5/10 at night  Pt reports the location that her primary symptoms are mostly in the front of her shoulder, but once a week or so she will get symptoms into her R elbow  Pt is only rolling onto shoulder will cause her pain  Pt has no formal follow up with Dr Jhonny Carmona  Pt reports that her big goals are to improve sleeping, and ideally be able to be entirely pain free      Quality of life: good    Pain  Current pain ratin  At best pain ratin  At worst pain ratin  Quality: dull ache and sharp  Aggravating factors: lifting and overhead activity  Progression: no change      Diagnostic Tests  No diagnostic tests performed  Treatments  Previous treatment: physical therapy  Patient Goals  Patient goals for therapy: decreased pain, increased motion, increased strength and return to sport/leisure activities          Objective     Active Range of Motion   Left Shoulder   Flexion: 165 (PROM WFL) degrees   Abduction: 160 (PROM WFL) degrees   External rotation BTH: C7 (PROM WFL)   Internal rotation BTB: T4 (PROM WFL)     Right Shoulder   Flexion: 160 (PROM WFL with AP) degrees   Abduction: 165 (PROM WFL with AP) degrees   External rotation BTH: T1 (PROM WFL with AP)   Internal rotation BTB: T9 (PROM WFL with AP) with pain    Additional Active Range of Motion Details  Forward head, rounded shoulders  B/L Sensation intact to light touch C3,4,5,6,7,8,T1,T2   strong and painless as well as UE screen  MMT  L 5/5 t/o  R Flex 4- abd 4- ER 4-* IR 5  Mid low trap 4- B  CS Screen WFL and without pain  Palpation LBHT, Sub Delt Bursa, Infra/Supraspinatis  STs  L all (-)  R + HK, + ZOI, + hornblower add not at Abd   (+) empty, (-) Full  (-) Anterior Slide, (-) Scour  Precautions: Hx of R BC 2 years remission, Falls hx  Manuals 7/20 7/23 7/27 7/29 8/3 8/5 8/10 0812 8/17    PROM of R shoulder  8 5 5 10 5 10 10 5     Inf/Mob/Distraction 10 min 10 - TS 5 5 5-TS 5 NP TS 5       Prone TS mobs G2-4 T1-12 5 5 5-TS 5  TS 5                 Neuro Re-Ed             Postural ed 10 min            Scap Add 10" x 10 10"x 10  10" x 10 10" x 10 10"x10 10" x 10 10" x 10  10x10" 10" x 10    CS retraction 6" x 10 6" x 10  6" x 10 2" 2 x 10 OP 2" 2 x 10  2" x 10 2" 2 x 10  2" 2x10 2" x 20     Tband Row + Ext  RTB x 10  RTB 2 x 10 RTB 2 x 10 GTB 2 x 10  GTB 2 x 10 GTB 2 x 10  GTB 2x10  BTB 2 x 10    Tband IR  RTB x 10 -IR only   ER = p! RTB 2 x 10 (10x ER*) RTB 2 x 10 ER YTB 10x YTB x10 RTB 2 x 10  YTB 2 x 10 YTB 2 x 10 (IR only) YTB 2x10 IR  YTB 2 x 10    S/l ER to neutral only       2 x 10  nv 2 x 10                 Ther Ex             Scaption 2 x 10 1 x 10 2 x 10 2 x 10 2 x 10  2 x 10 NV nv 2 x 10    Self Distraction  10" x 10 :10x10  10" x 10 10" x 10  10" x 10  10" x  10"x 10  10x10: 10 x 10"    Prone ITY R  P! extensio only x 10  x10 X 10 Ext X 10 Ext Ext x 10 Ext x 10  Ext x10 Ext 1  x10    SA Punches   2 x 10 2 x 10 2 x 10 2 x 10  2 x 10 2 x 10  2x10  2  x10    Isometrics all plances  NV nv Flex and Abd 6" x10 Flex/abd 6" x 10  Flex abd 6" x 10 Flex only 6" x 10 (abd-p!) Flex 6" x10 nv    UE weight Shifts  NV 2 x 10 2 x 10 "03 2 x 10  3" x 20 np       Pulleys  3 min  3 min 3 min 3 min  3 min 3 min  3 min  3 min    Thoracic stretch with bolster  :05 x 10  5" x 10  5" x 10 5" x 10  5" x 10 5" x 10  5"x10 5" x 10    Ther Activity                                       Gait Training                                       Modalities             CP/HP prn to R shoulder

## 2020-08-19 ENCOUNTER — APPOINTMENT (OUTPATIENT)
Dept: PHYSICAL THERAPY | Facility: CLINIC | Age: 50
End: 2020-08-19
Payer: COMMERCIAL

## 2020-08-24 ENCOUNTER — OFFICE VISIT (OUTPATIENT)
Dept: PHYSICAL THERAPY | Facility: CLINIC | Age: 50
End: 2020-08-24
Payer: COMMERCIAL

## 2020-08-24 DIAGNOSIS — M25.511 CHRONIC RIGHT SHOULDER PAIN: Primary | ICD-10-CM

## 2020-08-24 DIAGNOSIS — G89.29 CHRONIC RIGHT SHOULDER PAIN: Primary | ICD-10-CM

## 2020-08-24 PROCEDURE — 97140 MANUAL THERAPY 1/> REGIONS: CPT | Performed by: PHYSICAL THERAPIST

## 2020-08-24 PROCEDURE — 97112 NEUROMUSCULAR REEDUCATION: CPT | Performed by: PHYSICAL THERAPIST

## 2020-08-24 NOTE — PROGRESS NOTES
Daily Note     Today's date: 2020  Patient name: Oscar Arita  : 1970  MRN: [id number]  Referring provider: Cinda Bhagat MD  Dx:   Encounter Diagnosis     ICD-10-CM    1  Chronic right shoulder pain  M25 511     G89 29                   Subjective: Pt reports she did have an enhancement of R shoulder pain  She states that she drank a bit more than usual over the weekend, and slept on her R shoulder and this has finally recovered  Otherwise feeling pretty well today  Objective: See treatment diary below      Assessment: Enhanced OKC resistance today without symptom exacerbation  Reviewed build up of R side pillows and upright supine position to improve sleeping opportunity to prevent R rolling  Pt in accord  Precautions: Hx of R BC 2 years remission, Falls hx  Manuals 7/20 7/23 7/27 7/29 8/3 8/5 8/10 0812    PROM of R shoulder  8 5 5 10 5 10 10 5  5   Inf/Mob/Distraction 10 min 10 - TS 5 5 5-TS 5 NP TS 5 5      Prone TS mobs G2-4 T1-12 5 5 5-TS 5  TS 5 5                Neuro Re-Ed             Postural ed 10 min            Scap Add 10" x 10 10"x 10  10" x 10 10" x 10 10"x10 10" x 10 10" x 10  10x10" 10" x 10 10"x 10   CS retraction 6" x 10 6" x 10  6" x 10 2" 2 x 10 OP 2" 2 x 10  2" x 10 2" 2 x 10  2" 2x10 2" x 20  2" x 20   Tband Row + Ext  RTB x 10  RTB 2 x 10 RTB 2 x 10 GTB 2 x 10  GTB 2 x 10 GTB 2 x 10  GTB 2x10  BTB 2 x 10 Blue 2  X 10    Tband IR  RTB x 10 -IR only   ER = p! RTB 2 x 10 (10x ER*) RTB 2 x 10 ER YTB 10x YTB x10 RTB 2 x 10  YTB 2 x 10 YTB 2 x 10 (IR only) YTB 2x10 IR  YTB 2 x 10 YTB 2 x 10   S/l ER to neutral only       2 x 10  nv 2 x 10 2  x10                Ther Ex             Scaption 2 x 10 1 x 10 2 x 10 2 x 10 2 x 10  2 x 10 NV nv 2 x 10 2 x 10 2#    Self Distraction  10" x 10 :10x10  10" x 10 10" x 10  10" x 10  10" x  10"x 10  10x10: 10 x 10" 10" x 10   Prone ITY R  P! extensio only x 10  x10 X 10 Ext X 10 Ext Ext x 10 Ext x 10  Ext x10 Ext 1  x10 Ext 1 x 10   SA Punches   2 x 10 2 x 10 2 x 10 2 x 10  2 x 10 2 x 10  2x10  2  x10 2 x 10   Isometrics all plances  NV nv Flex and Abd 6" x10 Flex/abd 6" x 10  Flex abd 6" x 10 Flex only 6" x 10 (abd-p!) Flex 6" x10 nv    UE weight Shifts  NV 2 x 10 2 x 10 "03 2 x 10  3" x 20 np       Pulleys  3 min  3 min 3 min 3 min  3 min 3 min  3 min  3 min    Thoracic stretch with bolster  :05 x 10  5" x 10  5" x 10 5" x 10  5" x 10 5" x 10  5"x10 5" x 10 5" x 10    Ther Activity                                       Gait Training                                       Modalities             CP/HP prn to R shoulder

## 2020-08-26 ENCOUNTER — APPOINTMENT (OUTPATIENT)
Dept: PHYSICAL THERAPY | Facility: CLINIC | Age: 50
End: 2020-08-26
Payer: COMMERCIAL

## 2020-08-31 ENCOUNTER — OFFICE VISIT (OUTPATIENT)
Dept: PHYSICAL THERAPY | Facility: CLINIC | Age: 50
End: 2020-08-31
Payer: COMMERCIAL

## 2020-08-31 DIAGNOSIS — G89.29 CHRONIC RIGHT SHOULDER PAIN: Primary | ICD-10-CM

## 2020-08-31 DIAGNOSIS — M25.511 CHRONIC RIGHT SHOULDER PAIN: Primary | ICD-10-CM

## 2020-08-31 PROCEDURE — 97110 THERAPEUTIC EXERCISES: CPT

## 2020-08-31 PROCEDURE — 97112 NEUROMUSCULAR REEDUCATION: CPT

## 2020-08-31 PROCEDURE — 97140 MANUAL THERAPY 1/> REGIONS: CPT

## 2020-08-31 NOTE — PROGRESS NOTES
Daily Note     Today's date: 2020  Patient name: Rodri Bacon  : 1970  MRN: [id number]  Referring provider: Alla Renner MD  Dx:   Encounter Diagnosis     ICD-10-CM    1  Chronic right shoulder pain  M25 511     G89 29                   Subjective: Patient states that she went for a run this morning and noticed an increased a pain in R shoulder after approximately 2 5 miles  Patient continues to limit her desired upper body strengthening exercises due to pain  Objective: See treatment diary below      Assessment: Tolerated treatment fair  Patient exhibited good technique with therapeutic exercises      Plan: Continue per plan of care  Precautions: Hx of R BC 2 years remission, Falls hx      Manuals      8 8/10 0812    PROM of R shoulder 5     5 10 10 5  5   Inf/Mob/Distraction 5- TS     5 NP TS 5 5         5  TS 5 5                Neuro Re-Ed             Postural ed             Scap Add 10" x 10     10" x 10 10" x 10  10x10" 10" x 10 10"x 10   CS retraction 2"  X 20      2" x 10 2" 2 x 10  2" 2x10 2" x 20  2" x 20   Tband Row + Ext BTB 2 x 10      GTB 2 x 10 GTB 2 x 10  GTB 2x10  BTB 2 x 10 Blue 2  X 10    Tband IR YTB 2 x 10      YTB 2 x 10 YTB 2 x 10 (IR only) YTB 2x10 IR  YTB 2 x 10 YTB 2 x 10   S/l ER to neutral only 2 x 10      2 x 10  nv 2 x 10 2  x10                Ther Ex             Scaption      2 x 10 NV nv 2 x 10 2 x 10 2#    Self Distraction  10" x 10      10" x  10"x 10  10x10: 10 x 10" 10" x 10   Prone ITY R Ext 1 x 10     Ext x 10 Ext x 10  Ext x10 Ext 1  x10 Ext 1 x 10   SA Punches  2 x 10      2 x 10 2 x 10  2x10  2  x10 2 x 10   Isometrics all plances      Flex abd 6" x 10 Flex only 6" x 10 (abd-p!) Flex 6" x10 nv    UE weight Shifts      3" x 20 np       Pulleys      3 min 3 min  3 min  3 min    Thoracic stretch with bolster      5" x 10 5" x 10  5"x10 5" x 10 5" x 10    Ther Activity                                       Gait Training Modalities             CP/HP prn to R shoulder

## 2020-09-14 ENCOUNTER — OFFICE VISIT (OUTPATIENT)
Dept: INTERNAL MEDICINE CLINIC | Facility: CLINIC | Age: 50
End: 2020-09-14
Payer: COMMERCIAL

## 2020-09-14 VITALS
BODY MASS INDEX: 20.3 KG/M2 | DIASTOLIC BLOOD PRESSURE: 70 MMHG | HEART RATE: 79 BPM | OXYGEN SATURATION: 97 % | RESPIRATION RATE: 16 BRPM | SYSTOLIC BLOOD PRESSURE: 108 MMHG | WEIGHT: 114.6 LBS | HEIGHT: 63 IN | TEMPERATURE: 98.6 F

## 2020-09-14 DIAGNOSIS — R20.0 NUMBNESS AND TINGLING OF LEFT LOWER EXTREMITY: ICD-10-CM

## 2020-09-14 DIAGNOSIS — Z13.6 SCREENING FOR CARDIOVASCULAR CONDITION: ICD-10-CM

## 2020-09-14 DIAGNOSIS — E55.9 VITAMIN D DEFICIENCY: ICD-10-CM

## 2020-09-14 DIAGNOSIS — R20.2 NUMBNESS AND TINGLING OF LEFT LOWER EXTREMITY: ICD-10-CM

## 2020-09-14 DIAGNOSIS — Z00.00 WELLNESS EXAMINATION: ICD-10-CM

## 2020-09-14 DIAGNOSIS — Z23 NEED FOR VACCINATION: Primary | ICD-10-CM

## 2020-09-14 PROCEDURE — 99213 OFFICE O/P EST LOW 20 MIN: CPT

## 2020-09-14 PROCEDURE — 90682 RIV4 VACC RECOMBINANT DNA IM: CPT

## 2020-09-14 PROCEDURE — 90471 IMMUNIZATION ADMIN: CPT

## 2020-09-14 PROCEDURE — 1036F TOBACCO NON-USER: CPT

## 2020-09-14 PROCEDURE — 99396 PREV VISIT EST AGE 40-64: CPT

## 2020-09-14 NOTE — PROGRESS NOTES
Assessment/Plan:    Wellness examination  Assessment and plan 1  Health maintenance annual wellness examination overall the patient is clinically stable and doing well, we encouraged the patient to follow a healthy and balanced diet  We recommend that the patient exercise routinely approximately 30 minutes 5 times per week   We have reviewed the patient's vaccines and have made recommendations for updates if necessary  annual flu vaccine      We will be ordering screening laboratories which are age appropriate  Return to the office in      call if any problems  Vitamin D deficiency  Will check a vitamin-D level    Numbness and tingling of left lower extremity  She has a small round area of numbness and tingling left lateral lower extremity lateral aspect she has decreased sensation in this area using a monofilament rule out a focal neuropathy I will check an EMG will consult neurology motor strength is intact DTRs are intact very mild deficit patient is concerned this might be MS; I have explained to patient likely not MS but I would like to do the full workup to ensure there are no problems RTO in 3 months call if any problems    Need for vaccination  Counseled, patient received the influenza vaccine         Problem List Items Addressed This Visit        Other    Wellness examination     Assessment and plan 1  Health maintenance annual wellness examination overall the patient is clinically stable and doing well, we encouraged the patient to follow a healthy and balanced diet  We recommend that the patient exercise routinely approximately 30 minutes 5 times per week   We have reviewed the patient's vaccines and have made recommendations for updates if necessary  annual flu vaccine      We will be ordering screening laboratories which are age appropriate  Return to the office in      call if any problems           Numbness and tingling of left lower extremity     She has a small round area of numbness and tingling left lateral lower extremity lateral aspect she has decreased sensation in this area using a monofilament rule out a focal neuropathy I will check an EMG will consult neurology motor strength is intact DTRs are intact very mild deficit patient is concerned this might be MS; I have explained to patient likely not MS but I would like to do the full workup to ensure there are no problems RTO in 3 months call if any problems         Relevant Orders    EMG 1 Limb    Ambulatory referral to Neurology    Vitamin D deficiency     Will check a vitamin-D level         Relevant Orders    Vitamin D 25 hydroxy    Screening for cardiovascular condition    Relevant Orders    Comprehensive metabolic panel    Lipid Panel with Direct LDL reflex    Need for vaccination - Primary     Counseled, patient received the influenza vaccine         Relevant Orders    influenza vaccine, quadrivalent, recombinant, PF, 0 5 mL, for patients 18 yr+ (FLUBLOK) (Completed)          RTO in 3 months call if any problems  Subjective:      Patient ID: Jordan Castillo is a 45 y o  female chief complaint left lower extremity numbness/tingling and vitamin-D deficiency; the patient is concerned about MS Reports me a patch of vibration of the left lower extremity on the lateral aspect approximately approximately 2-3 cm in size    Her symptoms are intermittent noticed since last month no triggering event,  Seconds up to 30 seconds  No band sensation , vibration,  No injury, fall , accident,  (running tripped on carb apple ) occular migraine 1 yrs ago and 2 last week with rest and laying down goes away, no lemerts sign ,  Forgetting stuff (normal for ago) no temp  No lower back pain         The following portions of the patient's history were reviewed and updated as appropriate: allergies, current medications, past family history, past medical history, past social history, past surgical history and problem list     Review of Systems   Constitutional: Negative for activity change, appetite change and unexpected weight change  HENT: Negative for congestion  Eyes: Negative for visual disturbance  Respiratory: Negative for cough and shortness of breath  Cardiovascular: Negative for chest pain  Gastrointestinal: Negative for abdominal pain, diarrhea, nausea and vomiting  Neurological: Positive for numbness  Negative for dizziness, weakness, light-headedness and headaches  Objective:    No follow-ups on file  No results found  No Known Allergies    Past Medical History:   Diagnosis Date    Breast cancer ([address] ) 05/2018    right breast    Colon polyp     Fibrocystic breast     History of radiation therapy 2018    right breast ca    Seasonal allergies     Vertigo      Past Surgical History:   Procedure Laterality Date    BREAST BIOPSY Right 05/2018    Positive    BREAST BIOPSY Left     Neg around age 28    BREAST BIOPSY Left 11/132019    benign    BREAST LUMPECTOMY Right 07/2018    with radiation    EMBOLECTOMY      s/p forceps delivery    MAMMO NEEDLE LOCALIZATION RIGHT (ALL INC) Right 7/10/2018    MAMMO STEREOTACTIC BREAST BIOPSY RIGHT (ALL INC) Right 5/29/2018    FL BIOPSY/EXCISION, LYMPH NODE(S) Right 7/10/2018    Procedure: SENTINEL LYMPH NODE BIOPSY; LYMPHATIC MAPPING WITH BLUE DYE RADIOACTIVE DYE (INJECT AT 0800 BY DR CHAUDHARY IN THE OR); Surgeon: Beatriz Guevara MD;  [facility];  Service: Surgical Oncology    FL PERQ DEVICE PLACEMT BREAST LOC [facility]ST [facility] 7/10/2018    Procedure: BREAST LUMPECTOMY; BREAST NEEDLE LOCALIZATION (NEEDLE LOC AT 0700);   Surgeon: Beatriz Guevara MD;  [facility];  Service: Surgical Oncology    US GUIDED BREAST BIOPSY LEFT COMPLETE Left 11/13/2019    US GUIDED BREAST BIOPSY RIGHT COMPLETE Right 5/29/2018     Current Outpatient Medications on File Prior to Visit   Medication Sig Dispense Refill    loratadine (CLARITIN) 5 MG chewable tablet Chew 5 mg daily      meloxicam (MOBIC) 15 mg tablet Take 1 tablet (15 mg total) by mouth daily 30 tablet 0    tamoxifen (NOLVADEX) 20 mg tablet Take 1 tablet (20 mg total) by mouth daily 90 tablet 1     No current facility-administered medications on file prior to visit        Family History   Problem Relation Age of Onset    Prostate cancer Father     Breast cancer Maternal Aunt 54    Ovarian cancer Maternal Aunt     Colon cancer Maternal Grandmother     Esophageal cancer Maternal Grandfather     No Known Problems Sister     No Known Problems Daughter     Skin cancer Paternal Aunt      Social History     Socioeconomic History    Marital status: /Civil Union     Spouse name: Not on file    Number of children: 2    Years of education: Not on file    Highest education level: Not on file   Occupational History    Not on file   Social Needs    Financial resource strain: Not on file    Food insecurity     Worry: Not on file     Inability: Not on file    Transportation needs     Medical: Not on file     Non-medical: Not on file   Tobacco Use    Smoking status: Never Smoker    Smokeless tobacco: Never Used    Tobacco comment: only smoked socially as teenager for 1 yr   Substance and Sexual Activity    Alcohol use: Yes     Comment: 2-3 glasses of wine on Fri and sat annetta    Drug use: No    Sexual activity: Yes     Partners: Male     Birth control/protection: Male Sterilization   Lifestyle    Physical activity     Days per week: Not on file     Minutes per session: Not on file    Stress: Not on file   Relationships    Social connections     Talks on phone: Not on file     Gets together: Not on file     Attends Anabaptism service: Not on file     Active member of club or organization: Not on file     Attends meetings of clubs or organizations: Not on file     Relationship status: Not on file    Intimate partner violence     Fear of current or ex partner: Not on file     Emotionally abused: Not on file     Physically abused: Not on file     Forced sexual activity: Not on file   Other Topics Concern    Not on file   Social History Narrative    Not on file     Vitals:    09/14/20 0946   BP: 108/70   Pulse: 79   Resp: 16   Temp: 98 6 °F (37 °C)   TempSrc: Temporal   SpO2: 97%   Weight: 52 kg (114 lb 9 6 oz)   Height: 5' 3" (1 6 m)     Results for orders placed or performed during the hospital encounter of 02/21/20   POCT pregnancy, urine   Result Value Ref Range    EXT Preg Test, Ur Negative Negative    Control Valid Valid   Tissue Exam   Result Value Ref Range    Case Report       Surgical Pathology Report                         Case: [id number]                                   Authorizing Provider:  Gonzalez Fair MD            Collected:           02/21/2020 0956              Ordering Location:     [facility]        Received:            02/21/2020 [address] [place] [address] Endoscopy                                                           Pathologist:           Finn Leiva MD                                                   Specimens:   A) - Polyp, Colorectal, cold forcep biopsy of previous ascending colon polypectomy                  site                                                                                                B) - Polyp, Colorectal, ascending colon polyp cold forceps                                          C) - Polyp, Colorectal, sigmoid polyp cold snare                                                    D) - Polyp, Colorectal, rectal polyp cold snare                                            Final Diagnosis       A  Polyp, Colorectal, biopsy of previous ascending colon polypectomy site:  - Polypoid colonic mucosa with no significant histopathologic abnormality   - Negative for dysplasia and malignancy       B  Polyp, Colorectal, ascending colon polyp:  - Polypoid colonic mucosa with lymphoid aggregate and no significant histopathologic abnormality   - Negative for dysplasia and malignancy  C  Polyp, Colorectal, sigmoid polyp:  - Hyperplastic polyp   - Negative for dysplasia and malignancy  D  Polyp, Colorectal, rectal polyp:  - Hyperplastic polyp   - Negative for dysplasia and malignancy  Interpretation performed at [facility], [address]      Additional Information       All controls performed with the immunohistochemical stains reported above reacted appropriately  These tests were developed and their performance characteristics determined by [address] [place] [address] or [facility]  They may not be cleared or approved by the [place]  Food and Drug Administration  The FDA has determined that such clearance or approval is not necessary  These tests are used for clinical purposes  They should not be regarded as investigational or for research  This laboratory has been approved by [place] [id number], designated as a high-complexity laboratory and is qualified to perform these tests  Gross Description       A  The specimen is received in formalin, labeled with the patient's name and hospital number, and is designated "biopsy of previous ascending colon polypectomy site  The specimen consists of multiple tan soft tissue fragments measuring in loose aggregate 1 0 x 0 3 x 0 2 cm  Entirely submitted  One screened cassette  B  The specimen is received in formalin, labeled with the patient's name and hospital number, and is designated "ascending colon polyp  The specimen consists of 1 tan soft tissue fragment measuring 0 3 cm in greatest dimension  Entirely submitted  One screened cassette  C  The specimen is received in formalin, labeled with the patient's name and hospital number, and is designated "sigmoid polyp  The specimen consists of multiple tan soft tissue fragments and yellow brown fragments consistent with fecal material measuring in loose aggregate 2 5 x 1 3 x 0 3 cm  Entirely submitted   One screened cassette  D  The specimen is received in formalin, labeled with the patient's name and hospital number, and is designated "rectal polyp  The specimen consists of multiple tan soft tissue fragments and yellow fragments consistent with fecal material measuring in loose aggregate 0 9 x 0 4 x 0 1 cm  Entirely submitted  One screened cassette  Note: The estimated total formalin fixation time based upon information provided by the submitting clinician and the standard processing schedule is under 72 hours  [email or web address]       Weight (last 2 days)     Date/Time   Weight    09/14/20 0946   52 (114 6)            Body mass index is 20 3 kg/m²  BP      Temp      Pulse     Resp      SpO2        Vitals:    09/14/20 0946   Weight: 52 kg (114 lb 9 6 oz)     Vitals:    09/14/20 0946   Weight: 52 kg (114 lb 9 6 oz)       /70   Pulse 79   Temp 98 6 °F (37 °C) (Temporal)   Resp 16   Ht 5' 3" (1 6 m)   Wt 52 kg (114 lb 9 6 oz)   SpO2 97%   BMI 20 30 kg/m²          Physical Exam  Constitutional:       Appearance: She is well-developed  HENT:      Head: Normocephalic  Eyes:      General: No scleral icterus  Right eye: No discharge  Left eye: No discharge  Conjunctiva/sclera: Conjunctivae normal       Pupils: Pupils are equal, round, and reactive to light  Neck:      Musculoskeletal: Neck supple  Cardiovascular:      Rate and Rhythm: Normal rate and regular rhythm  Heart sounds: Normal heart sounds  No murmur  No friction rub  No gallop  Pulmonary:      Effort: No respiratory distress  Breath sounds: Normal breath sounds  No wheezing or rales  Abdominal:      General: Bowel sounds are normal  There is no distension  Palpations: Abdomen is soft  There is no mass  Tenderness: There is no abdominal tenderness  There is no guarding or rebound  Musculoskeletal:         General: No deformity  Lymphadenopathy:      Cervical: No cervical adenopathy  Neurological:      Mental Status: She is alert  Cranial Nerves: No cranial nerve deficit  Sensory: Sensory deficit present  Motor: No weakness        Coordination: Coordination normal       Gait: Gait normal       Deep Tendon Reflexes: Reflexes normal        decreased sensation around oval region left lateral lower extremity via monofilament testing DTR 2/4 bilateral lower extremity, motor strength 5/5 bilateral lower extremity

## 2020-09-14 NOTE — ASSESSMENT & PLAN NOTE
Assessment and plan 1  Health maintenance annual wellness examination overall the patient is clinically stable and doing well, we encouraged the patient to follow a healthy and balanced diet  We recommend that the patient exercise routinely approximately 30 minutes 5 times per week   We have reviewed the patient's vaccines and have made recommendations for updates if necessary  annual flu vaccine      We will be ordering screening laboratories which are age appropriate  Return to the office in      call if any problems

## 2020-09-14 NOTE — PROGRESS NOTES
Assessment/Plan:    Wellness examination  Assessment and plan 1  Health maintenance annual wellness examination overall the patient is clinically stable and doing well, we encouraged the patient to follow a healthy and balanced diet  We recommend that the patient exercise routinely approximately 30 minutes 5 times per week   We have reviewed the patient's vaccines and have made recommendations for updates if necessary  annual flu vaccine      We will be ordering screening laboratories which are age appropriate  Return to the office in      call if any problems  Vitamin D deficiency  Will check a vitamin-D level    Numbness and tingling of left lower extremity  She has a small round area of numbness and tingling left lateral lower extremity lateral aspect she has decreased sensation in this area using a monofilament rule out a focal neuropathy I will check an EMG will consult neurology motor strength is intact DTRs are intact very mild deficit patient is concerned this might be MS; I have explained to patient likely not MS but I would like to do the full workup to ensure there are no problems RTO in 3 months call if any problems    Need for vaccination  Counseled, patient received the influenza vaccine         Problem List Items Addressed This Visit        Other    Wellness examination     Assessment and plan 1  Health maintenance annual wellness examination overall the patient is clinically stable and doing well, we encouraged the patient to follow a healthy and balanced diet  We recommend that the patient exercise routinely approximately 30 minutes 5 times per week   We have reviewed the patient's vaccines and have made recommendations for updates if necessary  annual flu vaccine      We will be ordering screening laboratories which are age appropriate  Return to the office in      call if any problems           Numbness and tingling of left lower extremity     She has a small round area of numbness and tingling left lateral lower extremity lateral aspect she has decreased sensation in this area using a monofilament rule out a focal neuropathy I will check an EMG will consult neurology motor strength is intact DTRs are intact very mild deficit patient is concerned this might be MS; I have explained to patient likely not MS but I would like to do the full workup to ensure there are no problems RTO in 3 months call if any problems         Relevant Orders    EMG 1 Limb    Ambulatory referral to Neurology    Vitamin D deficiency     Will check a vitamin-D level         Relevant Orders    Vitamin D 25 hydroxy    Screening for cardiovascular condition    Relevant Orders    Comprehensive metabolic panel    Lipid Panel with Direct LDL reflex    Need for vaccination - Primary     Counseled, patient received the influenza vaccine         Relevant Orders    influenza vaccine, quadrivalent, recombinant, PF, 0 5 mL, for patients 18 yr+ (FLUBLOK) (Completed)          Return to office  3 months  call if any problems  Subjective:      Patient ID: Marquis Ann is a 45 y o  female  HPI 45 old female coming in for her annual wellness examination she reports me that she is trying to follow healthy imbalance diet reports me routine exercise, she sees a dentist routinely and brushes/flosses her teeth  She reports me that she wears a seatbelt and drives a car safely and has a carbon monoxide monitor, fire extinguisher and smoke alarm in her household  She wears a sunscreen she is up-to-date on her mammogram and sees OBGYN for routine exam   She would like to receive her flu shot today    The following portions of the patient's history were reviewed and updated as appropriate: allergies, current medications, past family history, past medical history, past social history, past surgical history and problem list     Review of Systems   Constitutional: Negative for activity change, appetite change and unexpected weight change  HENT: Negative for congestion  Eyes: Negative for visual disturbance  Respiratory: Negative for cough and shortness of breath  Cardiovascular: Negative for chest pain  Gastrointestinal: Negative for abdominal pain, diarrhea, nausea and vomiting  Neurological: Negative for dizziness, light-headedness and headaches  Objective:    No follow-ups on file  No results found  No Known Allergies    Past Medical History:   Diagnosis Date    Breast cancer ([facility] [address] ) 05/2018    right breast    Colon polyp     Fibrocystic breast     History of radiation therapy 2018    right breast ca    Seasonal allergies     Vertigo      Past Surgical History:   Procedure Laterality Date    BREAST BIOPSY Right 05/2018    Positive    BREAST BIOPSY Left     Neg around age 28    BREAST BIOPSY Left 11/132019    benign    BREAST LUMPECTOMY Right 07/2018    with radiation    EMBOLECTOMY      s/p forceps delivery    MAMMO NEEDLE LOCALIZATION RIGHT (ALL INC) Right 7/10/2018    MAMMO STEREOTACTIC BREAST BIOPSY RIGHT (ALL INC) Right 5/29/2018    NH BIOPSY/EXCISION, LYMPH NODE(S) Right 7/10/2018    Procedure: SENTINEL LYMPH NODE BIOPSY; LYMPHATIC MAPPING WITH BLUE DYE RADIOACTIVE DYE (INJECT AT 0800 BY DR CHAUDHARY IN THE OR); Surgeon: Fernanda Read MD;  [facility];  Service: Surgical Oncology    NH PERQ DEVICE PLACEMT BREAST LOC 1ST LES W GUIDNCE Right 7/10/2018    Procedure: BREAST LUMPECTOMY; BREAST NEEDLE LOCALIZATION (NEEDLE LOC AT 0700);   Surgeon: Fernanda Read MD;  [facility];  Service: Surgical Oncology    US GUIDED BREAST BIOPSY LEFT COMPLETE Left 11/13/2019    US GUIDED BREAST BIOPSY RIGHT COMPLETE Right 5/29/2018     Current Outpatient Medications on File Prior to Visit   Medication Sig Dispense Refill    loratadine (CLARITIN) 5 MG chewable tablet Chew 5 mg daily      meloxicam (MOBIC) 15 mg tablet Take 1 tablet (15 mg total) by mouth daily 30 tablet 0    tamoxifen (NOLVADEX) 20 mg tablet Take 1 tablet (20 mg total) by mouth daily 90 tablet 1     No current facility-administered medications on file prior to visit        Family History   Problem Relation Age of Onset    Prostate cancer Father     Breast cancer Maternal Aunt 54    Ovarian cancer Maternal Aunt     Colon cancer Maternal Grandmother     Esophageal cancer Maternal Grandfather     No Known Problems Sister     No Known Problems Daughter     Skin cancer Paternal Aunt      Social History     Socioeconomic History    Marital status: /Civil Union     Spouse name: Not on file    Number of children: 2    Years of education: Not on file    Highest education level: Not on file   Occupational History    Not on file   Social Needs    Financial resource strain: Not on file    Food insecurity     Worry: Not on file     Inability: Not on file    Transportation needs     Medical: Not on file     Non-medical: Not on file   Tobacco Use    Smoking status: Never Smoker    Smokeless tobacco: Never Used    Tobacco comment: only smoked socially as teenager for 1 yr   Substance and Sexual Activity    Alcohol use: Yes     Comment: 2-3 glasses of wine on Fri and sat annetta    Drug use: No    Sexual activity: Yes     Partners: Male     Birth control/protection: Male Sterilization   Lifestyle    Physical activity     Days per week: Not on file     Minutes per session: Not on file    Stress: Not on file   Relationships    Social connections     Talks on phone: Not on file     Gets together: Not on file     Attends Worship service: Not on file     Active member of club or organization: Not on file     Attends meetings of clubs or organizations: Not on file     Relationship status: Not on file    Intimate partner violence     Fear of current or ex partner: Not on file     Emotionally abused: Not on file     Physically abused: Not on file     Forced sexual activity: Not on file   Other Topics Concern    Not on file   Social History Narrative    Not on file     Vitals:    09/14/20 0946   BP: 108/70   Pulse: 79   Resp: 16   Temp: 98 6 °F (37 °C)   TempSrc: Temporal   SpO2: 97%   Weight: 52 kg (114 lb 9 6 oz)   Height: 5' 3" (1 6 m)     Results for orders placed or performed during the hospital encounter of 02/21/20   POCT pregnancy, urine   Result Value Ref Range    EXT Preg Test, Ur Negative Negative    Control Valid Valid   Tissue Exam   Result Value Ref Range    Case Report       Surgical Pathology Report                         Case: [id number]                                   Authorizing Provider:  Tyrone Guerin MD            Collected:           02/21/2020 0956              Ordering Location:     [facility]        Received:            02/21/2020 [facility]                                                           Pathologist:           Cathy Sherwood MD                                                   Specimens:   A) - Polyp, Colorectal, cold forcep biopsy of previous ascending colon polypectomy                  site                                                                                                B) - Polyp, Colorectal, ascending colon polyp cold forceps                                          C) - Polyp, Colorectal, sigmoid polyp cold snare                                                    D) - Polyp, Colorectal, rectal polyp cold snare                                            Final Diagnosis       A  Polyp, Colorectal, biopsy of previous ascending colon polypectomy site:  - Polypoid colonic mucosa with no significant histopathologic abnormality   - Negative for dysplasia and malignancy  B  Polyp, Colorectal, ascending colon polyp:  - Polypoid colonic mucosa with lymphoid aggregate and no significant histopathologic abnormality   - Negative for dysplasia and malignancy       C  Polyp, Colorectal, sigmoid polyp:  - Hyperplastic polyp   - Negative for dysplasia and malignancy  D  Polyp, Colorectal, rectal polyp:  - Hyperplastic polyp   - Negative for dysplasia and malignancy  Interpretation performed at [facility], [address]      Additional Information       All controls performed with the immunohistochemical stains reported above reacted appropriately  These tests were developed and their performance characteristics determined by [facility] or [address] [place] [address]  They may not be cleared or approved by the [place]  Food and Drug Administration  The FDA has determined that such clearance or approval is not necessary  These tests are used for clinical purposes  They should not be regarded as investigational or for research  This laboratory has been approved by [facility], designated as a high-complexity laboratory and is qualified to perform these tests  Gross Description       A  The specimen is received in formalin, labeled with the patient's name and hospital number, and is designated "biopsy of previous ascending colon polypectomy site  The specimen consists of multiple tan soft tissue fragments measuring in loose aggregate 1 0 x 0 3 x 0 2 cm  Entirely submitted  One screened cassette  B  The specimen is received in formalin, labeled with the patient's name and hospital number, and is designated "ascending colon polyp  The specimen consists of 1 tan soft tissue fragment measuring 0 3 cm in greatest dimension  Entirely submitted  One screened cassette  C  The specimen is received in formalin, labeled with the patient's name and hospital number, and is designated "sigmoid polyp  The specimen consists of multiple tan soft tissue fragments and yellow brown fragments consistent with fecal material measuring in loose aggregate 2 5 x 1 3 x 0 3 cm  Entirely submitted  One screened cassette  D   The specimen is received in formalin, labeled with the patient's name and hospital number, and is designated "rectal polyp  The specimen consists of multiple tan soft tissue fragments and yellow fragments consistent with fecal material measuring in loose aggregate 0 9 x 0 4 x 0 1 cm  Entirely submitted  One screened cassette  Note: The estimated total formalin fixation time based upon information provided by the submitting clinician and the standard processing schedule is under 72 hours  Jonel       Weight (last 2 days)     Date/Time   Weight    09/14/20 0946   52 (114 6)            Body mass index is 20 3 kg/m²  BP      Temp      Pulse     Resp      SpO2        Vitals:    09/14/20 0946   Weight: 52 kg (114 lb 9 6 oz)     Vitals:    09/14/20 0946   Weight: 52 kg (114 lb 9 6 oz)       /70   Pulse 79   Temp 98 6 °F (37 °C) (Temporal)   Resp 16   Ht 5' 3" (1 6 m)   Wt 52 kg (114 lb 9 6 oz)   SpO2 97%   BMI 20 30 kg/m²          Physical Exam  Constitutional:       Appearance: She is well-developed  HENT:      Head: Normocephalic  Eyes:      General: No scleral icterus  Right eye: No discharge  Left eye: No discharge  Conjunctiva/sclera: Conjunctivae normal       Pupils: Pupils are equal, round, and reactive to light  Neck:      Musculoskeletal: Neck supple  Cardiovascular:      Rate and Rhythm: Normal rate and regular rhythm  Heart sounds: Normal heart sounds  No murmur  No friction rub  No gallop  Pulmonary:      Effort: No respiratory distress  Breath sounds: Normal breath sounds  No wheezing or rales  Abdominal:      General: Bowel sounds are normal  There is no distension  Palpations: Abdomen is soft  There is no mass  Tenderness: There is no abdominal tenderness  There is no guarding or rebound  Musculoskeletal:         General: No deformity  Lymphadenopathy:      Cervical: No cervical adenopathy  Neurological:      Mental Status: She is alert  Cranial Nerves: No cranial nerve deficit        Sensory: Sensory deficit present  Motor: No weakness        Coordination: Coordination normal       Gait: Gait normal       Deep Tendon Reflexes: Reflexes normal

## 2020-09-14 NOTE — ASSESSMENT & PLAN NOTE
She has a small round area of numbness and tingling left lateral lower extremity lateral aspect she has decreased sensation in this area using a monofilament rule out a focal neuropathy I will check an EMG will consult neurology motor strength is intact DTRs are intact very mild deficit patient is concerned this might be MS; I have explained to patient likely not MS but I would like to do the full workup to ensure there are no problems RTO in 3 months call if any problems

## 2020-09-16 ENCOUNTER — APPOINTMENT (OUTPATIENT)
Dept: LAB | Facility: CLINIC | Age: 50
End: 2020-09-16
Payer: COMMERCIAL

## 2020-09-16 DIAGNOSIS — E55.9 VITAMIN D DEFICIENCY: ICD-10-CM

## 2020-09-16 DIAGNOSIS — Z13.6 SCREENING FOR CARDIOVASCULAR CONDITION: ICD-10-CM

## 2020-09-16 LAB
25(OH)D3 SERPL-MCNC: 29.2 NG/ML (ref 30–100)
ALBUMIN SERPL BCP-MCNC: 4.2 G/DL (ref 3.5–5)
ALP SERPL-CCNC: 64 U/L (ref 46–116)
ALT SERPL W P-5'-P-CCNC: 22 U/L (ref 12–78)
ANION GAP SERPL CALCULATED.3IONS-SCNC: 11 MMOL/L (ref 4–13)
AST SERPL W P-5'-P-CCNC: 20 U/L (ref 5–45)
BILIRUB SERPL-MCNC: 0.39 MG/DL (ref 0.2–1)
BUN SERPL-MCNC: 14 MG/DL (ref 5–25)
CALCIUM SERPL-MCNC: 9.3 MG/DL (ref 8.3–10.1)
CHLORIDE SERPL-SCNC: 105 MMOL/L (ref 100–108)
CHOLEST SERPL-MCNC: 212 MG/DL (ref 50–200)
CO2 SERPL-SCNC: 26 MMOL/L (ref 21–32)
CREAT SERPL-MCNC: 0.88 MG/DL (ref 0.6–1.3)
GFR SERPL CREATININE-BSD FRML MDRD: 76 ML/MIN/1.73SQ M
GLUCOSE P FAST SERPL-MCNC: 94 MG/DL (ref 65–99)
HDLC SERPL-MCNC: 69 MG/DL
LDLC SERPL CALC-MCNC: 123 MG/DL (ref 0–100)
POTASSIUM SERPL-SCNC: 4.5 MMOL/L (ref 3.5–5.3)
PROT SERPL-MCNC: 7.9 G/DL (ref 6.4–8.2)
SODIUM SERPL-SCNC: 142 MMOL/L (ref 136–145)
TRIGL SERPL-MCNC: 102 MG/DL

## 2020-09-16 PROCEDURE — 82306 VITAMIN D 25 HYDROXY: CPT

## 2020-09-16 PROCEDURE — 36415 COLL VENOUS BLD VENIPUNCTURE: CPT

## 2020-09-16 PROCEDURE — 80061 LIPID PANEL: CPT

## 2020-09-16 PROCEDURE — 80053 COMPREHEN METABOLIC PANEL: CPT

## 2020-09-17 ENCOUNTER — TELEPHONE (OUTPATIENT)
Dept: INTERNAL MEDICINE CLINIC | Facility: CLINIC | Age: 50
End: 2020-09-17

## 2020-09-17 DIAGNOSIS — E78.5 HYPERLIPIDEMIA, UNSPECIFIED HYPERLIPIDEMIA TYPE: Primary | ICD-10-CM

## 2020-09-17 NOTE — TELEPHONE ENCOUNTER
Patient is calling back in regards to her cholesterol  Patient said she does not eat meat  Patient is asking if you can recommend a dietician        Please advise

## 2020-09-17 NOTE — TELEPHONE ENCOUNTER
Before patient see's a nutritionist do you think this may be a good idea? She stopped eating meat and has been exercising more and she does not understand how her cholesterol went up that high  No family history that she knows about  Please advise

## 2020-09-18 DIAGNOSIS — R79.89 LOW VITAMIN D LEVEL: Primary | ICD-10-CM

## 2020-09-21 NOTE — PROGRESS NOTES
PT Discharge    Today's date: 2020  Patient name: Michelle Simon  : 1970  MRN: [id number]  Referring provider: Luci Cardoza MD  Dx:   Encounter Diagnosis     ICD-10-CM    1  Chronic right shoulder pain  M25 511     G89 29        Start Time: 935  Stop Time: 1020  Total time in clinic (min): 45 minutes    Assessment/Plan Pt has not been present since 2020  Pt's chart will be DC in compliance of facility policy as all Charts are DC within 30 days of last scheduled visit          Subjective    Objective    Flowsheet Rows      Most Recent Value   PT/OT G-Codes   Current Score  87   Projected Score  73

## 2020-09-30 ENCOUNTER — CLINICAL SUPPORT (OUTPATIENT)
Dept: NUTRITION | Facility: HOSPITAL | Age: 50
End: 2020-09-30
Payer: COMMERCIAL

## 2020-09-30 DIAGNOSIS — E78.5 HYPERLIPIDEMIA, UNSPECIFIED HYPERLIPIDEMIA TYPE: ICD-10-CM

## 2020-09-30 PROCEDURE — 97802 MEDICAL NUTRITION INDIV IN: CPT | Performed by: DIETITIAN, REGISTERED

## 2020-09-30 NOTE — PROGRESS NOTES
Initial Nutrition Assessment Form    Patient Name: Navid Caldera    YOB: 1970    Sex: Female     Assessment Date: 9/30/2020  Start Time: 10:24 Stop Time: 11:00 Total Minutes: 39     Data:  Present at session: self   Parent/Patient Concerns: Elevated Cholesterol   Medical Dx/Reason for Referral: E78 5 Hyperlipidemia   Past Medical History:   Diagnosis Date    Breast cancer ([facility] [address] ) 05/2018    right breast    Colon polyp     Fibrocystic breast     History of radiation therapy 2018    right breast ca    Seasonal allergies     Vertigo        Current Outpatient Medications   Medication Sig Dispense Refill    loratadine (CLARITIN) 5 MG chewable tablet Chew 5 mg daily      meloxicam (MOBIC) 15 mg tablet Take 1 tablet (15 mg total) by mouth daily 30 tablet 0    tamoxifen (NOLVADEX) 20 mg tablet Take 1 tablet (20 mg total) by mouth daily 90 tablet 1     No current facility-administered medications for this visit  Additional Meds/Supplements:    Special Learning Needs:    Height: HC Readings from Last 3 Encounters:   No data found for [facility]       Weight: Wt Readings from Last 10 Encounters:   09/14/20 52 kg (114 lb 9 6 oz)   07/21/20 52 2 kg (115 lb)   05/19/20 52 2 kg (115 lb)   05/13/20 52 2 kg (115 lb)   04/30/20 52 4 kg (115 lb 8 oz)   03/10/20 52 6 kg (116 lb)   02/21/20 52 1 kg (114 lb 12 8 oz)   11/01/19 53 1 kg (117 lb)   10/30/19 53 1 kg (117 lb)   10/28/19 53 1 kg (117 lb)     Estimated body mass index is 20 3 kg/m² as calculated from the following:    Height as of 9/14/20: 5' 3" (1 6 m)  Weight as of 9/14/20: 52 kg (114 lb 9 6 oz)     Recent Weight Change: []Yes     [x]No  Amount:       Energy Needs: No calculation needed   No Known Allergies    Social History     Substance and Sexual Activity   Alcohol Use Yes    Comment: 2-3 glasses of wine on Fri and sat annetta       Social History     Tobacco Use   Smoking Status Never Smoker   Smokeless Tobacco Never Used   Tobacco Comment    only smoked socially as teenager for 1 yr       Who shops? patient and spouse   Who cooks? spouse   Exercise: Everyday either walking or running but routine has changed since pt can't actually go to the gym   Prior Counseling? []Yes     [x]No  When:      Why:         Diet Hx:  Breakfast:  a m  Coffee with non-dairy creamer and fiber supplement   Lunch:  p m  Cereal ([employer]) with almond milk         Dinner:  p m  Vegetable and starch    Will drink a cup of plain green tea and water throughout the day   Snacks: Small apple or 7 - 8 dark chocolate covered almonds        Nutrition Diagnosis:   Food and nutrition related knowledge deficit  related to Lack of prior exposure to accurate nutrition related information as evidenced by [employer] inaccurate or incomplete information       Medical Nutrition Therapy Intervention:  []Individualized Meal Plan []Understanding Lab Values   []Basic Pathophysiology of Disease []Food/Medication Interactions   []Food Diary []Exercise   []Lifestyle/Behavior Modification Techniques []Medication, Mechanism of Action   []Label Reading []Self Blood Glucose Monitoring   []Weight/BMI Goals []Other -    Other Notes: Pt states that her 's cholesterol was elevated so that they changed their eating habits and eliminated meat which caused his cholesterol to decrease but her's increased  Pt states that her  only uses extra virgin olive oil when he cooks and they don't eat after 8 pm at night  Discussed sources of cholesterol which pt has been keeping these to a minimum  Also discussed that body makes cholesterol which if her intake is dropped, perhaps production may have increased  Pt's exercise routine has also changed during pandemic which may be reason for increase in cholesterol  Discussed adding other forms of viscous fiber such as beans  Also discussed adding fatty fishes to diet such as salmon, mackerel or swordfish          Comprehension: []Excellent  [x]Very Good  []Good  []Fair   []Poor    Receptivity: []Excellent  [x]Very Good  []Good  []Fair   []Poor    Expected Compliance: []Excellent  [x]Very Good  []Good  []Fair   []Poor        Goals:  1  Add serving of fatty fish weekly   2  Increase to 5 - 10 grams of viscous fiber per day   3  No follow-ups on file    Labs:  CMP  Lab Results   Component Value Date    K 4 5 09/16/2020     09/16/2020    CO2 26 09/16/2020    BUN 14 09/16/2020    CREATININE 0 88 09/16/2020    GLUF 94 09/16/2020    CALCIUM 9 3 09/16/2020    AST 20 09/16/2020    ALT 22 09/16/2020    ALKPHOS 64 09/16/2020    EGFR 76 09/16/2020       BMP  Lab Results   Component Value Date    CALCIUM 9 3 09/16/2020    K 4 5 09/16/2020    CO2 26 09/16/2020     09/16/2020    BUN 14 09/16/2020    CREATININE 0 88 09/16/2020       Lipids  No results found for: CHOL  Lab Results   Component Value Date    HDL 69 09/16/2020    HDL 60 10/17/2019     Lab Results   Component Value Date    LDLCALC 123 (H) 09/16/2020    LDLCALC 97 10/17/2019     Lab Results   Component Value Date    TRIG 102 09/16/2020    TRIG 60 10/17/2019     No results found for: CHOLHDL    Hemoglobin A1C  No results found for: HGBA1C    Fasting Glucose  Lab Results   Component Value Date    GLUF 94 09/16/2020       Insulin     Thyroid  No results found for: TSH, W8OBIJS, S3ZBIBB, THYROIDAB    Hepatic Function Panel  Lab Results   Component Value Date    ALT 22 09/16/2020    AST 20 09/16/2020    ALKPHOS 64 09/16/2020       Celiac Disease Antibody Panel  No results found for: ENDOMYSIAL IGA, GLIADIN IGA, GLIADIN IGG, IGA, TISSUE TRANSGLUT AB, TTG IGA   Iron  No results found for: IRON, TIBC, FERRITIN    Vitamins  No results found for: VITAMIN B2   No results found for: NICOTINAMIDE, NICOTINIC ACID   No results found for: VITAMINB6  No results found for: QMCWVPDD12  No results found for: VITB5  No results found for: D6KTDJWF  No results found for: THYROGLB  No results found for: VITAMIN K   No results found for: 25-HYDROXY VIT D   No components found for: [id number] [facility], MS, RDN, LDN  [address] [address]

## 2020-10-01 ENCOUNTER — CLINICAL SUPPORT (OUTPATIENT)
Dept: RADIATION ONCOLOGY | Facility: HOSPITAL | Age: 50
End: 2020-10-01
Attending: RADIOLOGY

## 2020-10-01 ENCOUNTER — TELEMEDICINE (OUTPATIENT)
Dept: RADIATION ONCOLOGY | Facility: HOSPITAL | Age: 50
End: 2020-10-01
Attending: RADIOLOGY

## 2020-10-01 VITALS — HEIGHT: 63 IN | BODY MASS INDEX: 20.2 KG/M2 | WEIGHT: 114 LBS

## 2020-10-01 DIAGNOSIS — C50.211 MALIGNANT NEOPLASM OF UPPER-INNER QUADRANT OF RIGHT BREAST IN FEMALE, ESTROGEN RECEPTOR POSITIVE (HCC): Primary | ICD-10-CM

## 2020-10-01 DIAGNOSIS — Z17.0 MALIGNANT NEOPLASM OF UPPER-INNER QUADRANT OF RIGHT BREAST IN FEMALE, ESTROGEN RECEPTOR POSITIVE (HCC): Primary | ICD-10-CM

## 2020-11-02 ENCOUNTER — HOSPITAL ENCOUNTER (OUTPATIENT)
Dept: ULTRASOUND IMAGING | Facility: CLINIC | Age: 50
Discharge: HOME | End: 2020-11-02
Payer: COMMERCIAL

## 2020-11-02 VITALS — HEIGHT: 63 IN | BODY MASS INDEX: 20.2 KG/M2 | WEIGHT: 114 LBS

## 2020-11-02 DIAGNOSIS — Z12.39 ENCOUNTER FOR BREAST CANCER SCREENING OTHER THAN MAMMOGRAM: ICD-10-CM

## 2020-11-02 DIAGNOSIS — C50.211 MALIGNANT NEOPLASM OF UPPER-INNER QUADRANT OF RIGHT BREAST IN FEMALE, ESTROGEN RECEPTOR POSITIVE (HCC): ICD-10-CM

## 2020-11-02 DIAGNOSIS — R92.2 DENSE BREASTS: ICD-10-CM

## 2020-11-02 DIAGNOSIS — Z17.0 MALIGNANT NEOPLASM OF UPPER-INNER QUADRANT OF RIGHT BREAST IN FEMALE, ESTROGEN RECEPTOR POSITIVE (HCC): ICD-10-CM

## 2020-11-02 PROCEDURE — 76377 3D RENDER W/INTRP POSTPROCES: CPT

## 2020-11-02 PROCEDURE — 76641 ULTRASOUND BREAST COMPLETE: CPT

## 2020-11-03 ENCOUNTER — ANNUAL EXAM (OUTPATIENT)
Dept: OBGYN CLINIC | Facility: CLINIC | Age: 50
End: 2020-11-03

## 2020-11-03 VITALS
TEMPERATURE: 98.8 F | DIASTOLIC BLOOD PRESSURE: 72 MMHG | BODY MASS INDEX: 20.37 KG/M2 | WEIGHT: 115 LBS | SYSTOLIC BLOOD PRESSURE: 128 MMHG

## 2020-11-03 DIAGNOSIS — C50.211 MALIGNANT NEOPLASM OF UPPER-INNER QUADRANT OF RIGHT BREAST IN FEMALE, ESTROGEN RECEPTOR POSITIVE (HCC): ICD-10-CM

## 2020-11-03 DIAGNOSIS — R10.32 LEFT INGUINAL PAIN: ICD-10-CM

## 2020-11-03 DIAGNOSIS — Z01.419 ENCOUNTER FOR GYNECOLOGICAL EXAMINATION (GENERAL) (ROUTINE) WITHOUT ABNORMAL FINDINGS: Primary | ICD-10-CM

## 2020-11-03 DIAGNOSIS — Z17.0 MALIGNANT NEOPLASM OF UPPER-INNER QUADRANT OF RIGHT BREAST IN FEMALE, ESTROGEN RECEPTOR POSITIVE (HCC): ICD-10-CM

## 2020-11-03 PROBLEM — Z12.4 CERVICAL CANCER SCREENING: Status: RESOLVED | Noted: 2018-10-16 | Resolved: 2020-11-03

## 2020-11-03 PROBLEM — R92.2 DENSE BREASTS: Status: RESOLVED | Noted: 2019-04-24 | Resolved: 2020-11-03

## 2020-11-03 PROBLEM — R92.30 DENSE BREASTS: Status: RESOLVED | Noted: 2019-04-24 | Resolved: 2020-11-03

## 2020-11-03 PROCEDURE — S0612 ANNUAL GYNECOLOGICAL EXAMINA: HCPCS | Performed by: NURSE PRACTITIONER

## 2020-11-23 ENCOUNTER — TELEPHONE (OUTPATIENT)
Dept: SURGICAL ONCOLOGY | Facility: CLINIC | Age: 50
End: 2020-11-23

## 2020-11-25 ENCOUNTER — OFFICE VISIT (OUTPATIENT)
Dept: SURGICAL ONCOLOGY | Facility: CLINIC | Age: 50
End: 2020-11-25
Payer: COMMERCIAL

## 2020-11-25 VITALS
HEART RATE: 70 BPM | SYSTOLIC BLOOD PRESSURE: 98 MMHG | BODY MASS INDEX: 20.02 KG/M2 | HEIGHT: 63 IN | WEIGHT: 113 LBS | RESPIRATION RATE: 14 BRPM | TEMPERATURE: 97.8 F | DIASTOLIC BLOOD PRESSURE: 78 MMHG

## 2020-11-25 DIAGNOSIS — C50.211 MALIGNANT NEOPLASM OF UPPER-INNER QUADRANT OF RIGHT BREAST IN FEMALE, ESTROGEN RECEPTOR POSITIVE (HCC): Primary | ICD-10-CM

## 2020-11-25 DIAGNOSIS — Z79.810 USE OF TAMOXIFEN (NOLVADEX): ICD-10-CM

## 2020-11-25 DIAGNOSIS — Z17.0 MALIGNANT NEOPLASM OF UPPER-INNER QUADRANT OF RIGHT BREAST IN FEMALE, ESTROGEN RECEPTOR POSITIVE (HCC): Primary | ICD-10-CM

## 2020-11-25 PROCEDURE — 3008F BODY MASS INDEX DOCD: CPT | Performed by: NURSE PRACTITIONER

## 2020-11-25 PROCEDURE — 99214 OFFICE O/P EST MOD 30 MIN: CPT | Performed by: NURSE PRACTITIONER

## 2020-11-25 PROCEDURE — 1036F TOBACCO NON-USER: CPT | Performed by: NURSE PRACTITIONER

## 2020-11-27 ENCOUNTER — CLINICAL SUPPORT (OUTPATIENT)
Dept: INTERNAL MEDICINE CLINIC | Facility: CLINIC | Age: 50
End: 2020-11-27
Payer: COMMERCIAL

## 2020-11-27 DIAGNOSIS — Z23 NEED FOR VACCINATION: Primary | ICD-10-CM

## 2020-11-27 PROCEDURE — 90750 HZV VACC RECOMBINANT IM: CPT

## 2020-11-27 PROCEDURE — 90471 IMMUNIZATION ADMIN: CPT

## 2021-01-17 DIAGNOSIS — C50.211 MALIGNANT NEOPLASM OF UPPER-INNER QUADRANT OF RIGHT BREAST IN FEMALE, ESTROGEN RECEPTOR POSITIVE (HCC): ICD-10-CM

## 2021-01-17 DIAGNOSIS — Z17.0 MALIGNANT NEOPLASM OF UPPER-INNER QUADRANT OF RIGHT BREAST IN FEMALE, ESTROGEN RECEPTOR POSITIVE (HCC): ICD-10-CM

## 2021-01-17 RX ORDER — TAMOXIFEN CITRATE 20 MG/1
20 TABLET ORAL DAILY
Qty: 90 TABLET | Refills: 0 | Status: CANCELLED | OUTPATIENT
Start: 2021-01-17

## 2021-01-18 DIAGNOSIS — Z17.0 MALIGNANT NEOPLASM OF UPPER-INNER QUADRANT OF RIGHT BREAST IN FEMALE, ESTROGEN RECEPTOR POSITIVE (HCC): ICD-10-CM

## 2021-01-18 DIAGNOSIS — C50.211 MALIGNANT NEOPLASM OF UPPER-INNER QUADRANT OF RIGHT BREAST IN FEMALE, ESTROGEN RECEPTOR POSITIVE (HCC): ICD-10-CM

## 2021-01-18 RX ORDER — TAMOXIFEN CITRATE 20 MG/1
20 TABLET ORAL DAILY
Qty: 90 TABLET | Refills: 1 | Status: SHIPPED | OUTPATIENT
Start: 2021-01-18 | End: 2021-07-19 | Stop reason: SDUPTHER

## 2021-02-26 ENCOUNTER — CLINICAL SUPPORT (OUTPATIENT)
Dept: INTERNAL MEDICINE CLINIC | Facility: CLINIC | Age: 51
End: 2021-02-26
Payer: COMMERCIAL

## 2021-02-26 DIAGNOSIS — Z23 NEED FOR VACCINATION: Primary | ICD-10-CM

## 2021-02-26 PROCEDURE — 90750 HZV VACC RECOMBINANT IM: CPT

## 2021-02-26 PROCEDURE — 90471 IMMUNIZATION ADMIN: CPT

## 2021-03-10 DIAGNOSIS — Z23 ENCOUNTER FOR IMMUNIZATION: ICD-10-CM

## 2021-03-19 ENCOUNTER — IMMUNIZATIONS (OUTPATIENT)
Dept: FAMILY MEDICINE CLINIC | Facility: HOSPITAL | Age: 51
End: 2021-03-19

## 2021-03-19 DIAGNOSIS — Z23 ENCOUNTER FOR IMMUNIZATION: Primary | ICD-10-CM

## 2021-03-19 PROCEDURE — 0001A SARS-COV-2 / COVID-19 MRNA VACCINE (PFIZER-BIONTECH) 30 MCG: CPT

## 2021-03-19 PROCEDURE — 91300 SARS-COV-2 / COVID-19 MRNA VACCINE (PFIZER-BIONTECH) 30 MCG: CPT

## 2021-04-09 ENCOUNTER — IMMUNIZATIONS (OUTPATIENT)
Dept: FAMILY MEDICINE CLINIC | Facility: HOSPITAL | Age: 51
End: 2021-04-09

## 2021-04-09 DIAGNOSIS — Z23 ENCOUNTER FOR IMMUNIZATION: Primary | ICD-10-CM

## 2021-04-09 PROCEDURE — 0002A SARS-COV-2 / COVID-19 MRNA VACCINE (PFIZER-BIONTECH) 30 MCG: CPT

## 2021-04-09 PROCEDURE — 91300 SARS-COV-2 / COVID-19 MRNA VACCINE (PFIZER-BIONTECH) 30 MCG: CPT

## 2021-04-27 ENCOUNTER — OFFICE VISIT (OUTPATIENT)
Dept: INTERNAL MEDICINE CLINIC | Facility: CLINIC | Age: 51
End: 2021-04-27
Payer: COMMERCIAL

## 2021-04-27 VITALS
HEIGHT: 63 IN | DIASTOLIC BLOOD PRESSURE: 72 MMHG | WEIGHT: 119 LBS | OXYGEN SATURATION: 98 % | BODY MASS INDEX: 21.09 KG/M2 | HEART RATE: 79 BPM | SYSTOLIC BLOOD PRESSURE: 118 MMHG

## 2021-04-27 DIAGNOSIS — H93.8X1 CONGESTION OF RIGHT EAR: Primary | ICD-10-CM

## 2021-04-27 PROCEDURE — 1036F TOBACCO NON-USER: CPT | Performed by: NURSE PRACTITIONER

## 2021-04-27 PROCEDURE — 3008F BODY MASS INDEX DOCD: CPT | Performed by: NURSE PRACTITIONER

## 2021-04-27 PROCEDURE — 3725F SCREEN DEPRESSION PERFORMED: CPT | Performed by: NURSE PRACTITIONER

## 2021-04-27 PROCEDURE — 99213 OFFICE O/P EST LOW 20 MIN: CPT | Performed by: NURSE PRACTITIONER

## 2021-04-27 RX ORDER — PREDNISONE 20 MG/1
20 TABLET ORAL DAILY
Qty: 7 TABLET | Refills: 0 | Status: SHIPPED | OUTPATIENT
Start: 2021-04-27 | End: 2021-07-19

## 2021-04-27 NOTE — PROGRESS NOTES
Assessment/Plan:    Congestion of right ear  No sign of infection  Start flonase for ear congestion  If not improving start prednison       Diagnoses and all orders for this visit:    Congestion of right ear  -     predniSONE 20 mg tablet; Take 1 tablet (20 mg total) by mouth daily    Other orders  -     Multiple Vitamin (MULTIVITAMIN ADULT PO); Take 1 tablet by mouth          Subjective:      Patient ID: Francisca Fernández is a 45 y o  female  Earache   There is pain in the right ear  This is a new problem  The current episode started yesterday  The problem occurs every few hours  The problem has been unchanged  The pain is mild  She has tried nothing for the symptoms  The following portions of the patient's history were reviewed and updated as appropriate: allergies, current medications, past family history, past medical history, past social history, past surgical history and problem list     Review of Systems   Constitutional: Negative  HENT: Positive for ear pain  Eyes: Negative  Respiratory: Negative  Cardiovascular: Negative  Gastrointestinal: Negative  Musculoskeletal: Negative  Neurological: Negative  Objective:      /72   Pulse 79   Ht 5' 3" (1 6 m)   Wt 54 kg (119 lb)   SpO2 98%   BMI 21 08 kg/m²          Physical Exam  Vitals signs and nursing note reviewed  Constitutional:       Appearance: She is well-developed  HENT:      Head: Normocephalic and atraumatic  Right Ear: External ear normal       Left Ear: External ear normal       Ears:      Comments: Right ear congestion behind TM     Nose: Nose normal    Eyes:      Conjunctiva/sclera: Conjunctivae normal       Pupils: Pupils are equal, round, and reactive to light  Musculoskeletal: Normal range of motion  Skin:     General: Skin is warm and dry  Neurological:      Mental Status: She is alert and oriented to person, place, and time

## 2021-05-14 ENCOUNTER — HOSPITAL ENCOUNTER (OUTPATIENT)
Dept: MAMMOGRAPHY | Facility: CLINIC | Age: 51
Discharge: HOME | End: 2021-05-14
Payer: COMMERCIAL

## 2021-05-14 VITALS — BODY MASS INDEX: 21.09 KG/M2 | WEIGHT: 119 LBS | HEIGHT: 63 IN

## 2021-05-14 DIAGNOSIS — Z17.0 MALIGNANT NEOPLASM OF UPPER-INNER QUADRANT OF RIGHT BREAST IN FEMALE, ESTROGEN RECEPTOR POSITIVE (HCC): ICD-10-CM

## 2021-05-14 DIAGNOSIS — C50.211 MALIGNANT NEOPLASM OF UPPER-INNER QUADRANT OF RIGHT BREAST IN FEMALE, ESTROGEN RECEPTOR POSITIVE (HCC): ICD-10-CM

## 2021-05-14 PROCEDURE — 77066 DX MAMMO INCL CAD BI: CPT

## 2021-05-14 PROCEDURE — G0279 TOMOSYNTHESIS, MAMMO: HCPCS

## 2021-05-20 ENCOUNTER — OFFICE VISIT (OUTPATIENT)
Dept: SURGICAL ONCOLOGY | Facility: CLINIC | Age: 51
End: 2021-05-20
Payer: COMMERCIAL

## 2021-05-20 VITALS
SYSTOLIC BLOOD PRESSURE: 100 MMHG | WEIGHT: 119 LBS | DIASTOLIC BLOOD PRESSURE: 60 MMHG | BODY MASS INDEX: 21.09 KG/M2 | TEMPERATURE: 97.8 F | HEIGHT: 63 IN | HEART RATE: 65 BPM | OXYGEN SATURATION: 98 % | RESPIRATION RATE: 16 BRPM

## 2021-05-20 DIAGNOSIS — Z17.0 MALIGNANT NEOPLASM OF UPPER-INNER QUADRANT OF RIGHT BREAST IN FEMALE, ESTROGEN RECEPTOR POSITIVE (HCC): Primary | ICD-10-CM

## 2021-05-20 DIAGNOSIS — Z12.39 ENCOUNTER FOR BREAST CANCER SCREENING OTHER THAN MAMMOGRAM: ICD-10-CM

## 2021-05-20 DIAGNOSIS — C50.211 MALIGNANT NEOPLASM OF UPPER-INNER QUADRANT OF RIGHT BREAST IN FEMALE, ESTROGEN RECEPTOR POSITIVE (HCC): Primary | ICD-10-CM

## 2021-05-20 DIAGNOSIS — Z79.810 USE OF TAMOXIFEN (NOLVADEX): ICD-10-CM

## 2021-05-20 DIAGNOSIS — R92.2 DENSE BREASTS: ICD-10-CM

## 2021-05-20 PROCEDURE — 3008F BODY MASS INDEX DOCD: CPT | Performed by: NURSE PRACTITIONER

## 2021-05-20 PROCEDURE — 99214 OFFICE O/P EST MOD 30 MIN: CPT | Performed by: NURSE PRACTITIONER

## 2021-05-20 PROCEDURE — 1036F TOBACCO NON-USER: CPT | Performed by: NURSE PRACTITIONER

## 2021-05-20 NOTE — PROGRESS NOTES
Surgical Oncology Follow Up       [address] [facility]  [facility] [place]  [address]  [place] [address]    Suresh Nickel  1970  [id number]  [address] [facility] [facility]  [facility] [place]  [address] [address]    Chief Complaint   Patient presents with    Breast Cancer     Pt is here for 6 month f/u        Assessment/Plan:  1  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 breast in female, estrogen receptor positive ([address] )  - 6 mo f/u visit    2  Use of tamoxifen (Nolvadex)  - Continue use per medical oncology    3  Encounter for breast cancer screening other than mammogram  - US breast screening bilateral complete (ABUS); Future    4  Dense breasts  - US breast screening bilateral complete (ABUS); Future      Discussion/Summary: Patient is a 79-year-old female who presents today for a six-month follow-up visit for right breast cancer diagnosed in May 2018  Her pathology revealed invasive ductal carcinoma, ER 95%, MT 90%, her 2-   She underwent a right lumpectomy and sentinel node biopsy by Dr Vilma Ritter Pray genetic testing was negative   She completed whole breast radiation therapy and is currently taking tamoxifen   She had a bilateral 3D diagnostic mammogram on May 14, 2021 which was BI-RADS 2, category 4 density   Her only complaints today are symptoms related to perimenopause  She complains intermittent ovulation pain, hot flashes, weight gain  I encouraged her to follow up with her gynecologist as well as her medical oncologist   There are no concerns on today's exam   I will order an automated breast ultrasound to be performed in November and I will plan to see her back in 6 months or sooner should the need arise  She was instructed to call with any new concerns or symptoms prior to that time  All of her questions were answered today      History of Present Illness:     Oncology History   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 breast in female, estrogen receptor positive ([facility] [address] )   5/29/2018 Biopsy    Right breast biopsy  3 o'clock 6 CMFN  Invasive ductal carcinoma  Grade 1  8 mm on ultrasound  No axillary adenopathy on ultrasound  ER 95  SD 90  Her 2 0  Stage IA     6/7/2018 Genetic Testing    BRCA testing  Negative     7/10/2018 Surgery    Right breast lumpectomy with sentinel lymph node biopsy  Invasive ductal carcinoma  9 mm  Grade 1  0/1 lymph nodes  Margins negative  ER 95  SD 90  Her 2 0  Stage IA     7/2018 -  Hormone Therapy      Adjuvant hormonal therapy with tamoxifen      8/13/2018 - 9/11/2018 Radiation    Course: C1    Plan ID Energy Fractions Dose per Fraction (cGy) Dose Correction (cGy) Total Dose Delivered (cGy) Elapsed Days   R Breast e 9E 5 / 5 200 0 1,000 6   Rt Breast 6X 16 / 16 266 0 4,256 22      Treatment dates:  C1: 8/13/2018 - 9/11/2018            -Interval History:  Patient presents today for follow-up visit for right breast cancer diagnosed in 2018  She notices no changes on her self breast exam   She continues on tamoxifen  She had a bilateral mammogram which was BI-RADS 2, category 4 density  She reports perimenopausal symptoms suggest hot flashes, weight gain and irregular menstrual cycles  She also reports some fatigue secondary to interrupted sleep from the hot flashes  Review of Systems:  Review of Systems   Constitutional: Positive for fatigue  Negative for activity change, appetite change, chills, fever and unexpected weight change  Respiratory: Negative for cough and shortness of breath  Cardiovascular: Negative for chest pain  Gastrointestinal: Negative for abdominal pain, constipation, diarrhea, nausea and vomiting  Endocrine: Positive for heat intolerance (hot flashes)  Genitourinary: Positive for menstrual problem (perimenopausal symptoms) and pelvic pain (intermittent ovulation pains)     Musculoskeletal: Negative for arthralgias, back pain, gait problem and myalgias  Skin: Negative for color change and rash  Neurological: Negative for dizziness and headaches  Hematological: Negative for adenopathy  Psychiatric/Behavioral: Negative for agitation and confusion  All other systems reviewed and are negative  Patient Active Problem List   Diagnosis    Benign paroxysmal vertigo    Seasonal allergies    Rash    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 breast in female, estrogen receptor positive ([facility] )    Screening for human papillomavirus (HPV)    Encounter for gynecological examination (general) (routine) without abnormal findings    Use of tamoxifen (Nolvadex)    Wellness examination    Rotator cuff syndrome of right shoulder    Numbness and tingling of left lower extremity    Vitamin D deficiency    Screening for cardiovascular condition    Need for vaccination    Left inguinal pain    Congestion of right ear     Past Medical History:   Diagnosis Date    BRCA1 negative     BRCA2 negative     [facility] cancer [facility] ) 05/2018    right breast    Colon polyp     Fibrocystic breast     History of radiation therapy 2018    right breast ca    Seasonal allergies     Vertigo      Past Surgical History:   Procedure Laterality Date    BREAST BIOPSY Right 05/2018    Positive    BREAST BIOPSY Left     Neg around age 28    BREAST BIOPSY Left [id number]    benign    BREAST LUMPECTOMY Right 07/2018    with radiation    EMBOLECTOMY      s/p forceps delivery    MAMMO NEEDLE LOCALIZATION RIGHT (ALL INC) Right 7/10/2018    MAMMO STEREOTACTIC BREAST BIOPSY RIGHT (ALL INC) Right 5/29/2018    NV BIOPSY/EXCISION, LYMPH NODE(S) Right 7/10/2018    Procedure: SENTINEL LYMPH NODE BIOPSY; LYMPHATIC MAPPING WITH BLUE DYE RADIOACTIVE DYE (INJECT AT 0800 BY DR CHAUDHARY IN THE OR);   Surgeon: Nereyda Matos MD;  [facility];  Service: Surgical Oncology    NV PER[facility]/10/2018    Procedure: BREAST LUMPECTOMY; BREAST NEEDLE LOCALIZATION (NEEDLE LOC AT 0700);   Surgeon: Fernanda Read MD;  [facility];  Service: Surgical Oncology    US GUIDED BREAST BIOPSY LEFT COMPLETE Left 11/13/2019    US GUIDED BREAST BIOPSY RIGHT COMPLETE Right 5/29/2018     Family History   Problem Relation Age of Onset    Prostate cancer Father     Breast cancer Maternal Aunt 54    Ovarian cancer Maternal Aunt     Colon cancer Maternal Grandmother     Esophageal cancer Maternal Grandfather     No Known Problems Sister     No Known Problems Daughter     Skin cancer Paternal Aunt      Social History     Socioeconomic History    Marital status: /Civil Union     Spouse name: Not on file    Number of children: 2    Years of education: Not on file    Highest education level: Not on file   Occupational History    Not on file   Social Needs    Financial resource strain: Not on file    Food insecurity     Worry: Not on file     Inability: Not on file    Transportation needs     Medical: Not on file     Non-medical: Not on file   Tobacco Use    Smoking status: Never Smoker    Smokeless tobacco: Never Used    Tobacco comment: only smoked socially as teenager for 1 yr   Substance and Sexual Activity    Alcohol use: Yes     Frequency: 2-4 times a month     Drinks per session: 1 or 2     Binge frequency: Never     Comment: 2-3 glasses of wine on Fri and sat annetta    Drug use: No    Sexual activity: Yes     Partners: Male     Birth control/protection: Male Sterilization   Lifestyle    Physical activity     Days per week: Not on file     Minutes per session: Not on file    Stress: Not on file   Relationships    Social connections     Talks on phone: Not on file     Gets together: Not on file     Attends Rastafari service: Not on file     Active member of club or organization: Not on file     Attends meetings of clubs or organizations: Not on file     Relationship status: Not on file    Intimate partner violence     Fear of current or ex partner: Not on file     Emotionally abused: Not on file     Physically abused: Not on file     Forced sexual activity: Not on file   Other Topics Concern    Not on file   Social History Narrative    Not on file       Current Outpatient Medications:     loratadine (CLARITIN) 5 MG chewable tablet, Chew 5 mg daily, Disp: , Rfl:     meloxicam (MOBIC) 15 mg tablet, Take 1 tablet (15 mg total) by mouth daily, Disp: 30 tablet, Rfl: 0    Multiple Vitamin (MULTIVITAMIN ADULT PO), Take 1 tablet by mouth, Disp: , Rfl:     tamoxifen (NOLVADEX) 20 mg tablet, Take 1 tablet (20 mg total) by mouth daily, Disp: 90 tablet, Rfl: 1    predniSONE 20 mg tablet, Take 1 tablet (20 mg total) by mouth daily, Disp: 7 tablet, Rfl: 0  No Known Allergies  Vitals:    05/20/21 0815   BP: 100/60   Pulse: 65   Resp: 16   Temp: 97 8 °F (36 6 °C)   SpO2: 98%       Physical Exam  Vitals signs reviewed  Constitutional:       General: She is not in acute distress  Appearance: Normal appearance  She is well-developed  She is not diaphoretic  HENT:      Head: Normocephalic and atraumatic  Neck:      Musculoskeletal: Normal range of motion  Cardiovascular:      Rate and Rhythm: Normal rate and regular rhythm  Heart sounds: Normal heart sounds  Pulmonary:      Effort: Pulmonary effort is normal       Breath sounds: Normal breath sounds  Chest:      Breasts:         Right: Skin change (Breast and axillary surgical scars) present  No swelling, bleeding, inverted nipple, mass, nipple discharge or tenderness  Left: No swelling, bleeding, inverted nipple, mass, nipple discharge, skin change or tenderness  Abdominal:      Palpations: Abdomen is soft  There is no mass  Tenderness: There is no abdominal tenderness  Musculoskeletal: Normal range of motion  Lymphadenopathy:      Upper Body:      Right upper body: No supraclavicular or axillary adenopathy        Left upper body: No supraclavicular or axillary adenopathy  Skin:     General: Skin is warm and dry  Findings: No erythema or rash  Neurological:      Mental Status: She is alert and oriented to person, place, and time  Psychiatric:         Speech: Speech normal            Results:    Imaging  Mammo Diagnostic Bilateral W 3d & Cad    Result Date: 5/14/2021  Narrative: DIAGNOSIS: 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 breast in female, estrogen receptor positive ([facility] [address] ) TECHNIQUE: Digital screening mammography was performed  Computer Aided Detection (CAD) analyzed all applicable images  COMPARISONS: Prior breast imaging dated: 05/13/2020, 04/18/2019, 05/10/2018, and 06/21/2017 RELEVANT HISTORY: Family Breast Cancer History: History of breast cancer in Maternal Aunt  Family Medical History: Family medical history includes breast cancer in maternal aunt, colon cancer in maternal grandmother, and ovarian cancer in maternal aunt  Personal History: Hormone history includes tamoxifen and birth control  Surgical history includes breast biopsy and lumpectomy  Medical history includes fibrocystic breast, breast cancer, BRCA 1 negative, and BRCA 2 negative  RISK ASSESSMENT: Tyrer-zick risk assessment reporting was suppressed due to the patient's history and/or demographic factors  TISSUE DENSITY: The breasts are extremely dense, which lowers the sensitivity of mammography  INDICATION: Rodri Bacon is a 45 y o  female presenting for annual mammography  History of right breast conservation therapy  FINDINGS: Right breast postsurgical scarring distortion are unchanged in appearance  Scattered bilateral calcifications are unchanged  There are no suspicious masses, grouped microcalcifications or unexplained areas of architectural distortion  The skin and nipple areolar complex are unremarkable  Impression:  Benign findings as above   ASSESSMENT/BI-RADS CATEGORY: Left: 2 - Benign Right: 2 - Benign Overall: 2 - Benign RECOMMENDATION:      - Diagnostic mammogram in 1 year for both breasts  Workstation ID: [id number]      I reviewed the above imaging data  Advance Care Planning/Advance Directives:  Discussed disease status, cancer treatment plans and/or cancer treatment goals with the patient

## 2021-06-14 ENCOUNTER — TELEPHONE (OUTPATIENT)
Dept: OBGYN CLINIC | Facility: CLINIC | Age: 51
End: 2021-06-14

## 2021-06-14 DIAGNOSIS — N92.1 MENORRHAGIA WITH IRREGULAR CYCLE: Primary | ICD-10-CM

## 2021-06-14 NOTE — TELEPHONE ENCOUNTER
Called pt- advised pt , per DR Fabio Richards to have a EMB & pelvic US  Pt is in agreement  Will look at schedule & call back for an appt  Pelvic US scheduled in [facility]  C/S number given to pt to call for an appt

## 2021-06-14 NOTE — TELEPHONE ENCOUNTER
Pt is having a heavy period- lmp was 6/12- she is going through a tampon in an hour  This is the first time she has had a period like this  She has been skipping some months this year  Is wondering if she is going through menopause  She is also on Tamoxifen

## 2021-06-14 NOTE — TELEPHONE ENCOUNTER
Pt is in perimenopause and having irreg menses  Last period was only 2 days (on Mar  2)  Prior to that was 4 mos ago  She got her menses Sat the 12th  Got very heavy yesterday ( 1 tampon /hr )   I recommended ibuprofen q 4 hrs  Am sending this to you because she is on Tamoxifen also  Does that make any difference?  Thx

## 2021-06-28 ENCOUNTER — HOSPITAL ENCOUNTER (OUTPATIENT)
Dept: ULTRASOUND IMAGING | Facility: HOSPITAL | Age: 51
Discharge: HOME | End: 2021-06-28
Attending: OBSTETRICS & GYNECOLOGY
Payer: COMMERCIAL

## 2021-06-28 DIAGNOSIS — N92.1 MENORRHAGIA WITH IRREGULAR CYCLE: ICD-10-CM

## 2021-06-28 PROCEDURE — 76856 US EXAM PELVIC COMPLETE: CPT

## 2021-06-28 PROCEDURE — 76830 TRANSVAGINAL US NON-OB: CPT

## 2021-07-09 ENCOUNTER — TELEPHONE (OUTPATIENT)
Dept: OBGYN CLINIC | Facility: CLINIC | Age: 51
End: 2021-07-09

## 2021-07-09 NOTE — TELEPHONE ENCOUNTER
----- Message from Mckenna Givens MD sent at 7/8/2021  4:36 PM EDT -----    Ultrasound reviewed, overall without concerns, 1 fibroid in her uterus  She should be scheduled for an appointment for an endometrial biopsy due to her abnormal bleeding and tamoxifen use

## 2021-07-19 ENCOUNTER — OFFICE VISIT (OUTPATIENT)
Dept: OBGYN CLINIC | Facility: CLINIC | Age: 51
End: 2021-07-19
Payer: COMMERCIAL

## 2021-07-19 VITALS
BODY MASS INDEX: 21.33 KG/M2 | HEIGHT: 63 IN | SYSTOLIC BLOOD PRESSURE: 102 MMHG | DIASTOLIC BLOOD PRESSURE: 60 MMHG | WEIGHT: 120.4 LBS

## 2021-07-19 DIAGNOSIS — Z17.0 MALIGNANT NEOPLASM OF UPPER-INNER QUADRANT OF RIGHT BREAST IN FEMALE, ESTROGEN RECEPTOR POSITIVE (HCC): Primary | ICD-10-CM

## 2021-07-19 DIAGNOSIS — N93.9 ABNORMAL UTERINE BLEEDING: Primary | ICD-10-CM

## 2021-07-19 DIAGNOSIS — C50.211 MALIGNANT NEOPLASM OF UPPER-INNER QUADRANT OF RIGHT BREAST IN FEMALE, ESTROGEN RECEPTOR POSITIVE (HCC): Primary | ICD-10-CM

## 2021-07-19 DIAGNOSIS — Z79.810 USE OF TAMOXIFEN (NOLVADEX): ICD-10-CM

## 2021-07-19 PROCEDURE — 88305 TISSUE EXAM BY PATHOLOGIST: CPT | Performed by: PATHOLOGY

## 2021-07-19 PROCEDURE — 58100 BIOPSY OF UTERUS LINING: CPT | Performed by: OBSTETRICS & GYNECOLOGY

## 2021-07-19 RX ORDER — TAMOXIFEN CITRATE 20 MG/1
20 TABLET ORAL DAILY
Qty: 90 TABLET | Refills: 1 | Status: SHIPPED | OUTPATIENT
Start: 2021-07-19 | End: 2022-01-13 | Stop reason: SDUPTHER

## 2021-07-19 NOTE — PROGRESS NOTES
Endometrial biopsy    Date/Time: 7/19/2021 3:44 PM  Performed by: Duane Meckel, MD  Authorized by: Duane Meckel, MD   Universal Protocol:  Risks and benefits: risks, benefits and alternatives were discussed  Consent given by: patient  Patient understanding: patient states understanding of the procedure being performed  Patient consent: the patient's understanding of the procedure matches consent given  Patient identity confirmed: verbally with patient      Indication:     Indications comment:  Heavy Bleeding, Tamoxifen Use  Procedure:     Procedure: endometrial biopsy with Pipelle      A bivalve speculum was placed in the vagina: yes      Cervix cleaned and prepped: yes      Uterus sounded: yes      Uterus sound depth (cm):  9    Specimen collected: specimen collected and sent to pathology      Patient tolerated procedure well with no complications: yes    Findings:     Uterus size:  Non-gravid    Cervix: normal      Cervix comment:  Large, multiparous  Comments:     Procedure comments:  EMB performed today due to irregular heavy menses  Denise is jaja-menopausal and has been getting menses ~ every 3 months  They had been fairly normal until most recently in which she had an excessively heavy day  She had a pelvic US which revealed a small fibroid  EMB was advised due to tamoxifen use, due to rare association with endometrial hyperplasia

## 2021-07-20 ENCOUNTER — OFFICE VISIT (OUTPATIENT)
Dept: HEMATOLOGY ONCOLOGY | Facility: CLINIC | Age: 51
End: 2021-07-20
Payer: COMMERCIAL

## 2021-07-20 VITALS
HEART RATE: 68 BPM | DIASTOLIC BLOOD PRESSURE: 62 MMHG | TEMPERATURE: 98.2 F | OXYGEN SATURATION: 99 % | BODY MASS INDEX: 21.35 KG/M2 | RESPIRATION RATE: 18 BRPM | SYSTOLIC BLOOD PRESSURE: 110 MMHG | HEIGHT: 63 IN | WEIGHT: 120.5 LBS

## 2021-07-20 DIAGNOSIS — Z17.0 MALIGNANT NEOPLASM OF UPPER-INNER QUADRANT OF RIGHT BREAST IN FEMALE, ESTROGEN RECEPTOR POSITIVE (HCC): Primary | ICD-10-CM

## 2021-07-20 DIAGNOSIS — C50.211 MALIGNANT NEOPLASM OF UPPER-INNER QUADRANT OF RIGHT BREAST IN FEMALE, ESTROGEN RECEPTOR POSITIVE (HCC): Primary | ICD-10-CM

## 2021-07-20 PROCEDURE — 1036F TOBACCO NON-USER: CPT | Performed by: INTERNAL MEDICINE

## 2021-07-20 PROCEDURE — 3008F BODY MASS INDEX DOCD: CPT | Performed by: INTERNAL MEDICINE

## 2021-07-20 PROCEDURE — 99214 OFFICE O/P EST MOD 30 MIN: CPT | Performed by: INTERNAL MEDICINE

## 2021-07-20 NOTE — PROGRESS NOTES
Hematology / Oncology Outpatient Follow Up Note    Simon Arreola 45 y o  female :1970 YV         Date:  2021    Assessment / Plan:  A 62-year-old premenopausal woman with stage IA right breast cancer, grade 1, ER 95% positive, MN 90% positive, HER2 negative disease   She underwent lumpectomy with sentinel lymph node biopsy, resulting in LESLI    She is currently on adjuvant hormonal therapy with tamoxifen with excellent tolerance  She has no evidence recurrent disease, based on her symptoms and physical examinations  I recommended her to continue with tamoxifen 20 mg daily  She is in agreement with my recommendation  I will see her again in a year for routine follow-up         Subjective:      HPI:  A 62-year-old premenopausal woman who was found to have abnormality in her right breast, based on a screening mammography  Ella Larry, she underwent stereotactic right breast biopsy in May 29, 2018 which showed invasive ductal carcinoma, grade 1   Subsequently, she had genetic testing which was negative for BRCA gene mutation   She elected to have lumpectomy which she did in July 10, 2018 by Dr Lemuel Vazquez had 9 x 6 mm of invasive ductal carcinoma, grade 1  [place] Sanju[place]rian is no evidence of lymphovascular invasion   One sentinel lymph nodes was negative for metastatic disease   This was ER 95% positive, MN 90% positive, HER2 negative disease   She presents today to discuss adjuvant treatment options   She feels well   She has no complaint of pain   She has regular menstrual cycle   She has no respiratory symptoms   She has no other past medical history   She does not take any medications   Her performance status is normal            Interval History:  A 62-year-old premenopausal woman with stage IA right breast cancer, grade 1, ER 95% positive, MN 90% positive, HER2 negative disease   She underwent lumpectomy with sentinel lymph node biopsy, resulting in LESLI    She started adjuvant hormonal therapy with tamoxifen July 2018  She presents today for routine follow-up  She has slowly progressive hot flashes and night sweats  She has much less menstrual cycle than before  Most recently, she has menstrual bleeding every 3 months which is heavy bleeding  She denied any pain  She has no respiratory symptoms  She has no swelling in the lower extremities  Her performance status is normal   She underwent endometrial biopsy yesterday , due to the heavy bleeding       Objective:      Primary Diagnosis:     Right breast cancer, stage IA (pT1b, pN0, M0) grade 1, ER 95% positive, FL 90% positive, HER2 negative disease   Diagnosed in July 2018      Cancer Staging:  Cancer Staging  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 breast in female, estrogen receptor positive ([facility] [address] )  Staging form: Breast, AJCC 8th Edition  - Clinical: Stage IA (cT1b, cN0(sn), cM0, G1, ER: Positive, FL: Positive, HER2: Negative) - Unsigned           Previous Hematologic/ Oncologic Treatment:            Current Hematologic/ Oncologic Treatment:       Adjuvant hormonal therapy with tamoxifen since July 2018      Disease Status:      LESLI status post lumpectomy with sentinel lymph node biopsy      Test Results:     Pathology:     9 x 6 mm of invasive ductal carcinoma, grade 1   No evidence of lymphovascular invasion   One sentinel lymph nodes were negative for metastatic disease   ER 95% positive, FL 90% positive, HER2 negative disease   Stage IA (pT1b, pN0, M0)     Radiology:     Mammography in May 2020 was benign   BI-RADS 2       Laboratory:     See below      Physical Exam:        General Appearance:    Alert, oriented          Eyes:    PERRL   Ears:    Normal external ear canals, both ears   Nose:   Nares normal, septum midline   Throat:   Mucosa moist  Pharynx without injection      Neck:   Supple         Lungs:     Clear to auscultation bilaterally   Chest Wall:    No tenderness or deformity    Heart:    Regular rate and rhythm       Abdomen:     Soft, non-tender, bowel sounds +, no organomegaly               Extremities:   Extremities no cyanosis or edema         Skin:   no rash or icterus  Lymph nodes:   Cervical, supraclavicular, and axillary nodes normal   Neurologic:   CNII-XII intact, normal strength, sensation and reflexes     Throughout             Breast exam: Lumpectomy scar at inner aspect of her right breast with no palpable abnormality   Left breast exam is negative  ROS: Review of Systems   All other systems reviewed and are negative  Imaging: US pelvis complete w transvaginal    Result Date: 7/7/2021  Narrative: PELVIC ULTRASOUND, COMPLETE INDICATION:  45years old  N92 1: Excessive and frequent menstruation with irregular cycle  COMPARISON: None TECHNIQUE:   Transabdominal pelvic ultrasound was performed in sagittal and transverse planes with a curvilinear transducer  Additional transvaginal imaging was performed to better evaluate the endometrium and ovaries  Imaging included volumetric sweeps as well as traditional still imaging technique  FINDINGS: UTERUS: The uterus is retroverted in position, measuring 10 5 x 6 6 x 5 6 cm  Contour and echotexture appear normal    Probable fibroid in the left lower uterus measuring 4 1 x 4 2 x 3 9 cm  The cervix shows no suspicious abnormality  ENDOMETRIUM:  Normal caliber of 12 mm  Homogeneous and normal in appearance  OVARIES/ADNEXA: Right ovary:  1 7 x 1 x 1 5 cm  No suspicious right ovarian abnormality  Doppler flow within normal limits  Left ovary:  1 7 x 2 4 x 2 2 cm  No suspicious left ovarian abnormality  Doppler flow within normal limits  No suspicious adnexal mass or loculated collections  There is no free fluid  Impression: 1  Left lower fibroid measuring 4 1 x 4 2 x 3 9 cm  2   Unremarkable ovaries   Workstation performed: [id number]         Labs:   Lab Results   Component Value Date    WBC 7 58 08/21/2018    HGB 14 1 08/21/2018    HCT 40 8 08/21/2018 MCV 92 08/21/2018     08/21/2018     Lab Results   Component Value Date    K 4 5 09/16/2020     09/16/2020    CO2 26 09/16/2020    BUN 14 09/16/2020    CREATININE 0 88 09/16/2020    GLUF 94 09/16/2020    CALCIUM 9 3 09/16/2020    AST 20 09/16/2020    ALT 22 09/16/2020    ALKPHOS 64 09/16/2020    EGFR 76 09/16/2020         Current Medications: Reviewed  Allergies: Reviewed  PMH/FH/SH:  Reviewed      Vital Sign:    Body surface area is 1 56 meters squared      Wt Readings from Last 3 Encounters:   07/20/21 54 7 kg (120 lb 8 oz)   07/19/21 54 6 kg (120 lb 6 4 oz)   05/20/21 54 kg (119 lb)        Temp Readings from Last 3 Encounters:   07/20/21 98 2 °F (36 8 °C) (Tympanic Core)   05/20/21 97 8 °F (36 6 °C) (Temporal)   11/25/20 97 8 °F (36 6 °C)        BP Readings from Last 3 Encounters:   07/20/21 110/62   07/19/21 102/60   05/20/21 100/60         Pulse Readings from Last 3 Encounters:   07/20/21 68   05/20/21 65   04/27/21 79     @LASTSAO2(3)@

## 2021-07-27 ENCOUNTER — TELEPHONE (OUTPATIENT)
Dept: OBGYN CLINIC | Facility: CLINIC | Age: 51
End: 2021-07-27

## 2021-07-27 NOTE — TELEPHONE ENCOUNTER
Spoke to pt and reviewed benign results    ----- Message from Thaddeus Boothe MD sent at 7/27/2021 11:30 AM EDT -----  Please let Denise know her biopsy was completely benign without any concerns  She can just continue to monitor her cycles

## 2021-09-17 ENCOUNTER — OFFICE VISIT (OUTPATIENT)
Dept: INTERNAL MEDICINE CLINIC | Facility: CLINIC | Age: 51
End: 2021-09-17
Payer: COMMERCIAL

## 2021-09-17 VITALS
SYSTOLIC BLOOD PRESSURE: 102 MMHG | OXYGEN SATURATION: 99 % | HEART RATE: 99 BPM | WEIGHT: 118.4 LBS | HEIGHT: 63 IN | BODY MASS INDEX: 20.98 KG/M2 | DIASTOLIC BLOOD PRESSURE: 62 MMHG

## 2021-09-17 DIAGNOSIS — R07.89 ATYPICAL CHEST PAIN: ICD-10-CM

## 2021-09-17 DIAGNOSIS — Z23 NEED FOR VACCINATION: ICD-10-CM

## 2021-09-17 DIAGNOSIS — N95.1 PERIMENOPAUSE: ICD-10-CM

## 2021-09-17 DIAGNOSIS — Z13.6 SCREENING FOR CARDIOVASCULAR CONDITION: ICD-10-CM

## 2021-09-17 DIAGNOSIS — Z11.59 NEED FOR HEPATITIS C SCREENING TEST: ICD-10-CM

## 2021-09-17 DIAGNOSIS — Z00.00 ANNUAL PHYSICAL EXAM: Primary | ICD-10-CM

## 2021-09-17 DIAGNOSIS — Z00.00 WELLNESS EXAMINATION: ICD-10-CM

## 2021-09-17 DIAGNOSIS — Z11.4 SCREENING FOR HIV (HUMAN IMMUNODEFICIENCY VIRUS): ICD-10-CM

## 2021-09-17 DIAGNOSIS — Z23 ENCOUNTER FOR IMMUNIZATION: ICD-10-CM

## 2021-09-17 DIAGNOSIS — R19.4 CHANGE IN BOWEL HABIT: ICD-10-CM

## 2021-09-17 PROCEDURE — 3008F BODY MASS INDEX DOCD: CPT | Performed by: INTERNAL MEDICINE

## 2021-09-17 PROCEDURE — 99396 PREV VISIT EST AGE 40-64: CPT | Performed by: INTERNAL MEDICINE

## 2021-09-17 PROCEDURE — 1036F TOBACCO NON-USER: CPT | Performed by: INTERNAL MEDICINE

## 2021-09-17 PROCEDURE — 90682 RIV4 VACC RECOMBINANT DNA IM: CPT

## 2021-09-17 PROCEDURE — 90471 IMMUNIZATION ADMIN: CPT

## 2021-09-17 PROCEDURE — 3725F SCREEN DEPRESSION PERFORMED: CPT | Performed by: INTERNAL MEDICINE

## 2021-09-17 NOTE — PROGRESS NOTES
[facility]    NAME: Fiona Daniel  AGE: 45 y o  SEX: female  : 1970     DATE: 2021     Assessment and Plan:     Problem List Items Addressed This Visit        Other    Wellness examination      Assessment and plan 1  Health maintenance annual wellness examination overall the patient is clinically stable and doing well, we encouraged the patient to follow a healthy and balanced diet  We recommend that the patient exercise routinely approximately 30 minutes 5 times per week   We have reviewed the patient's vaccines and have made recommendations for updates if necessary  Annual flu shot today, COVID-19 vaccine when available       We will be ordering screening laboratories which are age appropriate  Return to the office in    3 months   call if any problems           Screening for cardiovascular condition    Relevant Orders    Comprehensive metabolic panel    Lipid Panel with Direct LDL reflex    Need for vaccination    Relevant Orders    influenza vaccine, quadrivalent, recombinant, PF, 0 5 mL, for patients 18 yr+ (FLUBLOK) (Completed)    Change in bowel habit      Recommend initial 1 tsp in 8 oz of water once daily, Colace 100 mg b i d  p r n  if no bowel movement negative to days then use MiraLax will have patient see GI for colonoscopy         Relevant Orders    Ambulatory referral to Gastroenterology    Atypical chest pain      Currently asymptomatic  Will check a stress test         Relevant Orders    Stress test only, exercise    Perimenopause      Patient does report significant amounts of hot flushes secondary to perimenopause will check with Oncology if she is a suitable candidate for Effexor           Other Visit Diagnoses     Annual physical exam    -  Primary    Need for hepatitis C screening test        Relevant Orders    Hepatitis C Antibody ([facility])    Screening for HIV (human immunodeficiency virus)        Encounter for immunization           RTO in 3 months call if any problems  Constipation 4-5mons , regular food causing no using last menstral cycle   Immunizations and preventive care screenings were discussed with patient today  Appropriate education was printed on patient's after visit summary  She is getting hot flushes and sweats   Counseling:  · Exercise: the importance of regular exercise/physical activity was discussed  Recommend exercise 3-5 times per week for at least 30 minutes  Depression Screening and Follow-up Plan:   Patient was screened for depression during today's encounter  They screened negative with a PHQ-2 score of 0      patient does report me perimenopausal symptoms including hot flashes also reports a change in bowel habitus reports me constipation  Has been using MiraLax  One and reported anxious intermittent right-sided chest pain    No follow-ups on file  Chief Complaint:     Chief Complaint   Patient presents with    Physical Exam      History of Present Illness:     Adult Annual Physical   Patient here for a comprehensive physical exam  The patient reports no problems  Diet and Physical Activity  · Diet/Nutrition: well balanced diet  · Exercise: walking  Running and weights 5/7     Depression Screening  PHQ-9 Depression Screening    PHQ-9:   Frequency of the following problems over the past two weeks:      Little interest or pleasure in doing things: 0 - not at all  Feeling down, depressed, or hopeless: 0 - not at all  PHQ-2 Score: 0       General Health  · Sleep: gets 4-6 hours of sleep on average  · Hearing: normal - bilateral   · Vision: no vision problems  · Dental: regular dental visits  /GYN Health  · Patient is: perimenopausal  · Last menstrual period: 3 months  · Contraceptive method: none     Review of Systems:     Review of Systems   Constitutional: Negative for activity change, appetite change and unexpected weight change     Eyes: Negative for visual disturbance  Respiratory: Negative for cough and shortness of breath  Cardiovascular: Negative for chest pain  Gastrointestinal: Negative for abdominal pain, diarrhea, nausea and vomiting  Neurological: Negative for dizziness, light-headedness and headaches  Past Medical History:     Past Medical History:   Diagnosis Date    BRCA1 negative     BRCA2 negative     Breast cancer ([facility] [address] ) 05/2018    right breast    Colon polyp     Fibrocystic breast     History of radiation therapy 2018    right breast ca    Seasonal allergies     Vertigo       Past Surgical History:     Past Surgical History:   Procedure Laterality Date    BREAST BIOPSY Right 05/2018    Positive    BREAST BIOPSY Left     Neg around age 28    BREAST BIOPSY Left 11/132019    benign    BREAST LUMPECTOMY Right 07/2018    with radiation    EMBOLECTOMY      s/p forceps delivery    MAMMO NEEDLE LOCALIZATION RIGHT (ALL INC) Right 7/10/2018    MAMMO STEREOTACTIC BREAST BIOPSY RIGHT (ALL INC) Right 5/29/2018    UT BIOPSY/EXCISION, LYMPH NODE(S) Right 7/10/2018    Procedure: SENTINEL LYMPH NODE BIOPSY; LYMPHATIC MAPPING WITH BLUE DYE RADIOACTIVE DYE (INJECT AT 0800 BY DR CHAUDHARY IN THE OR); Surgeon: Myles Bennett MD;  [facility];  Service: Surgical Oncology    UT PERQ DEVICE PLACEMT BREAST LOC 1ST LES W GUIDNCE Right 7/10/2018    Procedure: BREAST LUMPECTOMY; BREAST NEEDLE LOCALIZATION (NEEDLE LOC AT 0700);   Surgeon: Myles Bennett MD;  [facility];  Service: Surgical Oncology    US GUIDED BREAST BIOPSY LEFT COMPLETE Left 11/13/2019    US GUIDED BREAST BIOPSY RIGHT COMPLETE Right 5/29/2018      Social History:     Social History     Socioeconomic History    Marital status: /Civil Union     Spouse name: None    Number of children: 2    Years of education: None    Highest education level: None   Occupational History    None   Tobacco Use    Smoking status: Never Smoker    Smokeless tobacco: Never Used    Tobacco comment: only smoked socially as teenager for 1 yr   Vaping Use    Vaping Use: Never used   Substance and Sexual Activity    Alcohol use: Yes     Comment: 2-3 glasses of wine on Fri and sat annetta    Drug use: No    Sexual activity: Yes     Partners: Male     Birth control/protection: Male Sterilization   Other Topics Concern    None   Social History Narrative    None     Social Determinants of Health     Financial Resource Strain:     Difficulty of Paying Living Expenses:    Food Insecurity:     Worried About Running Out of Food in the Last Year:     [address] in the Last Year:    Transportation Needs:     Lack of Transportation (Medical):      Lack of Transportation (Non-Medical):    Physical Activity:     Days of Exercise per Week:     Minutes of Exercise per Session:    Stress:     Feeling of Stress :    Social Connections:     Frequency of Communication with Friends and Family:     Frequency of Social Gatherings with Friends and Family:     Attends Christian Services:     Active Member of Clubs or Organizations:     Attends Club or Organization Meetings:     Marital Status:    Intimate Partner Violence:     Fear of Current or Ex-Partner:     Emotionally Abused:     Physically Abused:     Sexually Abused:       Family History:     Family History   Problem Relation Age of Onset    Prostate cancer Father     Breast cancer Maternal Aunt 54    Ovarian cancer Maternal Aunt     Colon cancer Maternal Grandmother     Esophageal cancer Maternal Grandfather     No Known Problems Sister     No Known Problems Daughter     Skin cancer Paternal Aunt       Current Medications:     Current Outpatient Medications   Medication Sig Dispense Refill    loratadine (CLARITIN) 5 MG chewable tablet Chew 5 mg daily      meloxicam (MOBIC) 15 mg tablet Take 1 tablet (15 mg total) by mouth daily 30 tablet 0    Multiple Vitamin (MULTIVITAMIN ADULT PO) Take 1 tablet by mouth      tamoxifen (NOLVADEX) 20 mg tablet Take 1 tablet (20 mg total) by mouth daily 90 tablet 1     No current facility-administered medications for this visit  Allergies:     No Known Allergies   Physical Exam:     /62   Pulse 99   Ht 5' 2 9" (1 598 m)   Wt 53 7 kg (118 lb 6 4 oz)   SpO2 99%   BMI 21 04 kg/m²     Physical Exam  Constitutional:       Appearance: She is well-developed  HENT:      Head: Normocephalic and atraumatic  Right Ear: External ear normal       Left Ear: External ear normal       Nose: Nose normal    Eyes:      Pupils: Pupils are equal, round, and reactive to light  Cardiovascular:      Rate and Rhythm: Normal rate and regular rhythm  Heart sounds: Normal heart sounds  No murmur heard  Pulmonary:      Effort: Pulmonary effort is normal       Breath sounds: Normal breath sounds  Abdominal:      General: There is no distension  Palpations: Abdomen is soft  Tenderness: There is no abdominal tenderness  There is no guarding            Martita Figueredo DO  [facility]

## 2021-09-17 NOTE — PATIENT INSTRUCTIONS

## 2021-09-18 PROBLEM — R19.4 CHANGE IN BOWEL HABIT: Status: ACTIVE | Noted: 2021-09-18

## 2021-09-18 PROBLEM — R07.89 ATYPICAL CHEST PAIN: Status: ACTIVE | Noted: 2021-09-18

## 2021-09-18 PROBLEM — N95.1 PERIMENOPAUSE: Status: ACTIVE | Noted: 2021-09-18

## 2021-09-18 NOTE — ASSESSMENT & PLAN NOTE
Patient does report significant amounts of hot flushes secondary to perimenopause will check with Oncology if she is a suitable candidate for Effexor

## 2021-09-18 NOTE — ASSESSMENT & PLAN NOTE
Assessment and plan 1  Health maintenance annual wellness examination overall the patient is clinically stable and doing well, we encouraged the patient to follow a healthy and balanced diet  We recommend that the patient exercise routinely approximately 30 minutes 5 times per week   We have reviewed the patient's vaccines and have made recommendations for updates if necessary  Annual flu shot today, COVID-19 vaccine when available       We will be ordering screening laboratories which are age appropriate  Return to the office in    3 months   call if any problems

## 2021-09-18 NOTE — ASSESSMENT & PLAN NOTE
Recommend initial 1 tsp in 8 oz of water once daily, Colace 100 mg b i d  p r n  if no bowel movement negative to days then use MiraLax will have patient see GI for colonoscopy

## 2021-09-22 DIAGNOSIS — N95.1 PERIMENOPAUSE: Primary | ICD-10-CM

## 2021-09-22 RX ORDER — VENLAFAXINE HYDROCHLORIDE 37.5 MG/1
37.5 CAPSULE, EXTENDED RELEASE ORAL DAILY
COMMUNITY
End: 2021-09-22 | Stop reason: SDUPTHER

## 2021-09-22 RX ORDER — VENLAFAXINE HYDROCHLORIDE 37.5 MG/1
37.5 CAPSULE, EXTENDED RELEASE ORAL DAILY
Qty: 90 CAPSULE | Refills: 1 | Status: SHIPPED | OUTPATIENT
Start: 2021-09-22 | End: 2022-03-22

## 2021-09-22 NOTE — TELEPHONE ENCOUNTER
----- Message from Corbin Greenwood DO sent at 9/17/2021 12:49 PM EDT -----   Please let the patient know we checked with Oncology okay to start Effexor please verify with the patient she would like to start Effexor XR 37 5 mg once daily if yes please sent to me for approval  ----- Message -----  From: Moose Pena MD  Sent: 9/17/2021   9:44 AM EDT  To: Corbin Greenwood DO    Effexor is OK, since interaction is minimal  Lila White  ----- Message -----  From: Corbin Greenwood DO  Sent: 9/17/2021   9:42 AM EDT  To: MD Cale Queen , patient is having significant hot flashes and night sweats secondary to perimenopausal state unfortunately she cannot take low-dose estrogen because of the history of breast cancer we would like to start her on Effexor if that would be okay from the standpoint of the tamoxifen; please let me know I would be able to start her on this medication    Thank you Jacobo Florez

## 2021-10-29 ENCOUNTER — APPOINTMENT (OUTPATIENT)
Dept: LAB | Facility: CLINIC | Age: 51
End: 2021-10-29
Payer: COMMERCIAL

## 2021-10-29 DIAGNOSIS — Z13.6 SCREENING FOR CARDIOVASCULAR CONDITION: ICD-10-CM

## 2021-10-29 DIAGNOSIS — Z11.59 NEED FOR HEPATITIS C SCREENING TEST: ICD-10-CM

## 2021-10-29 LAB
ALBUMIN SERPL BCP-MCNC: 3.9 G/DL (ref 3.5–5)
ALP SERPL-CCNC: 71 U/L (ref 46–116)
ALT SERPL W P-5'-P-CCNC: 42 U/L (ref 12–78)
ANION GAP SERPL CALCULATED.3IONS-SCNC: 9 MMOL/L (ref 4–13)
AST SERPL W P-5'-P-CCNC: 28 U/L (ref 5–45)
BILIRUB SERPL-MCNC: 0.47 MG/DL (ref 0.2–1)
BUN SERPL-MCNC: 14 MG/DL (ref 5–25)
CALCIUM SERPL-MCNC: 9 MG/DL (ref 8.3–10.1)
CHLORIDE SERPL-SCNC: 104 MMOL/L (ref 100–108)
CHOLEST SERPL-MCNC: 192 MG/DL (ref 50–200)
CO2 SERPL-SCNC: 28 MMOL/L (ref 21–32)
CREAT SERPL-MCNC: 0.96 MG/DL (ref 0.6–1.3)
GFR SERPL CREATININE-BSD FRML MDRD: 68 ML/MIN/1.73SQ M
GLUCOSE P FAST SERPL-MCNC: 87 MG/DL (ref 65–99)
HCV AB SER QL: NORMAL
HDLC SERPL-MCNC: 54 MG/DL
LDLC SERPL CALC-MCNC: 124 MG/DL (ref 0–100)
POTASSIUM SERPL-SCNC: 4.3 MMOL/L (ref 3.5–5.3)
PROT SERPL-MCNC: 7.5 G/DL (ref 6.4–8.2)
SODIUM SERPL-SCNC: 141 MMOL/L (ref 136–145)
TRIGL SERPL-MCNC: 68 MG/DL

## 2021-10-29 PROCEDURE — 36415 COLL VENOUS BLD VENIPUNCTURE: CPT

## 2021-10-29 PROCEDURE — 86803 HEPATITIS C AB TEST: CPT

## 2021-10-29 PROCEDURE — 80061 LIPID PANEL: CPT

## 2021-10-29 PROCEDURE — 80053 COMPREHEN METABOLIC PANEL: CPT

## 2021-11-03 ENCOUNTER — HOSPITAL ENCOUNTER (OUTPATIENT)
Dept: ULTRASOUND IMAGING | Facility: CLINIC | Age: 51
Discharge: HOME | End: 2021-11-03
Payer: COMMERCIAL

## 2021-11-03 DIAGNOSIS — R92.2 DENSE BREASTS: ICD-10-CM

## 2021-11-03 DIAGNOSIS — Z12.39 ENCOUNTER FOR BREAST CANCER SCREENING OTHER THAN MAMMOGRAM: ICD-10-CM

## 2021-11-03 PROCEDURE — 76641 ULTRASOUND BREAST COMPLETE: CPT

## 2021-11-08 ENCOUNTER — ANNUAL EXAM (OUTPATIENT)
Dept: OBGYN CLINIC | Facility: CLINIC | Age: 51
End: 2021-11-08
Payer: COMMERCIAL

## 2021-11-08 VITALS
DIASTOLIC BLOOD PRESSURE: 70 MMHG | HEIGHT: 63 IN | WEIGHT: 122.2 LBS | SYSTOLIC BLOOD PRESSURE: 102 MMHG | BODY MASS INDEX: 21.65 KG/M2

## 2021-11-08 DIAGNOSIS — Z01.419 ENCOUNTER FOR GYNECOLOGICAL EXAMINATION (GENERAL) (ROUTINE) WITHOUT ABNORMAL FINDINGS: Primary | ICD-10-CM

## 2021-11-08 DIAGNOSIS — Z12.31 ENCOUNTER FOR SCREENING MAMMOGRAM FOR BREAST CANCER: ICD-10-CM

## 2021-11-08 PROCEDURE — G0145 SCR C/V CYTO,THINLAYER,RESCR: HCPCS | Performed by: OBSTETRICS & GYNECOLOGY

## 2021-11-08 PROCEDURE — G0476 HPV COMBO ASSAY CA SCREEN: HCPCS | Performed by: OBSTETRICS & GYNECOLOGY

## 2021-11-08 PROCEDURE — S0612 ANNUAL GYNECOLOGICAL EXAMINA: HCPCS | Performed by: OBSTETRICS & GYNECOLOGY

## 2021-11-11 ENCOUNTER — RADIATION ONCOLOGY FOLLOW-UP (OUTPATIENT)
Dept: RADIATION ONCOLOGY | Facility: HOSPITAL | Age: 51
End: 2021-11-11
Attending: RADIOLOGY
Payer: COMMERCIAL

## 2021-11-11 VITALS
BODY MASS INDEX: 22.31 KG/M2 | HEART RATE: 73 BPM | SYSTOLIC BLOOD PRESSURE: 118 MMHG | WEIGHT: 121.2 LBS | DIASTOLIC BLOOD PRESSURE: 78 MMHG | RESPIRATION RATE: 16 BRPM | HEIGHT: 62 IN | TEMPERATURE: 98.4 F | OXYGEN SATURATION: 100 %

## 2021-11-11 DIAGNOSIS — Z17.0 MALIGNANT NEOPLASM OF UPPER-INNER QUADRANT OF RIGHT BREAST IN FEMALE, ESTROGEN RECEPTOR POSITIVE (HCC): Primary | ICD-10-CM

## 2021-11-11 DIAGNOSIS — C50.211 MALIGNANT NEOPLASM OF UPPER-INNER QUADRANT OF RIGHT BREAST IN FEMALE, ESTROGEN RECEPTOR POSITIVE (HCC): Primary | ICD-10-CM

## 2021-11-11 PROCEDURE — 99211 OFF/OP EST MAY X REQ PHY/QHP: CPT | Performed by: RADIOLOGY

## 2021-11-12 ENCOUNTER — HOSPITAL ENCOUNTER (OUTPATIENT)
Dept: NON INVASIVE DIAGNOSTICS | Facility: CLINIC | Age: 51
Discharge: HOME | End: 2021-11-12
Payer: COMMERCIAL

## 2021-11-12 DIAGNOSIS — R07.89 ATYPICAL CHEST PAIN: ICD-10-CM

## 2021-11-12 LAB
BASELINE ST DEPRESSION: 0 MM
CHEST PAIN STATEMENT: NORMAL
HPV HR 12 DNA CVX QL NAA+PROBE: NEGATIVE
HPV16 DNA CVX QL NAA+PROBE: NEGATIVE
HPV18 DNA CVX QL NAA+PROBE: NEGATIVE
LAB AP GYN PRIMARY INTERPRETATION: NORMAL
Lab: NORMAL
MAX DIASTOLIC BP: 70 MMHG
MAX HEART RATE: 179 BPM
MAX PREDICTED HEART RATE: 168 BPM
MAX. SYSTOLIC BP: 140 MMHG
PROTOCOL NAME: NORMAL
RATE PRESSURE PRODUCT: NORMAL
REASON FOR TERMINATION: NORMAL
SL CV STRESS RECOVERY BP: NORMAL MMHG
SL CV STRESS RECOVERY HR: 102 BPM
SL CV STRESS RECOVERY O2 SAT: 98 %
STRESS ANGINA INDEX: 0
STRESS BASELINE BP: NORMAL MMHG
STRESS BASELINE HR: 86 BPM
STRESS DUKE TREADMILL SCORE: 10
STRESS O2 SAT REST: 99 %
STRESS PEAK HR: 179 BPM
STRESS PERCENT HR: 106 %
STRESS POST ESTIMATED WORKLOAD: 11.7 METS
STRESS POST EXERCISE DUR MIN: 10 MIN
STRESS POST O2 SAT PEAK: 98 %
STRESS POST PEAK BP: 126 MMHG
STRESS ST DEPRESSION: 0 MM
STRESS TARGET HR: 179 BPM
TARGET HR FORMULA: NORMAL
TEST INDICATION: NORMAL
TIME IN EXERCISE PHASE: NORMAL

## 2021-11-12 PROCEDURE — 93016 CV STRESS TEST SUPVJ ONLY: CPT | Performed by: INTERNAL MEDICINE

## 2021-11-12 PROCEDURE — 93017 CV STRESS TEST TRACING ONLY: CPT

## 2021-11-12 PROCEDURE — 93018 CV STRESS TEST I&R ONLY: CPT | Performed by: INTERNAL MEDICINE

## 2021-11-15 ENCOUNTER — HOSPITAL ENCOUNTER (OUTPATIENT)
Dept: ULTRASOUND IMAGING | Facility: CLINIC | Age: 51
Discharge: HOME | End: 2021-11-15
Payer: COMMERCIAL

## 2021-11-15 VITALS — HEIGHT: 62 IN | WEIGHT: 121 LBS | BODY MASS INDEX: 22.26 KG/M2

## 2021-11-15 DIAGNOSIS — R92.8 ABNORMAL SCREENING MAMMOGRAM: ICD-10-CM

## 2021-11-15 PROCEDURE — 76642 ULTRASOUND BREAST LIMITED: CPT

## 2021-11-24 ENCOUNTER — OFFICE VISIT (OUTPATIENT)
Dept: INTERNAL MEDICINE CLINIC | Facility: CLINIC | Age: 51
End: 2021-11-24
Payer: COMMERCIAL

## 2021-11-24 VITALS
SYSTOLIC BLOOD PRESSURE: 112 MMHG | OXYGEN SATURATION: 98 % | WEIGHT: 122.2 LBS | BODY MASS INDEX: 22.49 KG/M2 | DIASTOLIC BLOOD PRESSURE: 72 MMHG | HEART RATE: 70 BPM | RESPIRATION RATE: 16 BRPM | HEIGHT: 62 IN

## 2021-11-24 DIAGNOSIS — R07.89 ATYPICAL CHEST PAIN: Primary | ICD-10-CM

## 2021-11-24 PROCEDURE — 3008F BODY MASS INDEX DOCD: CPT | Performed by: GENERAL ACUTE CARE HOSPITAL

## 2021-11-24 PROCEDURE — 1036F TOBACCO NON-USER: CPT | Performed by: GENERAL ACUTE CARE HOSPITAL

## 2021-11-24 PROCEDURE — 99213 OFFICE O/P EST LOW 20 MIN: CPT | Performed by: GENERAL ACUTE CARE HOSPITAL

## 2021-11-29 ENCOUNTER — HOSPITAL ENCOUNTER (OUTPATIENT)
Dept: MAMMOGRAPHY | Facility: CLINIC | Age: 51
Discharge: HOME | End: 2021-11-29
Payer: COMMERCIAL

## 2021-11-29 ENCOUNTER — HOSPITAL ENCOUNTER (OUTPATIENT)
Dept: ULTRASOUND IMAGING | Facility: CLINIC | Age: 51
Discharge: HOME | End: 2021-11-29
Payer: COMMERCIAL

## 2021-11-29 VITALS — SYSTOLIC BLOOD PRESSURE: 130 MMHG | HEART RATE: 80 BPM | DIASTOLIC BLOOD PRESSURE: 80 MMHG

## 2021-11-29 DIAGNOSIS — R92.8 ABNORMAL MAMMOGRAM: ICD-10-CM

## 2021-11-29 PROCEDURE — 88305 TISSUE EXAM BY PATHOLOGIST: CPT | Performed by: SPECIALIST

## 2021-11-29 PROCEDURE — 19083 BX BREAST 1ST LESION US IMAG: CPT

## 2021-11-29 PROCEDURE — A4648 IMPLANTABLE TISSUE MARKER: HCPCS

## 2021-11-29 RX ORDER — LIDOCAINE HYDROCHLORIDE 10 MG/ML
5 INJECTION, SOLUTION EPIDURAL; INFILTRATION; INTRACAUDAL; PERINEURAL ONCE
Status: COMPLETED | OUTPATIENT
Start: 2021-11-29 | End: 2021-11-29

## 2021-11-29 RX ADMIN — LIDOCAINE HYDROCHLORIDE 5 ML: 10 INJECTION, SOLUTION EPIDURAL; INFILTRATION; INTRACAUDAL; PERINEURAL at 14:54

## 2021-12-02 ENCOUNTER — TELEPHONE (OUTPATIENT)
Dept: SURGICAL ONCOLOGY | Facility: CLINIC | Age: 51
End: 2021-12-02

## 2021-12-04 ENCOUNTER — IMMUNIZATIONS (OUTPATIENT)
Dept: FAMILY MEDICINE CLINIC | Facility: HOSPITAL | Age: 51
End: 2021-12-04

## 2021-12-04 DIAGNOSIS — Z23 ENCOUNTER FOR IMMUNIZATION: Primary | ICD-10-CM

## 2021-12-04 PROCEDURE — 91300 COVID-19 PFIZER VACC 0.3 ML: CPT

## 2021-12-04 PROCEDURE — 0001A COVID-19 PFIZER VACC 0.3 ML: CPT

## 2021-12-21 ENCOUNTER — OFFICE VISIT (OUTPATIENT)
Dept: SURGICAL ONCOLOGY | Facility: CLINIC | Age: 51
End: 2021-12-21
Payer: COMMERCIAL

## 2021-12-21 VITALS
DIASTOLIC BLOOD PRESSURE: 80 MMHG | HEIGHT: 62 IN | SYSTOLIC BLOOD PRESSURE: 128 MMHG | BODY MASS INDEX: 22.26 KG/M2 | HEART RATE: 82 BPM | RESPIRATION RATE: 16 BRPM | TEMPERATURE: 98.4 F | OXYGEN SATURATION: 99 % | WEIGHT: 121 LBS

## 2021-12-21 DIAGNOSIS — C50.211 MALIGNANT NEOPLASM OF UPPER-INNER QUADRANT OF RIGHT BREAST IN FEMALE, ESTROGEN RECEPTOR POSITIVE (HCC): Primary | ICD-10-CM

## 2021-12-21 DIAGNOSIS — Z17.0 MALIGNANT NEOPLASM OF UPPER-INNER QUADRANT OF RIGHT BREAST IN FEMALE, ESTROGEN RECEPTOR POSITIVE (HCC): Primary | ICD-10-CM

## 2021-12-21 DIAGNOSIS — Z79.810 USE OF TAMOXIFEN (NOLVADEX): ICD-10-CM

## 2021-12-21 PROCEDURE — 1036F TOBACCO NON-USER: CPT | Performed by: NURSE PRACTITIONER

## 2021-12-21 PROCEDURE — 3008F BODY MASS INDEX DOCD: CPT | Performed by: NURSE PRACTITIONER

## 2021-12-21 PROCEDURE — 99214 OFFICE O/P EST MOD 30 MIN: CPT | Performed by: NURSE PRACTITIONER

## 2022-01-13 DIAGNOSIS — Z17.0 MALIGNANT NEOPLASM OF UPPER-INNER QUADRANT OF RIGHT BREAST IN FEMALE, ESTROGEN RECEPTOR POSITIVE (HCC): ICD-10-CM

## 2022-01-13 DIAGNOSIS — C50.211 MALIGNANT NEOPLASM OF UPPER-INNER QUADRANT OF RIGHT BREAST IN FEMALE, ESTROGEN RECEPTOR POSITIVE (HCC): ICD-10-CM

## 2022-01-14 RX ORDER — TAMOXIFEN CITRATE 20 MG/1
20 TABLET ORAL DAILY
Qty: 90 TABLET | Refills: 0 | Status: SHIPPED | OUTPATIENT
Start: 2022-01-14 | End: 2022-04-28

## 2022-03-22 ENCOUNTER — OFFICE VISIT (OUTPATIENT)
Dept: INTERNAL MEDICINE CLINIC | Facility: CLINIC | Age: 52
End: 2022-03-22
Payer: COMMERCIAL

## 2022-03-22 VITALS
DIASTOLIC BLOOD PRESSURE: 82 MMHG | HEIGHT: 62 IN | BODY MASS INDEX: 22.78 KG/M2 | OXYGEN SATURATION: 99 % | SYSTOLIC BLOOD PRESSURE: 112 MMHG | WEIGHT: 123.8 LBS | HEART RATE: 75 BPM

## 2022-03-22 DIAGNOSIS — Z23 ENCOUNTER FOR IMMUNIZATION: ICD-10-CM

## 2022-03-22 DIAGNOSIS — R21 RASH: Primary | ICD-10-CM

## 2022-03-22 DIAGNOSIS — Z12.31 ENCOUNTER FOR SCREENING MAMMOGRAM FOR BREAST CANCER: ICD-10-CM

## 2022-03-22 PROCEDURE — 1036F TOBACCO NON-USER: CPT | Performed by: NURSE PRACTITIONER

## 2022-03-22 PROCEDURE — 3008F BODY MASS INDEX DOCD: CPT | Performed by: NURSE PRACTITIONER

## 2022-03-22 PROCEDURE — 99213 OFFICE O/P EST LOW 20 MIN: CPT | Performed by: NURSE PRACTITIONER

## 2022-03-22 RX ORDER — CLOBETASOL PROPIONATE 0.5 MG/G
CREAM TOPICAL 2 TIMES DAILY
Qty: 60 G | Refills: 0 | Status: SHIPPED | OUTPATIENT
Start: 2022-03-22

## 2022-03-22 NOTE — PROGRESS NOTES
Assessment/Plan:    Rash  Benadryl at night and start topical cortisone cream       Diagnoses and all orders for this visit:    Rash  -     clobetasol (TEMOVATE) 0 05 % cream; Apply topically 2 (two) times a day    Encounter for immunization    Encounter for screening mammogram for breast cancer          Subjective:      Patient ID: Tim Figueroa is a 45 y o  female  Patient is here for a red rash of her chest and upper arms that is itchy  No changes in diet she has no allergies as far she is aware of  She is not using any new lotions or detergents  She has not tried Benadryl or anything for it so far    Rash  This is a new problem  The current episode started in the past 7 days  The problem is unchanged  The affected locations include the chest, left shoulder, left arm, right shoulder and right arm  The rash is characterized by redness and itchiness  She was exposed to nothing  Pertinent negatives include no anorexia, congestion, cough, diarrhea, eye pain, facial edema, fatigue, fever, joint pain, nail changes, rhinorrhea, shortness of breath, sore throat or vomiting  The following portions of the patient's history were reviewed and updated as appropriate: allergies, current medications, past family history, past medical history, past social history, past surgical history and problem list     Review of Systems   Constitutional: Negative  Negative for fatigue and fever  HENT: Negative  Negative for congestion, rhinorrhea and sore throat  Eyes: Negative  Negative for pain  Respiratory: Negative  Negative for cough and shortness of breath  Cardiovascular: Negative  Gastrointestinal: Negative  Negative for anorexia, diarrhea and vomiting  Musculoskeletal: Negative  Negative for joint pain  Skin: Positive for rash  Negative for nail changes  Neurological: Negative            Objective:      /82   Pulse 75   Ht 5' 2" (1 575 m)   Wt 56 2 kg (123 lb 12 8 oz)   SpO2 99%   BMI 22 64 kg/m²          Physical Exam  Vitals reviewed  Constitutional:       Appearance: Normal appearance  Skin:         Neurological:      Mental Status: She is alert

## 2022-03-30 ENCOUNTER — TELEPHONE (OUTPATIENT)
Dept: GYNECOLOGIC ONCOLOGY | Facility: CLINIC | Age: 52
End: 2022-03-30

## 2022-03-30 NOTE — TELEPHONE ENCOUNTER
Called patient and rescheduled appt on 6/21/22 with Darrion Lara is now rescheduled with [facility]

## 2022-04-27 ENCOUNTER — TELEPHONE (OUTPATIENT)
Dept: HEMATOLOGY ONCOLOGY | Facility: CLINIC | Age: 52
End: 2022-04-27

## 2022-04-27 DIAGNOSIS — Z17.0 MALIGNANT NEOPLASM OF UPPER-INNER QUADRANT OF RIGHT BREAST IN FEMALE, ESTROGEN RECEPTOR POSITIVE (HCC): ICD-10-CM

## 2022-04-27 DIAGNOSIS — C50.211 MALIGNANT NEOPLASM OF UPPER-INNER QUADRANT OF RIGHT BREAST IN FEMALE, ESTROGEN RECEPTOR POSITIVE (HCC): ICD-10-CM

## 2022-04-27 NOTE — TELEPHONE ENCOUNTER
Appointment Cancellation Or Reschedule     Person calling in Patient    Provider Dr Milana Alcala   Office Visit Date and Time 7/27/22 at 2:40   Office Visit Location [facility]   Did patient want to reschedule their office appointment? If so, when was it scheduled to? 7/12/22 at 2:20   Is this patient calling to reschedule an infusion appointment? No   When is their next infusion appointment? NA   Is this patient a Chemo patient? No   Reason for Cancellation or Reschedule Schedule Conflict      If the patient is a treatment patient, please route this to the office nurse  If the patient is not on treatment, please route to the office MA

## 2022-04-28 RX ORDER — TAMOXIFEN CITRATE 20 MG/1
TABLET ORAL
Qty: 90 TABLET | Refills: 1 | Status: SHIPPED | OUTPATIENT
Start: 2022-04-28

## 2022-05-16 ENCOUNTER — HOSPITAL ENCOUNTER (OUTPATIENT)
Dept: MAMMOGRAPHY | Facility: CLINIC | Age: 52
Discharge: HOME | End: 2022-05-16
Payer: COMMERCIAL

## 2022-05-16 VITALS — BODY MASS INDEX: 22.63 KG/M2 | HEIGHT: 62 IN | WEIGHT: 123 LBS

## 2022-05-16 DIAGNOSIS — Z17.0 MALIGNANT NEOPLASM OF UPPER-INNER QUADRANT OF RIGHT BREAST IN FEMALE, ESTROGEN RECEPTOR POSITIVE (HCC): ICD-10-CM

## 2022-05-16 DIAGNOSIS — C50.211 MALIGNANT NEOPLASM OF UPPER-INNER QUADRANT OF RIGHT BREAST IN FEMALE, ESTROGEN RECEPTOR POSITIVE (HCC): ICD-10-CM

## 2022-05-16 PROCEDURE — 77066 DX MAMMO INCL CAD BI: CPT

## 2022-05-16 PROCEDURE — G0279 TOMOSYNTHESIS, MAMMO: HCPCS

## 2022-06-21 ENCOUNTER — OFFICE VISIT (OUTPATIENT)
Dept: SURGICAL ONCOLOGY | Facility: CLINIC | Age: 52
End: 2022-06-21
Payer: COMMERCIAL

## 2022-06-21 VITALS
TEMPERATURE: 97.1 F | BODY MASS INDEX: 22.63 KG/M2 | OXYGEN SATURATION: 99 % | HEIGHT: 62 IN | SYSTOLIC BLOOD PRESSURE: 112 MMHG | RESPIRATION RATE: 14 BRPM | HEART RATE: 68 BPM | DIASTOLIC BLOOD PRESSURE: 70 MMHG | WEIGHT: 123 LBS

## 2022-06-21 DIAGNOSIS — R92.2 DENSE BREAST TISSUE: ICD-10-CM

## 2022-06-21 DIAGNOSIS — Z79.810 USE OF TAMOXIFEN (NOLVADEX): ICD-10-CM

## 2022-06-21 DIAGNOSIS — Z17.0 MALIGNANT NEOPLASM OF UPPER-INNER QUADRANT OF RIGHT BREAST IN FEMALE, ESTROGEN RECEPTOR POSITIVE (HCC): Primary | ICD-10-CM

## 2022-06-21 DIAGNOSIS — C50.211 MALIGNANT NEOPLASM OF UPPER-INNER QUADRANT OF RIGHT BREAST IN FEMALE, ESTROGEN RECEPTOR POSITIVE (HCC): Primary | ICD-10-CM

## 2022-06-21 PROCEDURE — 1036F TOBACCO NON-USER: CPT

## 2022-06-21 PROCEDURE — 99214 OFFICE O/P EST MOD 30 MIN: CPT

## 2022-06-21 NOTE — PROGRESS NOTES
Surgical Oncology Follow Up       [address] [facility] [facility]  [facility]  [address]  [facility] [address]    Irene Henderson  1970  [id number]  [address] [facility] [facility]  [facility] [place]  [address] [address]    Chief Complaint   Patient presents with    Follow-up       Assessment/Plan:  1  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 breast in female, estrogen receptor positive ([address] )  - 6 month follow up    2  Use of tamoxifen (Nolvadex)  - continue use per medical oncology    3  Dense breast tissue  - US breast screening bilateral complete (ABUS); Future    Discussion/Summary:  Patient is a 24-year-old female presenting today for a six-month follow-up for right breast cancer diagnosed in May 2018  Pathology revealed invasive ductal carcinoma ER 95 percent, OR 90 percent, HER2 negative  She underwent genetic testing which was negative  She had a right breast lumpectomy with sentinel node biopsy with Dr Lin Cope and completed whole breast radiation therapy  She is currently on tamoxifen  She had a bilateral diagnostic mammogram on 05/16/2022 which was BI-RADS 2 category 3 density  She will be due for an automated breast ultrasound in the fall  There were no concerns on her clinical breast exam   Patient did note that she has some back pain near her lumbar spine  She believes that is related to her mattress  She states that it is relieved with rest and stretches  I told her she can always try Tylenol for pain  I asked her to give me a call if back pain becomes persistent and is not relieved  I will see the patient back in 6 months or sooner should the need arise  She was instructed to call with any questions or concerns prior to this time  All questions were answered today      History of Present Illness:     Oncology History   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 breast in female, estrogen receptor positive ([facility] [address] )   5/29/2018 Biopsy    Right breast biopsy  3 o'clock 6 CMFN  Invasive ductal carcinoma  Grade 1  8 mm on ultrasound  No axillary adenopathy on ultrasound  ER 95  DE 90  Her 2 0  Stage IA     6/7/2018 Genetic Testing    BRCA testing  Negative     7/10/2018 Surgery    Right breast lumpectomy with sentinel lymph node biopsy  Invasive ductal carcinoma  9 mm  Grade 1  0/1 lymph nodes  Margins negative  ER 95  DE 90  Her 2 0  Stage IA     7/2018 -  Hormone Therapy      Adjuvant hormonal therapy with tamoxifen      8/13/2018 - 9/11/2018 Radiation    Course: C1    Plan ID Energy Fractions Dose per Fraction (cGy) Dose Correction (cGy) Total Dose Delivered (cGy) Elapsed Days   R Breast e 9E 5 / 5 200 0 1,000 6   Rt Breast 6X 16 / 16 266 0 4,256 22      Treatment dates:  C1: 8/13/2018 - 9/11/2018            -Interval History: Patient is a 50-year-old female presenting today for a six-month follow-up for right breast cancer diagnosed in May 2018  She is currently on tamoxifen  She had a bilateral diagnostic mammogram on 05/16/2022 which was BI-RADS 2 category 3 density  Patient denies changes on her breast exam  She denies persistent headaches, bone pain, shortness of breath, or abdominal pain  Review of Systems:  Review of Systems   Constitutional: Negative for activity change, appetite change, fatigue and unexpected weight change  Respiratory: Negative for cough and shortness of breath  Cardiovascular: Negative for chest pain  Gastrointestinal: Negative for abdominal pain, diarrhea, nausea and vomiting  Endocrine: Negative for heat intolerance  Musculoskeletal: Positive for back pain  Negative for arthralgias and myalgias  Skin: Negative for rash  Neurological: Negative for weakness and headaches  Hematological: Negative for adenopathy         Patient Active Problem List   Diagnosis    Benign paroxysmal vertigo    Seasonal allergies    Rash    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 breast in female, estrogen receptor positive ([facility] [address] )    Screening for human papillomavirus (HPV)    Encounter for gynecological examination (general) (routine) without abnormal findings    Use of tamoxifen (Nolvadex)    Wellness examination    Rotator cuff syndrome of right shoulder    Numbness and tingling of left lower extremity    Vitamin D deficiency    Screening for cardiovascular condition    Need for vaccination    Left inguinal pain    Congestion of right ear    Change in bowel habit    Atypical chest pain    Perimenopause     Past Medical History:   Diagnosis Date    BRCA1 negative     BRCA2 negative     Breast cancer ([facility] [address] ) 05/2018    right breast    Colon polyp     Fibrocystic breast     History of radiation therapy 2018    right breast ca    Seasonal allergies     Vertigo      Past Surgical History:   Procedure Laterality Date    BREAST BIOPSY Right 05/2018    Positive    BREAST BIOPSY Left     Neg around age 28    BREAST BIOPSY Left 11/132019    benign    BREAST BIOPSY Right 11/29/2021    BREAST LUMPECTOMY Right 07/2018    with radiation    EMBOLECTOMY      s/p forceps delivery    MAMMO NEEDLE LOCALIZATION RIGHT (ALL INC) Right 7/10/2018    MAMMO STEREOTACTIC BREAST BIOPSY RIGHT (ALL INC) Right 5/29/2018    DE BIOPSY/EXCISION, LYMPH NODE(S) Right 7/10/2018    Procedure: SENTINEL LYMPH NODE BIOPSY; LYMPHATIC MAPPING WITH BLUE DYE RADIOACTIVE DYE (INJECT AT 0800 BY DR CHAUDHARY IN THE OR); Surgeon: Esther Davis MD;  [facility];  Service: Surgical Oncology    DE PERQ DEVICE PLACEMT BREAST LOC [address] [place] [address] [facility] Right 7/10/2018    Procedure: BREAST LUMPECTOMY; BREAST NEEDLE LOCALIZATION (NEEDLE LOC AT 0700);   Surgeon: Esther Davis MD;  [facility];  Service: Surgical Oncology    US GUIDED BREAST BIOPSY LEFT COMPLETE Left 11/13/2019    US GUIDED BREAST BIOPSY RIGHT COMPLETE Right 5/29/2018    US GUIDED BREAST BIOPSY RIGHT COMPLETE Right 11/29/2021     Family History   Problem Relation Age of Onset    Prostate cancer Father     Breast cancer Maternal Aunt 54    Ovarian cancer Maternal Aunt     Colon cancer Maternal Grandmother     Esophageal cancer Maternal Grandfather     No Known Problems Sister     No Known Problems Daughter     Skin cancer Paternal Aunt      Social History     Socioeconomic History    Marital status: /Civil Union     Spouse name: Not on file    Number of children: 2    Years of education: Not on file    Highest education level: Not on file   Occupational History    Not on file   Tobacco Use    Smoking status: Never Smoker    Smokeless tobacco: Never Used    Tobacco comment: only smoked socially as teenager for 1 yr   Vaping Use    Vaping Use: Never used   Substance and Sexual Activity    Alcohol use: Yes     Comment: 2-3 glasses of wine on Fri and sat annetta    Drug use: No    Sexual activity: Yes     Partners: Male     Birth control/protection: Male Sterilization   Other Topics Concern    Not on file   Social History Narrative    Not on file     Social Determinants of Health     Financial Resource Strain: Not on file   Food Insecurity: Not on file   Transportation Needs: Not on file   Physical Activity: Not on file   Stress: Not on file   Social Connections: Not on file   Intimate Partner Violence: Not on file   Housing Stability: Not on file       Current Outpatient Medications:     clobetasol (TEMOVATE) 0 05 % cream, Apply topically 2 (two) times a day, Disp: 60 g, Rfl: 0    loratadine (CLARITIN) 5 MG chewable tablet, Chew 5 mg daily, Disp: , Rfl:     Multiple Vitamin (MULTIVITAMIN ADULT PO), Take 1 tablet by mouth, Disp: , Rfl:     tamoxifen (NOLVADEX) 20 mg tablet, TAKE 1 TABLET(20 MG) BY MOUTH DAILY, Disp: 90 tablet, Rfl: 1  No Known Allergies  Vitals:    06/21/22 0926   BP: 112/70   Pulse: 68   Resp: 14   Temp: (!) 97 1 °F (36 2 °C)   SpO2: 99%       Physical Exam  Constitutional: General: She is not in acute distress  Appearance: Normal appearance  Cardiovascular:      Rate and Rhythm: Normal rate and regular rhythm  Pulses: Normal pulses  Heart sounds: Normal heart sounds  Pulmonary:      Effort: Pulmonary effort is normal       Breath sounds: Normal breath sounds  Chest:      Chest wall: No mass  Breasts:      Right: No swelling, bleeding, inverted nipple, mass, nipple discharge, skin change, tenderness, axillary adenopathy or supraclavicular adenopathy  Left: No swelling, bleeding, inverted nipple, mass, nipple discharge, skin change, tenderness, axillary adenopathy or supraclavicular adenopathy  Comments: Right lumpectomy scar  No masses, nodularity, skin changes, nipple changes or discharge, or adenopathy appreciated on physical exam      Abdominal:      General: Abdomen is flat  Palpations: Abdomen is soft  Lymphadenopathy:      Upper Body:      Right upper body: No supraclavicular, axillary or pectoral adenopathy  Left upper body: No supraclavicular, axillary or pectoral adenopathy  Skin:     General: Skin is warm  Neurological:      General: No focal deficit present  Mental Status: She is alert and oriented to person, place, and time  Psychiatric:         Mood and Affect: Mood normal          Behavior: Behavior normal            Results:    Imaging  No results found  I reviewed the above imaging data  Advance Care Planning/Advance Directives:  Discussed disease status, cancer treatment plans and/or cancer treatment goals with the patient

## 2022-06-29 PROBLEM — M54.9 BACK PAIN: Status: ACTIVE | Noted: 2022-06-29

## 2022-06-30 ENCOUNTER — OFFICE VISIT (OUTPATIENT)
Dept: INTERNAL MEDICINE CLINIC | Facility: CLINIC | Age: 52
End: 2022-06-30
Payer: COMMERCIAL

## 2022-06-30 VITALS
SYSTOLIC BLOOD PRESSURE: 110 MMHG | HEIGHT: 62 IN | BODY MASS INDEX: 22.12 KG/M2 | DIASTOLIC BLOOD PRESSURE: 68 MMHG | OXYGEN SATURATION: 98 % | WEIGHT: 120.2 LBS | RESPIRATION RATE: 14 BRPM | TEMPERATURE: 97.1 F | HEART RATE: 76 BPM

## 2022-06-30 DIAGNOSIS — K59.01 SLOW TRANSIT CONSTIPATION: Primary | ICD-10-CM

## 2022-06-30 DIAGNOSIS — M54.50 LOW BACK PAIN, UNSPECIFIED BACK PAIN LATERALITY, UNSPECIFIED CHRONICITY, UNSPECIFIED WHETHER SCIATICA PRESENT: ICD-10-CM

## 2022-06-30 PROCEDURE — 99213 OFFICE O/P EST LOW 20 MIN: CPT | Performed by: INTERNAL MEDICINE

## 2022-06-30 PROCEDURE — 3008F BODY MASS INDEX DOCD: CPT

## 2022-06-30 NOTE — ASSESSMENT & PLAN NOTE
Patient has been having 1 week history of back pain  Patient states the back pain worsens with changes in movements  Patient states usually whenever she has back pain as result of running it typically improves within a few days, as is the 1st time that it has lingered on for a week    Patient has tried ice but nothing else     -continue conservative management  -recommended over-the-counter Biofreeze/lidocaine patch  -if patient's back pain does not improve can consider muscle relaxant such as Zanaflex  -patient will return to clinic in 2 weeks if there is no improvement

## 2022-06-30 NOTE — PROGRESS NOTES
Assessment/Plan:    Back pain  Patient has been having 1 week history of back pain  Patient states the back pain worsens with changes in movements  Patient states usually whenever she has back pain as result of running it typically improves within a few days, as is the 1st time that it has lingered on for a week  Patient has tried ice but nothing else     -continue conservative management  -recommended over-the-counter Biofreeze/lidocaine patch  -if patient's back pain does not improve can consider muscle relaxant such as Zanaflex  -patient will return to clinic in 2 weeks if there is no improvement    Slow transit constipation  Patient has been having left lower quadrant abdominal pain  Patient feels like this is related to her decreased episodes of bowel movements  Patient has been having difficulty with bowel movements and try to take a laxative to help     -encouraged continued fiber intake  -discussed starting Colace 50 mg b i d  As a stool softener  -return to clinic in 2 weeks       Diagnoses and all orders for this visit:    Slow transit constipation  -     docusate sodium (COLACE) 50 mg capsule; Take 1 capsule (50 mg total) by mouth 2 (two) times a day for 14 days    Low back pain, unspecified back pain laterality, unspecified chronicity, unspecified whether sciatica present          Subjective:      Patient ID: Juani Aguero is a 47 y o  female  Back Pain  This is a new problem  The current episode started in the past 7 days  The problem occurs daily  The problem is unchanged  The pain is present in the gluteal and lumbar spine  The quality of the pain is described as aching and cramping  The pain does not radiate  The pain is moderate  The pain is the same all the time  The symptoms are aggravated by bending, lying down and sitting   Pertinent negatives include no bladder incontinence, bowel incontinence, chest pain, leg pain, numbness, paresis, paresthesias, pelvic pain, perianal numbness, tingling or weakness  Risk factors include history of cancer  She has tried ice for the symptoms  The following portions of the patient's history were reviewed and updated as appropriate: allergies, current medications, past family history, past medical history, past social history, past surgical history and problem list     Review of Systems   Cardiovascular: Negative for chest pain  Gastrointestinal: Negative for bowel incontinence  Genitourinary: Negative for bladder incontinence and pelvic pain  Musculoskeletal: Positive for back pain  Neurological: Negative for tingling, weakness, numbness and paresthesias  Objective:      /68   Pulse 76   Temp (!) 97 1 °F (36 2 °C)   Resp 14   Ht 5' 2" (1 575 m)   Wt 54 5 kg (120 lb 3 2 oz)   SpO2 98%   BMI 21 98 kg/m²          Physical Exam  Vitals reviewed  Constitutional:       General: She is not in acute distress  Appearance: Normal appearance  HENT:      Head: Normocephalic and atraumatic  Nose: No congestion or rhinorrhea  Mouth/Throat:      Pharynx: Oropharynx is clear  Eyes:      General: No scleral icterus  Right eye: No discharge  Left eye: No discharge  Extraocular Movements: Extraocular movements intact  Conjunctiva/sclera: Conjunctivae normal       Pupils: Pupils are equal, round, and reactive to light  Cardiovascular:      Rate and Rhythm: Normal rate and regular rhythm  Pulses: Normal pulses  Heart sounds: Normal heart sounds  No murmur heard  No gallop  Pulmonary:      Effort: Pulmonary effort is normal  No respiratory distress  Breath sounds: No wheezing or rales  Abdominal:      General: Abdomen is flat  Bowel sounds are normal  There is no distension  Palpations: Abdomen is soft  Tenderness: There is no abdominal tenderness  Musculoskeletal:         General: No swelling, tenderness or deformity  Right lower leg: No edema        Left lower leg: No edema  Skin:     General: Skin is warm and dry  Capillary Refill: Capillary refill takes less than 2 seconds  Coloration: Skin is not pale  Findings: No bruising, erythema or rash  Neurological:      General: No focal deficit present  Mental Status: She is alert and oriented to person, place, and time  Cranial Nerves: No cranial nerve deficit  Motor: No weakness  Psychiatric:         Mood and Affect: Mood normal          Behavior: Behavior normal          Thought Content:  Thought content normal          Judgment: Judgment normal

## 2022-06-30 NOTE — ASSESSMENT & PLAN NOTE
Patient has been having left lower quadrant abdominal pain  Patient feels like this is related to her decreased episodes of bowel movements  Patient has been having difficulty with bowel movements and try to take a laxative to help     -encouraged continued fiber intake  -discussed starting Colace 50 mg b i d   As a stool softener  -return to clinic in 2 weeks

## 2022-07-12 ENCOUNTER — OFFICE VISIT (OUTPATIENT)
Dept: HEMATOLOGY ONCOLOGY | Facility: CLINIC | Age: 52
End: 2022-07-12
Payer: COMMERCIAL

## 2022-07-12 VITALS
TEMPERATURE: 98 F | DIASTOLIC BLOOD PRESSURE: 70 MMHG | HEIGHT: 62 IN | BODY MASS INDEX: 22.63 KG/M2 | HEART RATE: 84 BPM | WEIGHT: 123 LBS | RESPIRATION RATE: 14 BRPM | SYSTOLIC BLOOD PRESSURE: 110 MMHG | OXYGEN SATURATION: 98 %

## 2022-07-12 DIAGNOSIS — Z17.0 MALIGNANT NEOPLASM OF UPPER-INNER QUADRANT OF RIGHT BREAST IN FEMALE, ESTROGEN RECEPTOR POSITIVE (HCC): Primary | ICD-10-CM

## 2022-07-12 DIAGNOSIS — C50.211 MALIGNANT NEOPLASM OF UPPER-INNER QUADRANT OF RIGHT BREAST IN FEMALE, ESTROGEN RECEPTOR POSITIVE (HCC): Primary | ICD-10-CM

## 2022-07-12 PROCEDURE — 99214 OFFICE O/P EST MOD 30 MIN: CPT | Performed by: INTERNAL MEDICINE

## 2022-07-12 NOTE — PROGRESS NOTES
Hematology / Oncology Outpatient Follow Up Note    Milli Huang 47 y o  female :1970 [id number]         Date:  2022    Assessment / Plan:  A 58-year-old premenopausal woman with stage IA right breast cancer, grade 1, ER 95% positive, OH 90% positive, HER2 negative disease   She underwent lumpectomy with sentinel lymph node biopsy, resulting in LESLI    She is currently on adjuvant hormonal therapy with tamoxifen with excellent tolerance  She is still premenopausal   Therefore, I recommended her to continue tamoxifen 20 mg daily  Clinically, she has no evidence of recurrent disease  We discussed the future hormonal treatment options  If she did not have anymore menstrual bleeding over the next 4 months, I may test her estradiol when she would come back and see me in a year  If she is postmenopausal, she may switch over to aromatase inhibitor   She is in agreement with my recommendations           Subjective:      HPI:  A 48-year-old premenopausal woman who was found to have abnormality in her right breast, based on a screening mammography  Roman Del Angel, she underwent stereotactic right breast biopsy in May 29, 2018 which showed invasive ductal carcinoma, grade 1   Subsequently, she had genetic testing which was negative for BRCA gene mutation   She elected to have lumpectomy which she did in July 10, 2018 by Dr Mario Rodneyanil Jacob had 9 x 6 mm of invasive ductal carcinoma, grade 1  Sharon Has is no evidence of lymphovascular invasion   One sentinel lymph nodes was negative for metastatic disease   This was ER 95% positive, OH 90% positive, HER2 negative disease   She presents today to discuss adjuvant treatment options   She feels well   She has no complaint of pain   She has regular menstrual cycle   She has no respiratory symptoms   She has no other past medical history   She does not take any medications   Her performance status is normal            Interval History:  A 58-year-old premenopausal woman with stage IA right breast cancer, grade 1, ER 95% positive, OK 90% positive, HER2 negative disease   She underwent lumpectomy with sentinel lymph node biopsy, resulting in LESLI    She started adjuvant hormonal therapy with tamoxifen July 2018  She presents today for routine follow-up  Over the last 12 months, she only had 1 menstrual bleeding which occurred in March 2022  She has more hot flashes and before  Otherwise, she is tolerating tamoxifen very well  She has no complaint of bone pain  Her weight is stable  She has no respiratory symptoms  Her performance status is normal      Objective:      Primary Diagnosis:     Right breast cancer, stage IA (pT1b, pN0, M0) grade 1, ER 95% positive, OK 90% positive, HER2 negative disease   Diagnosed in July 2018      Cancer Staging:  Cancer Staging  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 breast in female, estrogen receptor positive ([facility] [address] )  Staging form: Breast, AJCC 8th Edition  - Clinical: Stage IA (cT1b, cN0(sn), cM0, G1, ER: Positive, OK: Positive, HER2: Negative) - Unsigned           Previous Hematologic/ Oncologic Treatment:            Current Hematologic/ Oncologic Treatment:       Adjuvant hormonal therapy with tamoxifen since July 2018      Disease Status:      LESLI status post lumpectomy with sentinel lymph node biopsy      Test Results:     Pathology:     9 x 6 mm of invasive ductal carcinoma, grade 1   No evidence of lymphovascular invasion   One sentinel lymph nodes were negative for metastatic disease   ER 95% positive, OK 90% positive, HER2 negative disease   Stage IA (pT1b, pN0, M0)     Radiology:     Mammography in May 2022 was benign   BI-RADS 2       Laboratory:     See below      Physical Exam:        General Appearance:    Alert, oriented          Eyes:    PERRL   Ears:    Normal external ear canals, both ears   Nose:   Nares normal, septum midline   Throat:   Mucosa moist  Pharynx without injection      Neck:   Supple         Lungs:     Clear to auscultation bilaterally   Chest Wall:    No tenderness or deformity    Heart:    Regular rate and rhythm         Abdomen:     Soft, non-tender, bowel sounds +, no organomegaly               Extremities:   Extremities no cyanosis or edema         Skin:   no rash or icterus  Lymph nodes:   Cervical, supraclavicular, and axillary nodes normal   Neurologic:   CNII-XII intact, normal strength, sensation and reflexes     Throughout             Breast exam: Lumpectomy scar at inner aspect of her right breast with no palpable abnormality   Left breast exam is negative                  ROS: Review of Systems   All other systems reviewed and are negative  Imaging: No results found  Labs:   Lab Results   Component Value Date    WBC 7 58 08/21/2018    HGB 14 1 08/21/2018    HCT 40 8 08/21/2018    MCV 92 08/21/2018     08/21/2018     Lab Results   Component Value Date    K 4 3 10/29/2021     10/29/2021    CO2 28 10/29/2021    BUN 14 10/29/2021    CREATININE 0 96 10/29/2021    GLUF 87 10/29/2021    CALCIUM 9 0 10/29/2021    AST 28 10/29/2021    ALT 42 10/29/2021    ALKPHOS 71 10/29/2021    EGFR 68 10/29/2021           Current Medications: Reviewed  Allergies: Reviewed  PMH/FH/SH:  Reviewed      Vital Sign:    Body surface area is 1 55 meters squared      Wt Readings from Last 3 Encounters:   07/12/22 55 8 kg (123 lb)   06/30/22 54 5 kg (120 lb 3 2 oz)   06/21/22 55 8 kg (123 lb)        Temp Readings from Last 3 Encounters:   07/12/22 98 °F (36 7 °C) (Temporal)   06/30/22 (!) 97 1 °F (36 2 °C)   06/21/22 (!) 97 1 °F (36 2 °C)        BP Readings from Last 3 Encounters:   07/12/22 110/70   06/30/22 110/68   06/21/22 112/70         Pulse Readings from Last 3 Encounters:   07/12/22 84   06/30/22 76   06/21/22 68     @LASTSAO2(3)@

## 2022-09-01 ENCOUNTER — TELEMEDICINE (OUTPATIENT)
Dept: INTERNAL MEDICINE CLINIC | Facility: CLINIC | Age: 52
End: 2022-09-01
Payer: COMMERCIAL

## 2022-09-01 DIAGNOSIS — U07.1 COVID-19: Primary | ICD-10-CM

## 2022-09-01 DIAGNOSIS — J01.00 ACUTE NON-RECURRENT MAXILLARY SINUSITIS: ICD-10-CM

## 2022-09-01 PROCEDURE — 99213 OFFICE O/P EST LOW 20 MIN: CPT | Performed by: NURSE PRACTITIONER

## 2022-09-01 RX ORDER — AMOXICILLIN AND CLAVULANATE POTASSIUM 875; 125 MG/1; MG/1
1 TABLET, FILM COATED ORAL EVERY 12 HOURS SCHEDULED
Qty: 14 TABLET | Refills: 0 | Status: SHIPPED | OUTPATIENT
Start: 2022-09-01 | End: 2022-09-08

## 2022-09-01 NOTE — PROGRESS NOTES
COVID-19 Outpatient Progress Note    Assessment/Plan:    Problem List Items Addressed This Visit    None     Visit Diagnoses     COVID-19    -  Primary    Acute non-recurrent maxillary sinusitis        Relevant Medications    amoxicillin-clavulanate (AUGMENTIN) 875-125 mg per tablet         Disposition:     Patient with moderate or severe illness or has a weakened immune system  They should isolate from others through at least day 10  Isolation can be ended if symptoms are improving and they are fever free for the past 24 hours  If they still have fever or other symptoms have not improved, continue to isolate until they improve  Regardless of when you isolation is ended, avoid being around people who are more likely to get very sick from COVID-19 until at least day 11  Clinically stable however having s/s secondary sinus infection  Add augmentin  Will call with update on 9/6  Supportive care per discussion    I have spent 10 minutes directly with the patient  Greater than 50% of this time was spent in counseling/coordination of care regarding: prognosis, risks and benefits of treatment options, instructions for management, patient and family education and impressions  Encounter provider: Brandt Ramirez [address] [place] [address]     Provider located at: [address] [place] [phone]     Recent Visits  No visits were found meeting these conditions  Showing recent visits within past 7 days and meeting all other requirements  Today's Visits  Date Type Provider Dept   09/01/22 Telemedicine Franklyn Burns today's visits and meeting all other requirements  Future Appointments  No visits were found meeting these conditions    Showing future appointments within next 150 days and meeting all other requirements     This virtual check-in was done via [employer] and patient was informed that this is a secure, HIPAA-compliant platform  She agrees to proceed  Patient agrees to participate in a virtual check in via telephone or video visit instead of presenting to the office to address urgent/immediate medical needs  Patient is aware this is a billable service  She acknowledged consent and understanding of privacy and security of the video platform  The patient has agreed to participate and understands they can discontinue the visit at any time  After connecting through [facility], the patient was identified by name and date of birth  Rodri Bacon was informed that this was a telemedicine visit and that the exam was being conducted confidentially over secure lines  My office door was closed  No one else was in the room  Rodri Bacon acknowledged consent and understanding of privacy and security of the telemedicine visit  I informed the patient that I have reviewed her record in Epic and presented the opportunity for her to ask any questions regarding the visit today  The patient agreed to participate  Verification of patient location:  Patient is located in the following state in which I hold an active license: [place]    Subjective:   Rodri Bacon is a 47 y o  female who has been screened for COVID-19  Symptom change since last report: worsening  Patient's symptoms include fatigue, nasal congestion, sore throat and headache  Patient denies fever, cough, shortness of breath, chest tightness, abdominal pain and diarrhea  - Date of symptom onset: 8/24/2022  - Date of positive COVID-19 test: 8/27/2022  Type of test: Home antigen  Patient with typical symptoms of COVID-19 and they attest that they were positive on home rapid antigen testing  Image of positive result is not able to be uploaded into their chart  COVID-19 vaccination status: Fully vaccinated with booster    Denise has been staying home and has isolated themselves in her home   She is taking care to not share personal items and is cleaning all surfaces that are touched often, like counters, tabletops, and doorknobs using household cleaning sprays or wipes  She is wearing a mask when she leaves her room  Lab Results   Component Value Date    SARSCOV2 NOT DETECTED 08/30/2021     Past Medical History:   Diagnosis Date    BRCA1 negative     BRCA2 negative     Breast cancer ([facility] [address] ) 05/2018    right breast    Colon polyp     Fibrocystic breast     History of radiation therapy 2018    right breast ca    Seasonal allergies     Vertigo      Past Surgical History:   Procedure Laterality Date    BREAST BIOPSY Right 05/2018    Positive    BREAST BIOPSY Left     Neg around age 28    BREAST BIOPSY Left 11/132019    benign    BREAST BIOPSY Right 11/29/2021    BREAST LUMPECTOMY Right 07/2018    with radiation    EMBOLECTOMY      s/p forceps delivery    MAMMO NEEDLE LOCALIZATION RIGHT (ALL INC) Right 7/10/2018    MAMMO STEREOTACTIC BREAST BIOPSY RIGHT (ALL INC) Right 5/29/2018    AK BIOPSY/EXCISION, LYMPH NODE(S) Right 7/10/2018    Procedure: SENTINEL LYMPH NODE BIOPSY; LYMPHATIC MAPPING WITH BLUE DYE RADIOACTIVE DYE (INJECT AT 0800 BY DR CHAUDHARY IN THE OR); Surgeon: Hollie Gaming MD;  [facility];  Service: Surgical Oncology    AK PERQ DEVICE PLACEMT BREAST LOC 1ST LES W GUIDNCE Right 7/10/2018    Procedure: BREAST LUMPECTOMY; BREAST NEEDLE LOCALIZATION (NEEDLE LOC AT 0700);   Surgeon: Hollie Gaming MD;  [facility];  Service: Surgical Oncology    US GUIDED BREAST BIOPSY LEFT COMPLETE Left 11/13/2019    US GUIDED BREAST BIOPSY RIGHT COMPLETE Right 5/29/2018    US GUIDED BREAST BIOPSY RIGHT COMPLETE Right 11/29/2021     Current Outpatient Medications   Medication Sig Dispense Refill    amoxicillin-clavulanate (AUGMENTIN) 875-125 mg per tablet Take 1 tablet by mouth every 12 (twelve) hours for 7 days 14 tablet 0    clobetasol (TEMOVATE) 0 05 % cream Apply topically 2 (two) times a day (Patient not taking: No sig reported) 60 g 0    docusate sodium (COLACE) 50 mg capsule Take 1 capsule (50 mg total) by mouth 2 (two) times a day for 14 days 28 capsule 0    loratadine (CLARITIN) 5 MG chewable tablet Chew 5 mg daily      Multiple Vitamin (MULTIVITAMIN ADULT PO) Take 1 tablet by mouth      tamoxifen (NOLVADEX) 20 mg tablet TAKE 1 TABLET(20 MG) BY MOUTH DAILY 90 tablet 1     No current facility-administered medications for this visit  No Known Allergies    Review of Systems   Constitutional: Positive for fatigue  Negative for fever  HENT: Positive for congestion, sinus pressure, sinus pain and sore throat  Purulent nasal discharge   Respiratory: Negative for cough, chest tightness and shortness of breath  Cardiovascular: Negative for chest pain  Gastrointestinal: Negative for abdominal pain and diarrhea  Neurological: Positive for headaches  Negative for dizziness and weakness  Objective: There were no vitals filed for this visit  Physical Exam  Constitutional:       General: She is not in acute distress  Appearance: Normal appearance  She is not ill-appearing  Neurological:      Mental Status: She is alert

## 2022-10-31 ENCOUNTER — RA CDI HCC (OUTPATIENT)
Dept: OTHER | Facility: HOSPITAL | Age: 52
End: 2022-10-31

## 2022-10-31 NOTE — PROGRESS NOTES
[facility]  coding opportunities       Chart reviewed, no opportunity found: CHART REVIEWED, NO OPPORTUNITY FOUND        Patients Insurance        Commercial Insurance: [address] [place] [address]

## 2022-11-07 ENCOUNTER — OFFICE VISIT (OUTPATIENT)
Dept: INTERNAL MEDICINE CLINIC | Facility: CLINIC | Age: 52
End: 2022-11-07

## 2022-11-07 VITALS
BODY MASS INDEX: 21.75 KG/M2 | HEIGHT: 62 IN | HEART RATE: 69 BPM | DIASTOLIC BLOOD PRESSURE: 78 MMHG | OXYGEN SATURATION: 99 % | SYSTOLIC BLOOD PRESSURE: 116 MMHG | WEIGHT: 118.2 LBS

## 2022-11-07 DIAGNOSIS — Z01.89 NEED FOR PHYSICAL THERAPY ASSESSMENT: ICD-10-CM

## 2022-11-07 DIAGNOSIS — Z23 NEED FOR COVID-19 VACCINE: ICD-10-CM

## 2022-11-07 DIAGNOSIS — Z13.6 SCREENING FOR CARDIOVASCULAR CONDITION: ICD-10-CM

## 2022-11-07 DIAGNOSIS — M54.50 CHRONIC LOW BACK PAIN WITHOUT SCIATICA, UNSPECIFIED BACK PAIN LATERALITY: ICD-10-CM

## 2022-11-07 DIAGNOSIS — G89.29 CHRONIC LOW BACK PAIN WITHOUT SCIATICA, UNSPECIFIED BACK PAIN LATERALITY: ICD-10-CM

## 2022-11-07 DIAGNOSIS — K63.5 POLYP OF COLON, UNSPECIFIED PART OF COLON, UNSPECIFIED TYPE: ICD-10-CM

## 2022-11-07 DIAGNOSIS — Z23 ENCOUNTER FOR IMMUNIZATION: ICD-10-CM

## 2022-11-07 DIAGNOSIS — Z23 NEED FOR VACCINATION: ICD-10-CM

## 2022-11-07 DIAGNOSIS — Z00.00 WELLNESS EXAMINATION: Primary | ICD-10-CM

## 2022-11-07 DIAGNOSIS — Z13.1 SCREENING FOR DIABETES MELLITUS: ICD-10-CM

## 2022-11-07 NOTE — PROGRESS NOTES
[facility]    NAME: Mahnaz Coleman  AGE: 47 y o  SEX: female  : 1970     DATE: 2022     Assessment and Plan:     Problem List Items Addressed This Visit        Other    Wellness examination - Primary     Assessment and plan 1  Health maintenance annual wellness examination overall the patient is clinically stable and doing well, we encouraged the patient to follow a healthy and balanced diet  We recommend that the patient exercise routinely approximately 30 minutes 5 times per week   We have reviewed the patient's vaccines and have made recommendations for updates if necessary influenza vaccine, COVID booster when ready       We will be ordering screening laboratories which are age appropriate  Return to the office in    1 year  call if any problems  Screening for cardiovascular condition    Relevant Orders    Comprehensive metabolic panel    Lipid Panel with Direct LDL reflex    Need for vaccination    Relevant Orders    influenza vaccine, quadrivalent, recombinant, PF, 0 5 mL, for patients 18 yr+ (FLUBLOK) (Completed)    Chronic low back pain     Suspect degenerative disc disease core muscle strengthening range-of-motion exercises patient would prefer not to take any medicines at this point time will continue monitor         Relevant Orders    XR spine lumbar minimum 4 views non injury      Other Visit Diagnoses     Encounter for immunization        Need for physical therapy assessment        Screening for diabetes mellitus        Relevant Orders    Hemoglobin A1C    Need for COVID-19 vaccine        Relevant Orders    Covid Vaccine at Practice    Polyp of colon, unspecified part of colon, unspecified type        Relevant Orders    Ambulatory referral to Gastroenterology        lowewr back pain previous injury ddd , worse in bed  Immunizations and preventive care screenings were discussed with patient today  Appropriate education was printed on patient's after visit summary  Counseling:  Exercise: the importance of regular exercise/physical activity was discussed  Recommend exercise 3-5 times per week for at least 30 minutes  Return in about 6 months (around 5/7/2023)  Chief Complaint:     Chief Complaint   Patient presents with   • Physical Exam      History of Present Illness:     Adult Annual Physical   Patient here for a comprehensive physical exam  The patient repots chronic intermittent lower back pain no radiation  Diet and Physical Activity  Diet/Nutrition: well balanced diet  Exercise: vigorous cardiovascular exercise  Depression Screening  PHQ-2/9 Depression Screening    Little interest or pleasure in doing things: 0 - not at all  Feeling down, depressed, or hopeless: 0 - not at all       General Health  Sleep: sleeps well  Hearing: normal - bilateral   Vision: no vision problems  wears   Dental: regular dental visits  /GYN Health  Patient is: postmenopausal  Last menstrual period: march 2022  Contraceptive method: vasectomy     Review of Systems:     Review of Systems   Constitutional: Negative for activity change, appetite change and unexpected weight change  HENT: Negative for congestion and postnasal drip  Eyes: Negative for visual disturbance  Respiratory: Negative for cough and shortness of breath  Cardiovascular: Negative for chest pain  Gastrointestinal: Negative for abdominal pain, diarrhea, nausea and vomiting  Musculoskeletal: Positive for back pain (Chronic)  Neurological: Negative for dizziness, light-headedness and headaches        Past Medical History:     Past Medical History:   Diagnosis Date   • BRCA1 negative    • BRCA2 negative    • Breast cancer ([facility] ) 05/2018    right breast   • Cancer ([facility] ) 5/10/2018   • Colon polyp    • Fibrocystic breast    • History of radiation therapy 2018    right breast ca   • Seasonal allergies    • Vertigo Past Surgical History:     Past Surgical History:   Procedure Laterality Date   • BREAST BIOPSY Right 05/2018    Positive   • BREAST BIOPSY Left     Neg around age 28   • BREAST BIOPSY Left 11/132019    benign   • BREAST BIOPSY Right 11/29/2021   • BREAST LUMPECTOMY Right 07/2018    with radiation   • EMBOLECTOMY      s/p forceps delivery   • MAMMO NEEDLE LOCALIZATION RIGHT (ALL INC) Right 7/10/2018   • MAMMO STEREOTACTIC BREAST BIOPSY RIGHT (ALL INC) Right 5/29/2018   • IL BIOPSY/EXCISION, LYMPH NODE(S) Right 7/10/2018    Procedure: SENTINEL LYMPH NODE BIOPSY; LYMPHATIC MAPPING WITH BLUE DYE RADIOACTIVE DYE (INJECT AT 0800 BY DR CHAUDHARY IN THE OR); Surgeon: Ledy Montemayor MD;  Location[facility];  Service: Surgical Oncology   • IL PERQ DEVICE PLACEMT BREAST LOC 1ST LES W GUIDNCE Right 7/10/2018    Procedure: BREAST LUMPECTOMY; BREAST NEEDLE LOCALIZATION (NEEDLE LOC AT 0700); Surgeon: Ledy Montemayor MD;  Location[facility];  Service: Surgical Oncology   • US GUIDED BREAST BIOPSY LEFT COMPLETE Left 11/13/2019   • US GUIDED BREAST BIOPSY RIGHT COMPLETE Right 5/29/2018   • US GUIDED BREAST BIOPSY RIGHT COMPLETE Right 11/29/2021      Social History:     Social History     Socioeconomic History   • Marital status: /Civil Union     Spouse name: None   • Number of children: 2   • Years of education: None   • Highest education level: None   Occupational History   • None   Tobacco Use   • Smoking status: Never Smoker   • Smokeless tobacco: Never Used   • Tobacco comment: only smoked socially as teenager for 1 yr   Vaping Use   • Vaping Use: Never used   Substance and Sexual Activity   • Alcohol use:  Yes     Alcohol/week: 2 0 standard drinks     Types: 2 Glasses of wine per week     Comment: Social drinker   • Drug use: No   • Sexual activity: Yes     Partners: Male     Birth control/protection: Male Sterilization   Other Topics Concern   • None   Social History Narrative   • None     Social Determinants of Health Financial Resource Strain: Not on file   Food Insecurity: Not on file   Transportation Needs: Not on file   Physical Activity: Not on file   Stress: Not on file   Social Connections: Not on file   Intimate Partner Violence: Not on file   Housing Stability: Not on file      Family History:     Family History   Problem Relation Age of Onset   • Prostate cancer Father    • Breast cancer Maternal Aunt 55   • Ovarian cancer Maternal Aunt    • Cancer Maternal Aunt    • Colon cancer Maternal Grandmother    • Esophageal cancer Maternal Grandfather    • No Known Problems Sister    • No Known Problems Daughter    • Skin cancer Paternal Aunt       Current Medications:     Current Outpatient Medications   Medication Sig Dispense Refill   • loratadine (CLARITIN) 5 MG chewable tablet Chew 5 mg daily     • tamoxifen (NOLVADEX) 20 mg tablet TAKE 1 TABLET(20 MG) BY MOUTH DAILY 90 tablet 1   • clobetasol (TEMOVATE) 0 05 % cream Apply topically 2 (two) times a day (Patient not taking: No sig reported) 60 g 0   • Multiple Vitamin (MULTIVITAMIN ADULT PO) Take 1 tablet by mouth (Patient not taking: Reported on 11/7/2022)       No current facility-administered medications for this visit  Allergies:     No Known Allergies   Physical Exam:     /78   Pulse 69   Ht 5' 2" (1 575 m)   Wt 53 6 kg (118 lb 3 2 oz)   SpO2 99%   BMI 21 62 kg/m²     Physical Exam  Vitals and nursing note reviewed  Constitutional:       General: She is not in acute distress  Appearance: She is well-developed  She is not ill-appearing, toxic-appearing or diaphoretic  HENT:      Head: Normocephalic and atraumatic  Right Ear: External ear normal       Left Ear: External ear normal       Nose: Nose normal    Eyes:      Pupils: Pupils are equal, round, and reactive to light  Cardiovascular:      Rate and Rhythm: Normal rate and regular rhythm  Heart sounds: Normal heart sounds  No murmur heard    Pulmonary:      Effort: Pulmonary effort is normal       Breath sounds: Normal breath sounds  Abdominal:      General: There is no distension  Palpations: Abdomen is soft  Tenderness: There is no abdominal tenderness  There is no guarding     Neurological:      Mental Status: She is alert       negative straight leg raising    DO TRUDY Beavers [place] [id number]

## 2022-11-07 NOTE — ASSESSMENT & PLAN NOTE
Assessment and plan 1  Health maintenance annual wellness examination overall the patient is clinically stable and doing well, we encouraged the patient to follow a healthy and balanced diet  We recommend that the patient exercise routinely approximately 30 minutes 5 times per week   We have reviewed the patient's vaccines and have made recommendations for updates if necessary influenza vaccine, COVID booster when ready       We will be ordering screening laboratories which are age appropriate  Return to the office in    1 year  call if any problems

## 2022-11-07 NOTE — ASSESSMENT & PLAN NOTE
Suspect degenerative disc disease core muscle strengthening range-of-motion exercises patient would prefer not to take any medicines at this point time will continue monitor

## 2022-11-09 ENCOUNTER — PREP FOR PROCEDURE (OUTPATIENT)
Dept: GASTROENTEROLOGY | Facility: AMBULARY SURGERY CENTER | Age: 52
End: 2022-11-09

## 2022-11-09 ENCOUNTER — HOSPITAL ENCOUNTER (OUTPATIENT)
Dept: ULTRASOUND IMAGING | Facility: CLINIC | Age: 52
Discharge: HOME | End: 2022-11-09

## 2022-11-09 VITALS — WEIGHT: 118 LBS | BODY MASS INDEX: 21.71 KG/M2 | HEIGHT: 62 IN

## 2022-11-09 DIAGNOSIS — R92.2 DENSE BREAST TISSUE: ICD-10-CM

## 2022-11-09 DIAGNOSIS — Z86.010 HISTORY OF COLON POLYPS: ICD-10-CM

## 2022-11-09 DIAGNOSIS — Z12.11 SPECIAL SCREENING FOR MALIGNANT NEOPLASMS, COLON: Primary | ICD-10-CM

## 2022-11-16 ENCOUNTER — CONSULT (OUTPATIENT)
Dept: OBGYN CLINIC | Facility: CLINIC | Age: 52
End: 2022-11-16

## 2022-11-16 VITALS — SYSTOLIC BLOOD PRESSURE: 122 MMHG | WEIGHT: 120.6 LBS | BODY MASS INDEX: 22.06 KG/M2 | DIASTOLIC BLOOD PRESSURE: 80 MMHG

## 2022-11-16 DIAGNOSIS — G47.9 SLEEP DISTURBANCE: ICD-10-CM

## 2022-11-16 DIAGNOSIS — N95.1 PERIMENOPAUSAL SYMPTOMS: Primary | ICD-10-CM

## 2022-11-16 DIAGNOSIS — R63.5 WEIGHT GAIN: ICD-10-CM

## 2022-11-16 PROBLEM — Z01.419 ENCOUNTER FOR GYNECOLOGICAL EXAMINATION (GENERAL) (ROUTINE) WITHOUT ABNORMAL FINDINGS: Status: RESOLVED | Noted: 2018-10-16 | Resolved: 2022-11-16

## 2022-11-16 PROBLEM — Z00.00 WELLNESS EXAMINATION: Status: RESOLVED | Noted: 2019-09-13 | Resolved: 2022-11-16

## 2022-11-16 NOTE — PROGRESS NOTES
Assessment/Plan:       Diagnoses and all orders for this visit:    Perimenopausal symptoms    Sleep disturbance    Weight gain        1) We discussed the physiology of the menopausal transition and many of the classic symptoms often associated with this  She is still perimenopausal by symptom and not a year of amenorrhea yet  Perimenopausal women have very high and wide estradiol/progesterone swings with estrogen dominance, perhaps one of the reasons for her early breast cancer diagnosis ( although IMHO this is still a genetic issue, just not identified yet)  2) I warned her that I will never be able to offer her  "conventional " HRT with estradiol or progesterone EVER, but can entertain other adrenal axis hormones ONLY with her oncologists blessing  3) She is fearful of side effects, was given an antidepressant previously  I warned her not to discount therapy based on rare side effects  Every medical decision has side effects as well as risks, but as long as benefits outweigh the risks, it would be worth it to try  4) Non hormonal botanical supplements suggested: Evening Primrose Oil, DIM, soy, black cohosh  I warned her that medical studies do not support effectiveness, but the risks are extremely low and worth a try  5) Effexor and Paxil also have indications for hot flash relief and are used by many breast cancer survivors and would be an appropriate next step  Some COMT SNP patients are discouraged from using Paxil, so be warned, but I do not suspect she has this SNP?  6) We discussed adrenal axis hormones in detail, specifically DHEA and testosterone  I treat many breast cancer survivors with testosterone with aromatase inhibitors quite successfully without jeopardizing cancer therapy  7) Testosterone supplementation discussed in detail, including lack of FDA approval for women and cost concerns, as insurance will not reimburse   Side effects include: increased libido, increased muscle mass, decreased fat mass, erythrocytosis ( NOT polycythemia rubra), increased energy, acne, increased hair growth or loss  A baseline total testosterone level would be needed before starting this and labs monitored every 6 months  8) I warned her that she would need her oncologists blessing before starting this, and will be forwarding my notes to her oncology team   9) [facility] how the supplements are working and if hormonal therapy is desired  This was a 60 minute visit with greater than 50% of time spent in face to face counseling and coordination of care    Subjective:      Patient ID: Mary Cavanaugh is a 47 y o  female  Eliel Huber presents for hormonal consultation, her first visit with me, self referred, but sees Dr Wilver Hurt for gyn care  Eliel Huber was diagnosed with Stage 1a breast cancer, ER+NV+ Her2- in 2018 s/p lumpectomy and tamoxifen therapy for 5 years, scheduled to end 7/23  Genetic testing negative  Periods are very sporadic, LMP 3/22  She complains of:  1) Hot flashes and night sweats, so bad she has had to do ice packs to neck and pulse points for relief  These are intermittent and have not been as bad lately  2) Poor sleep, due to premature awakenings due to night sweat  4) Weight gain  She feels many body changes, with weight up 10 lbs despite intermittent fasting, KETO food plan, consistent exercise  PMHX: as above  FHX: mom healthy; MGM with gastric CA, MGF with throat CA  Mat aunt with ovarian and breast cancer age 54 ( her genetic testing also neg)  Dad with prostate CA and recent Parkinsons  PGM stroke; PGF unknown  3 siblings without health concern  2 kids healthy        Review of Systems   Constitutional: Positive for fatigue and unexpected weight change  Negative for activity change and appetite change  Gastrointestinal: Positive for constipation  Endocrine: Positive for heat intolerance  Genitourinary: Negative for dyspareunia and menstrual problem     Psychiatric/Behavioral: Positive for sleep disturbance           Objective:      /80 (BP Location: Left arm, Patient Position: Sitting, Cuff Size: Standard)   Wt 54 7 kg (120 lb 9 6 oz)   BMI 22 06 kg/m²          Physical Exam

## 2022-11-16 NOTE — LETTER
November 16, 2022     Noah Nicole  [phone]    Patient: Licha Chong   YOB: 1970   Date of Visit: 11/16/2022       Dear Dr Gómez Offer: Thank you for referring Licha Chong to me for evaluation  Below are my notes for this consultation  If you have questions, please do not hesitate to call me  I look forward to following your patient along with you  Sincerely,        Torres Tejada MD        CC: MD Torres Pulliam MD  11/16/2022 12:30 PM  Incomplete  Assessment/Plan:      Diagnoses and all orders for this visit:    Perimenopausal symptoms    Sleep disturbance    Weight gain       1) We discussed the physiology of the menopausal transition and many of the classic symptoms often associated with this  She is still perimenopausal by symptom and not a year of amenorrhea yet  Perimenopausal women have very high and wide estradiol/progesterone swings with estrogen dominance, perhaps one of the reasons for her early breast cancer diagnosis ( although IMHO this is still a genetic issue, just not identified yet)  2) I warned her that I will never be able to offer her  "conventional " HRT with estradiol or progesterone EVER, but can entertain other adrenal axis hormones ONLY with her oncologists blessing  3) She is fearful of side effects, was given an antidepressant previously  I warned her not to discount therapy based on rare side effects  Every medical decision has side effects as well as risks, but as long as benefits outweigh the risks, it would be worth it to try  4) Non hormonal botanical supplements suggested: Evening Primrose Oil, DIM, soy, black cohosh  I warned her that medical studies do not support effectiveness, but the risks are extremely low and worth a try    5) Effexor and Paxil also have indications for hot flash relief and are used by many breast cancer survivors and would be an appropriate next step  Some COMT SNP patients are discouraged from using Paxil, so be warned, but I do not suspect she has this SNP?  6) We discussed adrenal axis hormones in detail, specifically DHEA and testosterone  I treat many breast cancer survivors with testosterone with aromatase inhibitors quite successfully without jeopardizing cancer therapy  7) Testosterone supplementation discussed in detail, including lack of FDA approval for women and cost concerns, as insurance will not reimburse  Side effects include: increased libido, increased muscle mass, decreased fat mass, erythrocytosis ( NOT polycythemia rubra), increased energy, acne, increased hair growth or loss  A baseline total testosterone level would be needed before starting this and labs monitored every 6 months  8) I warned her that she would need her oncologists blessing before starting this, and will be forwarding my notes to her oncology team   9) [facility] to how the supplements are working and if hormonal therapy is desired  This was a 60 minute visit with greater than 50% of time spent in face to face counseling and coordination of care    Subjective:     Patient ID: Henry Daugherty is a 47 y o  female  Patty Meyer presents for hormonal consultation, her first visit with me, self referred, but sees Dr Reginald Hackett for gyn care  Patty Meyer was diagnosed with Stage 1a breast cancer, ER+PA+ Her2- in 2018 s/p lumpectomy and tamoxifen therapy for 5 years, scheduled to end 7/23  Genetic testing negative  Periods are very sporadic, LMP 3/22  She complains of:  1) Hot flashes and night sweats, so bad she has had to do ice packs to neck and pulse points for relief  These are intermittent and have not been as bad lately  2) Poor sleep, due to premature awakenings due to night sweat  4) Weight gain  She feels many body changes, with weight up 10 lbs despite intermittent fasting, KETO food plan, consistent exercise     PMHX: as above  FHX: mom healthy; MGM with gastric CA, MGF with throat CA  Mat aunt with ovarian and breast cancer age 54 ( her genetic testing also neg)  Dad with prostate CA and recent Parkinsons  PGM stroke; PGF unknown  3 siblings without health concern  2 kids healthy        Review of Systems   Constitutional: Positive for fatigue and unexpected weight change  Negative for activity change and appetite change  Gastrointestinal: Positive for constipation  Endocrine: Positive for heat intolerance  Genitourinary: Negative for dyspareunia and menstrual problem  Psychiatric/Behavioral: Positive for sleep disturbance  Objective:      /80 (BP Location: Left arm, Patient Position: Sitting, Cuff Size: Standard)   Wt 54 7 kg (120 lb 9 6 oz)   BMI 22 06 kg/m²         Physical Exam     Adele Peter MD  11/16/2022 11:35 AM  Incomplete  Assessment/Plan:      Diagnoses and all orders for this visit:    Perimenopausal symptoms    Sleep disturbance    Weight gain       1) We discussed the physiology of the menopausal transition and many of the classic symptoms often associated with this She is still perimenopausal by symptom and not a year of amenorrhea yet  Perimenopausal women have very high and wide estradiol/progesterone swings with estrogen dominance, perhaps one of the reasons for her early breast cancer diagnosis ( although IMHO this is still a genetic issue, just not identified yet  2)   Subjective:     Patient ID: Pierce Hope is a 47 y o  female  Jessica Costa presents for hormonal consultation, her first visit with me, self referred, but sees Dr Armida Haq for gyn care  Jessica Costa was diagnosed with Stage 1a breast cancer, ER+AZ+ Her2- in 2018 s/p lumpectomy and tamoxifen therapy for 5 years, scheduled to end 7/23  Genetic testing negative  Periods are very sporadic, LMP 3/22  She complains of:  1) Hot flashes and night sweats, so bad she has had to do ice packs to neck and pulse points for relief   These are intermittent and have not been as bad lately  2) Poor sleep, due to premature awakenings due to night sweat  4) Weight gain  She feels many body changes, with weight up 10 lbs despite intermittent fasting, KETO food plan, consistent exercise  PMHX: as above  FHX: mom healthy; MGM with gastric CA, MGF with throat CA  Mat aunt with ovarian and breast cancer age 54 ( her genetic testing also neg)  Dad with prostate CA and recent Parkinsons  PGM stroke; PGF unknown  3 siblings without health concern  2 kids healthy        Review of Systems   Constitutional: Positive for fatigue and unexpected weight change  Negative for activity change and appetite change  Gastrointestinal: Positive for constipation  Endocrine: Positive for heat intolerance  Genitourinary: Negative for dyspareunia and menstrual problem  Psychiatric/Behavioral: Positive for sleep disturbance          Objective:      /80 (BP Location: Left arm, Patient Position: Sitting, Cuff Size: Standard)   Wt 54 7 kg (120 lb 9 6 oz)   BMI 22 06 kg/m²         Physical Exam

## 2022-11-29 ENCOUNTER — ANNUAL EXAM (OUTPATIENT)
Dept: OBGYN CLINIC | Facility: CLINIC | Age: 52
End: 2022-11-29

## 2022-11-29 VITALS
SYSTOLIC BLOOD PRESSURE: 110 MMHG | BODY MASS INDEX: 21.44 KG/M2 | DIASTOLIC BLOOD PRESSURE: 72 MMHG | HEIGHT: 63 IN | WEIGHT: 121 LBS

## 2022-11-29 DIAGNOSIS — Z01.419 ENCNTR FOR GYN EXAM (GENERAL) (ROUTINE) W/O ABN FINDINGS: Primary | ICD-10-CM

## 2022-11-29 DIAGNOSIS — Z17.0 MALIGNANT NEOPLASM OF UPPER-INNER QUADRANT OF RIGHT BREAST IN FEMALE, ESTROGEN RECEPTOR POSITIVE (HCC): ICD-10-CM

## 2022-11-29 DIAGNOSIS — C50.211 MALIGNANT NEOPLASM OF UPPER-INNER QUADRANT OF RIGHT BREAST IN FEMALE, ESTROGEN RECEPTOR POSITIVE (HCC): ICD-10-CM

## 2022-11-29 NOTE — PROGRESS NOTES
Jose Alfredo Solisty  1970      CC:  Yearly exam    S:  47 y o  female here for yearly exam   LMP March 2022, nothing since  Noe Fortune is having severe hot flashes and trouble sleeping  She did see Dr Linda Hernandez for consultation  Considering some supplementation to help  She has a personal history of breast cancer and is on tamoxifen  She denies vaginal discharge, some discomfort on occasional that feels similar to ovulation pain  Also has been having ongoing back pain  She has no urinary concerns - does not an increase in frequency, getting up 2-3 times a night - does tend to drink a lot in the evenings, does not have incontinence  No bowel concerns, fiber is helping with her constipation  No breast concerns  Sexual activity: She is sexually active without pain, bleeding or dryness  She is  and monogamous  She is not interested in STD screening today  Contraception: She uses vasectomy for contraception  Last Pap: 11/8/21 - NILM, Neg HPV  Last Mammo: 5/16/22 - BIRad 2 (surg onc orders)  Last Colonoscopy:  12/18/19 - poylps, 3yr recall (scheduled 2/2023)    We reviewed ASCCP guidelines for Pap testing today       Family hx of breast cancer: M Aunt  Family hx of ovarian cancer: M Aunt  Family hx of colon cancer: MGM      Current Outpatient Medications:   •  loratadine (CLARITIN) 5 MG chewable tablet, Chew 5 mg daily, Disp: , Rfl:   •  tamoxifen (NOLVADEX) 20 mg tablet, TAKE 1 TABLET(20 MG) BY MOUTH DAILY, Disp: 90 tablet, Rfl: 1     Patient Active Problem List   Diagnosis   • Benign paroxysmal vertigo   • Seasonal allergies   • Rash   • 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 breast in female, estrogen receptor positive ([facility] )   • Screening for human papillomavirus (HPV)   • Use of tamoxifen (Nolvadex)   • Rotator cuff syndrome of right shoulder   • Numbness and tingling of left lower extremity   • Vitamin D deficiency   • Screening for cardiovascular condition   • Need for vaccination   • Left inguinal pain   • Congestion of right ear   • Change in bowel habit   • Atypical chest pain   • Perimenopause   • Back pain   • Slow transit constipation   • Chronic low back pain     Past Medical History:   Diagnosis Date   • BRCA1 negative    • BRCA2 negative    • [facility]/2018    right breast   • Cancer [facility] ) 5/10/2018   • Colon polyp    • Fibrocystic breast    • History of radiation therapy 2018    right breast ca   • Seasonal allergies    • Vertigo      Family History   Problem Relation Age of Onset   • Prostate cancer Father    • Parkinsonism Father    • No Known Problems Sister    • No Known Problems Daughter    • Colon cancer Maternal Grandmother    • Esophageal cancer Maternal Grandfather    • Breast cancer Maternal Aunt 55   • Ovarian cancer Maternal Aunt    • Cancer Maternal Aunt    • Skin cancer Paternal Aunt           Review of Systems   Respiratory: Negative  Cardiovascular: Negative  Gastrointestinal: Negative for constipation and diarrhea  O:  Blood pressure 110/72, height 5' 3 39" (1 61 m), weight 54 9 kg (121 lb), last menstrual period 03/23/2022, not currently breastfeeding  Patient appears well and is not in distress  Breasts are without mass, tenderness, nipple discharge, skin changes or adenopathy  Right breast with lumpectomy and axillary scar  Abdomen is soft and nontender without masses  External genitals are normal without lesions or rashes  Urethral meatus and urethra are normal  Bladder is normal to palpation  Vagina is normal without discharge or bleeding  Cervix is large/parous, without lesion   Uterus is normal, mobile, nontender without palpable mass, anteverted  Adnexa are normal, nontender, without palpable mass  A:  Yearly exam      P:   Pap & HPV up to date   Mammo to be ordered by surg onc, she has appointment in December  Colon Cancer Screening scheduled for early 2023     Discussed watching how much she is drinking in the evening, and possibly trying to stop earlier in evening to limit how much she has to get up at night to void  RTO one year for yearly exam or sooner as needed

## 2022-12-01 ENCOUNTER — TELEPHONE (OUTPATIENT)
Dept: INTERNAL MEDICINE CLINIC | Facility: CLINIC | Age: 52
End: 2022-12-01

## 2022-12-01 NOTE — TELEPHONE ENCOUNTER
Patient is calling in to schedule a tetanus vaccine        Please advise if ok to schedule        **Pt aware you are out of the office today and will address this tomorrow**

## 2022-12-02 ENCOUNTER — HOSPITAL ENCOUNTER (OUTPATIENT)
Dept: RADIOLOGY | Facility: HOSPITAL | Age: 52
Discharge: HOME | End: 2022-12-02

## 2022-12-02 ENCOUNTER — APPOINTMENT (OUTPATIENT)
Dept: LAB | Facility: CLINIC | Age: 52
End: 2022-12-02

## 2022-12-02 DIAGNOSIS — M54.50 CHRONIC LOW BACK PAIN WITHOUT SCIATICA, UNSPECIFIED BACK PAIN LATERALITY: ICD-10-CM

## 2022-12-02 DIAGNOSIS — Z13.6 SCREENING FOR CARDIOVASCULAR CONDITION: ICD-10-CM

## 2022-12-02 DIAGNOSIS — Z13.1 SCREENING FOR DIABETES MELLITUS: ICD-10-CM

## 2022-12-02 DIAGNOSIS — G89.29 CHRONIC LOW BACK PAIN WITHOUT SCIATICA, UNSPECIFIED BACK PAIN LATERALITY: ICD-10-CM

## 2022-12-02 LAB
ALBUMIN SERPL BCP-MCNC: 4 G/DL (ref 3.5–5)
ALP SERPL-CCNC: 56 U/L (ref 34–104)
ALT SERPL W P-5'-P-CCNC: 16 U/L (ref 7–52)
ANION GAP SERPL CALCULATED.3IONS-SCNC: 5 MMOL/L (ref 4–13)
AST SERPL W P-5'-P-CCNC: 20 U/L (ref 13–39)
BILIRUB SERPL-MCNC: 0.37 MG/DL (ref 0.2–1)
BUN SERPL-MCNC: 13 MG/DL (ref 5–25)
CALCIUM SERPL-MCNC: 8.7 MG/DL (ref 8.4–10.2)
CHLORIDE SERPL-SCNC: 107 MMOL/L (ref 96–108)
CHOLEST SERPL-MCNC: 173 MG/DL
CO2 SERPL-SCNC: 29 MMOL/L (ref 21–32)
CREAT SERPL-MCNC: 0.81 MG/DL (ref 0.6–1.3)
GFR SERPL CREATININE-BSD FRML MDRD: 83 ML/MIN/1.73SQ M
GLUCOSE P FAST SERPL-MCNC: 97 MG/DL (ref 65–99)
HDLC SERPL-MCNC: 61 MG/DL
LDLC SERPL CALC-MCNC: 94 MG/DL (ref 0–100)
POTASSIUM SERPL-SCNC: 4.1 MMOL/L (ref 3.5–5.3)
PROT SERPL-MCNC: 6.8 G/DL (ref 6.4–8.4)
SODIUM SERPL-SCNC: 141 MMOL/L (ref 135–147)
TRIGL SERPL-MCNC: 88 MG/DL

## 2022-12-03 LAB
EST. AVERAGE GLUCOSE BLD GHB EST-MCNC: 97 MG/DL
HBA1C MFR BLD: 5 %

## 2022-12-05 DIAGNOSIS — M51.36 DEGENERATIVE DISC DISEASE, LUMBAR: Primary | ICD-10-CM

## 2022-12-06 ENCOUNTER — CLINICAL SUPPORT (OUTPATIENT)
Dept: INTERNAL MEDICINE CLINIC | Facility: CLINIC | Age: 52
End: 2022-12-06

## 2022-12-06 DIAGNOSIS — Z23 ENCOUNTER FOR IMMUNIZATION: Primary | ICD-10-CM

## 2022-12-20 ENCOUNTER — NURSE TRIAGE (OUTPATIENT)
Dept: OTHER | Facility: OTHER | Age: 52
End: 2022-12-20

## 2022-12-20 NOTE — TELEPHONE ENCOUNTER
Regarding: Reaction painful lump from Tetanus vaccine  ----- Message from Thamas Race sent at 12/20/2022  9:33 AM EST -----  "I got a tetanus shot on 12/6/22 and seem to have an allergic reaction to it   There is a painful lump at the injection site "

## 2022-12-20 NOTE — TELEPHONE ENCOUNTER
Reason for Disposition  • Deep lump follows (in 2 to 8 weeks) Td or TDaP shot, and becomes tender to the touch    Answer Assessment - Initial Assessment Questions  1  SYMPTOMS: "What is the main symptom?" (e g , redness, swelling, pain)       Red, warm to touch swelling at the injection site   2  ONSET: "When was the vaccine (shot) given?" "How much later did the inflamation begin?" (e g , hours, days ago)        on a following day 12/7/22   3  SEVERITY: "How bad is it?"       Moderate   4  FEVER: "Is there a fever?" If Yes, ask: "What is it, how was it measured, and when did it start?"       No   5  IMMUNIZATIONS GIVEN: "What shots have you recently received?"      DTap 12/6/22   6  PAST REACTIONS: "Have you reacted to immunizations before?" If Yes, ask: "What happened?"      First time   7   OTHER SYMPTOMS: "Do you have any other symptoms?"      No other symptoms    Protocols used: IMMUNIZATION REACTIONS-ADULT-[place]

## 2022-12-21 ENCOUNTER — OFFICE VISIT (OUTPATIENT)
Dept: SURGICAL ONCOLOGY | Facility: CLINIC | Age: 52
End: 2022-12-21

## 2022-12-21 ENCOUNTER — OFFICE VISIT (OUTPATIENT)
Dept: INTERNAL MEDICINE CLINIC | Facility: CLINIC | Age: 52
End: 2022-12-21

## 2022-12-21 VITALS
BODY MASS INDEX: 21.26 KG/M2 | DIASTOLIC BLOOD PRESSURE: 78 MMHG | HEART RATE: 75 BPM | HEIGHT: 63 IN | TEMPERATURE: 97.3 F | OXYGEN SATURATION: 100 % | WEIGHT: 120 LBS | SYSTOLIC BLOOD PRESSURE: 118 MMHG

## 2022-12-21 VITALS
WEIGHT: 119.6 LBS | SYSTOLIC BLOOD PRESSURE: 108 MMHG | BODY MASS INDEX: 21.19 KG/M2 | HEIGHT: 63 IN | OXYGEN SATURATION: 99 % | HEART RATE: 79 BPM | DIASTOLIC BLOOD PRESSURE: 70 MMHG | RESPIRATION RATE: 16 BRPM

## 2022-12-21 DIAGNOSIS — S40.022A CONTUSION OF LEFT UPPER EXTREMITY, INITIAL ENCOUNTER: Primary | ICD-10-CM

## 2022-12-21 DIAGNOSIS — T14.8XXA HEMATOMA: ICD-10-CM

## 2022-12-21 DIAGNOSIS — Z17.0 MALIGNANT NEOPLASM OF UPPER-INNER QUADRANT OF RIGHT BREAST IN FEMALE, ESTROGEN RECEPTOR POSITIVE (HCC): Primary | ICD-10-CM

## 2022-12-21 DIAGNOSIS — C50.211 MALIGNANT NEOPLASM OF UPPER-INNER QUADRANT OF RIGHT BREAST IN FEMALE, ESTROGEN RECEPTOR POSITIVE (HCC): Primary | ICD-10-CM

## 2022-12-21 DIAGNOSIS — Z79.810 USE OF TAMOXIFEN (NOLVADEX): ICD-10-CM

## 2022-12-21 NOTE — PROGRESS NOTES
Assessment/Plan:    Hematoma  Left deltoid 3 x 4 cm intramuscular hematoma suspect secondary to the Tdap vaccine; I would like the patient to ice the area 10 minutes 4 times a day for the next week, I like the patient to do range of motion exercises but I do not want her to do any heavy lifting at the gym at this point time  I will check an ultrasound of the left upper extremity soft tissue to confirm hematoma, we will check a PTT/PT/INR, CBC  She does not have a history of gum bleeding or excessive bleeding  She does report to me 1 previous hematoma in the past   Patient does report to me the hematoma is not expanding, but it is not improving at this point she would prefer to take a more conservative approach  RTO in 4 weeks call if any change, worse or symptoms do not improve         Problem List Items Addressed This Visit        Other    Hematoma - Primary     Left deltoid 3 x 4 cm intramuscular hematoma suspect secondary to the Tdap vaccine; I would like the patient to ice the area 10 minutes 4 times a day for the next week, I like the patient to do range of motion exercises but I do not want her to do any heavy lifting at the gym at this point time  I will check an ultrasound of the left upper extremity soft tissue to confirm hematoma, we will check a PTT/PT/INR, CBC  She does not have a history of gum bleeding or excessive bleeding  She does report to me 1 previous hematoma in the past   Patient does report to me the hematoma is not expanding, but it is not improving at this point she would prefer to take a more conservative approach  RTO in 4 weeks call if any change, worse or symptoms do not improve              Subjective:      Patient ID: Merlinda Larsson is a 47 y o  female      HPI  67-year-old female who recently had a Tdap on 12/6/2022;dinner soreness the reallly sor couldn't lay on the side then wwarm ,  immidiately developed cyst structure warm initially, no f/c  The following portions of the patient's history were reviewed and updated as appropriate: allergies, current medications, past family history, past medical history, past social history, past surgical history and problem list     Review of Systems   Constitutional: Negative for activity change, appetite change, chills, fever and unexpected weight change  HENT: Negative for congestion  Eyes: Negative for visual disturbance  Respiratory: Negative for cough and shortness of breath  Cardiovascular: Negative for chest pain  Gastrointestinal: Negative for abdominal pain, diarrhea, nausea and vomiting  Musculoskeletal:        Left deltoid pain/cystlike structure   Neurological: Negative for dizziness, light-headedness and headaches  Objective:    Return in about 2 weeks (around 1/4/2023), or if symptoms worsen or fail to improve  Procedure: XR spine lumbar minimum 4 views non injury    Result Date: 12/5/2022  Narrative: LUMBAR SPINE INDICATION:   M54 49: Low back pain, unspecified G89 29: Other chronic pain  COMPARISON:  None VIEWS:  XR SPINE LUMBAR MINIMUM 4 VIEWS NON INJURY FINDINGS: There are 5 non rib bearing lumbar vertebral bodies  There is no evidence of acute fracture or destructive osseous lesion  Alignment is unremarkable  Mild degenerative facet disease throughout the lumbar spine  The pedicles appear intact  Soft tissues are unremarkable  Impression: Mild degenerative facet disease throughout the lumbar spine   Workstation performed: [id number]          No Known Allergies    Past Medical History:   Diagnosis Date   • BRCA1 negative    • BRCA2 negative    • Breast cancer ([facility] [address] ) 05/2018    right breast   • Cancer ([facility] [address] ) 5/10/2018   • Colon polyp    • Fibrocystic breast    • History of radiation therapy 2018    right breast ca   • Seasonal allergies    • Vertigo      Past Surgical History:   Procedure Laterality Date   • BREAST BIOPSY Right 05/2018    Positive   • BREAST BIOPSY Left     Neg around age 28   • BREAST BIOPSY Left 11/132019    benign   • BREAST BIOPSY Right 11/29/2021   • BREAST LUMPECTOMY Right 07/2018    with radiation   • EMBOLECTOMY      s/p forceps delivery   • MAMMO NEEDLE LOCALIZATION RIGHT (ALL INC) Right 7/10/2018   • MAMMO STEREOTACTIC BREAST BIOPSY RIGHT (ALL INC) Right 5/29/2018   • AL BIOPSY/EXCISION, LYMPH NODE(S) Right 7/10/2018    Procedure: SENTINEL LYMPH NODE BIOPSY; LYMPHATIC MAPPING WITH BLUE DYE RADIOACTIVE DYE (INJECT AT 0800 BY DR CHAUDHARY IN THE OR); Surgeon: Alvarez Bond MD;  [facility];  Service: Surgical Oncology   • AL PERQ DEVICE PLACEMT BREAST LOC 1ST LES W GUIDNCE Right 7/10/2018    Procedure: BREAST LUMPECTOMY; BREAST NEEDLE LOCALIZATION (NEEDLE LOC AT 0700); Surgeon: Alvarez Bond MD;  [facility];  Service: Surgical Oncology   • US GUIDED BREAST BIOPSY LEFT COMPLETE Left 11/13/2019   • US GUIDED BREAST BIOPSY RIGHT COMPLETE Right 5/29/2018   • US GUIDED BREAST BIOPSY RIGHT COMPLETE Right 11/29/2021     Current Outpatient Medications on File Prior to Visit   Medication Sig Dispense Refill   • loratadine (CLARITIN) 5 MG chewable tablet Chew 5 mg daily     • tamoxifen (NOLVADEX) 20 mg tablet TAKE 1 TABLET(20 MG) BY MOUTH DAILY 90 tablet 1     No current facility-administered medications on file prior to visit       Family History   Problem Relation Age of Onset   • Prostate cancer Father    • Parkinsonism Father    • No Known Problems Sister    • No Known Problems Daughter    • Colon cancer Maternal Grandmother    • Esophageal cancer Maternal Grandfather    • Breast cancer Maternal Aunt 54   • Ovarian cancer Maternal Aunt    • Cancer Maternal Aunt    • Skin cancer Paternal Aunt      Social History     Socioeconomic History   • Marital status: /Civil Union     Spouse name: Not on file   • Number of children: 2   • Years of education: Not on file   • Highest education level: Not on file   Occupational History   • Not on file   Tobacco Use   • Smoking status: Never • Smokeless tobacco: Never   • Tobacco comments:     only smoked socially as teenager for 1 yr   Vaping Use   • Vaping Use: Never used   Substance and Sexual Activity   • Alcohol use: Yes     Alcohol/week: 2 0 standard drinks     Types: 2 Glasses of wine per week     Comment: Social drinker   • Drug use: No   • Sexual activity: Yes     Partners: Male     Birth control/protection: Male Sterilization   Other Topics Concern   • Not on file   Social History Narrative   • Not on file     Social Determinants of Health     Financial Resource Strain: Not on file   Food Insecurity: Not on file   Transportation Needs: Not on file   Physical Activity: Not on file   Stress: Not on file   Social Connections: Not on file   Intimate Partner Violence: Not on file   Housing Stability: Not on file     Vitals:    12/21/22 1155   BP: 108/70   Pulse: 79   Resp: 16   SpO2: 99%   Weight: 54 3 kg (119 lb 9 6 oz)   Height: 5' 3 39" (1 61 m)     Results for orders placed or performed in visit on 12/02/22   Comprehensive metabolic panel   Result Value Ref Range    Sodium 141 135 - 147 mmol/L    Potassium 4 1 3 5 - 5 3 mmol/L    Chloride 107 96 - 108 mmol/L    CO2 29 21 - 32 mmol/L    ANION GAP 5 4 - 13 mmol/L    BUN 13 5 - 25 mg/dL    Creatinine 0 81 0 60 - 1 30 mg/dL    Glucose, Fasting 97 65 - 99 mg/dL    Calcium 8 7 8 4 - 10 2 mg/dL    AST 20 13 - 39 U/L    ALT 16 7 - 52 U/L    Alkaline Phosphatase 56 34 - 104 U/L    Total Protein 6 8 6 4 - 8 4 g/dL    Albumin 4 0 3 5 - 5 0 g/dL    Total Bilirubin 0 37 0 20 - 1 00 mg/dL    eGFR 83 ml/min/1 73sq m   Hemoglobin A1C   Result Value Ref Range    Hemoglobin A1C 5 0 Normal 3 8-5 6%; PreDiabetic 5 7-6 4%;  Diabetic >=6 5%; Glycemic control for adults with diabetes <7 0% %    EAG 97 mg/dl   Lipid Panel with Direct LDL reflex   Result Value Ref Range    Cholesterol 173 See Comment mg/dL    Triglycerides 88 See Comment mg/dL    HDL, Direct 61 >=50 mg/dL    LDL Calculated 94 0 - 100 mg/dL     Weight (last 2 days)     Date/Time Weight    12/21/22 1155 54 3 (119 6)        Body mass index is 20 93 kg/m²  BP      Temp      Pulse     Resp      SpO2        Vitals:    12/21/22 1155   Weight: 54 3 kg (119 lb 9 6 oz)     Vitals:    12/21/22 1155   Weight: 54 3 kg (119 lb 9 6 oz)       /70   Pulse 79   Resp 16   Ht 5' 3 39" (1 61 m)   Wt 54 3 kg (119 lb 9 6 oz)   SpO2 99%   BMI 20 93 kg/m²          Physical Exam  Vitals and nursing note reviewed  Constitutional:       General: She is not in acute distress  Appearance: She is well-developed  She is not ill-appearing, toxic-appearing or diaphoretic  HENT:      Head: Normocephalic  Eyes:      General: No scleral icterus  Right eye: No discharge  Left eye: No discharge  Conjunctiva/sclera: Conjunctivae normal       Pupils: Pupils are equal, round, and reactive to light  Cardiovascular:      Rate and Rhythm: Normal rate and regular rhythm  Heart sounds: Normal heart sounds  No murmur heard  No friction rub  No gallop  Pulmonary:      Effort: No respiratory distress  Breath sounds: Normal breath sounds  No wheezing or rales  Abdominal:      General: Bowel sounds are normal  There is no distension  Palpations: Abdomen is soft  There is no mass  Tenderness: There is no abdominal tenderness  There is no guarding or rebound  Musculoskeletal:         General: No deformity  Cervical back: Neck supple  Lymphadenopathy:      Cervical: No cervical adenopathy  Neurological:      Mental Status: She is alert  Coordination: Coordination normal        Examination of the left deltoid there is a 3 x 4 round/ovoid cystlike structure consistent with a hematoma there is no erythema no warmth no pus it is mildly tender it appears to be intramuscular

## 2022-12-21 NOTE — PROGRESS NOTES
Surgical Oncology Follow Up       [address]  [facility] [place]  [address] [facility] [place] [facility] [address]    Maria Victoria Rivera  1970  [id number]  [address] [facility] [address] [facility]  [facility] [place]  [address] [address]    Chief Complaint   Patient presents with   • Follow-up       Assessment/Plan:  1  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 breast in female, estrogen receptor positive ([facility] [address] )  - 6 month followup  - Mammo diagnostic bilateral w 3d & cad; Future    2  Use of tamoxifen (Nolvadex)  - continue use per medical oncology       Discussion/Summary: Patient is a 48-year-old female presenting today for a six-month follow-up for right breast cancer diagnosed in May 2018  Pathology revealed invasive ductal carcinoma ER/DE positive, HER2 negative  She underwent genetic testing which was negative  She had a right breast lumpectomy with sentinel node biopsy with Dr Gloria Matos and completed whole breast radiation therapy  She is currently on tamoxifen  She had an automated breast ultrasound on 11/9/2022 which was benign  I have ordered her mammogram for the spring  There were no concerns on her CBE  I will see the patient back in 6 months or sooner should the need arise  She was instructed to call with any questions or concerns prior to this time  All questions were answered today      History of Present Illness:     Oncology History   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 breast in female, estrogen receptor positive ([facility] [address] )   5/29/2018 Biopsy    Right breast biopsy  3 o'clock 6 CMFN  Invasive ductal carcinoma  Grade 1  8 mm on ultrasound  No axillary adenopathy on ultrasound  ER 95  DE 90  Her 2 0  Stage IA     6/7/2018 Genetic Testing    BRCA testing  Negative     7/10/2018 Surgery    Right breast lumpectomy with sentinel lymph node biopsy  Invasive ductal carcinoma  9 mm  Grade 1  0/1 lymph nodes  Margins negative  ER 95  MO 90  Her 2 0  Stage IA     7/2018 -  Hormone Therapy      Adjuvant hormonal therapy with tamoxifen      8/13/2018 - 9/11/2018 Radiation    Course: C1    Plan ID Energy Fractions Dose per Fraction (cGy) Dose Correction (cGy) Total Dose Delivered (cGy) Elapsed Days   R Breast e 9E 5 / 5 200 0 1,000 6   Rt Breast 6X 16 / 16 266 0 4,256 22      Treatment dates:  C1: 8/13/2018 - 9/11/2018            -Interval History Patient is a 59-year-old female presenting today for a six-month follow-up for right breast cancer diagnosed in May 2018  She is currently on tamoxifen  She had an automated breast ultrasound on 11/9/2022 which was benign  She informed me that she had a tetanus shot in the left arm 2 weeks ago but still has an area of swelling, no lymphadenopathy noted  Patient denies changes on her breast exam  She denies persistent headaches, bone pain, back pain, shortness of breath, or abdominal pain  Review of Systems:  Review of Systems   Constitutional: Negative for activity change, appetite change, fatigue and unexpected weight change  Respiratory: Negative for cough and shortness of breath  Cardiovascular: Negative for chest pain  Gastrointestinal: Negative for abdominal pain, diarrhea, nausea and vomiting  Endocrine: Positive for heat intolerance  Musculoskeletal: Negative for arthralgias, back pain and myalgias  Skin: Negative for rash  Neurological: Negative for weakness and headaches  Hematological: Negative for adenopathy         Patient Active Problem List   Diagnosis   • Benign paroxysmal vertigo   • Seasonal allergies   • Rash   • 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 breast in female, estrogen receptor positive ([facility] [address] )   • Screening for human papillomavirus (HPV)   • Use of tamoxifen (Nolvadex)   • Rotator cuff syndrome of right shoulder   • Numbness and tingling of left lower extremity   • Vitamin D deficiency   • Screening for cardiovascular condition   • Need for vaccination   • Left inguinal pain   • Congestion of right ear   • Change in bowel habit   • Atypical chest pain   • Perimenopause   • Back pain   • Slow transit constipation   • Chronic low back pain     Past Medical History:   Diagnosis Date   • BRCA1 negative    • BRCA2 negative    • Breast cancer ([facility] [address] ) 05/2018    right breast   • Cancer ([facility] [address] ) 5/10/2018   • Colon polyp    • Fibrocystic breast    • History of radiation therapy 2018    right breast ca   • Seasonal allergies    • Vertigo      Past Surgical History:   Procedure Laterality Date   • BREAST BIOPSY Right 05/2018    Positive   • BREAST BIOPSY Left     Neg around age 28   • BREAST BIOPSY Left 11/132019    benign   • BREAST BIOPSY Right 11/29/2021   • BREAST LUMPECTOMY Right 07/2018    with radiation   • EMBOLECTOMY      s/p forceps delivery   • MAMMO NEEDLE LOCALIZATION RIGHT (ALL INC) Right 7/10/2018   • MAMMO STEREOTACTIC BREAST BIOPSY RIGHT (ALL INC) Right 5/29/2018   • GA BIOPSY/EXCISION, LYMPH NODE(S) Right 7/10/2018    Procedure: SENTINEL LYMPH NODE BIOPSY; LYMPHATIC MAPPING WITH BLUE DYE RADIOACTIVE DYE (INJECT AT 0800 BY DR CHAUDHARY IN THE OR); Surgeon: Antony Gonzalez MD;  [facility];  Service: Surgical Oncology   • GA PERQ DEVICE PLACEMT BREAST LOC 1ST LES W GUIDNCE Right 7/10/2018    Procedure: BREAST LUMPECTOMY; BREAST NEEDLE LOCALIZATION (NEEDLE LOC AT 0700);   Surgeon: Antony Gonzalez MD;  [facility];  Service: Surgical Oncology   • US GUIDED BREAST BIOPSY LEFT COMPLETE Left 11/13/2019   • US GUIDED BREAST BIOPSY RIGHT COMPLETE Right 5/29/2018   • US GUIDED BREAST BIOPSY RIGHT COMPLETE Right 11/29/2021     Family History   Problem Relation Age of Onset   • Prostate cancer Father    • Parkinsonism Father    • No Known Problems Sister    • No Known Problems Daughter    • Colon cancer Maternal Grandmother    • Esophageal cancer Maternal Grandfather    • Breast cancer Maternal Aunt 54   • Ovarian cancer Maternal Aunt    • Cancer Maternal Aunt    • Skin cancer Paternal Aunt      Social History     Socioeconomic History   • Marital status: /Civil Union     Spouse name: Not on file   • Number of children: 2   • Years of education: Not on file   • Highest education level: Not on file   Occupational History   • Not on file   Tobacco Use   • Smoking status: Never   • Smokeless tobacco: Never   • Tobacco comments:     only smoked socially as teenager for 1 yr   Vaping Use   • Vaping Use: Never used   Substance and Sexual Activity   • Alcohol use: Yes     Alcohol/week: 2 0 standard drinks     Types: 2 Glasses of wine per week     Comment: Social drinker   • Drug use: No   • Sexual activity: Yes     Partners: Male     Birth control/protection: Male Sterilization   Other Topics Concern   • Not on file   Social History Narrative   • Not on file     Social Determinants of Health     Financial Resource Strain: Not on file   Food Insecurity: Not on file   Transportation Needs: Not on file   Physical Activity: Not on file   Stress: Not on file   Social Connections: Not on file   Intimate Partner Violence: Not on file   Housing Stability: Not on file       Current Outpatient Medications:   •  loratadine (CLARITIN) 5 MG chewable tablet, Chew 5 mg daily, Disp: , Rfl:   •  tamoxifen (NOLVADEX) 20 mg tablet, TAKE 1 TABLET(20 MG) BY MOUTH DAILY, Disp: 90 tablet, Rfl: 1  No Known Allergies  Vitals:    12/21/22 0859   BP: 118/78   Pulse: 75   Temp: (!) 97 3 °F (36 3 °C)   SpO2: 100%       Physical Exam  Constitutional:       General: She is not in acute distress  Appearance: Normal appearance  Cardiovascular:      Rate and Rhythm: Normal rate and regular rhythm  Pulses: Normal pulses  Heart sounds: Normal heart sounds  Pulmonary:      Effort: Pulmonary effort is normal       Breath sounds: Normal breath sounds  Chest:      Chest wall: No mass     Breasts:     Right: No swelling, bleeding, inverted nipple, mass, nipple discharge, skin change or tenderness  Left: No swelling, bleeding, inverted nipple, mass, nipple discharge, skin change or tenderness  Comments: Right breast lumpectomy scar and sln bx scar  No masses, nodularity, skin changes, nipple changes or discharge, or adenopathy appreciated on physical exam      Abdominal:      General: Abdomen is flat  Palpations: Abdomen is soft  Lymphadenopathy:      Upper Body:      Right upper body: No supraclavicular, axillary or pectoral adenopathy  Left upper body: No supraclavicular, axillary or pectoral adenopathy  Skin:     General: Skin is warm  Neurological:      General: No focal deficit present  Mental Status: She is alert and oriented to person, place, and time  Psychiatric:         Mood and Affect: Mood normal          Behavior: Behavior normal            Results:    Imaging  XR spine lumbar minimum 4 views non injury    Result Date: 12/5/2022  Narrative: LUMBAR SPINE INDICATION:   M54 50: Low back pain, unspecified G89 29: Other chronic pain  COMPARISON:  None VIEWS:  XR SPINE LUMBAR MINIMUM 4 VIEWS NON INJURY FINDINGS: There are 5 non rib bearing lumbar vertebral bodies  There is no evidence of acute fracture or destructive osseous lesion  Alignment is unremarkable  Mild degenerative facet disease throughout the lumbar spine  The pedicles appear intact  Soft tissues are unremarkable  Impression: Mild degenerative facet disease throughout the lumbar spine  Workstation performed: [id number]       I reviewed the above imaging data  Advance Care Planning/Advance Directives:  Discussed disease status, cancer treatment plans and/or cancer treatment goals with the patient

## 2022-12-22 PROBLEM — S40.022A CONTUSION OF LEFT ARM: Status: ACTIVE | Noted: 2022-12-22

## 2022-12-22 PROBLEM — T14.8XXA HEMATOMA: Status: ACTIVE | Noted: 2022-12-22

## 2022-12-22 NOTE — ASSESSMENT & PLAN NOTE
Left deltoid 3 x 4 cm intramuscular hematoma suspect secondary to the Tdap vaccine; I would like the patient to ice the area 10 minutes 4 times a day for the next week, I like the patient to do range of motion exercises but I do not want her to do any heavy lifting at the gym at this point time  I will check an ultrasound of the left upper extremity soft tissue to confirm hematoma, we will check a PTT/PT/INR, CBC  She does not have a history of gum bleeding or excessive bleeding  She does report to me 1 previous hematoma in the past   Patient does report to me the hematoma is not expanding, but it is not improving at this point she would prefer to take a more conservative approach    RTO in 4 weeks call if any change, worse or symptoms do not improve

## 2022-12-27 ENCOUNTER — EVALUATION (OUTPATIENT)
Dept: PHYSICAL THERAPY | Facility: CLINIC | Age: 52
End: 2022-12-27

## 2022-12-27 DIAGNOSIS — M51.36 DEGENERATIVE DISC DISEASE, LUMBAR: Primary | ICD-10-CM

## 2022-12-27 NOTE — PROGRESS NOTES
PT Evaluation     Today's date: 2022  Patient name: Milagros Short  : 1970  MRN: [id number]  Referring provider: Mariam Pena DO  Dx:   Encounter Diagnosis     ICD-10-CM    1  Degenerative disc disease, lumbar  M51 36 Ambulatory Referral to Physical Therapy                     Assessment  Assessment details: Milagros Short is a pleasant 47 y o  female who presents with signs and symptoms correlating with referring diagnosis  No further referral appears necessary at this time based upon examination results  The patient's greatest concerns are decreasing pain when completing daily activities  She presents with a movement impairment diagnosis of hypomobile lumbar extension ROM  This also presents with signs of tight hip flexors and quadricept muscles and hypomobility into posterior to anterior mobility of PAROM  Negative prognostic indicators: none  Positive prognostic indicators: good motivation  Please contact me if you have any further questions or recommendations  Thank you very much for the kind referral       Impairments: abnormal or restricted ROM, activity intolerance, impaired physical strength, pain with function and poor posture     Symptom irritability: lowUnderstanding of Dx/Px/POC: good   Prognosis: good    Goals  STGs  1  Decrease pain by 20% in 2-4 weeks  2  Improve lumbar spine ROM by 50% in 2-4 weeks  LTGs  1  Decrease pain by 60% in 6-8 weeks  2  Improve running tolerance to >30 minutes in 6-8 weeks  3  Get up in the AM without pain in 6-8 weeks          Plan  Referral necessary: No  Planned modality interventions: cryotherapy, TENS and thermotherapy: hydrocollator packs  Planned therapy interventions: neuromuscular re-education, balance/weight bearing training, patient education, therapeutic exercise, therapeutic activities, stretching, strengthening, functional ROM exercises, home exercise program and manual therapy  Frequency: 1x week  Duration in weeks: 6  Treatment plan discussed with: patient        Subjective Evaluation    History of Present Illness  Mechanism of injury: Pt is a 47 y o  female presenting w/ chronic low back pain starting when she was in her 25s and has fluctuated since  However, in the last few months she really started to notice the discomfort and after getting up from a prolonged period of inactivity or after running for longer periods of time she notices pain and tightness  She is very active and does not want to be limited with anything else  She has noticed bringing knees to chest relieves some of the pain  Neurological signs: none   Red Flags: none             Not a recurrent problem   Quality of life: good    Pain  Current pain ratin  At best pain ratin  At worst pain ratin  Location: entire low back   Quality: tight  Relieving factors: change in position and relaxation  Exacerbated by: running   Progression: no change    Social Support    Employment status: not working  Patient Goals  Patient goals for therapy: increased strength, decreased pain, increased motion and independence with ADLs/IADLs          Objective     Active Range of Motion     Lumbar   Flexion:  WFL  Extension:  with pain Restriction level: moderate  Left lateral flexion:  WFL  Right lateral flexion:  [facility]  Mechanical Assessment    Cervical      Thoracic      Lumbar    Lying extension: repeated movements  Pain intensity: worse  Pain level: produced  increased with sustained too    Strength/Myotome Testing     Lumbar   Left   Normal strength    Right   Normal strength    Tests     Lumbar   Negative SIJ compression, sacral thrust  and sacral spring   Left   Negative crossed SLR, femoral stretch and passive SLR  Right   Negative crossed SLR, femoral stretch and passive SLR  Left Pelvic Girdle/Sacrum   Negative: active SLR test      Right Pelvic Girdle/Sacrum   Negative: active SLR test      Left Hip   Negative ERICA and STACY       Right Hip   Negative ERICA and FADIR       Additional Tests Details  Quadricept tightness noted bilaterally   SLS BLE WNL   PAROM: increased pain and hypomobility toPA  L3-5 and PA to B innominates              Precautions: none      Manuals 12/27            PA mob prone                                                    Neuro Re-Ed             pallof press             LP              Side steps w/ band             Bridges                                                     Ther Ex             TM              Hip flexor EOB stretch w/ strap HEP            Q/l  Stretch door HEP            ROSALINA                                                                 Ther Activity                                       Gait Training                                       Modalities

## 2023-01-10 ENCOUNTER — IMMUNIZATIONS (OUTPATIENT)
Dept: FAMILY MEDICINE CLINIC | Facility: CLINIC | Age: 53
End: 2023-01-10

## 2023-01-10 DIAGNOSIS — Z23 NEED FOR COVID-19 VACCINE: ICD-10-CM

## 2023-01-12 ENCOUNTER — CLINICAL SUPPORT (OUTPATIENT)
Dept: RADIATION ONCOLOGY | Facility: HOSPITAL | Age: 53
End: 2023-01-12
Attending: RADIOLOGY

## 2023-01-12 ENCOUNTER — RADIATION ONCOLOGY FOLLOW-UP (OUTPATIENT)
Dept: RADIATION ONCOLOGY | Facility: HOSPITAL | Age: 53
End: 2023-01-12
Attending: RADIOLOGY

## 2023-01-12 VITALS
OXYGEN SATURATION: 98 % | HEART RATE: 75 BPM | DIASTOLIC BLOOD PRESSURE: 76 MMHG | RESPIRATION RATE: 18 BRPM | WEIGHT: 121.2 LBS | TEMPERATURE: 96.7 F | SYSTOLIC BLOOD PRESSURE: 110 MMHG | BODY MASS INDEX: 21.21 KG/M2

## 2023-01-12 DIAGNOSIS — Z17.0 MALIGNANT NEOPLASM OF UPPER-INNER QUADRANT OF RIGHT BREAST IN FEMALE, ESTROGEN RECEPTOR POSITIVE (HCC): Primary | ICD-10-CM

## 2023-01-12 DIAGNOSIS — C50.211 MALIGNANT NEOPLASM OF UPPER-INNER QUADRANT OF RIGHT BREAST IN FEMALE, ESTROGEN RECEPTOR POSITIVE (HCC): Primary | ICD-10-CM

## 2023-01-12 NOTE — PROGRESS NOTES
Follow-up - Radiation Oncology   Mary Cavanaugh 1970 47 y o  female [id number]      History of Present Illness   Cancer Staging   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 breast in female, estrogen receptor positive ([facility] [address] )  Staging form: Breast, AJCC 8th Edition  - Clinical: Stage IA (cT1b, cN0(sn), cM0, G1, ER+, IN+, HER2-) - Unsigned  Neoadjuvant therapy: No  Method of lymph node assessment: [place] lymph node biopsy  Histologic grading system: 3 grade system  Laterality: Right  Tumor size (mm): 9          Interval History:  Mary Cavanaugh 1970 is a 47 y o  female with stage IA, cT1b (9 mm), cN0 (one sentinel lymph node negative for metastatic carcinoma), cM0, grade 1, invasive right breast carcinoma, ER 95%, IN 90%   positive, HER2 negative, status post lumpectomy and sentinel lymph node biopsy  She completed a hypofractionated course of radiation therapy to the right breast on 9/11/18  The pt was last seen in radiation on 11/21/21  The pt returns for her follow up       12/21/21 - Surg OncSumi  No complaints or concerns  Pt had mass in right breast - bx shows benign   Follow up in 6 months     05/16/22 - Mammo diagnostic bilateral w 3d & cad  FINDINGS:   Bilateral   There are no suspicious masses, grouped microcalcifications or areas of unexplained architectural distortion  The skin and nipple areolar complex are unremarkable  Bilateral clips, scar markers, and stable calcifications are identified  IMPRESSION:   No evidence of malignancy     ASSESSMENT/BI-RADS CATEGORY:   Left: 2 - Benign   Right: 2 - Benign   Overall: 2 - Benign     06/21/22 - Surg Onc, [place]  No concerns on clinical exam  Pt remains on Tamoxifen     07/12/22 - Hem Ana Parish  Clinically LESLI  Pt to remain on Tamoxifen        11/09/22 - US breast screening bilateral complete (ABUS)  FINDINGS:   Bilateral  There are no suspicious masses or areas of architectural distortion   The previously biopsied mass in the superior central left breast measures 7 mm   This measured 10 mm on the pre biopsy ultrasound from 2019  There are postsurgical changes in the right breast      IMPRESSION:   Findings are benign   No ultrasound evidence of invasive breast malignancy    Automated breast ultrasound is an adjunct, not a replacement, for routine yearly screening mammography   The patient is due for her next annual diagnostic mammogram May 17, 2023      12/21/22 - Surg Onc, [place]  Pt to have mammogram in spring  No concerns or complaints  Follow up in 6 months      Upcomin23 - Mammo diagnostic bilateral w 3d & cad  23 - Surg Onc, [facility]ney  22 - [facility]on[facility]     Historical Information   Oncology History   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 breast in female, estrogen receptor positive ([address] )   2018 Biopsy    Right breast biopsy  3 o'clock 6 CMFN  Invasive ductal carcinoma  Grade 1  8 mm on ultrasound  No axillary adenopathy on ultrasound  ER 95  IN 90  Her 2 0  Stage IA     2018 Genetic Testing    BRCA testing  Negative     7/10/2018 Surgery    Right breast lumpectomy with sentinel lymph node biopsy  Invasive ductal carcinoma  9 mm  Grade 1  0/1 lymph nodes  Margins negative  ER 95  IN 90  Her 2 0  Stage IA     2018 -  Hormone Therapy      Adjuvant hormonal therapy with tamoxifen      2018 - 2018 Radiation    Course: C1    Plan ID Energy Fractions Dose per Fraction (cGy) Dose Correction (cGy) Total Dose Delivered (cGy) Elapsed Days   R Breast e 9E 5 / 5 200 0 1,000 6   Rt Breast 6X 16 / 16 266 0 4,256 22      Treatment dates:  C1: 2018 - 2018           Past Medical History:   Diagnosis Date   • BRCA1 negative    • BRCA2 negative    • Breast cancer ([address] ) 2018    right breast   • Cancer ([address] ) 5/10/2018   • Colon polyp    • Fibrocystic breast    • History of radiation therapy 2018    right breast ca   • Seasonal allergies    • Vertigo      Past Surgical History:   Procedure Laterality Date   • BREAST BIOPSY Right 05/2018    Positive   • BREAST BIOPSY Left     Neg around age 28   • BREAST BIOPSY Left 11/132019    benign   • BREAST BIOPSY Right 11/29/2021   • BREAST LUMPECTOMY Right 07/2018    with radiation   • EMBOLECTOMY      s/p forceps delivery   • MAMMO NEEDLE LOCALIZATION RIGHT (ALL INC) Right 7/10/2018   • MAMMO STEREOTACTIC BREAST BIOPSY RIGHT (ALL INC) Right 5/29/2018   • [place] BX/EXC LYMPH NODE OPEN SUPERFICIAL Right 7/10/2018    Procedure: SENTINEL LYMPH NODE BIOPSY; LYMPHATIC MAPPING WITH BLUE DYE RADIOACTIVE DYE (INJECT AT 0800 BY DR CHAUDHARY IN THE OR); Surgeon: Nereyda Matos MD;  [facility];  Service: Surgical Oncology   • AZ PERQ DEVICE PLACEMENT BREAST LOC 1ST LES W/GDNCE Right 7/10/2018    Procedure: BREAST LUMPECTOMY; BREAST NEEDLE LOCALIZATION (NEEDLE LOC AT 0700); Surgeon: Nereyda Matos MD;  [facility];  Service: Surgical Oncology   • US GUIDED BREAST BIOPSY LEFT COMPLETE Left 11/13/2019   • US GUIDED BREAST BIOPSY RIGHT COMPLETE Right 5/29/2018   • US GUIDED BREAST BIOPSY RIGHT COMPLETE Right 11/29/2021       Social History   Social History     Substance and Sexual Activity   Alcohol Use Yes   • Alcohol/week: 2 0 standard drinks   • Types: 2 Glasses of wine per week    Comment: Social drinker     Social History     Substance and Sexual Activity   Drug Use No     Social History     Tobacco Use   Smoking Status Never   Smokeless Tobacco Never   Tobacco Comments    only smoked socially as teenager for 1 yr         Meds/Allergies     Current Outpatient Medications:   •  loratadine (CLARITIN) 5 MG chewable tablet, Chew 5 mg daily, Disp: , Rfl:   •  tamoxifen (NOLVADEX) 20 mg tablet, TAKE 1 TABLET(20 MG) BY MOUTH DAILY, Disp: 90 tablet, Rfl: 1  No Known Allergies      Review of Systems   Constitutional: Negative  HENT: Negative  Eyes: Negative  Respiratory: Negative  Cardiovascular: Negative  Gastrointestinal: Negative  Endocrine: Positive for heat intolerance (hot flashes)  Genitourinary: Negative  Musculoskeletal: Negative  Skin:        Soreness at right breast scar area, tenderness   Allergic/Immunologic: Negative  Neurological: Negative  Hematological: Negative  Psychiatric/Behavioral: The patient is nervous/anxious  OBJECTIVE:   /76 (BP Location: Left arm)   Pulse 75   Temp (!) 96 7 °F (35 9 °C) (Temporal)   Resp 18   Wt 55 kg (121 lb 3 2 oz)   SpO2 98%   BMI 21 21 kg/m²   Pain Assessment:  {Pain Score 0-10, 0 default:35703}  {MDA IP Performance Status Choice:68630}    Physical Exam ***        RESULTS    Lab Results: No results found for this or any previous visit (from the past 672 hour(s))  Imaging Studies:No results found  Assessment/Plan:  No orders of the defined types were placed in this encounter  Ana Houser is a 47y o  year old female with ***      Homer Monroy MD  1/12/2023,9:04 AM    Portions of the record may have been created with voice recognition software   Occasional wrong word or "sound a like" substitutions may have occurred due to the inherent limitations of voice recognition software   Read the chart carefully and recognize, using context, where substitutions have occurred

## 2023-01-12 NOTE — PROGRESS NOTES
Rodri Bacon 1970 is a 47 y o  female with stage IA, cT1b (9 mm), cN0 (one sentinel lymph node negative for metastatic carcinoma), cM0, grade 1, invasive right breast carcinoma, ER 95%, ME 90%   positive, HER2 negative, status post lumpectomy and sentinel lymph node biopsy  She completed a hypofractionated course of radiation therapy to the right breast on 9/11/18  The pt was last seen in radiation on 11/21/21  The pt returns for her follow up      12/21/21 - Surg OncSumi  No complaints or concerns  Pt had mass in right breast - bx shows benign   Follow up in 6 months    05/16/22 - Mammo diagnostic bilateral w 3d & cad  FINDINGS:   Bilateral   There are no suspicious masses, grouped microcalcifications or areas of unexplained architectural distortion  The skin and nipple areolar complex are unremarkable  Bilateral clips, scar markers, and stable calcifications are identified  IMPRESSION:   No evidence of malignancy  ASSESSMENT/BI-RADS CATEGORY:   Left: 2 - Benign   Right: 2 - Benign   Overall: 2 - Benign    06/21/22 - Surg Onc, [place]  No concerns on clinical exam  Pt remains on Tamoxifen    07/12/22 - Hem [facility]  Clinically LES[facility]  Pt to remain on Tamoxifen      11/09/22 - US breast screening bilateral complete (ABUS)  FINDINGS:   Bilateral  There are no suspicious masses or areas of architectural distortion  The previously biopsied mass in the superior central left breast measures 7 mm  This measured 10 mm on the pre biopsy ultrasound from November 2019  There are postsurgical changes in the right breast      IMPRESSION:   Findings are benign  No ultrasound evidence of invasive breast malignancy  Automated breast ultrasound is an adjunct, not a replacement, for routine yearly screening mammography  The patient is due for her next annual diagnostic mammogram May 17, 2023     ASSESSMENT/BI-RADS CATEGORY:  Left: 2 - Benign  Right: 2 - Benign  Overall: 2 - Benign    12/21/22 - Surg Onc [place]  Pt to have mammogram in spring  No concerns or complaints  Follow up in 6 months     Upcomin23 - Mammo diagnostic bilateral w 3d & cad  23 - Surg Onc Catherine  22 - Hem Forrestine Lady            Oncology History   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 breast in female, estrogen receptor positive ([address] )   2018 Biopsy    Right breast biopsy  3 o'clock 6 CMFN  Invasive ductal carcinoma  Grade 1  8 mm on ultrasound  No axillary adenopathy on ultrasound  ER 95  AL 90  Her 2 0  Stage IA     2018 Genetic Testing    BRCA testing  Negative     7/10/2018 Surgery    Right breast lumpectomy with sentinel lymph node biopsy  Invasive ductal carcinoma  9 mm  Grade 1  0/1 lymph nodes  Margins negative  ER 95  AL 90  Her 2 0  Stage IA     2018 -  Hormone Therapy      Adjuvant hormonal therapy with tamoxifen      2018 - 2018 Radiation    Course: C1    Plan ID Energy Fractions Dose per Fraction (cGy) Dose Correction (cGy) Total Dose Delivered (cGy) Elapsed Days   R Breast e 9E  200 0 1,000 6   Rt Breast 6X 16 / 16 266 0 4,256 22      Treatment dates:  C1: 2018 - 2018           Review of Systems:  Review of Systems   Constitutional: Negative  HENT: Negative  Eyes: Negative  Respiratory: Negative  Cardiovascular: Negative  Gastrointestinal: Negative  Endocrine: Positive for heat intolerance (hot flashes)  Genitourinary: Negative  Musculoskeletal: Negative  Skin:        Soreness at right breast scar area, tenderness   Allergic/Immunologic: Negative  Neurological: Negative  Hematological: Negative  Psychiatric/Behavioral: The patient is nervous/anxious          Clinical Trial: no      Health Maintenance   Topic Date Due   • COVID-19 Vaccine (5 - Booster for Pfizer series) 2022   • Breast Cancer Survivorship  Never done   • Colorectal Surgery Screening  Never done   • Onc DXA Scan  Never done   • ONC Physical Therapy Referral  Never done   • Colorectal Cancer Screening  02/21/2023   • HIV Screening  09/18/2023 (Originally 3/26/1985)   • Breast Cancer Screening: Mammogram  05/16/2023   • Annual Physical  11/07/2023   • BMI: Adult  12/21/2023   • Depression Screening  01/12/2024   • Cervical Cancer Screening  11/08/2024   • DTaP,Tdap,and Td Vaccines (2 - Td or Tdap) 12/06/2032   • Hepatitis C Screening  Completed   • Influenza Vaccine  Completed   • Pneumococcal Vaccine: Pediatrics (0 to 5 Years) and At-Risk Patients (6 to 59 Years)  Aged Out   • HIB Vaccine  Aged Out   • IPV Vaccine  Aged Out   • Hepatitis A Vaccine  Aged Out   • Meningococcal ACWY Vaccine  Aged Out   • HPV Vaccine  Aged Out     Patient Active Problem List   Diagnosis   • Benign paroxysmal vertigo   • Seasonal allergies   • Rash   • 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 breast in female, estrogen receptor positive ([facility] [address] )   • Screening for human papillomavirus (HPV)   • Use of tamoxifen (Nolvadex)   • Rotator cuff syndrome of right shoulder   • Numbness and tingling of left lower extremity   • Vitamin D deficiency   • Screening for cardiovascular condition   • Need for vaccination   • Left inguinal pain   • Congestion of right ear   • Change in bowel habit   • Atypical chest pain   • Perimenopause   • Back pain   • Slow transit constipation   • Chronic low back pain   • Hematoma     Past Medical History:   Diagnosis Date   • BRCA1 negative    • BRCA2 negative    • Breast cancer ([facility] [address] ) 05/2018    right breast   • Cancer ([facility] [address] ) 5/10/2018   • Colon polyp    • Fibrocystic breast    • History of radiation therapy 2018    right breast ca   • Seasonal allergies    • Vertigo      Past Surgical History:   Procedure Laterality Date   • BREAST BIOPSY Right 05/2018    Positive   • BREAST BIOPSY Left     Neg around age 28   • BREAST BIOPSY Left 11/132019    benign   • BREAST BIOPSY Right 11/29/2021   • BREAST LUMPECTOMY Right 07/2018    with radiation   • EMBOLECTOMY      s/p forceps delivery   • MAMMO NEEDLE LOCALIZATION RIGHT (ALL INC) Right 7/10/2018   • MAMMO STEREOTACTIC BREAST BIOPSY RIGHT (ALL INC) Right 5/29/2018   • [place] BX/EXC LYMPH NODE OPEN SUPERFICIAL Right 7/10/2018    Procedure: SENTINEL LYMPH NODE BIOPSY; LYMPHATIC MAPPING WITH BLUE DYE RADIOACTIVE DYE (INJECT AT 0800 BY DR CHAUDHARY IN THE OR); Surgeon: Mulugeta Barber MD;  [facility];  Service: Surgical Oncology   • RI PERQ DEVICE PLACEMENT BREAST LOC 1ST LES W/GDNCE Right 7/10/2018    Procedure: BREAST LUMPECTOMY; BREAST NEEDLE LOCALIZATION (NEEDLE LOC AT 0700); Surgeon: Mulugeta Barber MD;  [facility];  Service: Surgical Oncology   • US GUIDED BREAST BIOPSY LEFT COMPLETE Left 11/13/2019   • US GUIDED BREAST BIOPSY RIGHT COMPLETE Right 5/29/2018   • US GUIDED BREAST BIOPSY RIGHT COMPLETE Right 11/29/2021     Family History   Problem Relation Age of Onset   • Prostate cancer Father    • Parkinsonism Father    • No Known Problems Sister    • No Known Problems Daughter    • Colon cancer Maternal Grandmother    • Esophageal cancer Maternal Grandfather    • Breast cancer Maternal Aunt 54   • Ovarian cancer Maternal Aunt    • Cancer Maternal Aunt    • Skin cancer Paternal Aunt      Social History     Socioeconomic History   • Marital status: /Civil Union     Spouse name: Not on file   • Number of children: 2   • Years of education: Not on file   • Highest education level: Not on file   Occupational History   • Not on file   Tobacco Use   • Smoking status: Never   • Smokeless tobacco: Never   • Tobacco comments:     only smoked socially as teenager for 1 yr   Vaping Use   • Vaping Use: Never used   Substance and Sexual Activity   • Alcohol use:  Yes     Alcohol/week: 2 0 standard drinks     Types: 2 Glasses of wine per week     Comment: Social drinker   • Drug use: No   • Sexual activity: Yes     Partners: Male     Birth control/protection: Male Sterilization   Other Topics Concern   • Not on file   Social History Narrative   • Not on file     Social Determinants of Health     Financial Resource Strain: Not on file   Food Insecurity: Not on file   Transportation Needs: Not on file   Physical Activity: Not on file   Stress: Not on file   Social Connections: Not on file   Intimate Partner Violence: Not on file   Housing Stability: Not on file       Current Outpatient Medications:   •  loratadine (CLARITIN) 5 MG chewable tablet, Chew 5 mg daily, Disp: , Rfl:   •  tamoxifen (NOLVADEX) 20 mg tablet, TAKE 1 TABLET(20 MG) BY MOUTH DAILY, Disp: 90 tablet, Rfl: 1  No Known Allergies  Vitals:    01/12/23 0851   BP: 110/76   BP Location: Left arm   Pulse: 75   Resp: 18   Temp: (!) 96 7 °F (35 9 °C)   TempSrc: Temporal   SpO2: 98%   Weight: 55 kg (121 lb 3 2 oz)

## 2023-02-23 ENCOUNTER — ANESTHESIA (OUTPATIENT)
Dept: GASTROENTEROLOGY | Facility: AMBULARY SURGERY CENTER | Age: 53
End: 2023-02-23

## 2023-02-23 ENCOUNTER — ANESTHESIA EVENT (OUTPATIENT)
Dept: GASTROENTEROLOGY | Facility: AMBULARY SURGERY CENTER | Age: 53
End: 2023-02-23

## 2023-02-23 ENCOUNTER — HOSPITAL ENCOUNTER (OUTPATIENT)
Dept: GASTROENTEROLOGY | Facility: AMBULARY SURGERY CENTER | Age: 53
Setting detail: OUTPATIENT SURGERY
End: 2023-02-23
Attending: INTERNAL MEDICINE

## 2023-02-23 VITALS
HEART RATE: 70 BPM | WEIGHT: 119 LBS | TEMPERATURE: 97.4 F | DIASTOLIC BLOOD PRESSURE: 81 MMHG | RESPIRATION RATE: 22 BRPM | BODY MASS INDEX: 21.09 KG/M2 | OXYGEN SATURATION: 98 % | SYSTOLIC BLOOD PRESSURE: 124 MMHG | HEIGHT: 63 IN

## 2023-02-23 DIAGNOSIS — Z86.010 HISTORY OF ADENOMATOUS POLYP OF COLON: ICD-10-CM

## 2023-02-23 DIAGNOSIS — Z12.11 SPECIAL SCREENING FOR MALIGNANT NEOPLASMS, COLON: ICD-10-CM

## 2023-02-23 DIAGNOSIS — Z86.010 HISTORY OF COLON POLYPS: ICD-10-CM

## 2023-02-23 LAB
EXT PREGNANCY TEST URINE: NEGATIVE
EXT. CONTROL: NORMAL

## 2023-02-23 RX ORDER — PROPOFOL 10 MG/ML
INJECTION, EMULSION INTRAVENOUS AS NEEDED
Status: DISCONTINUED | OUTPATIENT
Start: 2023-02-23 | End: 2023-02-23

## 2023-02-23 RX ORDER — SODIUM CHLORIDE, SODIUM LACTATE, POTASSIUM CHLORIDE, CALCIUM CHLORIDE 600; 310; 30; 20 MG/100ML; MG/100ML; MG/100ML; MG/100ML
INJECTION, SOLUTION INTRAVENOUS CONTINUOUS PRN
Status: DISCONTINUED | OUTPATIENT
Start: 2023-02-23 | End: 2023-02-23

## 2023-02-23 RX ORDER — LIDOCAINE HYDROCHLORIDE 10 MG/ML
INJECTION, SOLUTION EPIDURAL; INFILTRATION; INTRACAUDAL; PERINEURAL AS NEEDED
Status: DISCONTINUED | OUTPATIENT
Start: 2023-02-23 | End: 2023-02-23

## 2023-02-23 RX ADMIN — LIDOCAINE HYDROCHLORIDE 50 MG: 10 INJECTION, SOLUTION EPIDURAL; INFILTRATION; INTRACAUDAL at 08:29

## 2023-02-23 RX ADMIN — SODIUM CHLORIDE, SODIUM LACTATE, POTASSIUM CHLORIDE, AND CALCIUM CHLORIDE: .6; .31; .03; .02 INJECTION, SOLUTION INTRAVENOUS at 08:17

## 2023-02-23 RX ADMIN — PROPOFOL 20 MG: 10 INJECTION, EMULSION INTRAVENOUS at 08:39

## 2023-02-23 RX ADMIN — PROPOFOL 20 MG: 10 INJECTION, EMULSION INTRAVENOUS at 08:42

## 2023-02-23 RX ADMIN — PROPOFOL 130 MG: 10 INJECTION, EMULSION INTRAVENOUS at 08:29

## 2023-02-23 RX ADMIN — PROPOFOL 20 MG: 10 INJECTION, EMULSION INTRAVENOUS at 08:31

## 2023-02-23 RX ADMIN — PROPOFOL 20 MG: 10 INJECTION, EMULSION INTRAVENOUS at 08:36

## 2023-02-23 RX ADMIN — PROPOFOL 50 MG: 10 INJECTION, EMULSION INTRAVENOUS at 08:33

## 2023-02-23 NOTE — ANESTHESIA PREPROCEDURE EVALUATION
Procedure:  COLONOSCOPY    Relevant Problems   CARDIO   (+) Atypical chest pain      GYN   (+) 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 breast in female, estrogen receptor positive (HCC)      MUSCULOSKELETAL   (+) Back pain   (+) Chronic low back pain      NEURO/PSYCH   (+) Chronic low back pain   (+) Numbness and tingling of left lower extremity        Physical Exam    Airway    Mallampati score: II         Dental   No notable dental hx     Cardiovascular  Rate: normal,     Pulmonary  Pulmonary exam normal     Other Findings        Anesthesia Plan  ASA Score- 2     Anesthesia Type- IV sedation with anesthesia with ASA Monitors  Additional Monitors:   Airway Plan:           Plan Factors-    Chart reviewed  EKG reviewed  Existing labs reviewed  Patient summary reviewed  Induction- intravenous  Postoperative Plan-     Informed Consent- Anesthetic plan and risks discussed with patient  I personally reviewed this patient with the CRNA  Discussed and agreed on the Anesthesia Plan with the CRNA  Garrett Powell

## 2023-02-23 NOTE — ANESTHESIA POSTPROCEDURE EVALUATION
Post-Op Assessment Note    CV Status:  Stable  Pain Score: 0    Pain management: adequate     Mental Status:  Awake and alert   Hydration Status:  Stable   PONV Controlled:  None   Airway Patency:  Patent and adequate      Post Op Vitals Reviewed: Yes      Staff: CRNA, Anesthesiologist         No notable events documented      BP   91/61   Temp     Pulse  65   Resp   16   SpO2   98%

## 2023-02-23 NOTE — H&P
History and Physical - [facility]  Anette Gonzalez 47 y o  female MRN: [id number]    HPI: Anette Gonzalez is a 47y o  year old female who presents with hx of colon polyps      Review of Systems    Historical Information   Past Medical History:   Diagnosis Date   • BRCA1 negative    • BRCA2 negative    • Breast cancer ([facility] [address] ) 05/2018    right breast   • Cancer ([facility] [address] ) 5/10/2018   • Colon polyp    • Fibrocystic breast    • History of radiation therapy 2018    right breast ca   • Seasonal allergies    • Vertigo      Past Surgical History:   Procedure Laterality Date   • BREAST BIOPSY Right 05/2018    Positive   • BREAST BIOPSY Left     Neg around age 28   • BREAST BIOPSY Left 11/132019    benign   • BREAST BIOPSY Right 11/29/2021   • BREAST LUMPECTOMY Right 07/2018    with radiation   • EMBOLECTOMY      s/p forceps delivery   • MAMMO NEEDLE LOCALIZATION RIGHT (ALL INC) Right 7/10/2018   • MAMMO STEREOTACTIC BREAST BIOPSY RIGHT (ALL INC) Right 5/29/2018   • RI BX/EXC LYMPH NODE OPEN SUPERFICIAL Right 7/10/2018    Procedure: SENTINEL LYMPH NODE BIOPSY; LYMPHATIC MAPPING WITH BLUE DYE RADIOACTIVE DYE (INJECT AT 0800 BY DR CHAUDHARY IN THE OR); Surgeon: Kevin Garcia MD;  [facility];  Service: Surgical Oncology   • RI PERQ DEVICE PLACEMENT BREAST LOC 1ST LES W/GDNCE Right 7/10/2018    Procedure: BREAST LUMPECTOMY; BREAST NEEDLE LOCALIZATION (NEEDLE LOC AT 0700);   Surgeon: Kevin Garcia MD;  [facility];  Service: Surgical Oncology   • US GUIDED BREAST BIOPSY LEFT COMPLETE Left 11/13/2019   • US GUIDED BREAST BIOPSY RIGHT COMPLETE Right 5/29/2018   • US GUIDED BREAST BIOPSY RIGHT COMPLETE Right 11/29/2021     Social History   Social History     Substance and Sexual Activity   Alcohol Use Yes   • Alcohol/week: 2 0 standard drinks   • Types: 2 Glasses of wine per week    Comment: Social drinker     Social History     Substance and Sexual Activity   Drug Use No     Social History     Tobacco Use Smoking Status Never   Smokeless Tobacco Never   Tobacco Comments    only smoked socially as teenager for 1 yr     Family History   Problem Relation Age of Onset   • Prostate cancer Father    • Parkinsonism Father    • No Known Problems Sister    • No Known Problems Daughter    • Colon cancer Maternal Grandmother    • Esophageal cancer Maternal Grandfather    • Breast cancer Maternal Aunt 54   • Ovarian cancer Maternal Aunt    • Cancer Maternal Aunt    • Skin cancer Paternal Aunt        Meds/Allergies     (Not in a hospital admission)      No Known Allergies    Objective     There were no vitals taken for this visit        PHYSICAL EXAM    Gen: NAD  CV: RRR  CHEST: Clear  ABD: soft, NT/ND  EXT: no edema  Neuro: AAO      ASSESSMENT/PLAN:  This is a 47y o  year old female here for hx of colon polyps        PLAN:   Procedure: colonoscopy

## 2023-02-27 ENCOUNTER — TELEPHONE (OUTPATIENT)
Dept: OBGYN CLINIC | Facility: CLINIC | Age: 53
End: 2023-02-27

## 2023-02-27 NOTE — TELEPHONE ENCOUNTER
Pt is stating Dr Ean Barry ordered something for her for hot flashes (she can't take estrogen as she is a breast cancer survivor) a couple years ago and at the time she decided not to take it  But now the hot flashes are bad and she would like to have it ordered  Pharmacy is on file

## 2023-02-28 DIAGNOSIS — N95.1 VASOMOTOR SYMPTOMS DUE TO MENOPAUSE: Primary | ICD-10-CM

## 2023-02-28 RX ORDER — VENLAFAXINE HYDROCHLORIDE 37.5 MG/1
37.5 CAPSULE, EXTENDED RELEASE ORAL DAILY
Qty: 30 CAPSULE | Refills: 2 | Status: SHIPPED | OUTPATIENT
Start: 2023-02-28

## 2023-05-17 ENCOUNTER — HOSPITAL ENCOUNTER (OUTPATIENT)
Dept: MAMMOGRAPHY | Facility: CLINIC | Age: 53
Discharge: HOME | End: 2023-05-17

## 2023-05-17 DIAGNOSIS — Z17.0 MALIGNANT NEOPLASM OF UPPER-INNER QUADRANT OF RIGHT BREAST IN FEMALE, ESTROGEN RECEPTOR POSITIVE (HCC): ICD-10-CM

## 2023-05-17 DIAGNOSIS — C50.211 MALIGNANT NEOPLASM OF UPPER-INNER QUADRANT OF RIGHT BREAST IN FEMALE, ESTROGEN RECEPTOR POSITIVE (HCC): ICD-10-CM

## 2023-06-01 DIAGNOSIS — N95.1 VASOMOTOR SYMPTOMS DUE TO MENOPAUSE: ICD-10-CM

## 2023-06-05 RX ORDER — VENLAFAXINE HYDROCHLORIDE 37.5 MG/1
CAPSULE, EXTENDED RELEASE ORAL
Qty: 30 CAPSULE | Refills: 2 | Status: SHIPPED | OUTPATIENT
Start: 2023-06-05

## 2023-06-26 ENCOUNTER — OFFICE VISIT (OUTPATIENT)
Dept: SURGICAL ONCOLOGY | Facility: CLINIC | Age: 53
End: 2023-06-26
Payer: COMMERCIAL

## 2023-06-26 VITALS
SYSTOLIC BLOOD PRESSURE: 126 MMHG | RESPIRATION RATE: 21 BRPM | WEIGHT: 125 LBS | BODY MASS INDEX: 22.15 KG/M2 | HEIGHT: 63 IN | TEMPERATURE: 97.2 F | HEART RATE: 72 BPM | DIASTOLIC BLOOD PRESSURE: 78 MMHG | OXYGEN SATURATION: 99 %

## 2023-06-26 DIAGNOSIS — C50.211 MALIGNANT NEOPLASM OF UPPER-INNER QUADRANT OF RIGHT BREAST IN FEMALE, ESTROGEN RECEPTOR POSITIVE (HCC): Primary | ICD-10-CM

## 2023-06-26 DIAGNOSIS — Z12.31 VISIT FOR SCREENING MAMMOGRAM: ICD-10-CM

## 2023-06-26 DIAGNOSIS — Z17.0 MALIGNANT NEOPLASM OF UPPER-INNER QUADRANT OF RIGHT BREAST IN FEMALE, ESTROGEN RECEPTOR POSITIVE (HCC): Primary | ICD-10-CM

## 2023-06-26 DIAGNOSIS — Z79.810 USE OF TAMOXIFEN (NOLVADEX): ICD-10-CM

## 2023-06-26 PROCEDURE — 99214 OFFICE O/P EST MOD 30 MIN: CPT

## 2023-06-26 NOTE — PROGRESS NOTES
Surgical Oncology Follow Up       [address] [place]  [facility] [place]  [address]  [place] [address]    Landon Wu  1970  [id number]  [address] [place]  [facility] [place]  [address] [address]    No chief complaint on file  Assessment/Plan:  1  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 breast in female, estrogen receptor positive ([address] )  -1 year follow up    2  Use of tamoxifen (Nolvadex)  - continue use per medical oncology    3  Visit for screening mammogram  - Mammo screening bilateral w 3d & cad; Future      Discussion/Summary: Patient is a 58-year-old female presenting today for a six-month follow-up for right breast cancer diagnosed in May 2018  Pathology revealed invasive ductal carcinoma ER/GA positive, HER2 negative   She underwent genetic testing which was negative  She had a right breast lumpectomy with sentinel node biopsy with Dr Vicky De La Fuente and completed whole breast radiation therapy  She is currently on tamoxifen  She had a bilateral diagnostic mammogram on 5/17/2023 which was BI-RADS 2 category 3 density  She is 5 years out from her surgery so we will see her again in 1 year  Patient and I spoke about ABUS and she prefers to forego additionally screening with ABUS due to the discomfort associated  I informed her this is ok  She sees her gyn in the winter so she will still be getting q6 month cbe  I informed her to call if anything changes between now and her next visit  All questions were answered today          History of Present Illness:     Oncology History   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 breast in female, estrogen receptor positive ([facility] [address] )   5/29/2018 Biopsy    Right breast biopsy  3 o'clock 6 CMFN  Invasive ductal carcinoma  Grade 1  8 mm on ultrasound  No axillary adenopathy on ultrasound  ER 95  GA 90  Her 2 0  Stage IA     6/7/2018 Genetic Testing    BRCA testing  Negative     7/10/2018 Surgery    Right breast lumpectomy with sentinel lymph node biopsy  Invasive ductal carcinoma  9 mm  Grade 1  0/1 lymph nodes  Margins negative  ER 95  MS 90  Her 2 0  Stage IA     7/2018 -  Hormone Therapy      Adjuvant hormonal therapy with tamoxifen      8/13/2018 - 9/11/2018 Radiation    Course: C1    Plan ID Energy Fractions Dose per Fraction (cGy) Dose Correction (cGy) Total Dose Delivered (cGy) Elapsed Days   R Breast e 9E 5 / 5 200 0 1,000 6   Rt Breast 6X 16 / 16 266 0 4,256 22      Treatment dates:  C1: 8/13/2018 - 9/11/2018            -Interval History: Patient is a 50-year-old female presenting today for a six-month follow-up for right breast cancer diagnosed in May 2018  She is currently on tamoxifen  She had a bilateral diagnostic mammogram on 5/17/2023 which was BI-RADS 2 category 3 density  She sees med/onc next week and she believes he is going to switch her to anastrozole for another 5 years  Review of Systems:  Review of Systems   Constitutional: Negative for activity change, appetite change, fatigue and unexpected weight change  Respiratory: Negative for cough and shortness of breath  Cardiovascular: Negative for chest pain  Gastrointestinal: Negative for abdominal pain, diarrhea, nausea and vomiting  Endocrine: Negative for heat intolerance  Musculoskeletal: Negative for arthralgias, back pain and myalgias  Skin: Negative for rash  Neurological: Negative for weakness and headaches  Hematological: Negative for adenopathy         Patient Active Problem List   Diagnosis   • Benign paroxysmal vertigo   • Seasonal allergies   • Rash   • 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 breast in female, estrogen receptor positive ([facility] [address] )   • Screening for human papillomavirus (HPV)   • Use of tamoxifen (Nolvadex)   • Rotator cuff syndrome of right shoulder   • Numbness and tingling of left lower extremity   • Vitamin D deficiency   • Screening for cardiovascular condition   • Need for vaccination   • Left inguinal pain   • Congestion of right ear   • Change in bowel habit   • Atypical chest pain   • Perimenopause   • Back pain   • Slow transit constipation   • Chronic low back pain   • Hematoma     Past Medical History:   Diagnosis Date   • BRCA1 negative    • BRCA2 negative    • Breast cancer ([facility] [address] ) 05/2018    right breast   • Cancer ([facility] [address] ) 5/10/2018   • Colon polyp    • Fibrocystic breast    • History of radiation therapy 2018    right breast ca   • Seasonal allergies    • Vertigo      Past Surgical History:   Procedure Laterality Date   • BREAST BIOPSY Right 05/2018    Positive   • BREAST BIOPSY Left     Neg around age 28   • BREAST BIOPSY Left 11/132019    benign   • BREAST BIOPSY Right 11/29/2021   • BREAST LUMPECTOMY Right 07/2018    with radiation   • EMBOLECTOMY      s/p forceps delivery   • MAMMO NEEDLE LOCALIZATION RIGHT (ALL INC) Right 7/10/2018   • MAMMO STEREOTACTIC BREAST BIOPSY RIGHT (ALL INC) Right 5/29/2018   • OR BX/EXC LYMPH NODE OPEN SUPERFICIAL Right 7/10/2018    Procedure: SENTINEL LYMPH NODE BIOPSY; LYMPHATIC MAPPING WITH BLUE DYE RADIOACTIVE DYE (INJECT AT 0800 BY DR CHAUDHARY IN THE OR); Surgeon: Maegan Sam MD;  [facility];  Service: Surgical Oncology   • OR PERQ DEVICE PLACEMENT BREAST LOC 1ST LES W/GDNCE Right 7/10/2018    Procedure: BREAST LUMPECTOMY; BREAST NEEDLE LOCALIZATION (NEEDLE LOC AT 0700);   Surgeon: Maegan Sam MD;  [facility];  Service: Surgical Oncology   • US GUIDED BREAST BIOPSY LEFT COMPLETE Left 11/13/2019   • US GUIDED BREAST BIOPSY RIGHT COMPLETE Right 5/29/2018   • US GUIDED BREAST BIOPSY RIGHT COMPLETE Right 11/29/2021     Family History   Problem Relation Age of Onset   • Prostate cancer Father    • Parkinsonism Father    • No Known Problems Sister    • No Known Problems Daughter    • Colon cancer Maternal Grandmother    • Esophageal cancer Maternal Grandfather    • Breast cancer Maternal Aunt 55   • Ovarian cancer Maternal Aunt    • Cancer Maternal Aunt    • Skin cancer Paternal Aunt      Social History     Socioeconomic History   • Marital status: /Civil Union     Spouse name: Not on file   • Number of children: 2   • Years of education: Not on file   • Highest education level: Not on file   Occupational History   • Not on file   Tobacco Use   • Smoking status: Never   • Smokeless tobacco: Never   • Tobacco comments:     only smoked socially as teenager for 1 yr   Vaping Use   • Vaping Use: Never used   Substance and Sexual Activity   • Alcohol use: Yes     Alcohol/week: 2 0 standard drinks of alcohol     Types: 2 Glasses of wine per week     Comment: Social drinker   • Drug use: No   • Sexual activity: Yes     Partners: Male     Birth control/protection: Male Sterilization   Other Topics Concern   • Not on file   Social History Narrative   • Not on file     Social Determinants of Health     Financial Resource Strain: Not on file   Food Insecurity: Not on file   Transportation Needs: Not on file   Physical Activity: Not on file   Stress: Not on file   Social Connections: Not on file   Intimate Partner Violence: Not on file   Housing Stability: Not on file       Current Outpatient Medications:   •  loratadine (CLARITIN) 5 MG chewable tablet, Chew 5 mg daily, Disp: , Rfl:   •  tamoxifen (NOLVADEX) 20 mg tablet, Take 1 tablet (20 mg total) by mouth daily, Disp: 90 tablet, Rfl: 1  •  venlafaxine (EFFEXOR-XR) 37 5 mg 24 hr capsule, TAKE 1 CAPSULE(37 5 MG) BY MOUTH DAILY, Disp: 30 capsule, Rfl: 2  No Known Allergies  Vitals:    06/26/23 0926   BP: 126/78   Pulse: 72   Resp: 21   Temp: (!) 97 2 °F (36 2 °C)   SpO2: 99%       Physical Exam  Constitutional:       General: She is not in acute distress  Appearance: Normal appearance  Cardiovascular:      Rate and Rhythm: Normal rate and regular rhythm  Pulses: Normal pulses  Heart sounds: Normal heart sounds     Pulmonary: Effort: Pulmonary effort is normal       Breath sounds: Normal breath sounds  Chest:      Chest wall: No mass  Breasts:     Right: No swelling, bleeding, inverted nipple, mass, nipple discharge, skin change or tenderness  Left: No swelling, bleeding, inverted nipple, mass, nipple discharge, skin change or tenderness  Abdominal:      General: Abdomen is flat  Palpations: Abdomen is soft  Lymphadenopathy:      Upper Body:      Right upper body: No supraclavicular, axillary or pectoral adenopathy  Left upper body: No supraclavicular, axillary or pectoral adenopathy  Skin:     General: Skin is warm  Neurological:      General: No focal deficit present  Mental Status: She is alert and oriented to person, place, and time  Psychiatric:         Mood and Affect: Mood normal          Behavior: Behavior normal            Results:    Imaging  No results found  I reviewed the above imaging data  Advance Care Planning/Advance Directives:  Discussed disease status, cancer treatment plans and/or cancer treatment goals with the patient

## 2023-07-12 ENCOUNTER — OFFICE VISIT (OUTPATIENT)
Dept: HEMATOLOGY ONCOLOGY | Facility: CLINIC | Age: 53
End: 2023-07-12
Payer: COMMERCIAL

## 2023-07-12 VITALS
WEIGHT: 126 LBS | RESPIRATION RATE: 18 BRPM | HEART RATE: 68 BPM | OXYGEN SATURATION: 99 % | HEIGHT: 63 IN | SYSTOLIC BLOOD PRESSURE: 124 MMHG | BODY MASS INDEX: 22.32 KG/M2 | TEMPERATURE: 97.1 F | DIASTOLIC BLOOD PRESSURE: 88 MMHG

## 2023-07-12 DIAGNOSIS — C50.211 MALIGNANT NEOPLASM OF UPPER-INNER QUADRANT OF RIGHT BREAST IN FEMALE, ESTROGEN RECEPTOR POSITIVE (HCC): Primary | ICD-10-CM

## 2023-07-12 DIAGNOSIS — Z17.0 MALIGNANT NEOPLASM OF UPPER-INNER QUADRANT OF RIGHT BREAST IN FEMALE, ESTROGEN RECEPTOR POSITIVE (HCC): Primary | ICD-10-CM

## 2023-07-12 PROCEDURE — 99214 OFFICE O/P EST MOD 30 MIN: CPT | Performed by: INTERNAL MEDICINE

## 2023-07-12 NOTE — PROGRESS NOTES
Hematology / Oncology Outpatient Follow Up Note    Alicia Castano 46 y.o. female :1970 PSB:[id number]         Date:  2023    Assessment / Plan:  A 62-year-old premenopausal woman with stage IA right breast cancer, grade 1, ER 95% positive, MD 90% positive, HER2 negative disease.  She underwent lumpectomy with sentinel lymph node biopsy, resulting in LESLI.   She is currently on adjuvant hormonal therapy with tamoxifen with excellent tolerance. Clinically, she has no evidence of recurrent disease. Since her last menstrual cycle was in 2022, she is most likely to be postmenopausal.  I recommended her to have estradiol and FSH in the months. If her postmenopausal condition is confirmed, I will switch her hormonal therapy to anastrozole. Side effect of anastrozole was thoroughly discussed, including but not limited to hot flashes, musculoskeletal symptoms and bone mineral density loss. She understood and wished to proceed. Once I obtain the [facility] and estradiol, we will contact her and treat her accordingly. I will see her again in a year for routine follow-up.       Subjective:      HPI:  A 48-year-old premenopausal woman who was found to have abnormality in her right breast, based on a screening mammography. Aracelis Lerma, she underwent stereotactic right breast biopsy in May 29, 2018 which showed invasive ductal carcinoma, grade 1.  Subsequently, she had genetic testing which was negative for BRCA gene mutation.  She elected to have lumpectomy which she did in July 10, 2018 by Dr. Iglesia Hartmann. Bernadette Haynes had 9 x 6 mm of invasive ductal carcinoma, grade 1. Norma Rothman is no evidence of lymphovascular invasion.  One sentinel lymph nodes was negative for metastatic disease.  This was ER 95% positive, MD 90% positive, HER2 negative disease.  She presents today to discuss adjuvant treatment options.  She feels well.  She has no complaint of pain.  She has regular menstrual cycle.  She has no respiratory symptoms. Bernadette Holderer has no other past medical history.  She does not take any medications.  Her performance status is normal.           Interval History:  A 76-year-old premenopausal woman with stage IA right breast cancer, grade 1, ER 95% positive, PA 90% positive, HER2 negative disease.  She underwent lumpectomy with sentinel lymph node biopsy, resulting in LESLI.   She started adjuvant hormonal therapy with tamoxifen July 2018. She presents today for routine follow-up. Her last menstrual cycle was in March 2022. She feels well. She has mild to moderate hot flashes. She denied any bone pain. She has no respiratory symptoms.   Her performance status is normal.           Objective:      Primary Diagnosis:     Right breast cancer, stage IA (pT1b, pN0, M0) grade 1, ER 95% positive, PA 90% positive, HER2 negative disease.  Diagnosed in July 2018.     Cancer Staging:  Cancer Staging  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 breast in female, estrogen receptor positive ([address])  Staging form: Breast, AJCC 8th Edition  - Clinical: Stage IA (cT1b, cN0(sn), cM0, G1, ER: Positive, PA: Positive, HER2: Negative) - Unsigned           Previous Hematologic/ Oncologic Treatment:            Current Hematologic/ Oncologic Treatment:       Adjuvant hormonal therapy with tamoxifen since July 2018.     Disease Status:      LESLI status post lumpectomy with sentinel lymph node biopsy.     Test Results:     Pathology:     9 x 6 mm of invasive ductal carcinoma, grade 1.  No evidence of lymphovascular invasion.  One sentinel lymph nodes were negative for metastatic disease.  ER 95% positive, PA 90% positive, HER2 negative disease.  Stage IA (pT1b, pN0, M0)     Radiology:     Mammography in May 2023 was benign.  BI-RADS 2.      Laboratory:     See below.     Physical Exam:        General Appearance:    Alert, oriented          Eyes:    PERRL   Ears:    Normal external ear canals, both ears   Nose:   Nares normal, septum midline   Throat:   Mucosa moist. Pharynx without injection. Neck:   Supple         Lungs:     Clear to auscultation bilaterally   Chest Wall:    No tenderness or deformity    Heart:    Regular rate and rhythm         Abdomen:     Soft, non-tender, bowel sounds +, no organomegaly               Extremities:   Extremities no cyanosis or edema         Skin:   no rash or icterus. Lymph nodes:   Cervical, supraclavicular, and axillary nodes normal   Neurologic:   CNII-XII intact, normal strength, sensation and reflexes     Throughout             Breast exam: Lumpectomy scar at inner aspect of her right breast with no palpable abnormality.  Left breast exam is negative.                 ROS: Review of Systems   All other systems reviewed and are negative. Imaging: No results found. Labs:   Lab Results   Component Value Date    WBC 7.58 08/21/2018    HGB 14.1 08/21/2018    HCT 40.8 08/21/2018    MCV 92 08/21/2018     08/21/2018     Lab Results   Component Value Date    K 4.1 12/02/2022     12/02/2022    CO2 29 12/02/2022    BUN 13 12/02/2022    CREATININE 0.81 12/02/2022    GLUF 97 12/02/2022    CALCIUM 8.7 12/02/2022    AST 20 12/02/2022    ALT 16 12/02/2022    ALKPHOS 56 12/02/2022    EGFR 83 12/02/2022           Current Medications: Reviewed  Allergies: Reviewed  PMH/FH/SH:  Reviewed      Vital Sign:    Body surface area is 1.59 meters squared.     Wt Readings from Last 3 Encounters:   07/12/23 57.2 kg (126 lb)   06/26/23 56.7 kg (125 lb)   02/23/23 54 kg (119 lb)        Temp Readings from Last 3 Encounters:   07/12/23 (!) 97.1 °F (36.2 °C) (Temporal)   06/26/23 (!) 97.2 °F (36.2 °C)   02/23/23 (!) 97.4 °F (36.3 °C) (Temporal)        BP Readings from Last 3 Encounters:   07/12/23 124/88   06/26/23 126/78   02/23/23 124/81         Pulse Readings from Last 3 Encounters:   07/12/23 68   06/26/23 72   02/23/23 70     @LASTSAO2(3)@

## 2023-08-16 DIAGNOSIS — Z17.0 MALIGNANT NEOPLASM OF UPPER-INNER QUADRANT OF RIGHT BREAST IN FEMALE, ESTROGEN RECEPTOR POSITIVE (HCC): ICD-10-CM

## 2023-08-16 DIAGNOSIS — C50.211 MALIGNANT NEOPLASM OF UPPER-INNER QUADRANT OF RIGHT BREAST IN FEMALE, ESTROGEN RECEPTOR POSITIVE (HCC): ICD-10-CM

## 2023-08-16 RX ORDER — TAMOXIFEN CITRATE 20 MG/1
20 TABLET ORAL DAILY
Qty: 90 TABLET | Refills: 1 | Status: SHIPPED | OUTPATIENT
Start: 2023-08-16 | End: 2023-08-17 | Stop reason: ALTCHOICE

## 2023-08-17 ENCOUNTER — APPOINTMENT (OUTPATIENT)
Dept: LAB | Facility: CLINIC | Age: 53
End: 2023-08-17
Payer: COMMERCIAL

## 2023-08-17 DIAGNOSIS — Z17.0 MALIGNANT NEOPLASM OF UPPER-INNER QUADRANT OF RIGHT BREAST IN FEMALE, ESTROGEN RECEPTOR POSITIVE (HCC): Primary | ICD-10-CM

## 2023-08-17 DIAGNOSIS — C50.211 MALIGNANT NEOPLASM OF UPPER-INNER QUADRANT OF RIGHT BREAST IN FEMALE, ESTROGEN RECEPTOR POSITIVE (HCC): ICD-10-CM

## 2023-08-17 DIAGNOSIS — Z17.0 MALIGNANT NEOPLASM OF UPPER-INNER QUADRANT OF RIGHT BREAST IN FEMALE, ESTROGEN RECEPTOR POSITIVE (HCC): ICD-10-CM

## 2023-08-17 DIAGNOSIS — C50.211 MALIGNANT NEOPLASM OF UPPER-INNER QUADRANT OF RIGHT BREAST IN FEMALE, ESTROGEN RECEPTOR POSITIVE (HCC): Primary | ICD-10-CM

## 2023-08-17 LAB
ESTRADIOL SERPL-MCNC: 19.7 PG/ML
FSH SERPL-ACNC: 52.2 MIU/ML

## 2023-08-17 PROCEDURE — 83001 ASSAY OF GONADOTROPIN (FSH): CPT

## 2023-08-17 PROCEDURE — 82670 ASSAY OF TOTAL ESTRADIOL: CPT

## 2023-08-17 PROCEDURE — 36415 COLL VENOUS BLD VENIPUNCTURE: CPT

## 2023-08-17 RX ORDER — ANASTROZOLE 1 MG/1
1 TABLET ORAL DAILY
Qty: 90 TABLET | Refills: 1 | Status: SHIPPED | OUTPATIENT
Start: 2023-08-17

## 2023-08-17 NOTE — TELEPHONE ENCOUNTER
Spoke with patient to let her know that she is post menopausal. She will switch to Anastrozole. She verbalized understanding and agreed to plan.     Reviewed side effects

## 2023-09-19 DIAGNOSIS — N95.1 VASOMOTOR SYMPTOMS DUE TO MENOPAUSE: ICD-10-CM

## 2023-09-19 RX ORDER — VENLAFAXINE HYDROCHLORIDE 37.5 MG/1
CAPSULE, EXTENDED RELEASE ORAL
Qty: 30 CAPSULE | Refills: 0 | Status: SHIPPED | OUTPATIENT
Start: 2023-09-19

## 2023-10-13 ENCOUNTER — IMMUNIZATIONS (OUTPATIENT)
Dept: INTERNAL MEDICINE CLINIC | Facility: CLINIC | Age: 53
End: 2023-10-13
Payer: COMMERCIAL

## 2023-10-13 DIAGNOSIS — Z23 ENCOUNTER FOR IMMUNIZATION: Primary | ICD-10-CM

## 2023-10-13 PROCEDURE — 90686 IIV4 VACC NO PRSV 0.5 ML IM: CPT

## 2023-10-13 PROCEDURE — 90471 IMMUNIZATION ADMIN: CPT

## 2023-10-18 ENCOUNTER — TELEPHONE (OUTPATIENT)
Dept: HEMATOLOGY ONCOLOGY | Facility: CLINIC | Age: 53
End: 2023-10-18

## 2023-10-18 NOTE — TELEPHONE ENCOUNTER
Left VM for patient to contact the office to r/s her appointment with Dr. Kam Myers to another provider.

## 2023-10-22 DIAGNOSIS — N95.1 VASOMOTOR SYMPTOMS DUE TO MENOPAUSE: ICD-10-CM

## 2023-10-23 RX ORDER — VENLAFAXINE HYDROCHLORIDE 37.5 MG/1
CAPSULE, EXTENDED RELEASE ORAL
Qty: 30 CAPSULE | Refills: 0 | Status: SHIPPED | OUTPATIENT
Start: 2023-10-23

## 2023-10-28 ENCOUNTER — HOSPITAL ENCOUNTER (EMERGENCY)
Facility: HOSPITAL | Age: 53
Discharge: HOME | End: 2023-10-28
Attending: EMERGENCY MEDICINE
Payer: COMMERCIAL

## 2023-10-28 ENCOUNTER — APPOINTMENT (EMERGENCY)
Dept: RADIOLOGY | Facility: HOSPITAL | Age: 53
End: 2023-10-28
Payer: COMMERCIAL

## 2023-10-28 VITALS
SYSTOLIC BLOOD PRESSURE: 120 MMHG | TEMPERATURE: 97.6 F | HEART RATE: 84 BPM | OXYGEN SATURATION: 99 % | DIASTOLIC BLOOD PRESSURE: 63 MMHG

## 2023-10-28 DIAGNOSIS — S60.222A CONTUSION OF LEFT HAND, INITIAL ENCOUNTER: Primary | ICD-10-CM

## 2023-10-28 PROCEDURE — 99283 EMERGENCY DEPT VISIT LOW MDM: CPT

## 2023-10-28 PROCEDURE — 73130 X-RAY EXAM OF HAND: CPT

## 2023-10-28 PROCEDURE — 99284 EMERGENCY DEPT VISIT MOD MDM: CPT | Performed by: EMERGENCY MEDICINE

## 2023-10-28 RX ORDER — ACETAMINOPHEN 325 MG/1
650 TABLET ORAL ONCE
Status: COMPLETED | OUTPATIENT
Start: 2023-10-28 | End: 2023-10-28

## 2023-10-28 RX ADMIN — ACETAMINOPHEN 650 MG: 325 TABLET, FILM COATED ORAL at 07:42

## 2023-10-28 NOTE — DISCHARGE INSTRUCTIONS
X-ray imaging in the emergency department was unremarkable. You have been diagnosed with a contusion of the left hand. You may take Tylenol and Motrin alternating every 3-4 hours as needed for pain. Please take Motrin with small meals to avoid upset stomach. You may also continue to apply ice packs as needed to decrease swelling. Please follow-up with your PCP on an outpatient basis for further evaluation and management. Should you develop worsening pain or fever uncontrolled with medication, numbness, weakness, increased discoloration or any other symptoms that you find concerning please return to the emergency department immediately.

## 2023-10-28 NOTE — ED PROVIDER NOTES
History  Chief Complaint   Patient presents with    Hand Injury     Patient reports dropping 20lb dumbbell onto L hand from height of about 2 feet around 0600. Large hematoma present on carpals. Able to move fingers. Has been icing hand since injury occurred, unsure of paraesthesia. The patient is a 50-year-old female with a past medical history of breast cancer who presents to the emergency department with complaint of left hand injury. She reports earlier this morning she was exercising and doing squats with dumbbells in each hand when she lost her balance and fell to the left causing the dumbbell on her right hand to come down and landed on the top of her left hand. She now reports severe hand pain and swelling. She has a large hematoma on the dorsal aspect of her left hand but still has full range of motion of the hand with 5 out of 5  strength and no sensational deficits. She has +2 radial pulse and good capillary refill. She has no other complaints at this time. Prior to Admission Medications   Prescriptions Last Dose Informant Patient Reported?  Taking?   anastrozole (ARIMIDEX) 1 mg tablet   No No   Sig: Take 1 tablet (1 mg total) by mouth daily   loratadine (CLARITIN) 5 MG chewable tablet  Self Yes No   Sig: Chew 5 mg daily   venlafaxine (EFFEXOR-XR) 37.5 mg 24 hr capsule   No No   Sig: Take one tablet daily      Facility-Administered Medications: None       Past Medical History:   Diagnosis Date    BRCA1 negative     BRCA2 negative     Breast cancer ([address]) 05/2018    right breast    Cancer ([address]) 5/10/2018    Colon polyp     Fibrocystic breast     History of radiation therapy 2018    right breast ca    Seasonal allergies     Vertigo        Past Surgical History:   Procedure Laterality Date    BREAST BIOPSY Right 05/2018    Positive    BREAST BIOPSY Left     Neg around age 28    BREAST BIOPSY Left 11/132019    benign    BREAST BIOPSY Right 11/29/2021    BREAST LUMPECTOMY Right 07/2018 with radiation    EMBOLECTOMY      s/p forceps delivery    MAMMO NEEDLE LOCALIZATION RIGHT (ALL INC) Right 7/10/2018    MAMMO STEREOTACTIC BREAST BIOPSY RIGHT (ALL INC) Right 5/29/2018    KY BX/EXC LYMPH NODE OPEN SUPERFICIAL Right 7/10/2018    Procedure: SENTINEL LYMPH NODE BIOPSY; LYMPHATIC MAPPING WITH BLUE DYE RADIOACTIVE DYE (INJECT AT 0800 BY DR CHAUDHARY IN THE OR); Surgeon: Ritesh Jaramillo MD;  Location: [facility];  Service: Surgical Oncology    KY PERQ DEVICE PLACEMENT BREAST LOC 1ST LES W/GDNCE Right 7/10/2018    Procedure: BREAST LUMPECTOMY; BREAST NEEDLE LOCALIZATION (NEEDLE LOC AT 0700); Surgeon: Ritesh Jaramillo MD;  Location[facility];  Service: Surgical Oncology    US GUIDED BREAST BIOPSY LEFT COMPLETE Left 11/13/2019    US GUIDED BREAST BIOPSY RIGHT COMPLETE Right 5/29/2018    US GUIDED BREAST BIOPSY RIGHT COMPLETE Right 11/29/2021       Family History   Problem Relation Age of Onset    Prostate cancer Father     Parkinsonism Father     No Known Problems Sister     No Known Problems Daughter     Colon cancer Maternal Grandmother     Esophageal cancer Maternal Grandfather     Breast cancer Maternal Aunt 54    Ovarian cancer Maternal Aunt     Cancer Maternal Aunt     Skin cancer Paternal Aunt      I have reviewed and agree with the history as documented. E-Cigarette/Vaping    E-Cigarette Use Never User      E-Cigarette/Vaping Substances    Nicotine No     THC No     CBD No     Flavoring No     Other No     Unknown No      Social History     Tobacco Use    Smoking status: Never    Smokeless tobacco: Never    Tobacco comments:     only smoked socially as teenager for 1 yr   Vaping Use    Vaping Use: Never used   Substance Use Topics    Alcohol use: Yes     Alcohol/week: 2.0 standard drinks of alcohol     Types: 2 Glasses of wine per week     Comment: Social drinker    Drug use: No        Review of Systems   Constitutional:  Negative for chills, fatigue and fever.    HENT:  Negative for congestion, ear pain, rhinorrhea and sore throat. Eyes:  Negative for photophobia, pain and visual disturbance. Respiratory:  Negative for cough, shortness of breath, wheezing and stridor. Cardiovascular:  Negative for chest pain, palpitations and leg swelling. Gastrointestinal:  Negative for abdominal distention, abdominal pain, constipation, diarrhea, nausea and vomiting. Endocrine: Negative. Genitourinary:  Negative for dysuria and hematuria. Musculoskeletal:  Positive for arthralgias and myalgias. Negative for back pain, neck pain and neck stiffness. Skin:  Negative for color change and rash. Allergic/Immunologic: Negative. Neurological:  Negative for dizziness, seizures, syncope, weakness, light-headedness, numbness and headaches. Hematological: Negative. Psychiatric/Behavioral: Negative. All other systems reviewed and are negative. Physical Exam  ED Triage Vitals [10/28/23 0651]   Temperature Pulse Resp Blood Pressure SpO2   97.6 °F (36.4 °C) 84 -- 120/63 99 %      Temp Source Heart Rate Source Patient Position - Orthostatic VS BP Location FiO2 (%)   Oral Monitor Sitting Left arm --      Pain Score       --             Orthostatic Vital Signs  Vitals:    10/28/23 0651   BP: 120/63   Pulse: 84   Patient Position - Orthostatic VS: Sitting       Physical Exam  Vitals and nursing note reviewed. Constitutional:       General: She is in acute distress. Appearance: Normal appearance. She is well-developed and normal weight. She is not ill-appearing. HENT:      Head: Normocephalic and atraumatic. Nose: Nose normal.      Mouth/Throat:      Mouth: Mucous membranes are moist.      Pharynx: Oropharynx is clear. Eyes:      Extraocular Movements: Extraocular movements intact. Conjunctiva/sclera: Conjunctivae normal.      Pupils: Pupils are equal, round, and reactive to light. Cardiovascular:      Rate and Rhythm: Normal rate and regular rhythm. Pulses: Normal pulses.       Heart sounds: Normal heart sounds. No murmur heard. No friction rub. Pulmonary:      Effort: Pulmonary effort is normal. No respiratory distress. Breath sounds: Normal breath sounds. No stridor. No wheezing, rhonchi or rales. Abdominal:      General: Abdomen is flat. Bowel sounds are normal. There is no distension. Palpations: Abdomen is soft. Tenderness: There is no abdominal tenderness. There is no right CVA tenderness, left CVA tenderness, guarding or rebound. Musculoskeletal:         General: Swelling, tenderness and deformity present. Normal range of motion. Cervical back: Normal range of motion and neck supple. No rigidity or tenderness. Right lower leg: No edema. Left lower leg: No edema. Comments: 1 cm diameter hematoma over the fourth and fifth metacarpal of the dorsal aspect of the left hand. Patient has full range of motion of all digits with 5 out of 5  strength, good capillary refill, +2 radial pulse and no sensational deficits. Lymphadenopathy:      Cervical: No cervical adenopathy. Skin:     General: Skin is warm and dry. Capillary Refill: Capillary refill takes less than 2 seconds. Coloration: Skin is not jaundiced or pale. Findings: No bruising, erythema or rash. Neurological:      General: No focal deficit present. Mental Status: She is alert and oriented to person, place, and time. Mental status is at baseline. Cranial Nerves: No cranial nerve deficit. Sensory: No sensory deficit. Motor: No weakness.    Psychiatric:         Mood and Affect: Mood normal.         ED Medications  Medications   acetaminophen (TYLENOL) tablet 650 mg (650 mg Oral Given 10/28/23 0742)       Diagnostic Studies  Results Reviewed       None                   XR hand 3+ views LEFT   ED Interpretation by Hugo Rosales DO (10/28 9545)   No fx            Procedures  Procedures      ED Course  ED Course as of 10/28/23 0817   Sat Oct 28, 2023 5439 XR hand 3+ views LEFT  No obvious sign of fracture or malalignment per my interpretation. 0410 Work-up in the emergency department was unremarkable. She will be discharged for outpatient follow-up with her PCP. Return precautions will be discussed. Further instructions per discharge orders. Medical Decision Making  The patient is a 48-year-old female with a past medical history of breast cancer who presents to the emergency department with complaint of left hand injury. Upon initial presentation the patient was alert and oriented x4 and in no acute distress. There was an ice pack applied to the dorsal aspect of her left hand. On physical exam there is a 1 cm diameter hematoma over the fourth and fifth proximal metacarpal of the left hand. The patient had full range of motion of her digits with 5 out of 5  strength, good capillary refill, +2 radial pulse and no sensational deficits. The remainder of her physical exam is grossly unremarkable. The patient is at risk for musculoskeletal pain, subcutaneous hematoma or fracture. I have given Tylenol for pain management and an x-ray of the left hand. Results pending. Amount and/or Complexity of Data Reviewed  Radiology: ordered and independent interpretation performed. Decision-making details documented in ED Course. Risk  OTC drugs. Disposition  Final diagnoses:   Contusion of left hand, initial encounter     Time reflects when diagnosis was documented in both MDM as applicable and the Disposition within this note       Time User Action Codes Description Comment    10/28/2023  8:15 AM Claire Madison Add [S60.222A] Contusion of left hand, initial encounter           ED Disposition       ED Disposition   Discharge    Condition   Stable    Date/Time   Sat Oct 28, 2023  8:14 AM    Comment   Kash Morfin discharge to home/self care.                    Follow-up Information       Follow up With Specialties Details Why Contact Info Additional [address] [facility],  Internal Medicine Schedule an appointment as soon as possible for a visit  For continued symptoms [address] [facility]        [address] [facility] Emergency Medicine  As needed [address]  Emergency Department, [place], [place], [address] [facility] Orthopedic Surgery  As needed [address] [address]  [facility], [address] [facility] [address], [address] [place], [place], [address] [phone]            Patient's Medications   Discharge Prescriptions    No medications on file     No discharge procedures on file. PDMP Review       None             ED Provider  Attending physically available and evaluated Parveen Campbell. I managed the patient along with the ED Attending.     Electronically Signed by           Nicolasa Swift DO  10/28/23 8337

## 2023-10-29 NOTE — ED ATTENDING ATTESTATION
10/28/2023  Yashira Bro DO, saw and evaluated the patient. I have discussed the patient with the resident/non-physician practitioner and agree with the resident's/non-physician practitioner's findings, Plan of Care, and MDM as documented in the resident's/non-physician practitioner's note, except where noted. All available labs and Radiology studies were reviewed. I was present for key portions of any procedure(s) performed by the resident/non-physician practitioner and I was immediately available to provide assistance. At this point I agree with the current assessment done in the Emergency Department.   I have conducted an independent evaluation of this patient a history and physical is as follows:    ED Course         Critical Care Time  Procedures

## 2023-11-27 ENCOUNTER — OFFICE VISIT (OUTPATIENT)
Dept: INTERNAL MEDICINE CLINIC | Facility: CLINIC | Age: 53
End: 2023-11-27
Payer: COMMERCIAL

## 2023-11-27 VITALS
OXYGEN SATURATION: 99 % | WEIGHT: 130.2 LBS | TEMPERATURE: 97.7 F | HEIGHT: 63 IN | DIASTOLIC BLOOD PRESSURE: 86 MMHG | SYSTOLIC BLOOD PRESSURE: 110 MMHG | BODY MASS INDEX: 23.07 KG/M2 | HEART RATE: 71 BPM

## 2023-11-27 DIAGNOSIS — N95.1 VASOMOTOR SYMPTOMS DUE TO MENOPAUSE: ICD-10-CM

## 2023-11-27 DIAGNOSIS — Z00.00 LABORATORY EXAM ORDERED AS PART OF ROUTINE GENERAL MEDICAL EXAMINATION: ICD-10-CM

## 2023-11-27 DIAGNOSIS — Z13.1 SCREENING FOR DIABETES MELLITUS: ICD-10-CM

## 2023-11-27 DIAGNOSIS — Z17.0 MALIGNANT NEOPLASM OF UPPER-INNER QUADRANT OF RIGHT BREAST IN FEMALE, ESTROGEN RECEPTOR POSITIVE: ICD-10-CM

## 2023-11-27 DIAGNOSIS — C50.211 MALIGNANT NEOPLASM OF UPPER-INNER QUADRANT OF RIGHT BREAST IN FEMALE, ESTROGEN RECEPTOR POSITIVE: ICD-10-CM

## 2023-11-27 DIAGNOSIS — Z13.220 SCREENING, LIPID: ICD-10-CM

## 2023-11-27 DIAGNOSIS — Z13.820 SCREENING FOR OSTEOPOROSIS: ICD-10-CM

## 2023-11-27 DIAGNOSIS — Z00.00 ANNUAL PHYSICAL EXAM: Primary | ICD-10-CM

## 2023-11-27 PROCEDURE — 99396 PREV VISIT EST AGE 40-64: CPT | Performed by: INTERNAL MEDICINE

## 2023-11-27 NOTE — PATIENT INSTRUCTIONS
Wellness Visit for Adults   AMBULATORY CARE:   A wellness visit  is when you see your healthcare provider to get screened for health problems. Your healthcare provider will also give you advice on how to stay healthy. Write down your questions so you remember to ask them. Ask your healthcare provider how often you should have a wellness visit. What happens at a wellness visit:  Your healthcare provider will ask about your health, and your family history of health problems. This includes high blood pressure, heart disease, and cancer. He or she will ask if you have symptoms that concern you, if you smoke, and about your mood. You may also be asked about your intake of medicines, supplements, food, and alcohol. Any of the following may be done: Your weight  will be checked. Your height may also be checked so your body mass index (BMI) can be calculated. Your BMI shows if you are at a healthy weight. Your blood pressure  and heart rate will be checked. Your temperature may also be checked. Blood and urine tests  may be done. Blood tests may be done to check your cholesterol levels. Abnormal cholesterol levels increase your risk for heart disease and stroke. You may also need a blood or urine test to check for diabetes if you are at increased risk. Urine tests may be done to look for signs of an infection or kidney disease. A physical exam  includes checking your heartbeat and lungs with a stethoscope. Your healthcare provider may also check your skin to look for sun damage. Screening tests  may be recommended. A screening test is done to check for diseases that may not cause symptoms. The screening tests you may need depend on your age, gender, family history, and lifestyle habits. For example, colorectal screening may be recommended if you are 48years old or older. Screening tests you need if you are a woman:   A Pap smear  is used to screen for cervical cancer.  Pap smears are usually done every 3 to 5 years depending on your age. You may need them more often if you have had abnormal Pap smear test results in the past. Ask your healthcare provider how often you should have a Pap smear. A mammogram  is an x-ray of your breasts to screen for breast cancer. Experts recommend mammograms every 2 years starting at age 48 years. You may need a mammogram at age 52 years or younger if you have an increased risk for breast cancer. Talk to your healthcare provider about when you should start having mammograms and how often you need them. Vaccines you may need:   Get an influenza vaccine  every year. The influenza vaccine protects you from the flu. Several types of viruses cause the flu. The viruses change over time, so new vaccines are made each year. Get a tetanus-diphtheria (Td) booster vaccine  every 10 years. This vaccine protects you against tetanus and diphtheria. Tetanus is a severe infection that may cause painful muscle spasms and lockjaw. Diphtheria is a severe bacterial infection that causes a thick covering in the back of your mouth and throat. Get a human papillomavirus (HPV) vaccine  if you are female and aged 23 to 32 or male 23 to 24 and never received it. This vaccine protects you from HPV infection. HPV is the most common infection spread by sexual contact. HPV may also cause vaginal, penile, and anal cancers. Get a pneumococcal vaccine  if you are aged 72 years or older. The pneumococcal vaccine is an injection given to protect you from pneumococcal disease. Pneumococcal disease is an infection caused by pneumococcal bacteria. The infection may cause pneumonia, meningitis, or an ear infection. Get a shingles vaccine  if you are 60 or older, even if you have had shingles before. The shingles vaccine is an injection to protect you from the varicella-zoster virus. This is the same virus that causes chickenpox.  Shingles is a painful rash that develops in people who had chickenpox or have been exposed to the virus. How to eat healthy:  My Plate is a model for planning healthy meals. It shows the types and amounts of foods that should go on your plate. Fruits and vegetables make up about half of your plate, and grains and protein make up the other half. A serving of dairy is included on the side of your plate. The amount of calories and serving sizes you need depends on your age, gender, weight, and height. Examples of healthy foods are listed below:  Eat a variety of vegetables  such as dark green, red, and orange vegetables. You can also include canned vegetables low in sodium (salt) and frozen vegetables without added butter or sauces. Eat a variety of fresh fruits , canned fruit in 100% juice, frozen fruit, and dried fruit. Include whole grains. At least half of the grains you eat should be whole grains. Examples include whole-wheat bread, wheat pasta, brown rice, and whole-grain cereals such as oatmeal.    Eat a variety of protein foods such as seafood (fish and shellfish), lean meat, and poultry without skin (turkey and chicken). Examples of lean meats include pork leg, shoulder, or tenderloin, and beef round, sirloin, tenderloin, and extra lean ground beef. Other protein foods include eggs and egg substitutes, beans, peas, soy products, nuts, and seeds. Choose low-fat dairy products such as skim or 1% milk or low-fat yogurt, cheese, and cottage cheese. Limit unhealthy fats  such as butter, hard margarine, and shortening. Exercise:  Exercise at least 30 minutes per day on most days of the week. Some examples of exercise include walking, biking, dancing, and swimming. You can also fit in more physical activity by taking the stairs instead of the elevator or parking farther away from stores. Include muscle strengthening activities 2 days each week. Regular exercise provides many health benefits.  It helps you manage your weight, and decreases your risk for type 2 diabetes, heart disease, stroke, and high blood pressure. Exercise can also help improve your mood. Ask your healthcare provider about the best exercise plan for you. General health and safety guidelines:   Do not smoke. Nicotine and other chemicals in cigarettes and cigars can cause lung damage. Ask your healthcare provider for information if you currently smoke and need help to quit. E-cigarettes or smokeless tobacco still contain nicotine. Talk to your healthcare provider before you use these products. Limit alcohol. A drink of alcohol is 12 ounces of beer, 5 ounces of wine, or 1½ ounces of liquor. Lose weight, if needed. Being overweight increases your risk of certain health conditions. These include heart disease, high blood pressure, type 2 diabetes, and certain types of cancer. Protect your skin. Do not sunbathe or use tanning beds. Use sunscreen with a SPF 15 or higher. Apply sunscreen at least 15 minutes before you go outside. Reapply sunscreen every 2 hours. Wear protective clothing, hats, and sunglasses when you are outside. Drive safely. Always wear your seatbelt. Make sure everyone in your car wears a seatbelt. A seatbelt can save your life if you are in an accident. Do not use your cell phone when you are driving. This could distract you and cause an accident. Pull over if you need to make a call or send a text message. Practice safe sex. Use latex condoms if are sexually active and have more than one partner. Your healthcare provider may recommend screening tests for sexually transmitted infections (STIs). Wear helmets, lifejackets, and protective gear. Always wear a helmet when you ride a bike or motorcycle, go skiing, or play sports that could cause a head injury. Wear protective equipment when you play sports. Wear a lifejacket when you are on a boat or doing water sports.     © Copyright [employer] 2023 Information is for End User's use only and may not be sold, redistributed or otherwise used for commercial purposes. The above information is an  only. It is not intended as medical advice for individual conditions or treatments. Talk to your doctor, nurse or pharmacist before following any medical regimen to see if it is safe and effective for you.

## 2023-11-27 NOTE — PROGRESS NOTES
ADULT ANNUAL PHYSICAL  [address] [facility]    NAME: Carmen Mariscal  AGE: 46 y.o. SEX: female  : 1970     DATE: 2023     Assessment and Plan:     Problem List Items Addressed This Visit        Other    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 breast in female, estrogen receptor positive      She was on 5 years of Tamoxifen then prescribed Arimidex in  which she has not started due to potential side effects. I recommended she call her medical oncologist's office about this and discuss the medication/other options         Vasomotor symptoms due to menopause     Discuss increasing venlafaxine dose with gyn        Other Visit Diagnoses     Annual physical exam    -  Primary    Laboratory exam ordered as part of routine general medical examination        Relevant Orders    CBC and differential    Comprehensive metabolic panel    Lipid panel    HEMOGLOBIN A1C W/ EAG ESTIMATION    Screening, lipid        Relevant Orders    Lipid panel    Screening for diabetes mellitus        Relevant Orders    HEMOGLOBIN A1C W/ EAG ESTIMATION    Screening for osteoporosis        Relevant Orders    DXA bone density spine hip and pelvis            Immunizations and preventive care screenings were discussed with patient today. Appropriate education was printed on patient's after visit summary. Counseling:  Exercise: the importance of regular exercise/physical activity was discussed. Recommend exercise 3-5 times per week for at least 30 minutes. Try low dose melatonin 1mg for sleep         Return in about 1 year (around 2024) for Annual physical.     Chief Complaint:     Chief Complaint   Patient presents with   • Physical Exam     physical      History of Present Illness:     Adult Annual Physical   Patient here for a comprehensive physical exam. The patient reports problems - weight gain .     Diet and Physical Activity  Diet/Nutrition: well balanced diet.   Exercise: 5-7 times a week on average. Depression Screening  PHQ-2/9 Depression Screening         General Health  Sleep: sleeps poorly. Hearing: normal - bilateral.  Vision: most recent eye exam >1 year ago and wears glasses. Dental: regular dental visits. /GYN Health  Follows with gynecology? yes   Patient is: postmenopausal     Review of Systems:     Review of Systems   Constitutional:  Positive for unexpected weight change (weight gain). Respiratory:  Negative for cough and shortness of breath. Cardiovascular:  Negative for chest pain and palpitations. Gastrointestinal:  Positive for constipation. Negative for abdominal pain and diarrhea. Endocrine: Positive for heat intolerance (hot flashes). Genitourinary:  Negative for difficulty urinating. Musculoskeletal:  Positive for arthralgias (right wrist and right index finger). Neurological:  Positive for weakness (ankles when she initiates running). Negative for dizziness and headaches. Psychiatric/Behavioral:  Positive for sleep disturbance.        Past Medical History:     Past Medical History:   Diagnosis Date   • Atypical chest pain 09/18/2021   • Back pain 06/29/2022   • BRCA1 negative    • BRCA2 negative    • Breast cancer ([address]) 05/2018    right breast   • Cancer ([address]) 5/10/2018   • Change in bowel habit 09/18/2021   • Chronic low back pain 11/07/2022   • Colon polyp    • Congestion of right ear 04/27/2021   • Fibrocystic breast    • Hematoma 12/22/2022   • History of radiation therapy 2018    right breast ca   • Left inguinal pain 11/03/2020   • Need for vaccination 09/14/2020   • Numbness and tingling of left lower extremity 09/14/2020   • Perimenopause 09/18/2021   • Rash 02/23/2016   • Screening for cardiovascular condition 09/14/2020   • Screening for human papillomavirus (HPV) 10/16/2018   • Seasonal allergies    • Use of tamoxifen (Nolvadex) 04/24/2019   • Vertigo       Past Surgical History:     Past Surgical History:   Procedure Laterality Date   • BREAST BIOPSY Right 05/2018    Positive   • BREAST BIOPSY Left     Neg around age 28   • BREAST BIOPSY Left 11/132019    benign   • BREAST BIOPSY Right 11/29/2021   • BREAST LUMPECTOMY Right 07/2018    with radiation   • EMBOLECTOMY      s/p forceps delivery   • MAMMO NEEDLE LOCALIZATION RIGHT (ALL INC) Right 7/10/2018   • MAMMO STEREOTACTIC BREAST BIOPSY RIGHT (ALL INC) Right 5/29/2018   • IN BX/EXC LYMPH NODE OPEN SUPERFICIAL Right 7/10/2018    Procedure: SENTINEL LYMPH NODE BIOPSY; LYMPHATIC MAPPING WITH BLUE DYE RADIOACTIVE DYE (INJECT AT 0800 BY DR CHAUDHARY IN THE OR); Surgeon: Mir Myers MD;  [facility];  Service: Surgical Oncology   • IN PERQ DEVICE PLACEMENT BREAST LOC 1ST LES W/GDNCE Right 7/10/2018    Procedure: BREAST LUMPECTOMY; BREAST NEEDLE LOCALIZATION (NEEDLE LOC AT 0700); Surgeon: Mir Myers MD;  [facility];  Service: Surgical Oncology   • US GUIDED BREAST BIOPSY LEFT COMPLETE Left 11/13/2019   • US GUIDED BREAST BIOPSY RIGHT COMPLETE Right 5/29/2018   • US GUIDED BREAST BIOPSY RIGHT COMPLETE Right 11/29/2021      Social History:     Social History     Socioeconomic History   • Marital status: /Civil Union     Spouse name: None   • Number of children: 2   • Years of education: None   • Highest education level: None   Occupational History   • None   Tobacco Use   • Smoking status: Never   • Smokeless tobacco: Never   • Tobacco comments:     only smoked socially as teenager for 1 yr   Vaping Use   • Vaping Use: Never used   Substance and Sexual Activity   • Alcohol use:  Yes     Alcohol/week: 2.0 standard drinks of alcohol     Types: 2 Glasses of wine per week     Comment: Social drinker   • Drug use: No   • Sexual activity: Yes     Partners: Male     Birth control/protection: Male Sterilization   Other Topics Concern   • None   Social History Narrative   • None     Social Determinants of Health     Financial Resource Strain: Not on file   Food Insecurity: Not on file   Transportation Needs: Not on file   Physical Activity: Not on file   Stress: Not on file   Social Connections: Not on file   Intimate Partner Violence: Not on file   Housing Stability: Not on file      Family History:     Family History   Problem Relation Age of Onset   • Prostate cancer Father    • Parkinsonism Father    • No Known Problems Sister    • No Known Problems Daughter    • Colon cancer Maternal Grandmother    • Esophageal cancer Maternal Grandfather    • Breast cancer Maternal Aunt 54   • Ovarian cancer Maternal Aunt    • Cancer Maternal Aunt    • Skin cancer Paternal Aunt       Current Medications:     Current Outpatient Medications   Medication Sig Dispense Refill   • anastrozole (ARIMIDEX) 1 mg tablet Take 1 tablet (1 mg total) by mouth daily 90 tablet 1   • loratadine (CLARITIN) 5 MG chewable tablet Chew 5 mg daily     • venlafaxine (EFFEXOR-XR) 37.5 mg 24 hr capsule Take one tablet daily 30 capsule 0     No current facility-administered medications for this visit. Allergies:     No Known Allergies   Physical Exam:     /86 (BP Location: Left arm, Patient Position: Sitting, Cuff Size: Large)   Pulse 71   Temp 97.7 °F (36.5 °C) (Probe)   Ht 5' 2.6" (1.59 m)   Wt 59.1 kg (130 lb 3.2 oz)   SpO2 99%   BMI 23.36 kg/m²     Physical Exam  Constitutional:       General: She is not in acute distress. Appearance: She is well-developed. She is not ill-appearing, toxic-appearing or diaphoretic. HENT:      Right Ear: External ear normal. There is no impacted cerumen. Left Ear: External ear normal. There is no impacted cerumen. Eyes:      Conjunctiva/sclera: Conjunctivae normal.   Cardiovascular:      Rate and Rhythm: Normal rate and regular rhythm. Heart sounds: Normal heart sounds. No murmur heard. Pulmonary:      Effort: Pulmonary effort is normal. No respiratory distress. Breath sounds: Normal breath sounds. No stridor.  No wheezing or rales. Abdominal:      General: There is no distension. Palpations: Abdomen is soft. There is no mass. Tenderness: There is no abdominal tenderness. There is no guarding or rebound. Musculoskeletal:      Right hand: No bony tenderness. Normal range of motion. Cervical back: Neck supple. Right lower leg: No edema. Left lower leg: No edema. Comments: No swelling of the right index finger   Neurological:      Mental Status: She is alert and oriented to person, place, and time. Psychiatric:         Mood and Affect: Mood normal.         Behavior: Behavior normal.         Thought Content:  Thought content normal.         Judgment: Judgment normal.          Roldan Foss MD  [facility]

## 2023-11-28 PROBLEM — Z23 NEED FOR VACCINATION: Status: RESOLVED | Noted: 2020-09-14 | Resolved: 2023-11-28

## 2023-11-28 PROBLEM — Z11.51 SCREENING FOR HUMAN PAPILLOMAVIRUS (HPV): Status: RESOLVED | Noted: 2018-10-16 | Resolved: 2023-11-28

## 2023-11-28 PROBLEM — R19.4 CHANGE IN BOWEL HABIT: Status: RESOLVED | Noted: 2021-09-18 | Resolved: 2023-11-28

## 2023-11-28 PROBLEM — M54.50 CHRONIC LOW BACK PAIN: Status: RESOLVED | Noted: 2022-11-07 | Resolved: 2023-11-28

## 2023-11-28 PROBLEM — H93.8X1 CONGESTION OF RIGHT EAR: Status: RESOLVED | Noted: 2021-04-27 | Resolved: 2023-11-28

## 2023-11-28 PROBLEM — R10.32 LEFT INGUINAL PAIN: Status: RESOLVED | Noted: 2020-11-03 | Resolved: 2023-11-28

## 2023-11-28 PROBLEM — Z13.6 SCREENING FOR CARDIOVASCULAR CONDITION: Status: RESOLVED | Noted: 2020-09-14 | Resolved: 2023-11-28

## 2023-11-28 PROBLEM — T14.8XXA HEMATOMA: Status: RESOLVED | Noted: 2022-12-22 | Resolved: 2023-11-28

## 2023-11-28 PROBLEM — M54.9 BACK PAIN: Status: RESOLVED | Noted: 2022-06-29 | Resolved: 2023-11-28

## 2023-11-28 PROBLEM — R20.2 NUMBNESS AND TINGLING OF LEFT LOWER EXTREMITY: Status: RESOLVED | Noted: 2020-09-14 | Resolved: 2023-11-28

## 2023-11-28 PROBLEM — N95.1 PERIMENOPAUSE: Status: RESOLVED | Noted: 2021-09-18 | Resolved: 2023-11-28

## 2023-11-28 PROBLEM — Z79.810 USE OF TAMOXIFEN (NOLVADEX): Status: RESOLVED | Noted: 2019-04-24 | Resolved: 2023-11-28

## 2023-11-28 PROBLEM — N95.1 VASOMOTOR SYMPTOMS DUE TO MENOPAUSE: Status: ACTIVE | Noted: 2023-11-28

## 2023-11-28 PROBLEM — R20.0 NUMBNESS AND TINGLING OF LEFT LOWER EXTREMITY: Status: RESOLVED | Noted: 2020-09-14 | Resolved: 2023-11-28

## 2023-11-28 PROBLEM — R07.89 ATYPICAL CHEST PAIN: Status: RESOLVED | Noted: 2021-09-18 | Resolved: 2023-11-28

## 2023-11-28 PROBLEM — G89.29 CHRONIC LOW BACK PAIN: Status: RESOLVED | Noted: 2022-11-07 | Resolved: 2023-11-28

## 2023-11-28 NOTE — ASSESSMENT & PLAN NOTE
She was on 5 years of Tamoxifen then prescribed Arimidex in June which she has not started due to potential side effects.  I recommended she call her medical oncologist's office about this and discuss the medication/other options

## 2024-01-02 NOTE — PROGRESS NOTES
Diagnoses and all orders for this visit:    Encounter for gynecological examination without abnormal finding    Encounter for screening mammogram for malignant neoplasm of breast    Vasomotor symptoms due to menopause  -     venlafaxine (EFFEXOR-XR) 75 mg 24 hr capsule; Take one tablet daily        Advised to call with any Post Menopausal bleeding.   Calcium/ Vit D dietary requirements discussed,   Weight bearing exercises minium of 150 mins/weekly advised.   Kegel exercises advised daily, see after visit summary for instructions and recommendations  Reviewed perineal hygiene and vaginal dryness post menopause  SBE and yearly mammography advised.  ASCCP guidelines reviewed. Will discontinue PAP's according to recommendations. Condoms encouraged with all sexual activity to prevent STI's.   Health maintenance encouraged with PMD, remain current with Colonoscopy, Dexa scan, and recommended vaccines.  Advised to call with any issues, all concerns & questions addressed.   See after visit summary for further information    F/U annually or Biannual if Medicare       Health Maintenance:    Last PAP: 2021 Negative HPV Negative   Next PAP Due:    Last Mammogram:2023 diagnostic Life time Martha Donato % supressed d/t BC hx, Density C heterogeneously dense , Bi-Rads 2 Benign  Next Mammogram: Order given, patient is schedule for 2024    Last Colonoscopy:2023 RTO in 3 years     Last DEXA: Not on file, has pending order     Subjective    CC: Yearly Exam     Pleasant 53 y.o. , female   postmenopausal here for annual exam.   GYN hx includes: 2 vaginal deliveries, Breast cancer diagnosed in 2018, estrogen receptor +, BRCA 1-2 Neg  No personal hx of cervical, ovarian or colon CA  Family Hx: MGM- Colon Cancer . Maternal Aunt - Breast cancer @ 55,Ovarian cancer   She reports no new changes in her health. Medically stable  Not happy with her weight , finding it hard to maintain, she does exercise  routinely      Denies history of abnormal pap smears.  Reports abnormal Mammograms, Breast cancer   Denies postmenopausal bleeding   reports issues with vasomotor symptoms, takes Effexor and evening of primrose which is not totally effective, we discussed increasing dose of Effexor of 75 mg daily, pt is agreeable, she has been seen by Dr Trinity Clay in the past for menopausal symptoms   Reports pelvic pain, occasional twinges that come and go, no concerns today   Denies vaginal issues.   Denies symptoms of pelvic organ prolapse, urinary, or fecal incontinence.    Is sexually active. Monogamous relationship.   Denies dyspareunia   Denies STI concerns. declines STD testing   Denies intimate partner violence    Past Medical History:   Diagnosis Date    Atypical chest pain 09/18/2021    Back pain 06/29/2022    BRCA1 negative     BRCA2 negative     Breast cancer (HCC) 05/2018    right breast    Cancer (HCC) 5/10/2018    Change in bowel habit 09/18/2021    Chronic low back pain 11/07/2022    Colon polyp     Congestion of right ear 04/27/2021    Fibrocystic breast     Hematoma 12/22/2022    History of radiation therapy 2018    right breast ca    Left inguinal pain 11/03/2020    Need for vaccination 09/14/2020    Numbness and tingling of left lower extremity 09/14/2020    Perimenopause 09/18/2021    Rash 02/23/2016    Screening for cardiovascular condition 09/14/2020    Screening for human papillomavirus (HPV) 10/16/2018    Seasonal allergies     Use of tamoxifen (Nolvadex) 04/24/2019    Vertigo      Past Surgical History:   Procedure Laterality Date    BREAST BIOPSY Right 05/2018    Positive    BREAST BIOPSY Left     Neg around age 27    BREAST BIOPSY Left 11/740793    benign    BREAST BIOPSY Right 11/29/2021    BREAST LUMPECTOMY Right 07/2018    with radiation    EMBOLECTOMY      s/p forceps delivery    MAMMO NEEDLE LOCALIZATION RIGHT (ALL INC) Right 7/10/2018    MAMMO STEREOTACTIC BREAST BIOPSY RIGHT (ALL INC)  Right 5/29/2018    [place] BX/EXC LYMPH NODE OPEN SUPERFICIAL Right 7/10/2018    Procedure: SENTINEL LYMPH NODE BIOPSY; LYMPHATIC MAPPING WITH BLUE DYE RADIOACTIVE DYE (INJECT AT 0800 BY DR SMALLS IN THE OR);  Surgeon: Demetri Smalls MD;  [facility];  Service: Surgical Oncology    [place] PERQ DEVICE PLACEMENT BREAST LOC 1ST LES W/GDNCE Right 7/10/2018    Procedure: BREAST LUMPECTOMY; BREAST NEEDLE LOCALIZATION (NEEDLE LOC AT 0700);  Surgeon: Demetri Smalls MD;  [facility];  Service: Surgical Oncology    US GUIDED BREAST BIOPSY LEFT COMPLETE Left 11/13/2019    US GUIDED BREAST BIOPSY RIGHT COMPLETE Right 5/29/2018    US GUIDED BREAST BIOPSY RIGHT COMPLETE Right 11/29/2021      Family History   Problem Relation Age of Onset    Prostate cancer Father     Parkinsonism Father     No Known Problems Sister     No Known Problems Daughter     Colon cancer Maternal Grandmother     Esophageal cancer Maternal Grandfather     Breast cancer Maternal Aunt 55    Ovarian cancer Maternal Aunt     Cancer Maternal Aunt     Skin cancer Paternal Aunt      Social History     Tobacco Use    Smoking status: Never    Smokeless tobacco: Never    Tobacco comments:     only smoked socially as teenager for 1 yr   Vaping Use    Vaping status: Never Used   Substance Use Topics    Alcohol use: Yes     Alcohol/week: 2.0 standard drinks of alcohol     Types: 2 Glasses of wine per week     Comment: Social drinker    Drug use: No       Current Outpatient Medications:     loratadine (CLARITIN) 5 MG chewable tablet, Chew 5 mg daily, Disp: , Rfl:     venlafaxine (EFFEXOR-XR) 75 mg 24 hr capsule, Take one tablet daily, Disp: 90 capsule, Rfl: 3    anastrozole (ARIMIDEX) 1 mg tablet, Take 1 tablet (1 mg total) by mouth daily, Disp: 90 tablet, Rfl: 1  Patient Active Problem List    Diagnosis Date Noted    Vasomotor symptoms due to menopause 11/28/2023    Slow transit constipation 06/30/2022    Vitamin D deficiency 09/14/2020    Rotator cuff syndrome of  "right shoulder 03/10/2020    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 breast in female, estrogen receptor positive  2018    Seasonal allergies 2015    Benign paroxysmal vertigo 2013       No Known Allergies    OB History    Para Term  AB Living   4 2 2   2 2   SAB IAB Ectopic Multiple Live Births   1       2      # Outcome Date GA Lbr [place]/2nd Weight Sex Delivery Anes PTL Lv   4 SAB            3 AB            2 Term         KRISTEL   1 Term         KRISTEL       Vitals:    24 0836   BP: 112/72   BP Location: Left arm   Patient Position: Sitting   Cuff Size: Adult   Weight: 57.5 kg (126 lb 12.8 oz)   Height: 5' 3\" (1.6 m)     Body mass index is 22.46 kg/m².    Review of Systems     Constitutional: Negative for chills, fatigue, fever, headaches, visual disturbances, and unexpected weight change.   Respiratory: Negative for cough, & shortness of breath.  Cardiovascular: Negative for chest pain. .    Gastrointestinal: Negative for Abd pain, nausea & vomiting, constipation and diarrhea.   Genitourinary: Negative for difficulty urinating, dysuria, hematuria, , unusual vaginal bleeding or discharge. dyspareunia  Skin: Negative skin changes    Physical Exam     Constitutional: Alert & Oriented x3, well-developed and well-nourished. No distress.   HENT: Atraumatic, Normocephalic, Conjunctivae clear  Neck: Normal range of motion. Neck supple. No thyromegaly, mass, nodules or tenderness  Pulmonary: Effort normal.   Abdominal: Soft. No tenderness or masses  Musculoskeletal: Normal ROM  Skin: Warm & Dry  Psychological: Normal mood, thought content, behavior & judgement     Breasts:   Right: Lumpectomy & radiation ,   tissue firm post radiation,  without masses, tenderness, skin changes or nipple discharge. No areas of erythema or pain. No subclavicular, axillary, pectoral adenopathy  Left:  tissue soft without masses, tenderness, skin changes or nipple discharge. No areas of erythema or " pain. No subclavicular, axillary, pectoral adenopathy    Pelvic exam was performed with patient supine, lithotomy position.     Exam is consistent with  atrophic changes.  Vulva/ Vestibule: Right: Negative rash, tenderness, lesion or injury                               Left: Negative rash, tenderness, lesion or injury   Urethral meatus: Negative for  tenderness, inflammation or discharge.slightly protuberant   Uterus: not deviated, enlarged, fixed or tender.   Cervix: No CMT, no discharge or friability.   Right adnexa: no mass, no tenderness and no fullness.  Left adnexa: no mass, no tenderness and no fullness.   Vagina: Consistent with  atrophic changes. No erythema, tenderness, masses, or foreign body in the vagina. No signs of injury around the vagina. No unusual vaginal discharge   Perineum without lesions, signs of injury, erythema or swelling.  Inguinal Canal:        Right: No inguinal adenopathy or hernia present.        Left: No inguinal adenopathy or hernia present.

## 2024-01-03 NOTE — PATIENT INSTRUCTIONS
Breast Self Exam for Women   AMBULATORY CARE:   A breast self-exam (BSE)  is a way to check your breasts for lumps and other changes. Regular BSEs can help you know how your breasts normally look and feel. Most breast lumps or changes are not cancer, but you should always have them checked by a healthcare provider.  Why you should do a BSE:  Breast cancer is the most common type of cancer in women. Even if you have mammograms, you may still want to do a BSE regularly. If you know how your breasts normally feel and look, it may help you know when to contact your healthcare provider. Mammograms can miss some cancers. You may find a lump during a BSE that did not show up on a mammogram.  When you should do a BSE:  If you have periods, you may want to do your BSE 1 week after your period ends. This is the time when your breasts may be the least swollen, lumpy, or tender. You can do regular BSEs even if you are breastfeeding or have breast implants.  Call your doctor if:   You find any lumps or changes in your breasts.    You have breast pain or fluid coming from your nipples.    You have questions or concerns about your condition or care.    How to do a BSE:       Look at your breasts in a mirror.  Look at the size and shape of each breast and nipple. Check for swelling, lumps, dimpling, scaly skin, or other skin changes. Look for nipple changes, such as a nipple that is painful or beginning to pull inward. Gently squeeze both nipples and check to see if fluid (that is not breast milk) comes out of them. If you find any of these or other breast changes, contact your healthcare provider. Check your breasts while you sit or  the following 3 positions:    Hang your arms down at your sides.    Raise your hands and join them behind your head.    Put firm pressure with your hands on your hips. Bend slightly forward while you look at your breasts in the mirror.    Lie down and feel your breasts.  When you lie down,  your breast tissue spreads out evenly over your chest. This makes it easier for you to feel for lumps and anything that may not be normal for your breasts. Do a BSE on one breast at a time.    Place a small pillow or towel under your left shoulder.  Put your left arm behind your head.    Use the 3 middle fingers of your right hand.  Use your fingertip pads, on the top of your fingers. Your fingertip pad is the most sensitive part of your finger.    Use small circles to feel your breast tissue.  Use your fingertip pads to make dime-sized, overlapping circles on your breast and armpits. Use light, medium, and firm pressure. First, press lightly. Second, press with medium pressure to feel a little deeper into the breast. Last, use firm pressure to feel deep within your breast.    Examine your entire breast area.  Examine the breast area from above the breast to below the breast where you feel only ribs. Make small circles with your fingertips, starting in the middle of your armpit. Make circles going up and down the breast area. Continue toward your breast and all the way across it. Examine the area from your armpit all the way over to the middle of your chest (breastbone). Stop at the middle of your chest.    Move the pillow or towel to your right shoulder, and put your right arm behind your head.  Use the 3 fingertip pads of your left hand, and repeat the above steps to do a BSE on your right breast.  What else you can do to check for breast problems or cancer:  Talk to your healthcare provider about mammograms. A mammogram is an x-ray of your breasts to screen for breast cancer or other problems. Your provider can tell you the benefits and risks of mammograms. The first mammogram is usually at age 45 or 50. Your provider may recommend you start at 40 or younger if your risk for breast cancer is high. Mammograms usually continue every 1 to 2 years until age 74.       Follow up with your doctor as directed:  Write  down your questions so you remember to ask them during your visits.  © Copyright [employer] 2023 Information is for End User's use only and may not be sold, redistributed or otherwise used for commercial purposes.  The above information is an  only. It is not intended as medical advice for individual conditions or treatments. Talk to your doctor, nurse or pharmacist before following any medical regimen to see if it is safe and effective for you.  Wellness Visit for Adults   AMBULATORY CARE:   A wellness visit  is when you see your healthcare provider to get screened for health problems. Your healthcare provider will also give you advice on how to stay healthy. Write down your questions so you remember to ask them. Ask your healthcare provider how often you should have a wellness visit.  What happens at a wellness visit:  Your healthcare provider will ask about your health, and your family history of health problems. This includes high blood pressure, heart disease, and cancer. He or she will ask if you have symptoms that concern you, if you smoke, and about your mood. You may also be asked about your intake of medicines, supplements, food, and alcohol. Any of the following may be done:  Your weight  will be checked. Your height may also be checked so your body mass index (BMI) can be calculated. Your BMI shows if you are at a healthy weight.    Your blood pressure  and heart rate will be checked. Your temperature may also be checked.    Blood and urine tests  may be done. Blood tests may be done to check your cholesterol levels. Abnormal cholesterol levels increase your risk for heart disease and stroke. You may also need a blood or urine test to check for diabetes if you are at increased risk. Urine tests may be done to look for signs of an infection or kidney disease.    A physical exam  includes checking your heartbeat and lungs with a stethoscope. Your healthcare provider may also check your skin to  look for sun damage.    Screening tests  may be recommended. A screening test is done to check for diseases that may not cause symptoms. The screening tests you may need depend on your age, gender, family history, and lifestyle habits. For example, colorectal screening may be recommended if you are 50 years old or older.    Screening tests you need if you are a woman:   A Pap smear  is used to screen for cervical cancer. Pap smears are usually done every 3 to 5 years depending on your age. You may need them more often if you have had abnormal Pap smear test results in the past. Ask your healthcare provider how often you should have a Pap smear.    A mammogram  is an x-ray of your breasts to screen for breast cancer. Experts recommend mammograms every 2 years starting at age 50 years. You may need a mammogram at age 49 years or younger if you have an increased risk for breast cancer. Talk to your healthcare provider about when you should start having mammograms and how often you need them.    Vaccines you may need:   Get an influenza vaccine  every year. The influenza vaccine protects you from the flu. Several types of viruses cause the flu. The viruses change over time, so new vaccines are made each year.    Get a tetanus-diphtheria (Td) booster vaccine  every 10 years. This vaccine protects you against tetanus and diphtheria. Tetanus is a severe infection that may cause painful muscle spasms and lockjaw. Diphtheria is a severe bacterial infection that causes a thick covering in the back of your mouth and throat.    Get a human papillomavirus (HPV) vaccine  if you are female and aged 19 to 26 or male 19 to 21 and never received it. This vaccine protects you from HPV infection. HPV is the most common infection spread by sexual contact. HPV may also cause vaginal, penile, and anal cancers.    Get a pneumococcal vaccine  if you are aged 65 years or older. The pneumococcal vaccine is an injection given to protect you  from pneumococcal disease. Pneumococcal disease is an infection caused by pneumococcal bacteria. The infection may cause pneumonia, meningitis, or an ear infection.    Get a shingles vaccine  if you are 60 or older, even if you have had shingles before. The shingles vaccine is an injection to protect you from the varicella-zoster virus. This is the same virus that causes chickenpox. Shingles is a painful rash that develops in people who had chickenpox or have been exposed to the virus.    How to eat healthy:  My Plate is a model for planning healthy meals. It shows the types and amounts of foods that should go on your plate. Fruits and vegetables make up about half of your plate, and grains and protein make up the other half. A serving of dairy is included on the side of your plate. The amount of calories and serving sizes you need depends on your age, gender, weight, and height. Examples of healthy foods are listed below:  Eat a variety of vegetables  such as dark green, red, and orange vegetables. You can also include canned vegetables low in sodium (salt) and frozen vegetables without added butter or sauces.    Eat a variety of fresh fruits , canned fruit in 100% juice, frozen fruit, and dried fruit.    Include whole grains.  At least half of the grains you eat should be whole grains. Examples include whole-wheat bread, wheat pasta, brown rice, and whole-grain cereals such as oatmeal.    Eat a variety of protein foods such as seafood (fish and shellfish), lean meat, and poultry without skin (turkey and chicken). Examples of lean meats include pork leg, shoulder, or tenderloin, and beef round, sirloin, tenderloin, and extra lean ground beef. Other protein foods include eggs and egg substitutes, beans, peas, soy products, nuts, and seeds.    Choose low-fat dairy products such as skim or 1% milk or low-fat yogurt, cheese, and cottage cheese.    Limit unhealthy fats  such as butter, hard margarine, and shortening.        Exercise:  Exercise at least 30 minutes per day on most days of the week. Some examples of exercise include walking, biking, dancing, and swimming. You can also fit in more physical activity by taking the stairs instead of the elevator or parking farther away from stores. Include muscle strengthening activities 2 days each week. Regular exercise provides many health benefits. It helps you manage your weight, and decreases your risk for type 2 diabetes, heart disease, stroke, and high blood pressure. Exercise can also help improve your mood. Ask your healthcare provider about the best exercise plan for you.       General health and safety guidelines:   Do not smoke.  Nicotine and other chemicals in cigarettes and cigars can cause lung damage. Ask your healthcare provider for information if you currently smoke and need help to quit. E-cigarettes or smokeless tobacco still contain nicotine. Talk to your healthcare provider before you use these products.    Limit alcohol.  A drink of alcohol is 12 ounces of beer, 5 ounces of wine, or 1½ ounces of liquor.    Lose weight, if needed.  Being overweight increases your risk of certain health conditions. These include heart disease, high blood pressure, type 2 diabetes, and certain types of cancer.    Protect your skin.  Do not sunbathe or use tanning beds. Use sunscreen with a SPF 15 or higher. Apply sunscreen at least 15 minutes before you go outside. Reapply sunscreen every 2 hours. Wear protective clothing, hats, and sunglasses when you are outside.    Drive safely.  Always wear your seatbelt. Make sure everyone in your car wears a seatbelt. A seatbelt can save your life if you are in an accident. Do not use your cell phone when you are driving. This could distract you and cause an accident. Pull over if you need to make a call or send a text message.    Practice safe sex.  Use latex condoms if are sexually active and have more than one partner. Your healthcare  provider may recommend screening tests for sexually transmitted infections (STIs).    Wear helmets, lifejackets, and protective gear.  Always wear a helmet when you ride a bike or motorcycle, go skiing, or play sports that could cause a head injury. Wear protective equipment when you play sports. Wear a lifejacket when you are on a boat or doing water sports.    © Copyright [employer] 2023 Information is for End User's use only and may not be sold, redistributed or otherwise used for commercial purposes.  The above information is an  only. It is not intended as medical advice for individual conditions or treatments. Talk to your doctor, nurse or pharmacist before following any medical regimen to see if it is safe and effective for you.  Kegel Exercises for Women   AMBULATORY CARE:   Kegel exercises  help strengthen your pelvic muscles. Pelvic muscles hold your pelvic organs, such as your bladder and uterus, in place. Kegel exercises help prevent or control certain conditions, such as urine incontinence (leakage) or uterine prolapse.       Call your doctor or physical therapist if:   You cannot feel your muscles tighten or relax.    You continue to leak urine.    You have questions or concerns about your condition or care.    Use the correct muscles:  Pelvic muscles are the muscles you use to control urine flow. To target these muscles, stop and start the flow of urine several times. This will help you become familiar with how it feels to tighten and relax these muscles.  How to do Kegel exercises:   Get into a comfortable position.  You may lie down, stand up, or sit down to do these exercises. When you first try to do these exercises, it may be easier if you lie down.    Tighten or squeeze your pelvic muscles slowly.  It may feel like you are trying to hold back urine or gas. Hold this position for 3 seconds. Relax for 3 seconds. Repeat this cycle 10 times. Do not hold your breath when you do Kegel  exercises. Keep your stomach, back, and leg muscles relaxed.    Do 10 sets of Kegel exercises, at least 3 times a day.  When you know how to do Kegel exercises, use different positions. This will help to strengthen your pelvic muscles as much as possible. You can do these exercises while you lie on the floor, watch TV, or while you stand. Tighten your pelvic muscles before you sneeze, cough, or lift to prevent urine leakage. You may notice improved bladder control within about 6 weeks.    Follow up with your doctor or physical therapist as directed:  Write down your questions so you remember to ask them during your visits.  © Copyright [employer] 2023 Information is for End User's use only and may not be sold, redistributed or otherwise used for commercial purposes.  The above information is an  only. It is not intended as medical advice for individual conditions or treatments. Talk to your doctor, nurse or pharmacist before following any medical regimen to see if it is safe and effective for you.       Perineal Hygiene      Your vaginal naturally takes care of its self, it is a self washing system, the less you mess the healthier it will be     No soaps or feminine wash to the vulva, these products can cause dermitis, bacterial infections and other vulvar problems.   Use only water to cleanse, or water with Dove or Dove Sensitive Skin Bar soap if necessary.    No scented lotions or products are advised in or near your vulva.    Use only coconut oil for moisture if needed.  No douching this may cause imbalance in your vaginal PH and further issues.    If you wear panty liners, you may apply a thin coating of Vaseline, A&D ointment or coconut oil to the vulvar tissues as a skin barrier     Cotton underware, loose fitting clothing  Only perfume-free, dye-free laundry detergent, use a second rinse cycle   Avoid fabric softeners/dryer sheets.       Your partner should avoid the same products as well.        Over the counter probiotic to restore vaginal vivienne may be helpful as well, take daily.       You may also look into Boric Acid vaginal suppositories to restore vaginal PH balance for up to 2 weeks as directed on the box. You may not use these if you are pregnant      For vaginal dryness:      You may use:     Coconut oil (organic, pure, unscented) as needed for moisture or lubrication. ( Do not use if allergic)       Replens moisture restore external comfort gel daily ( use as directed on the box)        Replens long lasting vaginal moisturizer  ( use as directed on the box)         For Vaginal Lubrication:          You may use:     Coconut oil (organic, pure, unscented) as a lubricant or another scent-free lubricant (Astroglide, Uberlube) if needed.  Do not use coconut oil or silicone if using a condom as this may break down the integrity of the condom and cause an unplanned pregnancy              Do not use coconut oil if allergic               Replens silky smooth lubricant, premium silicone based lubricant for intercourse. ( use as directed, a small amount will provide an enhanced natural feeling)     Any premium over the counter vaginal lubricant water or silicone based. Silicone based will have more staying power.     Menopause   WHAT YOU NEED TO KNOW:   What is menopause?  Menopause is a normal stage in a person's life when monthly periods stop. You are considered to be in menopause when you have not had a period for a full year after the age of 40. Menopause usually occurs between ages 47 to 53. Perimenopause is a stage before menopause that may cause signs and symptoms similar to menopause. Perimenopause may start about 4 years before menopause.       What causes menopause?  Menopause starts when the ovaries stop making the female hormones estrogen and progesterone. After menopause, you are no longer able to become pregnant. Any of the following may trigger menopause or early menopause:  Surgery, including  a hysterectomy or oophorectomy    Family history of early menopause    Smoking    Chemotherapy or pelvic radiation    Chromosome abnormalities, including Cordero syndrome and Fragile X syndrome    Premature ovarian insufficiency (the ovaries stop producing eggs before age 40)    What are the signs and symptoms of menopause?   Irregular menstrual cycles with heavy vaginal bleeding followed by decreased bleeding until it stops    Hot flashes (feeling warm, flushed, and sweaty)    Vaginal changes such as increased dryness    Mood changes such as anxiety, depression, or decreased desire to have sex    Trouble sleeping, joint pain, headaches    Brittle nails, hair on chin or chest where it is normally absent    Decrease in breast size and change in skin texture    Weight gain    How is menopause treated or managed?   Hormone replacement therapy (HRT) is medicine that replaces your low hormone levels.  HRT contains estrogen and sometimes progestin.    HRT has several benefits.  HRT helps prevent osteoporosis, which decreases your risk for bone fractures. HRT also protects you from colorectal cancer.    HRT also has some risks.  HRT increases your risk for breast cancer, blood clots, heart disease, a heart attack, or a stroke. If you are 65 years or older, HRT can also increase your risk for dementia. Your risk for uterine or endometrial cancer, gallbladder disease, and urinary incontinence is higher if you take estrogen-only HRT.    Manage hot flashes.  Hot flashes are brief periods of feeling very warm, flushed, and sweaty. Hot flashes can last from a few seconds to several minutes. They may happen many times during the day, and are common at night. Layer your clothing so that you can easily remove some clothing and cool yourself during a hot flash. Cold drinks may also be helpful. Non-hormone medicines can help relieve or prevent hot flashes. Examples include certain antidepressants, nerve medicines, and high blood  pressure medicines.    Reduce vaginal dryness by using over-the-counter vaginal creams.  Vaginal dryness may cause you to have pain or discomfort during sex. Only use creams that are made for vaginal use. Do  not  use petroleum jelly. You may put an estrogen cream in and around your vagina. Estrogen cream may help decrease vaginal dryness and lower your risk of vaginal infections.    Continue to use birth control during perimenopause if you do not want to get pregnant.  You may need to use birth control until it has been 1 year since your periods stopped. Ask your healthcare provider when you can stop using birth control to prevent pregnancy.    How can I live a healthy lifestyle during and after menopause?  After menopause, your risk for heart disease and bone loss increases. Ask about these and other ways to stay healthy:  Exercise regularly.  Exercise helps you maintain a healthy weight. Exercise can also help to control your blood pressure and cholesterol levels. Include weight-bearing exercise for strong bones. Weight bearing exercise is recommended for at least 30 minutes, 3 times a week. Ask your healthcare provider about the best exercise plan for you.         Eat a variety of healthy foods.  Include fruits, vegetables, whole grains (whole-wheat bread, pasta, and cereals), low-fat dairy, and lean protein foods (beans, poultry, and fish). Limit foods high in sodium (salt). Ask your healthcare provider for more information about a meal plan that is right for you.         Do not smoke.  If you smoke, it is never too late to quit. You are more likely to have a heart attack, lung disease, blood clots, and cancer if you smoke. Ask your healthcare provider for information if you need help quitting.    Take supplements as directed.  You may need extra calcium and vitamin D to help prevent osteoporosis.            Limit alcohol and caffeine.  Alcohol and caffeine may worsen your symptoms.    When should I call my  doctor?   You have vaginal bleeding after menopause.    You have questions or concerns about your condition or care.    CARE AGREEMENT:   You have the right to help plan your care. Learn about your health condition and how it may be treated. Discuss treatment options with your healthcare providers to decide what care you want to receive. You always have the right to refuse treatment. The above information is an  only. It is not intended as medical advice for individual conditions or treatments. Talk to your doctor, nurse or pharmacist before following any medical regimen to see if it is safe and effective for you.  © Copyright [employer] 2023 Information is for End User's use only and may not be sold, redistributed or otherwise used for commercial purposes.      Vaginal Atrophy   AMBULATORY CARE:   Vaginal atrophy  is a condition that causes thinning, drying, and inflammation of vaginal tissue. This condition is caused by decreased levels of estrogen (a female sex hormone). Vaginal atrophy can increase your risk for vaginal and urinary tract infections. Vaginal atrophy can worsen over time if not treated.   Common signs and symptoms include the following:   Vaginal dryness, itching, and burning    Vaginal discharge    Pain or discomfort during sex    Light bleeding after sex    Burning during urination    Frequent, sudden, strong urges to urinate    Urinary incontinence (loss of control of your bladder)    Contact your healthcare provider if:   You have a foul-smelling odor coming from your vagina.     You have a thick, cheese-like discharge from your vagina.     You have itching, swelling, or redness in your vagina.     You have pain or burning when you urinate.     Your urine smells bad.     Your symptoms do not improve, or they get worse.     You have questions or concerns about your condition or care.    Treatment:   Over-the counter vaginal moisturizers  can help reduce dryness. Your healthcare  provider may recommend that you use a vaginal moisturizer several times each week and during sex. Only use creams that are made for vaginal use. Do  not  use petroleum jelly. Lubricants can be used during sex to decrease pain and discomfort.     Estrogen  may help decrease dryness. It may also lower your risk of vaginal infections if you are going through menopause. It can also help to relieve urinary symptoms. Estrogen may be prescribed in the form of a cream, tablet, or ring. These medicines can be applied or inserted into the vagina. Estrogen can also be prescribed in the form of a pill.    Follow up with your doctor as directed:  Write down your questions so you remember to ask them during your visits.  © Copyright [employer] 2023 Information is for End User's use only and may not be sold, redistributed or otherwise used for commercial purposes.  The above information is an  only. It is not intended as medical advice for individual conditions or treatments. Talk to your doctor, nurse or pharmacist before following any medical regimen to see if it is safe and effective for you.

## 2024-01-04 ENCOUNTER — ANNUAL EXAM (OUTPATIENT)
Dept: OBGYN CLINIC | Facility: CLINIC | Age: 54
End: 2024-01-04
Payer: COMMERCIAL

## 2024-01-04 VITALS
BODY MASS INDEX: 22.47 KG/M2 | HEIGHT: 63 IN | DIASTOLIC BLOOD PRESSURE: 72 MMHG | WEIGHT: 126.8 LBS | SYSTOLIC BLOOD PRESSURE: 112 MMHG

## 2024-01-04 DIAGNOSIS — N95.1 VASOMOTOR SYMPTOMS DUE TO MENOPAUSE: ICD-10-CM

## 2024-01-04 DIAGNOSIS — Z01.419 ENCOUNTER FOR GYNECOLOGICAL EXAMINATION WITHOUT ABNORMAL FINDING: Primary | ICD-10-CM

## 2024-01-04 DIAGNOSIS — Z12.31 ENCOUNTER FOR SCREENING MAMMOGRAM FOR MALIGNANT NEOPLASM OF BREAST: ICD-10-CM

## 2024-01-04 PROCEDURE — S0612 ANNUAL GYNECOLOGICAL EXAMINA: HCPCS | Performed by: OBSTETRICS & GYNECOLOGY

## 2024-01-04 RX ORDER — VENLAFAXINE HYDROCHLORIDE 75 MG/1
CAPSULE, EXTENDED RELEASE ORAL
Qty: 90 CAPSULE | Refills: 3 | Status: SHIPPED | OUTPATIENT
Start: 2024-01-04

## 2024-01-12 NOTE — TELEPHONE ENCOUNTER
Spoke with Pt today via phone call  Pt states she is scheduled for a follow-up diagnostic mammogram and ultrasound of the right breast at the [facility] on 5/29/18  Reiterated to Pt that if she has any questions/concerns, to contact office  [[email or web address]] : Mr. Rodriguez is a 52yo man with history of traumatic head injury with SAH, ETOH abuse, anxiety, depression, PTSD here for evaluation of cognitive issues and balance issues.  He has seen neurologist previously and is following with psychiatry.  He is on multiple psych medications and has poor concetration, attention which is likely affecting his memory significantly.  He also has signficant balance issues likely related to continued alcohol use (had 3 drinks last night). 1. Alcohol cessation 2. Psychiatry for therapist referral 3. 48 hour aEEG 4. Discuss with psychiatry about adjusting medications to reduce sedation.

## 2024-01-17 ENCOUNTER — TELEPHONE (OUTPATIENT)
Dept: OBGYN CLINIC | Facility: CLINIC | Age: 54
End: 2024-01-17

## 2024-01-17 NOTE — TELEPHONE ENCOUNTER
Pt has a lump on her l breast - am bringing her in for a breast check . It is smaller than a grape. Pt had br ca in r breast previously

## 2024-01-18 ENCOUNTER — OFFICE VISIT (OUTPATIENT)
Dept: OBGYN CLINIC | Facility: CLINIC | Age: 54
End: 2024-01-18
Payer: COMMERCIAL

## 2024-01-18 VITALS
DIASTOLIC BLOOD PRESSURE: 76 MMHG | WEIGHT: 126 LBS | SYSTOLIC BLOOD PRESSURE: 120 MMHG | HEIGHT: 63 IN | BODY MASS INDEX: 22.32 KG/M2

## 2024-01-18 DIAGNOSIS — R23.8 PAPULE: Primary | ICD-10-CM

## 2024-01-18 PROCEDURE — 99213 OFFICE O/P EST LOW 20 MIN: CPT | Performed by: OBSTETRICS & GYNECOLOGY

## 2024-01-18 NOTE — PROGRESS NOTES
Diagnoses and all orders for this visit:    Papule    May use warm compress to area if desired   Monitor for resolution or increased symptoms   If area does not resolve in 1-2 weeks will consider diagnostic imaging     Subjective    CC: Problem visit     Denise Hairston is a 53 y.o. female   Reports an area on her left breast outer area, not painful, present for 3 days, recent annual exam with me, not present at that visit .   Recent mammogram with no suspicious findings   She was just concerned d/t her hx of breast cancer right breast     Patient's last menstrual period was 2022 (approximate).    Past Medical History:   Diagnosis Date    Atypical chest pain 2021    Back pain 2022    BRCA1 negative     BRCA2 negative     Breast cancer (HCC) 2018    right breast    Cancer (HCC) 5/10/2018    Change in bowel habit 2021    Chronic low back pain 2022    Colon polyp     Congestion of right ear 2021    Fibrocystic breast     Hematoma 2022    History of radiation therapy 2018    right breast ca    Left inguinal pain 2020    Need for vaccination 2020    Numbness and tingling of left lower extremity 2020    Perimenopause 2021    Rash 2016    Screening for cardiovascular condition 2020    Screening for human papillomavirus (HPV) 10/16/2018    Seasonal allergies     Use of tamoxifen (Nolvadex) 2019    Vertigo      Past Surgical History:   Procedure Laterality Date    BREAST BIOPSY Right 2018    Positive    BREAST BIOPSY Left     Neg around age 27    BREAST BIOPSY Left [id number]    benign    BREAST BIOPSY Right 2021    BREAST LUMPECTOMY Right 2018    with radiation    EMBOLECTOMY      s/p forceps delivery    MAMMO NEEDLE LOCALIZATION RIGHT (ALL INC) Right 7/10/2018    MAMMO STEREOTACTIC BREAST BIOPSY RIGHT (ALL INC) Right 2018    [place] BX/EXC LYMPH NODE OPEN SUPERFICIAL Right 7/10/2018    Procedure: SENTINEL LYMPH NODE  BIOPSY; LYMPHATIC MAPPING WITH BLUE DYE RADIOACTIVE DYE (INJECT AT 0800 BY DR SMALLS IN THE OR);  Surgeon: Demetri Smalls MD;  [facility];  Service: Surgical Oncology    [place] PERQ DEVICE PLACEMENT BREAST LOC 1ST LES W/GDNCE Right 7/10/2018    Procedure: BREAST LUMPECTOMY; BREAST NEEDLE LOCALIZATION (NEEDLE LOC AT 0700);  Surgeon: Demetri Smalls MD;  [facility];  Service: Surgical Oncology    US GUIDED BREAST BIOPSY LEFT COMPLETE Left 11/13/2019    US GUIDED BREAST BIOPSY RIGHT COMPLETE Right 5/29/2018    US GUIDED BREAST BIOPSY RIGHT COMPLETE Right 11/29/2021       Immunization History   Administered Date(s) Administered    COVID-19 PFIZER VACCINE 0.3 ML IM 03/19/2021, 04/09/2021, 12/04/2021, 05/12/2022    COVID-19 Pfizer Vac BIVALENT Moshe-sucrose 12 Yr+ IM 01/10/2023    INFLUENZA 09/02/2020    Influenza Quadrivalent Preservative Free 3 years and older IM 10/03/2014, 12/29/2015, 09/30/2016, 10/09/2017    Influenza, injectable, quadrivalent, preservative free 0.5 mL 10/08/2018, 10/13/2023    Influenza, recombinant, quadrivalent,injectable, preservative free 09/13/2019, 09/14/2020, 09/17/2021, 11/07/2022    Influenza, seasonal, injectable 01/09/2013, 10/21/2013    Tdap 12/06/2022    Zoster Vaccine Recombinant 11/27/2020, 02/26/2021       Family History   Problem Relation Age of Onset    Prostate cancer Father     Parkinsonism Father     No Known Problems Sister     No Known Problems Daughter     Colon cancer Maternal Grandmother     Esophageal cancer Maternal Grandfather     Breast cancer Maternal Aunt 55    Ovarian cancer Maternal Aunt     Cancer Maternal Aunt     Skin cancer Paternal Aunt      Social History     Tobacco Use    Smoking status: Never    Smokeless tobacco: Never    Tobacco comments:     only smoked socially as teenager for 1 yr   Vaping Use    Vaping status: Never Used   Substance Use Topics    Alcohol use: Yes     Alcohol/week: 2.0 standard drinks of alcohol     Types: 2 Glasses of wine per  "week     Comment: Social drinker    Drug use: No       Current Outpatient Medications:     loratadine (CLARITIN) 5 MG chewable tablet, Chew 5 mg daily, Disp: , Rfl:     venlafaxine (EFFEXOR-XR) 75 mg 24 hr capsule, Take one tablet daily, Disp: 90 capsule, Rfl: 3    anastrozole (ARIMIDEX) 1 mg tablet, Take 1 tablet (1 mg total) by mouth daily (Patient not taking: Reported on 2024), Disp: 90 tablet, Rfl: 1  Patient Active Problem List    Diagnosis Date Noted    Vasomotor symptoms due to menopause 2023    Slow transit constipation 2022    Vitamin D deficiency 2020    Rotator cuff syndrome of right shoulder 03/10/2020    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 breast in female, estrogen receptor positive  2018    Seasonal allergies 2015    Benign paroxysmal vertigo 2013       No Known Allergies    OB History    Para Term  AB Living   4 2 2   2 2   SAB IAB Ectopic Multiple Live Births   1       2      # Outcome Date GA Lbr Franklin/2nd Weight Sex Delivery Anes PTL Lv   4 SAB            3 AB            2 Term         KRISTEL   1 Term         KRISTEL       Vitals:    24 1109   BP: 120/76   BP Location: Right arm   Patient Position: Sitting   Cuff Size: Large   Weight: 57.2 kg (126 lb)   Height: 5' 3\" (1.6 m)     Body mass index is 22.32 kg/m².    Review of Systems     Constitutional: Negative for chills, fatigue, fever, headaches, visual disturbances, and unexpected weight change.   Respiratory: Negative for cough, & shortness of breath.  Cardiovascular: Negative for chest pain. .    Gastrointestinal: Negative for Abd pain, nausea & vomiting, constipation and diarrhea.   Genitourinary: Negative for difficulty urinating, dysuria, hematuria, dyspareunia, unusual vaginal bleeding or discharge  Skin: Negative skin changes    Physical Exam     Constitutional: Alert & Oriented x3, well-developed and well-nourished. No distress.   HENT: Atraumatic, Normocephalic,   Neck: " Normal range of motion.   Pulmonary: Effort normal.   Abdominal: Soft. No tenderness or masses  Musculoskeletal: Normal ROM  Skin: Warm & Dry  Psychological: Normal mood, thought content, behavior & judgement     Breasts:   Right: s/p Lumpectomy & radiation No areas of erythema or pain. No subclavicular, axillary, pectoral adenopathy  Left: small papule left outer breast, flesh colored, superficial, possible small hair follicle inflamed, will monitor for resolution    tissue soft without masses, tenderness,  or nipple discharge. No areas of erythema or pain. No subclavicular, axillary, pectoral adenopathy

## 2024-01-18 NOTE — PATIENT INSTRUCTIONS
Folliculitis   AMBULATORY CARE:   Folliculitis  is inflammation of your hair follicles. A hair follicle is a sac under your skin. Your hair grows out of the follicle. Folliculitis is caused by bacteria or funguses, most commonly a germ called Staph. Folliculitis can occur anywhere you have hair.  Common signs and symptoms of folliculitis:   One or more small red, white, or yellow rash-like bumps around your hair follicles    Pus-filled bumps that may break open and form a crust on your skin    Itching, pain, or redness on or around your hair follicles    Seek care immediately if:   You develop large areas of red, warm, tender skin around the folliculitis.    You develop boils (red, painful bumps that develop under your skin).    Call your doctor or dermatologist if:   You have a fever.    You have foul-smelling pus coming from the bumps on your skin.    Your rash is spreading.    You have questions or concerns about your condition or care.    Treatment:  Folliculitis may heal on its own without treatment. If your folliculitis is severe or is not healing, you may need any of the following:  Antibiotics  help fight or prevent a bacterial infection. It may be given as an ointment that you apply to your skin or as a pill. Always take your antibiotics exactly as ordered by your healthcare provider. Never save antibiotics or take leftover antibiotics that were given to you for another illness.    Antifungal medicine  may be given as an cream that you apply to your skin or take as a pill.    Steroids  may be given to decrease inflammation.    NSAIDs , such as ibuprofen, help decrease swelling, pain, and fever. This medicine is available with or without a doctor's order. NSAIDs can cause stomach bleeding or kidney problems in certain people. If you take blood thinner medicine, always ask if NSAIDs are safe for you. Always read the medicine label and follow directions. Do not give these medicines to children younger than 6  months without direction from a healthcare provider.     Antihistamines  may be given to help decrease itching.    Ultraviolet (UV) light therapy  is used to help decrease inflammation on the skin. UV light treatments are only used to treat certain types of folliculitis.    Manage folliculitis:   Clean the area.  Use antibacterial soap to wash the affected area. Change your washcloths and towels every day.    Apply a warm compress.  Wet a clean washcloth with warm water and apply it to the infected skin area to help decrease pain and swelling. Warm compresses may also help drain pus and improve healing.    Do not shave the area.  If possible, do not shave areas that have folliculitis. If you must shave, use an electric razor or new blade every time you shave.    Prevent folliculitis:   Do not share personal items.  Personal items include towels, soap, nail cutters, dishes, and silverware.    Do not wear tight clothing.  Tight-fitting clothes may rub against and irritate your skin.    Treat skin injuries right away.  Wash an injury such as a cut or scrape right away. Use warm, soapy water. Cover the area with a bandage to prevent infection.    Follow up with your doctor or dermatologist as directed:  Write down your questions so you remember to ask them during your visits.  © Copyright [employer] 2023 Information is for End User's use only and may not be sold, redistributed or otherwise used for commercial purposes.  The above information is an  only. It is not intended as medical advice for individual conditions or treatments. Talk to your doctor, nurse or pharmacist before following any medical regimen to see if it is safe and effective for you.

## 2024-02-21 ENCOUNTER — NURSE TRIAGE (OUTPATIENT)
Age: 54
End: 2024-02-21

## 2024-02-21 NOTE — TELEPHONE ENCOUNTER
"Per patient she is in menopause.  March of 2022 last menses. Today patient started with a light period. No clots, bright red in color.  Denies abdominal pain. Appointment scheduled.     Reason for Disposition   Age > 39 years with irregular or excessive bleeding    Answer Assessment - Initial Assessment Questions  1. AMOUNT: \"Describe the bleeding that you are having.\"     - SPOTTING: spotting, or pinkish / brownish mucous discharge; does not fill panti-liner or pad     - MILD:  less than 1 pad / hour; less than patient's usual menstrual bleeding    - MODERATE: 1-2 pads / hour; 1 menstrual cup every 6 hours; small-medium blood clots (e.g., pea, grape, small coin)    - SEVERE: soaking 2 or more pads/hour for 2 or more hours; 1 menstrual cup every 2 hours; bleeding not contained by pads or continuous red blood from vagina; large blood clots (e.g., golf ball, large coin)       No clots, bright red blood, light   2. ONSET: \"When did the bleeding begin?\" \"Is it continuing now?\"      This mornign  3. MENSTRUAL PERIOD: \"When was the last normal menstrual period?\" \"How is this different than your period?\"      Last menses was 2022 March   4. REGULARITY: \"How regular are your periods?\"      No  5. ABDOMINAL PAIN: \"Do you have any pain?\" \"How bad is the pain?\"  (e.g., Scale 1-10; mild, moderate, or severe)    - MILD (1-3): doesn't interfere with normal activities, abdomen soft and not tender to touch     - MODERATE (4-7): interferes with normal activities or awakens from sleep, tender to touch     - SEVERE (8-10): excruciating pain, doubled over, unable to do any normal activities       Denies  6. PREGNANCY: \"Could you be pregnant?\" \"Are you sexually active?\" \"Did you recently give birth?\"      Denies       8. HORMONES: \"Are you taking any hormone medications, prescription or OTC?\" (e.g., birth control pills, estrogen)      Denies  9. BLOOD THINNERS: \"Do you take any blood thinners?\" (e.g., Coumadin/warfarin, " "Pradaxa/dabigatran, aspirin)      Denies  10. CAUSE: \"What do you think is causing the bleeding?\" (e.g., recent gyn surgery, recent gyn procedure; known bleeding disorder, cervical cancer, polycystic ovarian disease, fibroids)          unsure  11. HEMODYNAMIC STATUS: \"Are you weak or feeling lightheaded?\" If Yes, ask: \"Can you stand and walk normally?\"         Denies   12. OTHER SYMPTOMS: \"What other symptoms are you having with the bleeding?\" (e.g., passed tissue, vaginal discharge, fever, menstrual-type cramps)        Headache    Protocols used: Vaginal Bleeding - Abnormal-ADULT-OH    "

## 2024-03-07 ENCOUNTER — OFFICE VISIT (OUTPATIENT)
Dept: OBGYN CLINIC | Facility: CLINIC | Age: 54
End: 2024-03-07
Payer: COMMERCIAL

## 2024-03-07 VITALS
SYSTOLIC BLOOD PRESSURE: 120 MMHG | DIASTOLIC BLOOD PRESSURE: 74 MMHG | HEIGHT: 63 IN | BODY MASS INDEX: 22.32 KG/M2 | WEIGHT: 126 LBS

## 2024-03-07 DIAGNOSIS — R23.8 PAPULE: ICD-10-CM

## 2024-03-07 DIAGNOSIS — N95.0 POST-MENOPAUSAL BLEEDING: Primary | ICD-10-CM

## 2024-03-07 PROCEDURE — 58100 BIOPSY OF UTERUS LINING: CPT | Performed by: OBSTETRICS & GYNECOLOGY

## 2024-03-07 PROCEDURE — 99213 OFFICE O/P EST LOW 20 MIN: CPT | Performed by: OBSTETRICS & GYNECOLOGY

## 2024-03-07 PROCEDURE — 88305 TISSUE EXAM BY PATHOLOGIST: CPT | Performed by: PATHOLOGY

## 2024-03-07 NOTE — PROGRESS NOTES
"Endometrial biopsy    Date/Time: 3/7/2024 2:30 PM    Performed by: CAROLYN Pina  Authorized by: CAROLYN Pina  Universal Protocol:  Procedure performed by:  Consent: Verbal consent obtained. Written consent not obtained.  Risks and benefits: risks, benefits and alternatives were discussed  Consent given by: patient  Time out: Immediately prior to procedure a \"time out\" was called to verify the correct patient, procedure, equipment, support staff and site/side marked as required.  Patient understanding: patient states understanding of the procedure being performed  Patient consent: the patient's understanding of the procedure matches consent given  Procedure consent: procedure consent matches procedure scheduled  Relevant documents: relevant documents not present or verified  Test results: test results not available  Site marked: the operative site was not marked  Radiology Images displayed and confirmed. If images not available, report reviewed: imaging studies not available  Patient identity confirmed: verbally with patient    Indication:     Indications: Post-menopausal bleeding      Chronicity of post-menopausal bleeding:  New    Progression of post-menopausal bleeding:  Unchanged  Procedure:     Procedure: endometrial biopsy with Pipelle      A bivalve speculum was placed in the vagina: yes      Cervix cleaned and prepped: yes      A paracervical block was performed: no      An intracervical block was performed: no      The cervix was dilated: yes      Uterus sounded: yes      Uterus sound depth (cm):  7    Curettes used:  2    Specimen collected: specimen collected and sent to pathology      Patient tolerated procedure well with no complications: yes    Findings:     Uterus size:  Non-gravid    Cervix: normal    Comments:     Procedure comments:  Procedure explained to patient consent form obtained.  Procedure completed without difficulty.  Hemostasis appreciated  Patient " advised nothing in the vagina for 1 week  Patient advised she may have bleeding or spotting for the next several days.  Patient advised to take Tylenol or ibuprofen for cramping if necessary  Patient advised that we will call with biopsy results

## 2024-03-07 NOTE — PROGRESS NOTES
Diagnoses and all orders for this visit:    Post-menopausal bleeding  -     US pelvis complete w transvaginal; Future  -     Tissue Exam  -     Endometrial biopsy    Papule  -     Mammo diagnostic left w 3d & cad; Future    We discussed need for EMB, pt is willing to have done today   Call to schedule TVUS and diagnostic mammogram   I will call with pathology results       Subjective    CC: Problem visit     Denise Hairston is a 53 y.o. female  presents with concerns for Postmenopausal bleeding , LMP 3/2022, she reports a recent episode of bleeding 2 weeks ago, heavy flow for 4-5 days.     Also continues with concern for a superficial non tender papule on left breast which is smaller than our visit on 24 , will get diagnostic imaging as she remains concerned and has hx of BC right breast  Patient's last menstrual period was 2022 (approximate).    Past Medical History:   Diagnosis Date    Atypical chest pain 2021    Back pain 2022    BRCA1 negative     BRCA2 negative     Breast cancer (HCC) 2018    right breast    Cancer (HCC) 5/10/2018    Change in bowel habit 2021    Chronic low back pain 2022    Colon polyp     Congestion of right ear 2021    Fibrocystic breast     Hematoma 2022    History of radiation therapy 2018    right breast ca    Left inguinal pain 2020    Need for vaccination 2020    Numbness and tingling of left lower extremity 2020    Perimenopause 2021    Rash 2016    Screening for cardiovascular condition 2020    Screening for human papillomavirus (HPV) 10/16/2018    Seasonal allergies     Use of tamoxifen (Nolvadex) 2019    Vertigo      Past Surgical History:   Procedure Laterality Date    BREAST BIOPSY Right 2018    Positive    BREAST BIOPSY Left     Neg around age 27    BREAST BIOPSY Left     benign    BREAST BIOPSY Right 2021    BREAST LUMPECTOMY Right 2018    with radiation     EMBOLECTOMY      s/p forceps delivery    MAMMO NEEDLE LOCALIZATION RIGHT (ALL INC) Right 7/10/2018    MAMMO STEREOTACTIC BREAST BIOPSY RIGHT (ALL INC) Right 5/29/2018    [place] BX/EXC LYMPH NODE OPEN SUPERFICIAL Right 7/10/2018    Procedure: SENTINEL LYMPH NODE BIOPSY; LYMPHATIC MAPPING WITH BLUE DYE RADIOACTIVE DYE (INJECT AT 0800 BY DR SMALLS IN THE OR);  Surgeon: Demetri Smalls MD;  [facility];  Service: Surgical Oncology    [place] PERQ DEVICE PLACEMENT BREAST LOC 1ST LES W/GDNCE Right 7/10/2018    Procedure: BREAST LUMPECTOMY; BREAST NEEDLE LOCALIZATION (NEEDLE LOC AT 0700);  Surgeon: Demetri Smalls MD;  [facility];  Service: Surgical Oncology    US GUIDED BREAST BIOPSY LEFT COMPLETE Left 11/13/2019    US GUIDED BREAST BIOPSY RIGHT COMPLETE Right 5/29/2018    US GUIDED BREAST BIOPSY RIGHT COMPLETE Right 11/29/2021       Immunization History   Administered Date(s) Administered    COVID-19 PFIZER VACCINE 0.3 ML IM 03/19/2021, 04/09/2021, 12/04/2021, 05/12/2022    COVID-19 Pfizer Vac BIVALENT Moshe-sucrose 12 Yr+ IM 01/10/2023    INFLUENZA 09/02/2020    Influenza Quadrivalent Preservative Free 3 years and older IM 10/03/2014, 12/29/2015, 09/30/2016, 10/09/2017    Influenza, injectable, quadrivalent, preservative free 0.5 mL 10/08/2018, 10/13/2023    Influenza, recombinant, quadrivalent,injectable, preservative free 09/13/2019, 09/14/2020, 09/17/2021, 11/07/2022    Influenza, seasonal, injectable 01/09/2013, 10/21/2013    Tdap 12/06/2022    Zoster Vaccine Recombinant 11/27/2020, 02/26/2021       Family History   Problem Relation Age of Onset    Prostate cancer Father     Parkinsonism Father     No Known Problems Sister     No Known Problems Daughter     Colon cancer Maternal Grandmother     Esophageal cancer Maternal Grandfather     Breast cancer Maternal Aunt 55    Ovarian cancer Maternal Aunt     Cancer Maternal Aunt     Skin cancer Paternal Aunt      Social History     Tobacco Use    Smoking status: Never     "Smokeless tobacco: Never    Tobacco comments:     only smoked socially as teenager for 1 yr   Vaping Use    Vaping status: Never Used   Substance Use Topics    Alcohol use: Yes     Alcohol/week: 2.0 standard drinks of alcohol     Types: 2 Glasses of wine per week     Comment: Social drinker    Drug use: No       Current Outpatient Medications:     loratadine (CLARITIN) 5 MG chewable tablet, Chew 5 mg daily, Disp: , Rfl:     venlafaxine (EFFEXOR-XR) 75 mg 24 hr capsule, Take one tablet daily, Disp: 90 capsule, Rfl: 3    anastrozole (ARIMIDEX) 1 mg tablet, Take 1 tablet (1 mg total) by mouth daily (Patient not taking: Reported on 2024), Disp: 90 tablet, Rfl: 1  Patient Active Problem List    Diagnosis Date Noted    Vasomotor symptoms due to menopause 2023    Slow transit constipation 2022    Vitamin D deficiency 2020    Rotator cuff syndrome of right shoulder 03/10/2020    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 breast in female, estrogen receptor positive  2018    Seasonal allergies 2015    Benign paroxysmal vertigo 2013       No Known Allergies    OB History    Para Term  AB Living   4 2 2   2 2   SAB IAB Ectopic Multiple Live Births   1       2      # Outcome Date GA Lbr Franklin/2nd Weight Sex Delivery Anes PTL Lv   4 SAB            3 AB            2 Term         KRISTEL   1 Term         KRISTEL       Vitals:    24 1430   BP: 120/74   BP Location: Left arm   Patient Position: Sitting   Cuff Size: Large   Weight: 57.2 kg (126 lb)   Height: 5' 3\" (1.6 m)     Body mass index is 22.32 kg/m².    Review of Systems     Constitutional: Negative for chills, fatigue, fever, headaches, visual disturbances, and unexpected weight change.   Respiratory: Negative for cough, & shortness of breath.  Cardiovascular: Negative for chest pain. .    Gastrointestinal: Negative for Abd pain, nausea & vomiting, constipation and diarrhea.   Genitourinary: Negative for difficulty " urinating, dysuria, hematuria, dyspareunia, unusual vaginal bleeding or discharge  Skin: Negative skin changes    Physical Exam     Constitutional: Alert & Oriented x3, well-developed and well-nourished. No distress.   HENT: Atraumatic, Normocephalic,   Neck: Normal range of motion.   Pulmonary: Effort normal.   Abdominal: Soft. No tenderness or masses  Musculoskeletal: Normal ROM  Skin: Warm & Dry  Psychological: Normal mood, thought content, behavior & judgement     Breasts:   Right: tissue soft without masses, tenderness, skin changes or nipple discharge. No areas of erythema or pain. No subclavicular, axillary, pectoral adenopathy  Left:  tissue soft without masses, tenderness,  or nipple discharge. No areas of erythema or pain. No subclavicular, axillary, pectoral adenopathy  Small papule, non tender, skin colored 2mm, smaller than original exam on 1/18/24    Pelvic exam was performed with patient supine, lithotomy position.      Labia: Negative rash, tenderness, lesion or injury on the right labia.              Negative rash, tenderness, lesion or injury on the left labia.   Urethral meatus:  Negative for  tenderness, inflammation or discharge.   Uterus: not deviated, enlarged, fixed or tender.   Cervix: No CMT, no discharge or friability.   Right adnexa: no mass, no tenderness and no fullness.  Left adnexa: no mass, no tenderness and no fullness.   Vagina: No erythema, tenderness, masses, or foreign body in the vagina. No signs of injury around the vagina. No unusual vaginal discharge   Perineum without lesions, signs of injury, erythema or swelling.  Inguinal Canal:        Right: No inguinal adenopathy or hernia present.        Left: No inguinal adenopathy or hernia present.

## 2024-03-07 NOTE — PATIENT INSTRUCTIONS
Postmenopausal Bleeding   AMBULATORY CARE:   Postmenopausal bleeding  is bleeding that occurs after menopause. A woman who has not had a period for a full year after the age of 40 is considered to be in menopause. Postmenopausal bleeding may range from spotting to very heavy bleeding.  Causes of postmenopausal bleeding:   Thinning of the endometrium (lining of the uterus) called endometrial atrophy    Polyps (noncancerous growths) that develop on the inner wall of your uterus or cervix    Hormone replacement therapy    Abnormal thickening of the endometrium called endometrial hyperplasia    Tamoxifen (medicine used to treat breast cancer)    Cervical, endometrial, or uterine cancer    Call your doctor or gynecologist if:   You continue to have vaginal bleeding, even with treatment.    You have pain in your abdomen or pelvis.    You have questions or concerns about your condition or care.    Treatment for postmenopausal bleeding  depends on the cause of your postmenopausal bleeding. If you have polyps, you may need surgery to remove them. Endometrial atrophy can be treated with medicines. Endometrial hyperplasia may be treated with progestin hormone therapy. Surgery to remove your uterus will be needed if you have endometrial or uterine cancer. Your fallopian tubes, ovaries, and nearby lymph nodes may also be removed.  Follow up with your doctor or gynecologist as directed:  You may need to return for more tests. Write down your questions so you remember to ask them during your visits.  © Copyright [employer] 2023 Information is for End User's use only and may not be sold, redistributed or otherwise used for commercial purposes.  The above information is an  only. It is not intended as medical advice for individual conditions or treatments. Talk to your doctor, nurse or pharmacist before following any medical regimen to see if it is safe and effective for you.  Endometrial Biopsy   WHAT YOU NEED TO KNOW:    Endometrial biopsy is a procedure to remove a tissue sample from the lining of your uterus. This procedure is done through your vagina.        HOW TO PREPARE:   The week before your procedure:   Arrange to have someone drive you home after your procedure and stay with you for 24 hours.    Tell your surgeon about all medicines you currently take. He or she will tell you if you need to stop any medicine before your procedure, and when to stop. He or she will tell you which medicines to take or not take on the day of your procedure.    The night before your procedure:  You may be told not to eat or drink anything after midnight.  The day of your procedure:   You or a close family member will be asked to sign a legal document called a consent form. It gives healthcare providers permission to do the procedure or surgery. It also explains the problems that may happen, and your choices. Make sure all your questions are answered before you sign this form.    You may need a blood or urine test before your procedure to make sure you are not pregnant.    You may need to take an NSAID before your procedure to decrease swelling and pain. Follow your healthcare provider's instruction on when to take it.     An anesthesiologist will talk to you before your procedure. You may need medicine to numb your cervix. Tell him or her if you or anyone in your family has had a problem with anesthesia in the past.    WHAT WILL HAPPEN:   What will happen:  You will be awake during the procedure. An ultrasound or hysteroscope (tube with a light and a camera on the end) may be used. This helps your healthcare provider see inside your uterus to find the best spot to get the tissue sample. He or she will then insert a speculum into your vagina. This is the same tool used during a Pap smear. The speculum allows your healthcare provider to see inside your vagina to your cervix. He or she may need to numb your cervix. Your healthcare provider will  insert a small tube into your vagina and cervix to remove a piece of tissue from the lining of your uterus. The tissue sample will be sent to a lab to be tested.   After your procedure:  Do not get up until your healthcare provider says it is okay. When your healthcare provider sees that you are okay, you may be able to go home. You may have mild pain, cramping, or spotting for a few days after your procedure.  CONTACT YOUR HEALTHCARE PROVIDER IF:   You have a fever.    You get a cold or the flu.    You have questions or concerns about your procedure.  RISKS:   You could get an infection after your procedure. Your uterus may be damaged. Damage can cause heavy bleeding and pain. You may need surgery to repair the damage.   CARE AGREEMENT:   You have the right to help plan your care. Learn about your health condition and how it may be treated. Discuss treatment options with your healthcare providers to decide what care you want to receive. You always have the right to refuse treatment.   © Copyright [employer] 2023 Information is for End User's use only and may not be sold, redistributed or otherwise used for commercial purposes.  The above information is an  only. It is not intended as medical advice for individual conditions or treatments. Talk to your doctor, nurse or pharmacist before following any medical regimen to see if it is safe and effective for you.

## 2024-03-12 PROCEDURE — 88305 TISSUE EXAM BY PATHOLOGIST: CPT | Performed by: PATHOLOGY

## 2024-03-14 ENCOUNTER — TELEPHONE (OUTPATIENT)
Dept: OBGYN CLINIC | Facility: CLINIC | Age: 54
End: 2024-03-14

## 2024-03-14 NOTE — TELEPHONE ENCOUNTER
Spoke with pt, reviewed results of EMB, no cancer noted, waiting on US to be completed   No further bleeding noted     Advised to call again to schedule diagnostic mammogram, she called central scheduling and was told someone would call her back, she has not received a call as of yet

## 2024-03-27 ENCOUNTER — HOSPITAL ENCOUNTER (OUTPATIENT)
Dept: ULTRASOUND IMAGING | Facility: HOSPITAL | Age: 54
Discharge: HOME | End: 2024-03-27
Payer: COMMERCIAL

## 2024-03-27 DIAGNOSIS — N95.0 POST-MENOPAUSAL BLEEDING: ICD-10-CM

## 2024-03-27 PROCEDURE — 76830 TRANSVAGINAL US NON-OB: CPT

## 2024-03-27 PROCEDURE — 76856 US EXAM PELVIC COMPLETE: CPT

## 2024-04-05 DIAGNOSIS — R93.89 ENDOMETRIAL THICKENING ON ULTRASOUND: Primary | ICD-10-CM

## 2024-04-05 NOTE — PROGRESS NOTES
Spoke with patient reviewed ultrasound findings.  Referral placed to gynecology oncology.  Patient was given the phone number to the office to call on Monday to schedule an appointment for follow-up possible D&C possible hysterectomy  Patient is grateful for phone call

## 2024-04-09 ENCOUNTER — TELEPHONE (OUTPATIENT)
Dept: HEMATOLOGY ONCOLOGY | Facility: CLINIC | Age: 54
End: 2024-04-09

## 2024-04-09 NOTE — TELEPHONE ENCOUNTER
Denise called  in response to a referral that was received for patient to establish care with Gynecologic Oncology.     Outreach was made to schedule a consultation.    A consultation was scheduled for patient during this call. Patient is scheduled on 5/7/24 at 9:15 with Dr Wolff at the [facility]

## 2024-04-12 ENCOUNTER — TELEPHONE (OUTPATIENT)
Dept: MAMMOGRAPHY | Facility: CLINIC | Age: 54
End: 2024-04-12

## 2024-04-18 ENCOUNTER — CONSULT (OUTPATIENT)
Dept: GYNECOLOGIC ONCOLOGY | Facility: CLINIC | Age: 54
End: 2024-04-18
Payer: COMMERCIAL

## 2024-04-18 VITALS
HEART RATE: 84 BPM | BODY MASS INDEX: 23.07 KG/M2 | OXYGEN SATURATION: 99 % | TEMPERATURE: 97.7 F | WEIGHT: 130.2 LBS | HEIGHT: 63 IN | RESPIRATION RATE: 16 BRPM | SYSTOLIC BLOOD PRESSURE: 146 MMHG | DIASTOLIC BLOOD PRESSURE: 88 MMHG

## 2024-04-18 DIAGNOSIS — N83.209 CYST OF OVARY, UNSPECIFIED LATERALITY: ICD-10-CM

## 2024-04-18 DIAGNOSIS — N95.0 PMB (POSTMENOPAUSAL BLEEDING): Primary | ICD-10-CM

## 2024-04-18 DIAGNOSIS — Z17.0 MALIGNANT NEOPLASM OF UPPER-INNER QUADRANT OF RIGHT BREAST IN FEMALE, ESTROGEN RECEPTOR POSITIVE (HCC): ICD-10-CM

## 2024-04-18 DIAGNOSIS — C50.211 MALIGNANT NEOPLASM OF UPPER-INNER QUADRANT OF RIGHT BREAST IN FEMALE, ESTROGEN RECEPTOR POSITIVE (HCC): ICD-10-CM

## 2024-04-18 PROCEDURE — 99243 OFF/OP CNSLTJ NEW/EST LOW 30: CPT | Performed by: OBSTETRICS & GYNECOLOGY

## 2024-04-18 NOTE — ASSESSMENT & PLAN NOTE
Small incidental and simple appearing ovarian cyst.  I have confirmed appearance on ultrasound.  I discussed differential diagnosis.  Given findings this is almost certainly benign.  Will follow-up with repeat ultrasound in 3 months.

## 2024-04-18 NOTE — PROGRESS NOTES
Assessment/Plan:    Problem List Items Addressed This Visit          Endocrine    Ovarian cyst     Small incidental and simple appearing ovarian cyst.  I have confirmed appearance on ultrasound.  I discussed differential diagnosis.  Given findings this is almost certainly benign.  Will follow-up with repeat ultrasound in 3 months.            Oncology    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 breast in female, estrogen receptor positive (HCC)     Patient has not used Arimidex out of concern for side effects.  Today I reviewed with her the side effect profile and the typical tolerance to this medication.  She will reconsider whether or not she wants to proceed with Arimidex.  I emphasized the risk reduction effect and benefits of this medication.            Obstetrics/Gynecology    PMB (postmenopausal bleeding) - Primary     Patient has single instance of postmenopausal bleeding.  I have personally reviewed and interpreted pelvic ultrasound images.  Uterus measures about 11 to 12 cm in largest diameter.  The endometrial stripe is thickened at 20 mm.  Office endometrial biopsy obtained by primary gynecology provider was informative and demonstrated no evidence of atypia or malignancy, polyp fragments noted.    Today I discussed with patient differential diagnosis.  We discussed potential management options.  First, we discussed the patient of observation without any further assessments.  If bleeding was to recur we could then intervene.  Alternatively we discussed the possibility of proceeding with hysteroscopy and D&C to remove polyps and obtain more tissue for sampling.  I emphasized that previous biopsy carries an accuracy of 95% and the added value for diagnostic purposes of D&C would be limited.  Finally, we discussed the possibility of definitive surgery by means of minimally invasive hysterectomy and bilateral salpingo-oophorectomy.    Patient will consider these procedures.  We will meet via  telehealth in approximately 2 weeks to discuss her wishes.  I will tentatively schedule follow-up in our office also in 3 months with previsit pelvic ultrasound as default.         Relevant Orders    US pelvis complete non OB           CHIEF COMPLAINT:   Consultation for postmenopausal bleeding, abnormal uterus on ultrasound and ovarian cyst    Problem:  Cancer Staging   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 breast in female, estrogen receptor positive (HCC)  Staging form: Breast, AJCC 8th Edition  - Clinical: Stage IA (cT1b, cN0(sn), cM0, G1, ER+, ND+, HER2-) - Unsigned      Previous therapy:  Oncology History   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 breast in female, estrogen receptor positive (HCC)   5/29/2018 Biopsy    Right breast biopsy  3 o'clock 6 CMFN  Invasive ductal carcinoma  Grade 1  8 mm on ultrasound  No axillary adenopathy on ultrasound  ER 95  ND 90  Her 2 0  Stage IA     6/7/2018 Genetic Testing    BRCA testing  Negative     7/10/2018 Surgery    Right breast lumpectomy with sentinel lymph node biopsy  Invasive ductal carcinoma  9 mm  Grade 1  0/1 lymph nodes  Margins negative  ER 95  ND 90  Her 2 0  Stage IA     7/2018 -  Hormone Therapy      Adjuvant hormonal therapy with tamoxifen      8/13/2018 - 9/11/2018 Radiation    Course: C1    Plan ID Energy Fractions Dose per Fraction (cGy) Dose Correction (cGy) Total Dose Delivered (cGy) Elapsed Days   R Breast e 9E 5 / 5 200 0 1,000 6   Rt Breast 6X 16 / 16 266 0 4,256 22      Treatment dates:  C1: 8/13/2018 - 9/11/2018             Patient ID: Denise Hairston is a 54 y.o. female  HPI  54-year-old G4, P2 with 2 prior vaginal deliveries and stage IA hormone receptor positive, HER2 negative breast cancer treated with lumpectomy, radiotherapy and tamoxifen recently transition to aromatase inhibitor at the time of menopause last year.  Recommendation patient never started Arimidex due to concerns about side effects.  Recently had  single episode of postmenopausal bleeding which she described as a normal menstrual cycle.  She consulted with her primary gynecologic provider.  Pelvic ultrasound endometrial biopsy were performed.  Initial biopsy demonstrated fragments consistent with both myometrial polyp, there was no evidence of atypia or carcinoma.  Pelvic ultrasound demonstrated 11 to 12 cm uterus, several leiomyoma, thickened endometrium at 20 mm and a simple ovarian cyst.  Patient was referred for evaluation and treatment.    Up-to-date with colonoscopy and mammogram.    Review of Systems  Per HPI.  Review of systems otherwise noncontributory.  Current Outpatient Medications   Medication Sig Dispense Refill    loratadine (CLARITIN) 5 MG chewable tablet Chew 5 mg daily      venlafaxine (EFFEXOR-XR) 75 mg 24 hr capsule Take one tablet daily 90 capsule 3    anastrozole (ARIMIDEX) 1 mg tablet Take 1 tablet (1 mg total) by mouth daily (Patient not taking: Reported on 1/18/2024) 90 tablet 1     No current facility-administered medications for this visit.       No Known Allergies    Past Medical History:   Diagnosis Date    Atypical chest pain 09/18/2021    Back pain 06/29/2022    BRCA1 negative     BRCA2 negative     Breast cancer (HCC) 05/2018    right breast    Cancer (HCC) 5/10/2018    Change in bowel habit 09/18/2021    Chronic low back pain 11/07/2022    Colon polyp     Congestion of right ear 04/27/2021    Fibrocystic breast     Hematoma 12/22/2022    History of radiation therapy 2018    right breast ca    Left inguinal pain 11/03/2020    Need for vaccination 09/14/2020    Numbness and tingling of left lower extremity 09/14/2020    Perimenopause 09/18/2021    Rash 02/23/2016    Screening for cardiovascular condition 09/14/2020    Screening for human papillomavirus (HPV) 10/16/2018    Seasonal allergies     Use of tamoxifen (Nolvadex) 04/24/2019    Vertigo        Past Surgical History:   Procedure Laterality Date    BREAST BIOPSY Right  2018    Positive    BREAST BIOPSY Left     Neg around age 27    BREAST BIOPSY Left [id number]    benign    BREAST BIOPSY Right 2021    BREAST LUMPECTOMY Right 2018    with radiation    EMBOLECTOMY      s/p forceps delivery    MAMMO NEEDLE LOCALIZATION RIGHT (ALL INC) Right 7/10/2018    MAMMO STEREOTACTIC BREAST BIOPSY RIGHT (ALL INC) Right 2018    CO BX/EXC LYMPH NODE OPEN SUPERFICIAL Right 7/10/2018    Procedure: SENTINEL LYMPH NODE BIOPSY; LYMPHATIC MAPPING WITH BLUE DYE RADIOACTIVE DYE (INJECT AT 0800 BY DR SMALLS IN THE OR);  Surgeon: Demetri Smalls MD;  Location: [facility];  Service: Surgical Oncology    CO PERQ DEVICE PLACEMENT BREAST LOC 1ST LES W/GDNCE Right 7/10/2018    Procedure: BREAST LUMPECTOMY; BREAST NEEDLE LOCALIZATION (NEEDLE LOC AT 0700);  Surgeon: Demetri Smalls MD;  Location: [facility];  Service: Surgical Oncology    US GUIDED BREAST BIOPSY LEFT COMPLETE Left 2019    US GUIDED BREAST BIOPSY RIGHT COMPLETE Right 2018    US GUIDED BREAST BIOPSY RIGHT COMPLETE Right 2021       OB History          4    Para   2    Term   2            AB   2    Living   2         SAB   1    IAB        Ectopic        Multiple        Live Births   2                 Family History   Problem Relation Age of Onset    Prostate cancer Father     Parkinsonism Father     No Known Problems Sister     No Known Problems Daughter     Colon cancer Maternal Grandmother     Esophageal cancer Maternal Grandfather     Breast cancer Maternal Aunt 55    Ovarian cancer Maternal Aunt     Cancer Maternal Aunt     Skin cancer Paternal Aunt        The following portions of the patient's history were reviewed and updated as appropriate: allergies, current medications, past family history, past medical history, past social history, past surgical history, and problem list.      Objective:    Blood pressure 146/88, pulse 84, temperature 97.7 °F (36.5 °C), temperature source Temporal, resp. rate 16, height  "5' 3\" (1.6 m), weight 59.1 kg (130 lb 3.2 oz), last menstrual period 03/20/2022, SpO2 99%, not currently breastfeeding.  Body mass index is 23.06 kg/m².    Physical Exam  Vitals reviewed. Exam conducted with a chaperone present.   Constitutional:       General: She is not in acute distress.     Appearance: Normal appearance. She is not ill-appearing or toxic-appearing.   Cardiovascular:      Rate and Rhythm: Normal rate and regular rhythm.      Heart sounds: Normal heart sounds. No murmur heard.  Pulmonary:      Effort: Pulmonary effort is normal. No respiratory distress.      Breath sounds: No stridor. No wheezing.   Abdominal:      General: There is no distension.      Palpations: Abdomen is soft. There is no mass.      Tenderness: There is no abdominal tenderness. There is no guarding.      Hernia: No hernia is present.   Genitourinary:     Comments: Normal external female genitalia. Normal Barth[place]'s and [place]'s glands. Normal urethral meatus and no evidence of urethral discharge or masses.  Bladder without fullness mass or tenderness. Vagina without lesion or discharge. No significant pelvic organ prolapse noted. Cervix grossly normal appearing without visible lesions. Uterus 12 weeks size, nontender, normal mobility. Adnexae without mass or tenderness. Anus without fissure of lesion.    Musculoskeletal:      Right lower leg: No edema.      Left lower leg: No edema.         Michael Wolff MD, FACOG, FACS  4/18/2024  3:35 PM      "

## 2024-04-18 NOTE — LETTER
April 18, 2024     CAROLYN Pina  [address] [facility] [address]    Patient: Denise Hairston   YOB: 1970   Date of Visit: 4/18/2024       Dear Dr. Cárdenas:    Thank you for referring Denise Hairston to me for evaluation. Below are my notes for this consultation.    If you have questions, please do not hesitate to call me. I look forward to following your patient along with you.         Sincerely,        Michael Wolff MD        CC: DO Michael Quintero MD  4/18/2024  3:35 PM  Incomplete  Assessment/Plan:    Problem List Items Addressed This Visit          Endocrine    Ovarian cyst     Small incidental and simple appearing ovarian cyst.  I have confirmed appearance on ultrasound.  I discussed differential diagnosis.  Given findings this is almost certainly benign.  Will follow-up with repeat ultrasound in 3 months.            Oncology    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 breast in female, estrogen receptor positive (HCC)     Patient has not used Arimidex out of concern for side effects.  Today I reviewed with her the side effect profile and the typical tolerance to this medication.  She will reconsider whether or not she wants to proceed with Arimidex.  I emphasized the risk reduction effect and benefits of this medication.            Obstetrics/Gynecology    PMB (postmenopausal bleeding) - Primary     Patient has single instance of postmenopausal bleeding.  I have personally reviewed and interpreted pelvic ultrasound images.  Uterus measures about 11 to 12 cm in largest diameter.  The endometrial stripe is thickened at 20 mm.  Office endometrial biopsy obtained by primary gynecology provider was informative and demonstrated no evidence of atypia or malignancy, polyp fragments noted.    Today I discussed with patient differential diagnosis.  We discussed potential management options.  First, we discussed the patient of  observation without any further assessments.  If bleeding was to recur we could then intervene.  Alternatively we discussed the possibility of proceeding with hysteroscopy and D&C to remove polyps and obtain more tissue for sampling.  I emphasized that previous biopsy carries an accuracy of 95% and the added value for diagnostic purposes of D&C would be limited.  Finally, we discussed the possibility of definitive surgery by means of minimally invasive hysterectomy and bilateral salpingo-oophorectomy.    Patient will consider these procedures.  We will meet via telehealth in approximately 2 weeks to discuss her wishes.  I will tentatively schedule follow-up in our office also in 3 months with previsit pelvic ultrasound as default.         Relevant Orders    US pelvis complete non OB           CHIEF COMPLAINT:   Consultation for postmenopausal bleeding, abnormal uterus on ultrasound and ovarian cyst    Problem:  Cancer Staging   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 breast in female, estrogen receptor positive (HCC)  Staging form: Breast, AJCC 8th Edition  - Clinical: Stage IA (cT1b, cN0(sn), cM0, G1, ER+, AL+, HER2-) - Unsigned      Previous therapy:  Oncology History   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 breast in female, estrogen receptor positive (HCC)   5/29/2018 Biopsy    Right breast biopsy  3 o'clock 6 CMFN  Invasive ductal carcinoma  Grade 1  8 mm on ultrasound  No axillary adenopathy on ultrasound  ER 95  AL 90  Her 2 0  Stage IA     6/7/2018 Genetic Testing    BRCA testing  Negative     7/10/2018 Surgery    Right breast lumpectomy with sentinel lymph node biopsy  Invasive ductal carcinoma  9 mm  Grade 1  0/1 lymph nodes  Margins negative  ER 95  AL 90  Her 2 0  Stage IA     7/2018 -  Hormone Therapy      Adjuvant hormonal therapy with tamoxifen      8/13/2018 - 9/11/2018 Radiation    Course: C1    Plan ID Energy Fractions Dose per Fraction (cGy) Dose Correction (cGy) Total  Dose Delivered (cGy) Elapsed Days   R Breast e 9E 5 / 5 200 0 1,000 6   Rt Breast 6X 16 / 16 266 0 4,256 22      Treatment dates:  C1: 8/13/2018 - 9/11/2018             Patient ID: Denise Hairston is a 54 y.o. female  HPI  54-year-old G4, P2 with 2 prior vaginal deliveries and stage IA hormone receptor positive, HER2 negative breast cancer treated with lumpectomy, radiotherapy and tamoxifen recently transition to aromatase inhibitor at the time of menopause last year.  Recommendation patient never started Arimidex due to concerns about side effects.  Recently had single episode of postmenopausal bleeding which she described as a normal menstrual cycle.  She consulted with her primary gynecologic provider.  Pelvic ultrasound endometrial biopsy were performed.  Initial biopsy demonstrated fragments consistent with both myometrial polyp, there was no evidence of atypia or carcinoma.  Pelvic ultrasound demonstrated 11 to 12 cm uterus, several leiomyoma, thickened endometrium at 20 mm and a simple ovarian cyst.  Patient was referred for evaluation and treatment.    Up-to-date with colonoscopy and mammogram.    Review of Systems  Per HPI.  Review of systems otherwise noncontributory.  Current Outpatient Medications   Medication Sig Dispense Refill   • loratadine (CLARITIN) 5 MG chewable tablet Chew 5 mg daily     • venlafaxine (EFFEXOR-XR) 75 mg 24 hr capsule Take one tablet daily 90 capsule 3   • anastrozole (ARIMIDEX) 1 mg tablet Take 1 tablet (1 mg total) by mouth daily (Patient not taking: Reported on 1/18/2024) 90 tablet 1     No current facility-administered medications for this visit.       No Known Allergies    Past Medical History:   Diagnosis Date   • Atypical chest pain 09/18/2021   • Back pain 06/29/2022   • BRCA1 negative    • BRCA2 negative    • Breast cancer (HCC) 05/2018    right breast   • Cancer (HCC) 5/10/2018   • Change in bowel habit 09/18/2021   • Chronic low back pain 11/07/2022   • Colon polyp    •  Congestion of right ear 2021   • Fibrocystic breast    • Hematoma 2022   • History of radiation therapy 2018    right breast ca   • Left inguinal pain 2020   • Need for vaccination 2020   • Numbness and tingling of left lower extremity 2020   • Perimenopause 2021   • Rash 2016   • Screening for cardiovascular condition 2020   • Screening for human papillomavirus (HPV) 10/16/2018   • Seasonal allergies    • Use of tamoxifen (Nolvadex) 2019   • Vertigo        Past Surgical History:   Procedure Laterality Date   • BREAST BIOPSY Right 2018    Positive   • BREAST BIOPSY Left     Neg around age 27   • BREAST BIOPSY Left     benign   • BREAST BIOPSY Right 2021   • BREAST LUMPECTOMY Right 2018    with radiation   • EMBOLECTOMY      s/p forceps delivery   • MAMMO NEEDLE LOCALIZATION RIGHT (ALL INC) Right 7/10/2018   • MAMMO STEREOTACTIC BREAST BIOPSY RIGHT (ALL INC) Right 2018   • MT BX/EXC LYMPH NODE OPEN SUPERFICIAL Right 7/10/2018    Procedure: SENTINEL LYMPH NODE BIOPSY; LYMPHATIC MAPPING WITH BLUE DYE RADIOACTIVE DYE (INJECT AT 0800 BY DR SMALLS IN THE OR);  Surgeon: Demetri Smalls MD;  Location: [facility];  Service: Surgical Oncology   • MT PERQ DEVICE PLACEMENT BREAST LOC 1ST LES W/GDNCE Right 7/10/2018    Procedure: BREAST LUMPECTOMY; BREAST NEEDLE LOCALIZATION (NEEDLE LOC AT 0700);  Surgeon: Demetri Smalls MD;  Location: [facility];  Service: Surgical Oncology   • US GUIDED BREAST BIOPSY LEFT COMPLETE Left 2019   • US GUIDED BREAST BIOPSY RIGHT COMPLETE Right 2018   • US GUIDED BREAST BIOPSY RIGHT COMPLETE Right 2021       OB History          4    Para   2    Term   2            AB   2    Living   2         SAB   1    IAB        Ectopic        Multiple        Live Births   2                 Family History   Problem Relation Age of Onset   • Prostate cancer Father    • Parkinsonism Father    • No Known Problems  "Sister    • No Known Problems Daughter    • Colon cancer Maternal Grandmother    • Esophageal cancer Maternal Grandfather    • Breast cancer Maternal Aunt 55   • Ovarian cancer Maternal Aunt    • Cancer Maternal Aunt    • Skin cancer Paternal Aunt        The following portions of the patient's history were reviewed and updated as appropriate: allergies, current medications, past family history, past medical history, past social history, past surgical history, and problem list.      Objective:    Blood pressure 146/88, pulse 84, temperature 97.7 °F (36.5 °C), temperature source Temporal, resp. rate 16, height 5' 3\" (1.6 m), weight 59.1 kg (130 lb 3.2 oz), last menstrual period 03/20/2022, SpO2 99%, not currently breastfeeding.  Body mass index is 23.06 kg/m².    Physical Exam  Vitals reviewed. Exam conducted with a chaperone present.   Constitutional:       General: She is not in acute distress.     Appearance: Normal appearance. She is not ill-appearing or toxic-appearing.   Cardiovascular:      Rate and Rhythm: Normal rate and regular rhythm.      Heart sounds: Normal heart sounds. No murmur heard.  Pulmonary:      Effort: Pulmonary effort is normal. No respiratory distress.      Breath sounds: No stridor. No wheezing.   Abdominal:      General: There is no distension.      Palpations: Abdomen is soft. There is no mass.      Tenderness: There is no abdominal tenderness. There is no guarding.      Hernia: No hernia is present.   Genitourinary:     Comments: Normal external female genitalia. Normal Bartholin's and Laramie's glands. Normal urethral meatus and no evidence of urethral discharge or masses.  Bladder without fullness mass or tenderness. Vagina without lesion or discharge. No significant pelvic organ prolapse noted. Cervix grossly normal appearing without visible lesions. Uterus 12 weeks size, nontender, normal mobility. Adnexae without mass or tenderness. Anus without fissure of lesion.    Musculoskeletal: "      Right lower leg: No edema.      Left lower leg: No edema.         Michael Wolff MD, FACOG, FACS  4/18/2024  3:35 PM

## 2024-04-18 NOTE — ASSESSMENT & PLAN NOTE
Patient has not used Arimidex out of concern for side effects.  Today I reviewed with her the side effect profile and the typical tolerance to this medication.  She will reconsider whether or not she wants to proceed with Arimidex.  I emphasized the risk reduction effect and benefits of this medication.

## 2024-04-18 NOTE — ASSESSMENT & PLAN NOTE
Patient has single instance of postmenopausal bleeding.  I have personally reviewed and interpreted pelvic ultrasound images.  Uterus measures about 11 to 12 cm in largest diameter.  The endometrial stripe is thickened at 20 mm.  Office endometrial biopsy obtained by primary gynecology provider was informative and demonstrated no evidence of atypia or malignancy, polyp fragments noted.    Today I discussed with patient differential diagnosis.  We discussed potential management options.  First, we discussed the patient of observation without any further assessments.  If bleeding was to recur we could then intervene.  Alternatively we discussed the possibility of proceeding with hysteroscopy and D&C to remove polyps and obtain more tissue for sampling.  I emphasized that previous biopsy carries an accuracy of 95% and the added value for diagnostic purposes of D&C would be limited.  Finally, we discussed the possibility of definitive surgery by means of minimally invasive hysterectomy and bilateral salpingo-oophorectomy.    Patient will consider these procedures.  We will meet via telehealth in approximately 2 weeks to discuss her wishes.  I will tentatively schedule follow-up in our office also in 3 months with previsit pelvic ultrasound as default.

## 2024-04-19 ENCOUNTER — TELEPHONE (OUTPATIENT)
Dept: GYNECOLOGIC ONCOLOGY | Facility: CLINIC | Age: 54
End: 2024-04-19

## 2024-04-19 NOTE — TELEPHONE ENCOUNTER
Reschedule US to before follow up appointment with Dr PINK. Called patient and left voicemail with location, date and time. Provided patient with call back number if date and time does not work for her schedule.

## 2024-05-07 ENCOUNTER — TELEMEDICINE (OUTPATIENT)
Dept: GYNECOLOGIC ONCOLOGY | Facility: CLINIC | Age: 54
End: 2024-05-07
Payer: COMMERCIAL

## 2024-05-07 DIAGNOSIS — N83.209 CYST OF OVARY, UNSPECIFIED LATERALITY: ICD-10-CM

## 2024-05-07 DIAGNOSIS — N95.0 PMB (POSTMENOPAUSAL BLEEDING): Primary | ICD-10-CM

## 2024-05-07 PROCEDURE — 99213 OFFICE O/P EST LOW 20 MIN: CPT | Performed by: OBSTETRICS & GYNECOLOGY

## 2024-05-07 RX ORDER — METRONIDAZOLE 500 MG/100ML
500 INJECTION, SOLUTION INTRAVENOUS ONCE
OUTPATIENT
Start: 2024-05-07 | End: 2024-05-07

## 2024-05-07 RX ORDER — HEPARIN SODIUM 5000 [USP'U]/ML
5000 INJECTION, SOLUTION INTRAVENOUS; SUBCUTANEOUS
OUTPATIENT
Start: 2024-05-08 | End: 2024-05-09

## 2024-05-07 RX ORDER — CEFAZOLIN SODIUM 1 G/50ML
1000 SOLUTION INTRAVENOUS ONCE
OUTPATIENT
Start: 2024-05-07 | End: 2024-05-07

## 2024-05-08 ENCOUNTER — TELEPHONE (OUTPATIENT)
Dept: GYNECOLOGIC ONCOLOGY | Facility: CLINIC | Age: 54
End: 2024-05-08

## 2024-05-08 NOTE — TELEPHONE ENCOUNTER
LMM for patient to return my call to discuss surgery scheduling.  Offered 5/17 at [facility].  Teams number ([phone]) provided.

## 2024-05-08 NOTE — PROGRESS NOTES
Virtual Brief Visit    This Visit is being completed by telephone. The Patient is located at Home and in the following state in which I hold an active license [place]    The patient was identified by name and date of birth. Denise Hairston was informed that this is a telemedicine visit and that the visit is being conducted through Telephone.  My office door was closed. No one else was in the room.  She acknowledged consent and understanding of privacy and security of the video platform. The patient has agreed to participate and understands they can discontinue the visit at any time.    Patient is aware this is a billable service.     It was my intent to perform this visit via video technology but the patient was not able to do a video connection so the visit was completed via audio telephone only.    Connected to discuss potential options for treatment of postmenopausal bleeding after patient was given time to consider therapeutic options.    Assessment/Plan:    Problem List Items Addressed This Visit          Endocrine    Ovarian cyst     See treatment plan under postmenopausal bleeding.         Relevant Orders    Case request operating room: EXAM UNDER ANESTHESIA, HYSTERECTOMY LAPAROSCOPIC TOTAL (LTH) W/ ROBOTICS, BILATERAL SALPINGO-OOPHORECTOMY AND ALL INDICATED PROCEDURES (Completed)       Obstetrics/Gynecology    PMB (postmenopausal bleeding) - Primary     Menopause has been confirmed by FSH levels.  Endometrial biopsy demonstrates proliferative endometrium with no atypia or malignancy.  Proliferation is pathologic given menopausal status.  Today, we discussed again potential management options.  At this time, patient has decided to proceed with definitive surgery by means of robotic hysterectomy, bilateral salpingo-oophorectomy (patient was counseled about pros and cons of ovarian preservation and wishes to proceed with BSO) and all other indicated procedures.    Patient has good exercise tolerance and no  additional cardiovascular workup is indicated.  Preoperative instructions as per ERAS.  Perioperative testing as per procedure pass.    The patient was counseled regarding indications, risks, benefits and alternatives to surgical management.  We discussed risks including but not limited to bleeding and potential need for blood transfusions, infection, pain, injury to surrounding organs such as bladder, intestines, ureters and neurovascular structures.  Patient understands potential risks associated with stress of surgery and general anesthesia including but not limited to acute cardiac events, venous thromboembolism, etc.  Patient consents for blood transfusions if those are deemed necessary during the course of care.  She understands risks include but are not limited to hypersensitivity reactions and infection with blood-borne pathogens.  Patient verbalizes understanding, agrees and wants to proceed.  Informed consent provided verbally today via telehealth visit. All questions answered to patient's satisfaction.            Relevant Orders    Case request operating room: EXAM UNDER ANESTHESIA, HYSTERECTOMY LAPAROSCOPIC TOTAL (LTH) W/ ROBOTICS, BILATERAL SALPINGO-OOPHORECTOMY AND ALL INDICATED PROCEDURES (Completed)    Type and screen       Recent Visits  No visits were found meeting these conditions.  Showing recent visits within past 7 days and meeting all other requirements  Today's Visits  Date Type Provider Dept   05/07/24 Telemedicine Michael Wolff MD [facility] [place]   Showing today's visits and meeting all other requirements  Future Appointments  No visits were found meeting these conditions.  Showing future appointments within next 150 days and meeting all other requirements         Visit Time  Total Visit Duration: 20 MINUTES.    Michael Wolff MD, FACOG, FACS  5/7/2024  10:53 PM

## 2024-05-08 NOTE — ASSESSMENT & PLAN NOTE
Menopause has been confirmed by FSH levels.  Endometrial biopsy demonstrates proliferative endometrium with no atypia or malignancy.  Proliferation is pathologic given menopausal status.  Today, we discussed again potential management options.  At this time, patient has decided to proceed with definitive surgery by means of robotic hysterectomy, bilateral salpingo-oophorectomy (patient was counseled about pros and cons of ovarian preservation and wishes to proceed with BSO) and all other indicated procedures.    Patient has good exercise tolerance and no additional cardiovascular workup is indicated.  Preoperative instructions as per ERAS.  Perioperative testing as per procedure pass.    The patient was counseled regarding indications, risks, benefits and alternatives to surgical management.  We discussed risks including but not limited to bleeding and potential need for blood transfusions, infection, pain, injury to surrounding organs such as bladder, intestines, ureters and neurovascular structures.  Patient understands potential risks associated with stress of surgery and general anesthesia including but not limited to acute cardiac events, venous thromboembolism, etc.  Patient consents for blood transfusions if those are deemed necessary during the course of care.  She understands risks include but are not limited to hypersensitivity reactions and infection with blood-borne pathogens.  Patient verbalizes understanding, agrees and wants to proceed.  Informed consent provided verbally today via telehealth visit. All questions answered to patient's satisfaction.

## 2024-05-14 ENCOUNTER — APPOINTMENT (OUTPATIENT)
Dept: LAB | Facility: CLINIC | Age: 54
End: 2024-05-14
Payer: COMMERCIAL

## 2024-05-14 DIAGNOSIS — Z13.1 SCREENING FOR DIABETES MELLITUS: ICD-10-CM

## 2024-05-14 DIAGNOSIS — Z13.220 SCREENING, LIPID: ICD-10-CM

## 2024-05-14 DIAGNOSIS — Z00.00 LABORATORY EXAM ORDERED AS PART OF ROUTINE GENERAL MEDICAL EXAMINATION: ICD-10-CM

## 2024-05-14 DIAGNOSIS — N95.0 PMB (POSTMENOPAUSAL BLEEDING): ICD-10-CM

## 2024-05-14 LAB
ABO GROUP BLD: NORMAL
ALBUMIN SERPL BCP-MCNC: 4.3 G/DL (ref 3.5–5)
ALP SERPL-CCNC: 87 U/L (ref 34–104)
ALT SERPL W P-5'-P-CCNC: 18 U/L (ref 7–52)
ANION GAP SERPL CALCULATED.3IONS-SCNC: 5 MMOL/L (ref 4–13)
AST SERPL W P-5'-P-CCNC: 20 U/L (ref 13–39)
BASOPHILS # BLD AUTO: 0.06 THOUSANDS/ÂΜL (ref 0–0.1)
BASOPHILS NFR BLD AUTO: 1 % (ref 0–1)
BILIRUB SERPL-MCNC: 0.44 MG/DL (ref 0.2–1)
BLD GP AB SCN SERPL QL: NEGATIVE
BUN SERPL-MCNC: 18 MG/DL (ref 5–25)
CALCIUM SERPL-MCNC: 9 MG/DL (ref 8.4–10.2)
CHLORIDE SERPL-SCNC: 104 MMOL/L (ref 96–108)
CO2 SERPL-SCNC: 30 MMOL/L (ref 21–32)
CREAT SERPL-MCNC: 0.89 MG/DL (ref 0.6–1.3)
EOSINOPHIL # BLD AUTO: 0.2 THOUSAND/ÂΜL (ref 0–0.61)
EOSINOPHIL NFR BLD AUTO: 3 % (ref 0–6)
ERYTHROCYTE [DISTWIDTH] IN BLOOD BY AUTOMATED COUNT: 11.5 % (ref 11.6–15.1)
EST. AVERAGE GLUCOSE BLD GHB EST-MCNC: 100 MG/DL
GFR SERPL CREATININE-BSD FRML MDRD: 73 ML/MIN/1.73SQ M
GLUCOSE SERPL-MCNC: 121 MG/DL (ref 65–140)
HBA1C MFR BLD: 5.1 %
HCT VFR BLD AUTO: 43 % (ref 34.8–46.1)
HGB BLD-MCNC: 14.5 G/DL (ref 11.5–15.4)
IMM GRANULOCYTES # BLD AUTO: 0.02 THOUSAND/UL (ref 0–0.2)
IMM GRANULOCYTES NFR BLD AUTO: 0 % (ref 0–2)
LYMPHOCYTES # BLD AUTO: 1.49 THOUSANDS/ÂΜL (ref 0.6–4.47)
LYMPHOCYTES NFR BLD AUTO: 24 % (ref 14–44)
MCH RBC QN AUTO: 31.7 PG (ref 26.8–34.3)
MCHC RBC AUTO-ENTMCNC: 33.7 G/DL (ref 31.4–37.4)
MCV RBC AUTO: 94 FL (ref 82–98)
MONOCYTES # BLD AUTO: 0.57 THOUSAND/ÂΜL (ref 0.17–1.22)
MONOCYTES NFR BLD AUTO: 9 % (ref 4–12)
NEUTROPHILS # BLD AUTO: 3.96 THOUSANDS/ÂΜL (ref 1.85–7.62)
NEUTS SEG NFR BLD AUTO: 63 % (ref 43–75)
NRBC BLD AUTO-RTO: 0 /100 WBCS
PLATELET # BLD AUTO: 274 THOUSANDS/UL (ref 149–390)
PMV BLD AUTO: 10.4 FL (ref 8.9–12.7)
POTASSIUM SERPL-SCNC: 4.2 MMOL/L (ref 3.5–5.3)
PROT SERPL-MCNC: 7.3 G/DL (ref 6.4–8.4)
RBC # BLD AUTO: 4.58 MILLION/UL (ref 3.81–5.12)
RH BLD: POSITIVE
SODIUM SERPL-SCNC: 139 MMOL/L (ref 135–147)
SPECIMEN EXPIRATION DATE: NORMAL
WBC # BLD AUTO: 6.3 THOUSAND/UL (ref 4.31–10.16)

## 2024-05-14 PROCEDURE — 86900 BLOOD TYPING SEROLOGIC ABO: CPT

## 2024-05-14 PROCEDURE — 86850 RBC ANTIBODY SCREEN: CPT

## 2024-05-14 PROCEDURE — 85025 COMPLETE CBC W/AUTO DIFF WBC: CPT

## 2024-05-14 PROCEDURE — 80053 COMPREHEN METABOLIC PANEL: CPT

## 2024-05-14 PROCEDURE — 36415 COLL VENOUS BLD VENIPUNCTURE: CPT

## 2024-05-14 PROCEDURE — 86901 BLOOD TYPING SEROLOGIC RH(D): CPT

## 2024-05-14 PROCEDURE — 83036 HEMOGLOBIN GLYCOSYLATED A1C: CPT

## 2024-05-20 ENCOUNTER — HOSPITAL ENCOUNTER (OUTPATIENT)
Dept: MAMMOGRAPHY | Facility: HOSPITAL | Age: 54
Discharge: HOME | End: 2024-05-20
Payer: COMMERCIAL

## 2024-05-20 VITALS — BODY MASS INDEX: 23.04 KG/M2 | WEIGHT: 130 LBS | HEIGHT: 63 IN

## 2024-05-20 DIAGNOSIS — Z12.31 VISIT FOR SCREENING MAMMOGRAM: ICD-10-CM

## 2024-05-20 PROCEDURE — 77067 SCR MAMMO BI INCL CAD: CPT

## 2024-05-20 PROCEDURE — 77063 BREAST TOMOSYNTHESIS BI: CPT

## 2024-05-23 NOTE — PRE-PROCEDURE INSTRUCTIONS
Pre-Surgery Instructions:   Medication Instructions    loratadine (CLARITIN) 5 MG chewable tablet Hold day of surgery.    venlafaxine (EFFEXOR-XR) 75 mg 24 hr capsule Take night before surgery   Medication instructions for day surgery reviewed. Please use only a sip of water to take your instructed medications. Avoid all over the counter vitamins, supplements and NSAIDS for one week prior to surgery per anesthesia guidelines. Tylenol is ok to take as needed.     You will receive a call one business day prior to surgery with an arrival time and hospital directions. If your surgery is scheduled on a Monday, the hospital will be calling you on the Friday prior to your surgery. If you have not heard from anyone by 8pm, please call the hospital supervisor through the hospital  at [phone]. ([place] [phone] or [place] [phone]).    Do not eat or drink anything after midnight the night before your surgery, including candy, mints, lifesavers, or chewing gum. Do not drink alcohol 24hrs before your surgery. Try not to smoke at least 24hrs before your surgery.       Follow the pre surgery showering instructions as listed in the “My Surgical Experience Booklet” or otherwise provided by your surgeon's office. Do not use a blade to shave the surgical area 1 week before surgery. It is okay to use a clean electric clippers up to 24 hours before surgery. Do not apply any lotions, creams, including makeup, cologne, deodorant, or perfumes after showering on the day of your surgery. Do not use dry shampoo, hair spray, hair gel, or any type of hair products.     No contact lenses, eye make-up, or artificial eyelashes. Remove nail polish, including gel polish, and any artificial, gel, or acrylic nails if possible. Remove all jewelry including rings and body piercing jewelry.     Wear causal clothing that is easy to take on and off. Consider your type of surgery.    Keep any valuables, jewelry, piercings at home.  Please bring any specially ordered equipment (sling, braces) if indicated.    Arrange for a responsible person to drive you to and from the hospital on the day of your surgery. Please confirm the visitor policy for the day of your procedure when you receive your phone call with an arrival time.     Call the surgeon's office with any new illnesses, exposures, or additional questions prior to surgery.    Please reference your “My Surgical Experience Booklet” for additional information to prepare for your upcoming surgery.

## 2024-05-29 ENCOUNTER — OFFICE VISIT (OUTPATIENT)
Dept: INTERNAL MEDICINE CLINIC | Facility: CLINIC | Age: 54
End: 2024-05-29
Payer: COMMERCIAL

## 2024-05-29 VITALS
HEIGHT: 63 IN | BODY MASS INDEX: 23.21 KG/M2 | SYSTOLIC BLOOD PRESSURE: 120 MMHG | DIASTOLIC BLOOD PRESSURE: 78 MMHG | WEIGHT: 131 LBS

## 2024-05-29 DIAGNOSIS — M77.11 LATERAL EPICONDYLITIS OF RIGHT ELBOW: Primary | ICD-10-CM

## 2024-05-29 DIAGNOSIS — G56.01 CARPAL TUNNEL SYNDROME OF RIGHT WRIST: ICD-10-CM

## 2024-05-29 DIAGNOSIS — M65.4 TENOSYNOVITIS, DE QUERVAIN: ICD-10-CM

## 2024-05-29 PROCEDURE — 99214 OFFICE O/P EST MOD 30 MIN: CPT | Performed by: INTERNAL MEDICINE

## 2024-05-29 NOTE — PATIENT INSTRUCTIONS
-The pain in your elbow is likely due to tennis elbow.  You may purchase a counterforce brace and wear it during the day with activity over the next 2 to 4 weeks.  -The pain at the base of your thumb is likely due to de Quervain's tenosynovitis.  You may consider wearing a thumb spica splint with activity.  -You also appear to have mild carpal tunnel syndrome.  To help manage this you can wear a cock up volar wrist splint at night while sleeping.  -A referral has been made to physical therapy for further treatment.  If your symptoms fail to improve a referral will be made to a hand specialist.  -Ice the affected areas 2-3 times a day for 10 to 15 minutes at a time as needed.

## 2024-05-29 NOTE — PROGRESS NOTES
Name: Denise Hairston      : 1970      MRN: [id number]  Encounter Provider: Moody Santana MD  Encounter Date: 2024   [facility]    Assessment & Plan     1. Lateral epicondylitis of right elbow  Assessment & Plan:  -Suspect the patient's injuries are due to overuse.  For her lateral epicondylitis she has been provided a handout detailing exercises/stretches.  A referral has also been made to physical therapy.  I have encouraged her to wear a counterforce brace during the day with activity over the next 2 to 4 weeks.  Rest was also recommended in addition to icing the affected area 2-3 times a day for 10 to 15 minutes at a time.  Orders:  -     Ambulatory Referral to Physical Therapy; Future  2. Tenosynovitis, de Quervain  Assessment & Plan:  -Likely exacerbated by Zotmen curls.  Recommended rest.  She may consider wearing a thumb spica splint with activity for support.  Referral made to physical therapy.  Ice as mentioned above.  Orders:  -     Ambulatory Referral to Physical Therapy; Future  3. Carpal tunnel syndrome of right wrist  Assessment & Plan:  -Appears to be mild.  Discussed activity modification.  Referral made to PT.  Patient to wear a cock up volar wrist splint at night while sleeping.  Orders:  -     Ambulatory Referral to Physical Therapy; Future         Subjective      HPI  Patient presents today as an acute visit with multiple complaints.  She reports roughly 2 months ago she began experience pain over the outer portion of her right elbow.  She states this discomfort was significantly aggravated by performing Zotmen curls.  She states she is fairly active and works out in the gym at least 4 times a week.  She notes her elbow discomfort is exacerbated by opening doors and twisting jars.    Within the past few weeks she states she is also began experience pain at the base of her right thumb in addition to intermittent numbness and  "tingling in the thumb.  She notes that she tends to experience more of the numbness and tingling at night while sleeping.    All other systems negative except for pertinent findings noted in HPI.       Current Outpatient Medications on File Prior to Visit   Medication Sig   • loratadine (CLARITIN) 5 MG chewable tablet Chew 5 mg daily   • venlafaxine (EFFEXOR-XR) 75 mg 24 hr capsule Take one tablet daily (Patient taking differently: Take 75 mg by mouth daily at bedtime Take one tablet daily)       Objective     /78 (BP Location: Left arm, Patient Position: Sitting, Cuff Size: Standard)   Ht 5' 3\" (1.6 m)   Wt 59.4 kg (131 lb)   LMP 05/06/2024 (Exact Date)   BMI 23.21 kg/m²     BP Readings from Last 3 Encounters:   05/29/24 120/78   04/18/24 146/88   03/07/24 120/74        Wt Readings from Last 3 Encounters:   05/29/24 59.4 kg (131 lb)   05/20/24 59 kg (130 lb)   04/18/24 59.1 kg (130 lb 3.2 oz)       Physical Exam    General: NAD  HEENT: NCAT, EOMI, normal conjunctiva  Cardiovascular: RRR, normal S1 and S2, no m/r/g  Pulmonary: Normal respiratory effort, no wheezes, rales or rhonchi  MSK: Normal bulk and tone, tenderness to palpation over the right lateral epicondyle, elbow pain elicited with resisted right wrist extension, Finkelstein's positive on the right  Neuro: Phalen's positive on right  Extremities: No lower extremity edema      Moody Santana MD  "

## 2024-05-29 NOTE — ASSESSMENT & PLAN NOTE
-Suspect the patient's injuries are due to overuse.  For her lateral epicondylitis she has been provided a handout detailing exercises/stretches.  A referral has also been made to physical therapy.  I have encouraged her to wear a counterforce brace during the day with activity over the next 2 to 4 weeks.  Rest was also recommended in addition to icing the affected area 2-3 times a day for 10 to 15 minutes at a time.

## 2024-05-29 NOTE — ASSESSMENT & PLAN NOTE
-Likely exacerbated by Zotmen curls.  Recommended rest.  She may consider wearing a thumb spica splint with activity for support.  Referral made to physical therapy.  Ice as mentioned above.

## 2024-06-05 ENCOUNTER — ANESTHESIA EVENT (OUTPATIENT)
Dept: PERIOP | Facility: HOSPITAL | Age: 54
End: 2024-06-05
Payer: COMMERCIAL

## 2024-06-06 ENCOUNTER — HOSPITAL ENCOUNTER (OUTPATIENT)
Facility: HOSPITAL | Age: 54
Setting detail: OUTPATIENT SURGERY
Discharge: HOME | End: 2024-06-06
Attending: OBSTETRICS & GYNECOLOGY | Admitting: OBSTETRICS & GYNECOLOGY
Payer: COMMERCIAL

## 2024-06-06 ENCOUNTER — ANESTHESIA (OUTPATIENT)
Dept: PERIOP | Facility: HOSPITAL | Age: 54
End: 2024-06-06
Payer: COMMERCIAL

## 2024-06-06 VITALS
BODY MASS INDEX: 23.04 KG/M2 | DIASTOLIC BLOOD PRESSURE: 60 MMHG | WEIGHT: 130 LBS | HEIGHT: 63 IN | OXYGEN SATURATION: 99 % | RESPIRATION RATE: 20 BRPM | TEMPERATURE: 98.5 F | HEART RATE: 80 BPM | SYSTOLIC BLOOD PRESSURE: 117 MMHG

## 2024-06-06 DIAGNOSIS — N83.209 CYST OF OVARY, UNSPECIFIED LATERALITY: ICD-10-CM

## 2024-06-06 DIAGNOSIS — N95.0 PMB (POSTMENOPAUSAL BLEEDING): ICD-10-CM

## 2024-06-06 PROBLEM — Z90.710 S/P HYSTERECTOMY WITH OOPHORECTOMY: Status: ACTIVE | Noted: 2024-06-06

## 2024-06-06 PROBLEM — Z90.721 S/P HYSTERECTOMY WITH OOPHORECTOMY: Status: ACTIVE | Noted: 2024-06-06

## 2024-06-06 LAB
ABO GROUP BLD: NORMAL
EXT PREGNANCY TEST URINE: NEGATIVE
EXT. CONTROL: NORMAL
GLUCOSE SERPL-MCNC: 121 MG/DL (ref 65–140)
RH BLD: POSITIVE

## 2024-06-06 PROCEDURE — 82948 REAGENT STRIP/BLOOD GLUCOSE: CPT

## 2024-06-06 PROCEDURE — 88341 IMHCHEM/IMCYTCHM EA ADD ANTB: CPT | Performed by: STUDENT IN AN ORGANIZED HEALTH CARE EDUCATION/TRAINING PROGRAM

## 2024-06-06 PROCEDURE — NC001 PR NO CHARGE: Performed by: PHYSICIAN ASSISTANT

## 2024-06-06 PROCEDURE — S2900 ROBOTIC SURGICAL SYSTEM: HCPCS | Performed by: OBSTETRICS & GYNECOLOGY

## 2024-06-06 PROCEDURE — 81025 URINE PREGNANCY TEST: CPT | Performed by: OBSTETRICS & GYNECOLOGY

## 2024-06-06 PROCEDURE — 88342 IMHCHEM/IMCYTCHM 1ST ANTB: CPT | Performed by: STUDENT IN AN ORGANIZED HEALTH CARE EDUCATION/TRAINING PROGRAM

## 2024-06-06 PROCEDURE — NC001 PR NO CHARGE: Performed by: OBSTETRICS & GYNECOLOGY

## 2024-06-06 PROCEDURE — 58571 TLH W/T/O 250 G OR LESS: CPT | Performed by: OBSTETRICS & GYNECOLOGY

## 2024-06-06 PROCEDURE — 88307 TISSUE EXAM BY PATHOLOGIST: CPT | Performed by: STUDENT IN AN ORGANIZED HEALTH CARE EDUCATION/TRAINING PROGRAM

## 2024-06-06 RX ORDER — ONDANSETRON 2 MG/ML
4 INJECTION INTRAMUSCULAR; INTRAVENOUS EVERY 6 HOURS PRN
Status: DISCONTINUED | OUTPATIENT
Start: 2024-06-06 | End: 2024-06-06 | Stop reason: HOSPADM

## 2024-06-06 RX ORDER — METRONIDAZOLE 500 MG/100ML
500 INJECTION, SOLUTION INTRAVENOUS ONCE
Status: COMPLETED | OUTPATIENT
Start: 2024-06-06 | End: 2024-06-06

## 2024-06-06 RX ORDER — DEXAMETHASONE SODIUM PHOSPHATE 10 MG/ML
INJECTION, SOLUTION INTRAMUSCULAR; INTRAVENOUS AS NEEDED
Status: DISCONTINUED | OUTPATIENT
Start: 2024-06-06 | End: 2024-06-06

## 2024-06-06 RX ORDER — ACETAMINOPHEN 325 MG/1
975 TABLET ORAL ONCE AS NEEDED
Status: COMPLETED | OUTPATIENT
Start: 2024-06-06 | End: 2024-06-06

## 2024-06-06 RX ORDER — ONDANSETRON 2 MG/ML
INJECTION INTRAMUSCULAR; INTRAVENOUS AS NEEDED
Status: DISCONTINUED | OUTPATIENT
Start: 2024-06-06 | End: 2024-06-06

## 2024-06-06 RX ORDER — ROCURONIUM BROMIDE 10 MG/ML
INJECTION, SOLUTION INTRAVENOUS AS NEEDED
Status: DISCONTINUED | OUTPATIENT
Start: 2024-06-06 | End: 2024-06-06

## 2024-06-06 RX ORDER — SODIUM CHLORIDE 9 MG/ML
INJECTION, SOLUTION INTRAVENOUS CONTINUOUS PRN
Status: DISCONTINUED | OUTPATIENT
Start: 2024-06-06 | End: 2024-06-06

## 2024-06-06 RX ORDER — GLYCOPYRROLATE 0.2 MG/ML
INJECTION INTRAMUSCULAR; INTRAVENOUS AS NEEDED
Status: DISCONTINUED | OUTPATIENT
Start: 2024-06-06 | End: 2024-06-06

## 2024-06-06 RX ORDER — BUPIVACAINE HYDROCHLORIDE 2.5 MG/ML
INJECTION, SOLUTION EPIDURAL; INFILTRATION; INTRACAUDAL AS NEEDED
Status: DISCONTINUED | OUTPATIENT
Start: 2024-06-06 | End: 2024-06-06 | Stop reason: HOSPADM

## 2024-06-06 RX ORDER — HYDROMORPHONE HCL IN WATER/PF 6 MG/30 ML
0.2 PATIENT CONTROLLED ANALGESIA SYRINGE INTRAVENOUS
Status: DISCONTINUED | OUTPATIENT
Start: 2024-06-06 | End: 2024-06-06 | Stop reason: HOSPADM

## 2024-06-06 RX ORDER — FENTANYL CITRATE 50 UG/ML
INJECTION, SOLUTION INTRAMUSCULAR; INTRAVENOUS AS NEEDED
Status: DISCONTINUED | OUTPATIENT
Start: 2024-06-06 | End: 2024-06-06

## 2024-06-06 RX ORDER — LIDOCAINE HYDROCHLORIDE 20 MG/ML
INJECTION, SOLUTION EPIDURAL; INFILTRATION; INTRACAUDAL; PERINEURAL AS NEEDED
Status: DISCONTINUED | OUTPATIENT
Start: 2024-06-06 | End: 2024-06-06

## 2024-06-06 RX ORDER — PROPOFOL 10 MG/ML
INJECTION, EMULSION INTRAVENOUS AS NEEDED
Status: DISCONTINUED | OUTPATIENT
Start: 2024-06-06 | End: 2024-06-06

## 2024-06-06 RX ORDER — MIDAZOLAM HYDROCHLORIDE 2 MG/2ML
INJECTION, SOLUTION INTRAMUSCULAR; INTRAVENOUS AS NEEDED
Status: DISCONTINUED | OUTPATIENT
Start: 2024-06-06 | End: 2024-06-06

## 2024-06-06 RX ORDER — IBUPROFEN 600 MG/1
600 TABLET ORAL EVERY 6 HOURS PRN
Qty: 30 TABLET | Refills: 0 | Status: CANCELLED | OUTPATIENT
Start: 2024-06-06

## 2024-06-06 RX ORDER — CEFAZOLIN SODIUM 1 G/50ML
1000 SOLUTION INTRAVENOUS ONCE
Status: COMPLETED | OUTPATIENT
Start: 2024-06-06 | End: 2024-06-06

## 2024-06-06 RX ORDER — FENTANYL CITRATE/PF 50 MCG/ML
25 SYRINGE (ML) INJECTION
Status: DISCONTINUED | OUTPATIENT
Start: 2024-06-06 | End: 2024-06-06 | Stop reason: HOSPADM

## 2024-06-06 RX ORDER — ONDANSETRON 2 MG/ML
4 INJECTION INTRAMUSCULAR; INTRAVENOUS ONCE AS NEEDED
Status: DISCONTINUED | OUTPATIENT
Start: 2024-06-06 | End: 2024-06-06 | Stop reason: HOSPADM

## 2024-06-06 RX ORDER — ACETAMINOPHEN 500 MG
1000 TABLET ORAL EVERY 8 HOURS PRN
Status: CANCELLED
Start: 2024-06-06

## 2024-06-06 RX ORDER — HEPARIN SODIUM 5000 [USP'U]/ML
5000 INJECTION, SOLUTION INTRAVENOUS; SUBCUTANEOUS
Status: COMPLETED | OUTPATIENT
Start: 2024-06-06 | End: 2024-06-06

## 2024-06-06 RX ORDER — KETOROLAC TROMETHAMINE 30 MG/ML
INJECTION, SOLUTION INTRAMUSCULAR; INTRAVENOUS AS NEEDED
Status: DISCONTINUED | OUTPATIENT
Start: 2024-06-06 | End: 2024-06-06

## 2024-06-06 RX ORDER — OXYCODONE HYDROCHLORIDE 5 MG/1
5 TABLET ORAL ONCE
Status: COMPLETED | OUTPATIENT
Start: 2024-06-06 | End: 2024-06-06

## 2024-06-06 RX ORDER — SODIUM CHLORIDE, SODIUM LACTATE, POTASSIUM CHLORIDE, CALCIUM CHLORIDE 600; 310; 30; 20 MG/100ML; MG/100ML; MG/100ML; MG/100ML
INJECTION, SOLUTION INTRAVENOUS CONTINUOUS PRN
Status: DISCONTINUED | OUTPATIENT
Start: 2024-06-06 | End: 2024-06-06

## 2024-06-06 RX ADMIN — SODIUM CHLORIDE: 0.9 INJECTION, SOLUTION INTRAVENOUS at 08:45

## 2024-06-06 RX ADMIN — PROPOFOL 50 MG: 10 INJECTION, EMULSION INTRAVENOUS at 07:41

## 2024-06-06 RX ADMIN — PROPOFOL 200 MG: 10 INJECTION, EMULSION INTRAVENOUS at 07:37

## 2024-06-06 RX ADMIN — ROCURONIUM BROMIDE 50 MG: 50 INJECTION INTRAVENOUS at 07:37

## 2024-06-06 RX ADMIN — HEPARIN SODIUM 5000 UNITS: 5000 INJECTION INTRAVENOUS; SUBCUTANEOUS at 07:26

## 2024-06-06 RX ADMIN — MIDAZOLAM HYDROCHLORIDE 2 MG: 1 INJECTION, SOLUTION INTRAMUSCULAR; INTRAVENOUS at 07:28

## 2024-06-06 RX ADMIN — PHENYLEPHRINE HYDROCHLORIDE 100 MCG: 50 INJECTION INTRAVENOUS at 08:10

## 2024-06-06 RX ADMIN — ROCURONIUM BROMIDE 10 MG: 50 INJECTION INTRAVENOUS at 08:18

## 2024-06-06 RX ADMIN — CEFAZOLIN SODIUM 1000 MG: 1 SOLUTION INTRAVENOUS at 07:28

## 2024-06-06 RX ADMIN — METRONIDAZOLE: 500 INJECTION, SOLUTION INTRAVENOUS at 07:28

## 2024-06-06 RX ADMIN — HYDROMORPHONE HYDROCHLORIDE 0.2 MG: 0.2 INJECTION, SOLUTION INTRAMUSCULAR; INTRAVENOUS; SUBCUTANEOUS at 10:30

## 2024-06-06 RX ADMIN — SODIUM CHLORIDE, SODIUM LACTATE, POTASSIUM CHLORIDE, AND CALCIUM CHLORIDE: .6; .31; .03; .02 INJECTION, SOLUTION INTRAVENOUS at 07:19

## 2024-06-06 RX ADMIN — DEXMEDETOMIDINE 0.2 MCG/KG/HR: 100 INJECTION, SOLUTION INTRAVENOUS at 07:40

## 2024-06-06 RX ADMIN — GLYCOPYRROLATE 0.1 MG: 0.2 INJECTION INTRAMUSCULAR; INTRAVENOUS at 08:13

## 2024-06-06 RX ADMIN — LIDOCAINE HYDROCHLORIDE 100 MG: 20 INJECTION, SOLUTION EPIDURAL; INFILTRATION; INTRACAUDAL; PERINEURAL at 07:37

## 2024-06-06 RX ADMIN — OXYCODONE HYDROCHLORIDE 5 MG: 5 TABLET ORAL at 12:01

## 2024-06-06 RX ADMIN — SUGAMMADEX 200 MG: 100 INJECTION, SOLUTION INTRAVENOUS at 09:30

## 2024-06-06 RX ADMIN — PHENYLEPHRINE HYDROCHLORIDE 100 MCG: 50 INJECTION INTRAVENOUS at 09:29

## 2024-06-06 RX ADMIN — ACETAMINOPHEN 975 MG: 325 TABLET ORAL at 11:20

## 2024-06-06 RX ADMIN — DEXAMETHASONE SODIUM PHOSPHATE 10 MG: 10 INJECTION INTRAMUSCULAR; INTRAVENOUS at 07:55

## 2024-06-06 RX ADMIN — ONDANSETRON 4 MG: 2 INJECTION INTRAMUSCULAR; INTRAVENOUS at 09:20

## 2024-06-06 RX ADMIN — FENTANYL CITRATE 100 MCG: 50 INJECTION INTRAMUSCULAR; INTRAVENOUS at 07:38

## 2024-06-06 RX ADMIN — KETOROLAC TROMETHAMINE 15 MG: 30 INJECTION, SOLUTION INTRAMUSCULAR; INTRAVENOUS at 09:20

## 2024-06-06 RX ADMIN — PHENYLEPHRINE HYDROCHLORIDE 20 MCG/MIN: 50 INJECTION INTRAVENOUS at 08:01

## 2024-06-06 RX ADMIN — SODIUM CHLORIDE: 0.9 INJECTION, SOLUTION INTRAVENOUS at 07:36

## 2024-06-06 RX ADMIN — FENTANYL CITRATE 50 MCG: 50 INJECTION INTRAMUSCULAR; INTRAVENOUS at 09:46

## 2024-06-06 NOTE — ANESTHESIA POSTPROCEDURE EVALUATION
Post-Op Assessment Note    CV Status:  Stable  Pain Score: 5    Pain management: adequate       Mental Status:  Alert and awake   Hydration Status:  Euvolemic   PONV Controlled:  Controlled   Airway Patency:  Patent     Post Op Vitals Reviewed: Yes    No anethesia notable event occurred.    Staff: CRNA, Anesthesiologist               BP   109/54   Temp   97.3   Pulse  77   Resp   16   SpO2   100

## 2024-06-06 NOTE — H&P
H&P Exam - Gynecologic Oncology   Denise Hairston 54 y.o. female MRN: [id number]  Unit/Bed#: OR POOL Encounter: [id number]    Assessment & Plan     #1.  Postmenopausal bleeding: FSH in menopausal range.  Endometrial biopsy demonstrates proliferation which we have explained to her is certainly abnormal.  Fortunately there is no atypia or carcinoma.  Patient has elected she wants to proceed with definitive surgery and presents today for robotic hysterectomy, bilateral salpingo-oophorectomy and all other indicated procedures.  She has been previously consented after counseling via telehealth informed consent form will be signed formally today.    Preoperative testing results noted.  All questions answered.  Patient agrees to proceed to the operating room as planned.    History of Present Illness   HX and PE limited by:   HPI:  Denise Hairston is a 54 y.o. female  with 2 prior vaginal deliveries and stage Ia hormone receptor positive HER2 negative breast cancer status postlumpectomy, radiotherapy and tamoxifen recently transitioned to aromatase inhibitors.  She has documented FSH in the menopausal range.  She presents today for definitive surgery for postmenopausal bleeding with biopsy-proven proliferative endometrium.  Her clinical history and medications are unchanged from her last office visit.    Review of Systems  Per HPI.  12 point review of systems is otherwise noncontributory.  Historical Information   Oncology History   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 breast in female, estrogen receptor positive (HCC)   2018 Biopsy    Right breast biopsy  3 o'clock 6 CMFN  Invasive ductal carcinoma  Grade 1  8 mm on ultrasound  No axillary adenopathy on ultrasound  ER 95  GA 90  Her 2 0  Stage IA     2018 Genetic Testing    BRCA testing  Negative     7/10/2018 Surgery    Right breast lumpectomy with sentinel lymph node biopsy  Invasive ductal carcinoma  9 mm  Grade 1  0/1 lymph nodes  Margins  negative  ER 95  NH 90  Her 2 0  Stage IA     7/2018 -  Hormone Therapy      Adjuvant hormonal therapy with tamoxifen      8/13/2018 - 9/11/2018 Radiation    Course: C1    Plan ID Energy Fractions Dose per Fraction (cGy) Dose Correction (cGy) Total Dose Delivered (cGy) Elapsed Days   R Breast e 9E 5 / 5 200 0 1,000 6   Rt Breast 6X 16 / 16 266 0 4,256 22      Treatment dates:  C1: 8/13/2018 - 9/11/2018           Past Medical History:   Diagnosis Date    Atypical chest pain 09/18/2021    Back pain 06/29/2022    BRCA1 negative     BRCA2 negative     Breast cancer (HCC) 05/2018    right breast    Cancer (HCC) 5/10/2018    Change in bowel habit 09/18/2021    Chronic low back pain 11/07/2022    Colon polyp     Congestion of right ear 04/27/2021    Diabetes in pregnancy     Fibrocystic breast     Hematoma 12/22/2022    History of radiation therapy 2018    right breast ca    Left inguinal pain 11/03/2020    Need for vaccination 09/14/2020    Numbness and tingling of left lower extremity 09/14/2020    Perimenopause 09/18/2021    Rash 02/23/2016    Screening for cardiovascular condition 09/14/2020    Screening for human papillomavirus (HPV) 10/16/2018    Seasonal allergies     Use of tamoxifen (Nolvadex) 04/24/2019    Vertigo      Past Surgical History:   Procedure Laterality Date    BREAST BIOPSY Right 05/2018    Positive    BREAST BIOPSY Left     Neg around age 27    BREAST BIOPSY Left 11/132019    benign    BREAST BIOPSY Right 11/29/2021    BREAST LUMPECTOMY Right 07/2018    with radiation    COLONOSCOPY      EMBOLECTOMY      s/p forceps delivery    MAMMO NEEDLE LOCALIZATION RIGHT (ALL INC) Right 07/10/2018    MAMMO STEREOTACTIC BREAST BIOPSY RIGHT (ALL INC) Right 05/29/2018    NH BX/EXC LYMPH NODE OPEN SUPERFICIAL Right 07/10/2018    Procedure: SENTINEL LYMPH NODE BIOPSY; LYMPHATIC MAPPING WITH BLUE DYE RADIOACTIVE DYE (INJECT AT 0800 BY DR SMALLS IN THE OR);  Surgeon: Demetri Smalls MD;  [facility];  Service:  "Surgical Oncology    MO PERQ DEVICE PLACEMENT BREAST LOC 1ST LES W/GDNCE Right 07/10/2018    Procedure: BREAST LUMPECTOMY; BREAST NEEDLE LOCALIZATION (NEEDLE LOC AT 0700);  Surgeon: Demetri Smalls MD;  [facility];  Service: Surgical Oncology    US GUIDED BREAST BIOPSY LEFT COMPLETE Left 11/13/2019    US GUIDED BREAST BIOPSY RIGHT COMPLETE Right 05/29/2018    US GUIDED BREAST BIOPSY RIGHT COMPLETE Right 11/29/2021     OB/GYN History: G4, P2.  2 vaginal deliveries.  Per HPI.  Family History   Problem Relation Age of Onset    No Known Problems Mother     Prostate cancer Father     Parkinsonism Father     No Known Problems Sister     No Known Problems Daughter     Colon cancer Maternal Grandmother     Esophageal cancer Maternal Grandfather     Breast cancer Maternal Aunt 55    Ovarian cancer Maternal Aunt     Cancer Maternal Aunt     Skin cancer Paternal Aunt      Social History   Social History     Substance and Sexual Activity   Alcohol Use Yes    Alcohol/week: 3.0 standard drinks of alcohol    Types: 3 Cans of beer per week    Comment: Social drinker     Social History     Substance and Sexual Activity   Drug Use Not Currently    Types: Marijuana    Comment: when teenager     Social History     Tobacco Use   Smoking Status Former    Types: Cigarettes   Smokeless Tobacco Never   Tobacco Comments    only smoked socially as teenager for 1 yr     E-Cigarette/Vaping    E-Cigarette Use Never User      E-Cigarette/Vaping Substances    Nicotine No     THC No     CBD No     Flavoring No        Meds/Allergies   all current active meds have been reviewed  No Known Allergies    Objective   Vitals: Blood pressure 131/65, pulse 74, temperature (!) 97 °F (36.1 °C), temperature source Temporal, resp. rate 20, height 5' 3\" (1.6 m), weight 59 kg (130 lb), last menstrual period 03/20/2022, SpO2 99%, not currently breastfeeding.    No intake or output data in the 24 hours ending 06/06/24 0713    Physical Exam  Vitals reviewed. " Exam conducted with a chaperone present.   Constitutional:       General: She is not in acute distress.     Appearance: She is normal weight. She is not toxic-appearing.   Cardiovascular:      Rate and Rhythm: Normal rate and regular rhythm.   Pulmonary:      Effort: Pulmonary effort is normal. No respiratory distress.      Breath sounds: No stridor.   Abdominal:      General: There is no distension.      Palpations: Abdomen is soft. There is no mass.      Tenderness: There is no abdominal tenderness. There is no guarding.   Genitourinary:     Comments: Deferred to OR.  Musculoskeletal:      Right lower leg: No edema.      Left lower leg: No edema.   Neurological:      Mental Status: She is alert.       Lab Results: I have personally reviewed pertinent reports.    Imaging: I have personally reviewed pertinent reports.      Michael Wolff MD, FACOG, FACS  6/6/2024  7:16 AM

## 2024-06-06 NOTE — OP NOTE
OPERATIVE REPORT  PATIENT NAME: Denise Hairston    :  1970  MRN: [id number]  Pt Location: [facility] RO[facility] 04    SURGERY DATE: 2024    Surgeons and Role:     * Michael Wolff MD - Primary     * Lou Gilmore PA-C - Assisting     * Venkata Augustin DO - Assisting    Preop Diagnosis:  Cyst of ovary, unspecified laterality [N83.209]  PMB (postmenopausal bleeding) [N95.0]    Post-Op Diagnosis Codes:     * Cyst of ovary, unspecified laterality [N83.209]     * PMB (postmenopausal bleeding) [N95.0]    Procedure(s):  HYSTERECTOMY LAPAROSCOPIC TOTAL (LTH) W/ ROBOTICS. BILATERAL SALPINGO-OOPHORECTOMY. EXAM UNDER ANESTHESIA  CYSTOSCOPY    Specimen(s):  ID Type Source Tests Collected by Time Destination   1 : UTERUS, BILATERAL FALLOPIAN TUBES, BILATERAL OVARIES, CERVIX Tissue Uterus w/Bilateral Ovaries and Fallopian Tubes TISSUE EXAM Michael Wolff MD 2024 0854        Estimated Blood Loss:   Minimal    Drains:  Urethral Catheter Non-latex 16 Fr. (Active)   Number of days: 0       Anesthesia Type:   General    Operative Indications:  Cyst of ovary, unspecified laterality [N83.209]  PMB (postmenopausal bleeding) [N95.0]    Operative Findings:  #1.  Approximately 14 weeks size bulky uterus with posterior left leiomyoma noted.  #2.  Grossly normal bilateral fallopian tubes and ovaries.  #3.  Cystoscopy demonstrated normal bladder mucosa and bilateral potent ureteral urine jets.      Complications:   None    Procedure and Technique:  The patient was taken to the operating room where general endotracheal anesthesia was induced without complications. The patient received antibiotic prophylaxis per hospital protocol.  Sequential compression devices were applied to the lower extremities and activated prior to induction of anesthesia. A single dose of preoperative heparin was administered. The patient was placed in the dorsolithotomy position in [facility] and her lower abdomen, perineum and vagina  were prepped and draped in the usual sterile fashion. Under direct visualization the uterus was sounded to approximately 12 cm. The endocervix was dilated. A PREMA uterine manipulator was secured in place with a stitch of 0 Vicryl.  Moreno catheter was placed and the intravaginal occluder balloon was inflated.     Attention was turned to the abdomen. All port sites were infiltrated with bupivacaine at the beginning of completion of the procedure. An 8 mm skin incision was made approximately 4 cm above the umbilicus near the midline.  Then, using a 5 mm air seal trocar and the 5 mm laparoscope, the peritoneal cavity was entered under directvisualization. Good intraperitoneal location was confirmed and pneumoperitoneum was created to maximal pressure 15 mm of mercury.  A total of four 8 mm robotic trocars were placed (2 on the left, 1 in the midline replacing the 5 mm entry port and 1 on the right) and the 5 mm entry trocar was reinserted in the right flank for assistant's use. The patient was placed in steep Trendelenburg and the BitPayi system was docked.     The above-mentioned findings were noted. The round ligaments were cauterized and divided bilaterally and the bladder was mobilized anteriorly without difficulty.  The ureters were clearly identified transperitoneally.  The ovarian vessels were skeletonized,cauterized and divided bilaterally.  The fallopian tubes and ovaries were mobilized without difficulties.  The uterine vessels were skeletonized cauterized and divided bilaterally.  The cardinal ligaments were serially cauterized and divided and a circumferential colpotomy was made.  The specimen was removed through the vagina. The vaginal cuff was closed using a running stitch of 2-0 PDO Stratafix.  Airtight closure an excellent hemostasis was obtained.  Copious irrigation was used and excellent hemostasis was confirmed at low intraperitoneal pressures.  At this point the robot was undocked. Pneumoperitoneum  was completely released with the assistance of Valsalva maneuvers.  All trocars were removed and the skin was closed using a subcuticular stitch of 4-0 Monocryl and Exofin was applied to all incisions.     The Moreno was removed.  Using the Nezhat, the bladder was retrograde filled with approximately 150 mL of NS. We examined the bladder then using the 5 mm laparoscope.   The above mentioned findings were noted. The Moreno catheter was then used to drain the bladder and the catheter was then removed.     The patient tolerated the procedure well. Sponge, lap, needle and instrument counts were reported as correct x2.  At the time of this dictation the patient remains stable in the operating room awaiting extubation.       I was present for the entire procedure    Patient Disposition:  PACU     Michael Wolff MD, FACOG, FACS  6/6/2024  9:23 AM

## 2024-06-06 NOTE — DISCHARGE INSTR - AVS FIRST PAGE
[facility] - Gynecologic Oncology  [place], Chiki Song  [phone]    Robotic hysterectomy, bilateral salpingo-oophorectomy, cystoscopy  Discharge Instructions    WHAT YOU NEED TO KNOW:   Today we removed your uterus, cervix and bilateral fallopian tubes and ovaries.  The uterus was slightly enlarged with evidence of a fibroid.  Your ovaries appeared normal.  At the end the procedure we look inside your bladder and there was no evidence of injuries or abnormalities.    DISCHARGE INSTRUCTIONS:     Ice packs to abdominal incisions for the first 24 to 48 hours to manage discomfort and pain.    If your pain is not controlled with the measures recommended below contact Dr. Wolff directly on his cell phone at [phone].  For other questions or concerns call the office day or night at [phone].    Contact your doctor at the number above if:   You have a fever over 101o.  You have nausea or are vomiting that does not improve after a light meal.   Your pain is getting worse, even after you take medicine.   You feel pain or burning when you urinate, or you have trouble urinating.   You have pus or a foul-smelling odor coming from your vagina and/or wound.    Your wound is red, swollen, or draining pus.  You see new or an increased amount of bright red blood coming from your vagina or your incisions.   You have questions or concerns about your condition or care.    Seek care immediately:   Your arm or leg feels warm, tender, and painful. It may look swollen and red.  You have increasing abdominal or pelvic pain.   You have heavy vaginal bleeding that fills 1 or more sanitary pads in 1 hour.    Call 911 for any of the following:   You feel lightheaded, short of breath, and have chest pain.   You cough up blood.    Medicines: You may need any of the following:  Prescription medicine will not be given for this procedure.    Resume your previous medications as  directed.  Extra-strength Tylenol:  This medications over-the-counter.  Take 2 tablets every 6 hours as needed for mild-to-moderate pain.  Ibuprofen/Advil/Motrin 200 mg tablets:  This medication is over-the-counter.  Take 3 tablets every 8 hours as needed for moderate to severe pain.  Take this medication with food.  The drink plenty of fluids while using this medication to prevent kidney injury.  Metamucil or MiraLax:  This product is over-the-counter.  Distal 1 large tbsp in a large glass of water.  Use once or twice a day for 1-2 weeks prevent and or treat constipation.    Take your medicines as directed. Contact your healthcare provider if you think your medicine is not helping or if you have side effects. Tell him or her if you are allergic to any medicine. Keep a list of the medicines, vitamins, and herbs you take. Include the amounts, and when and why you take them. Bring the list or the pill bottles to follow-up visits. Carry your medicine list with you in case of an emergency.    Activity:   Rest as needed. Get up and move around as directed to help prevent blood clots. Start with short walks and slowly increase the distance every day. Limit the number of times you climb stairs to 2 times each day for the first week. Plan most of your daily activities on one level of your home.      Do not lift objects heavier than 10 pounds until seen in the office for your follow-up appointment.      Do not strain during bowel movements. High-fiber foods and extra liquids can help you prevent constipation. Examples of high-fiber foods are fruit and bran. Prune juice and water are good liquids to drink.      Do not have sex, use tampons, or douche and do not do tub baths/swimming until cleared by your physician at your postoperative appointment.    You may shower as soon as the day after surgery.  Do not go into pools or hot tubs until cleared by your doctor.      Ask when it is safe for you to drive. It is generally safe  to drive as soon as your legs are strong, you are off pain medicines and you feel like you will have the appropriate reflexes to step on the breaks in case of an emergency.    Ask when you may return to work and to other regular activities.    Wound care: Care for your abdominal incisions as directed. Carefully wash around the wound with soap and water. If you have Hibiclens or medicated soap that you were instructed to use before surgery, you may use that to wash with for up to 2 days after surgery.  If not, any mild non-scented, non-abrasive soap is safe.  It is okay to let the soap and water run over your incision. Do not scrub your incision. Dry the area and put on new, clean bandages as directed. Change your bandages when they get wet or dirty. If you have strips of medical tape, let them fall off on their own. It may take 7 to 14 days for them to fall off. Check your incision every day for redness, swelling, or pus.   Deep breathing: Take deep breaths and cough 10 times each hour. This will decrease your risk for a lung infection. Take a deep breath and hold it for as long as you can. Let the air out and then cough strongly. Deep breaths help open your airway. You may be given an incentive spirometer to help you take deep breaths. Put the plastic piece in your mouth and take a slow, deep breath, then let the air out and cough. Repeat these steps 10 times every hour.   Get support: This surgery may be life-changing for you and your family. You will no longer be able to get pregnant. Sudden changes in the levels of your hormones may occur and cause mood swings and depression. You may feel angry, sad, or frightened, or cry frequently and unexpectedly. These feelings are normal. Talk to your healthcare provider about where you can get support. You can also ask if hormone replacement medicine is right for you.   Follow up with your healthcare provider or gynecologist as directed: You may need to return to have  stitches removed, and for other tests. Write down your questions so you remember to ask them during your visits.      © 2017 [employer] Information is for End User's use only and may not be sold, redistributed or otherwise used for commercial purposes. All illustrations and images included in CareNotes® are the copyrighted property of [employer], [employer]. or [employer].  The above information is an  only. It is not intended as medical advice for individual conditions or treatments. Talk to your doctor, nurse or pharmacist before following any medical regimen to see if it is safe and effective for you.

## 2024-06-06 NOTE — ANESTHESIA PREPROCEDURE EVALUATION
Procedure:  HYSTERECTOMY LAPAROSCOPIC TOTAL (LTH) W/ ROBOTICS, BILATERAL SALPINGO-OOPHORECTOMY, EXAM UNDER ANESTHESIA  AND ALL INDICATED PROCEDURES (Abdomen)    Relevant Problems   GYN   (+) 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 breast in female, estrogen receptor positive (HCC)      MUSCULOSKELETAL   (+) Lateral epicondylitis of right elbow      Ear/Nose/Throat   (+) Benign paroxysmal vertigo      Obstetrics/Gynecology   (+) PMB (postmenopausal bleeding)        Physical Exam    Airway    Mallampati score: II  TM Distance: >3 FB  Neck ROM: full     Dental   No notable dental hx     Cardiovascular      Pulmonary      Other Findings  post-pubertal.      Anesthesia Plan  ASA Score- 2     Anesthesia Type- general with ASA Monitors.         Additional Monitors:     Airway Plan: ETT.           Plan Factors-Exercise tolerance (METS): >4 METS.    Chart reviewed.   Existing labs reviewed. Patient summary reviewed.    Patient is not a current smoker.              Induction- intravenous.    Postoperative Plan- Plan for postoperative opioid use. Planned trial extubation        Informed Consent- Anesthetic plan and risks discussed with patient.  I personally reviewed this patient with the CRNA. Discussed and agreed on the Anesthesia Plan with the CRNA..

## 2024-06-12 PROCEDURE — 88342 IMHCHEM/IMCYTCHM 1ST ANTB: CPT | Performed by: STUDENT IN AN ORGANIZED HEALTH CARE EDUCATION/TRAINING PROGRAM

## 2024-06-12 PROCEDURE — 88341 IMHCHEM/IMCYTCHM EA ADD ANTB: CPT | Performed by: STUDENT IN AN ORGANIZED HEALTH CARE EDUCATION/TRAINING PROGRAM

## 2024-06-12 PROCEDURE — 88307 TISSUE EXAM BY PATHOLOGIST: CPT | Performed by: STUDENT IN AN ORGANIZED HEALTH CARE EDUCATION/TRAINING PROGRAM

## 2024-06-13 ENCOUNTER — TELEPHONE (OUTPATIENT)
Dept: HEMATOLOGY ONCOLOGY | Facility: CLINIC | Age: 54
End: 2024-06-13

## 2024-06-13 NOTE — TELEPHONE ENCOUNTER
Patient Call    Who are you speaking with? Patient    If it is not the patient, are they listed on an active communication consent form? N/A   What is the reason for this call? She asked when she can drive. Pt said she has a little bit of pain in abdomen   Does this require a call back? Yes   If a call back is required, please list best call back number [phone]    If a call back is required, advise that a message will be forwarded to their care team and someone will return their call as soon as possible.   Did you relay this information to the patient? Yes

## 2024-06-20 ENCOUNTER — TELEPHONE (OUTPATIENT)
Dept: GYNECOLOGIC ONCOLOGY | Facility: CLINIC | Age: 54
End: 2024-06-20

## 2024-06-20 NOTE — TELEPHONE ENCOUNTER
Confirmed demographics.    Pt was called to reschedule her appointment on 7/2 @ 2pm to 6/24 @ 8am due to Dr. Wolff's schedule closing.    Pt understood and confirmed new date, time and location.

## 2024-06-24 ENCOUNTER — OFFICE VISIT (OUTPATIENT)
Dept: GYNECOLOGIC ONCOLOGY | Facility: CLINIC | Age: 54
End: 2024-06-24

## 2024-06-24 VITALS
SYSTOLIC BLOOD PRESSURE: 144 MMHG | RESPIRATION RATE: 16 BRPM | WEIGHT: 128.8 LBS | TEMPERATURE: 97.4 F | HEIGHT: 63 IN | HEART RATE: 79 BPM | DIASTOLIC BLOOD PRESSURE: 88 MMHG | BODY MASS INDEX: 22.82 KG/M2 | OXYGEN SATURATION: 100 %

## 2024-06-24 DIAGNOSIS — Z90.721 S/P HYSTERECTOMY WITH OOPHORECTOMY: Primary | ICD-10-CM

## 2024-06-24 DIAGNOSIS — Z90.710 S/P HYSTERECTOMY WITH OOPHORECTOMY: Primary | ICD-10-CM

## 2024-06-24 PROBLEM — N83.209 OVARIAN CYST: Status: RESOLVED | Noted: 2024-04-18 | Resolved: 2024-06-24

## 2024-06-24 PROBLEM — N95.0 PMB (POSTMENOPAUSAL BLEEDING): Status: RESOLVED | Noted: 2024-04-18 | Resolved: 2024-06-24

## 2024-06-24 PROCEDURE — 99024 POSTOP FOLLOW-UP VISIT: CPT | Performed by: OBSTETRICS & GYNECOLOGY

## 2024-06-24 NOTE — ASSESSMENT & PLAN NOTE
She is recovering well from surgery.  Final pathology benign.  I emphasized importance of continuing with strict pelvic rest for 7 to 8 weeks.  She will also avoid Valsalva/core engagement until she is 4 to 6 weeks out from surgery to prevent hernias.    She can return to routine gynecologic care.  She will contact us if she has any questions or problems related to surgery.

## 2024-06-24 NOTE — PROGRESS NOTES
Assessment/Plan:    Problem List Items Addressed This Visit          Surgery/Wound/Pain    S/P hysterectomy with oophorectomy - Primary     She is recovering well from surgery.  Final pathology benign.  I emphasized importance of continuing with strict pelvic rest for 7 to 8 weeks.  She will also avoid Valsalva/core engagement until she is 4 to 6 weeks out from surgery to prevent hernias.    She can return to routine gynecologic care.  She will contact us if she has any questions or problems related to surgery.              CHIEF COMPLAINT:  Postoperative follow-up     Problem:  Cancer Staging   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 breast in female, estrogen receptor positive (HCC)  Staging form: Breast, AJCC 8th Edition  - Clinical: Stage IA (cT1b, cN0(sn), cM0, G1, ER+, MD+, HER2-) - Unsigned        Previous therapy:  Oncology History   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 breast in female, estrogen receptor positive (HCC)   5/29/2018 Biopsy    Right breast biopsy  3 o'clock 6 CMFN  Invasive ductal carcinoma  Grade 1  8 mm on ultrasound  No axillary adenopathy on ultrasound  ER 95  MD 90  Her 2 0  Stage IA     6/7/2018 Genetic Testing    BRCA testing  Negative     7/10/2018 Surgery    Right breast lumpectomy with sentinel lymph node biopsy  Invasive ductal carcinoma  9 mm  Grade 1  0/1 lymph nodes  Margins negative  ER 95  MD 90  Her 2 0  Stage IA     7/2018 -  Hormone Therapy      Adjuvant hormonal therapy with tamoxifen      8/13/2018 - 9/11/2018 Radiation    Course: C1    Plan ID Energy Fractions Dose per Fraction (cGy) Dose Correction (cGy) Total Dose Delivered (cGy) Elapsed Days   R Breast e 9E 5 / 5 200 0 1,000 6   Rt Breast 6X 16 / 16 266 0 4,256 22      Treatment dates:  C1: 8/13/2018 - 9/11/2018             Patient ID: Denise Hairston is a 54 y.o. female  HPI  Patient is recovering well from surgery.  On June 6 she underwent robotic hysterectomy, bilateral  "salpingo-oophorectomy and cystoscopy.  Final pathology benign with benign cystadenoma and adenomyosis.  Patient denies vaginal bleeding, drainage or discharge.  We discussed optimization of bowel regimen.  Normal bladder function.  No significant pain.  Incisions healing well.  The following portions of the patient's history were reviewed and updated as appropriate: allergies, current medications, past family history, past medical history, past social history, past surgical history, and problem list.    Review of Systems  Per HPI.    Current Outpatient Medications:     loratadine (CLARITIN) 5 MG chewable tablet, Chew 5 mg daily, Disp: , Rfl:     venlafaxine (EFFEXOR-XR) 75 mg 24 hr capsule, Take one tablet daily (Patient taking differently: Take 75 mg by mouth daily at bedtime Take one tablet daily), Disp: 90 capsule, Rfl: 3    Objective:    Blood pressure 144/88, pulse 79, temperature (!) 97.4 °F (36.3 °C), temperature source Temporal, resp. rate 16, height 5' 3\" (1.6 m), weight 58.4 kg (128 lb 12.8 oz), last menstrual period 05/06/2024, SpO2 100%, not currently breastfeeding.  Body mass index is 22.82 kg/m².  Body surface area is 1.6 meters squared.    Physical Exam  Abdomen: Incisions well-healed.  No hernias.  Pelvic: Normal external genitalia.  Vulva and vagina with no lesions.  Vaginal cuff well reapproximated with no dehiscence, granulation tissue, bleeding, drainage or discharge.    Michael Wolff MD, FACOG, FACS  6/24/2024  8:57 AM                 "

## 2024-06-24 NOTE — LETTER
June 24, 2024     CAROLYN Pina  [address] [facility] [address]    Patient: Denise Hairston   YOB: 1970   Date of Visit: 6/24/2024       Dear Dr. Cárdenas:    Thank you for referring Denise Hairston to me for evaluation. Below are my notes for this consultation.    If you have questions, please do not hesitate to call me. I look forward to following your patient along with you.         Sincerely,        Michael Wolff MD        CC: MD Elias Wells DO Israel Zighelboim, MD  6/24/2024  8:57 AM  Sign when Signing Visit  Assessment/Plan:    Problem List Items Addressed This Visit          Surgery/Wound/Pain    S/P hysterectomy with oophorectomy - Primary     She is recovering well from surgery.  Final pathology benign.  I emphasized importance of continuing with strict pelvic rest for 7 to 8 weeks.  She will also avoid Valsalva/core engagement until she is 4 to 6 weeks out from surgery to prevent hernias.    She can return to routine gynecologic care.  She will contact us if she has any questions or problems related to surgery.              CHIEF COMPLAINT:  Postoperative follow-up     Problem:  Cancer Staging   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 breast in female, estrogen receptor positive (HCC)  Staging form: Breast, AJCC 8th Edition  - Clinical: Stage IA (cT1b, cN0(sn), cM0, G1, ER+, [place]+, HER2-) - Unsigned        Previous therapy:  Oncology History   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 breast in female, estrogen receptor positive (HCC)   5/29/2018 Biopsy    Right breast biopsy  3 o'clock 6 CMFN  Invasive ductal carcinoma  Grade 1  8 mm on ultrasound  No axillary adenopathy on ultrasound  ER 95  KY 90  Her 2 0  Stage IA     6/7/2018 Genetic Testing    BRCA testing  Negative     7/10/2018 Surgery    Right breast lumpectomy with sentinel lymph node biopsy  Invasive ductal carcinoma  9 mm  Grade 1  0/1  "lymph nodes  Margins negative  ER 95  AR 90  Her 2 0  Stage IA     7/2018 -  Hormone Therapy      Adjuvant hormonal therapy with tamoxifen      8/13/2018 - 9/11/2018 Radiation    Course: C1    Plan ID Energy Fractions Dose per Fraction (cGy) Dose Correction (cGy) Total Dose Delivered (cGy) Elapsed Days   R Breast e 9E 5 / 5 200 0 1,000 6   Rt Breast 6X 16 / 16 266 0 4,256 22      Treatment dates:  C1: 8/13/2018 - 9/11/2018             Patient ID: Denise Hairston is a 54 y.o. female  HPI  Patient is recovering well from surgery.  On June 6 she underwent robotic hysterectomy, bilateral salpingo-oophorectomy and cystoscopy.  Final pathology benign with benign cystadenoma and adenomyosis.  Patient denies vaginal bleeding, drainage or discharge.  We discussed optimization of bowel regimen.  Normal bladder function.  No significant pain.  Incisions healing well.  The following portions of the patient's history were reviewed and updated as appropriate: allergies, current medications, past family history, past medical history, past social history, past surgical history, and problem list.    Review of Systems  Per HPI.    Current Outpatient Medications:   •  loratadine (CLARITIN) 5 MG chewable tablet, Chew 5 mg daily, Disp: , Rfl:   •  venlafaxine (EFFEXOR-XR) 75 mg 24 hr capsule, Take one tablet daily (Patient taking differently: Take 75 mg by mouth daily at bedtime Take one tablet daily), Disp: 90 capsule, Rfl: 3    Objective:    Blood pressure 144/88, pulse 79, temperature (!) 97.4 °F (36.3 °C), temperature source Temporal, resp. rate 16, height 5' 3\" (1.6 m), weight 58.4 kg (128 lb 12.8 oz), last menstrual period 05/06/2024, SpO2 100%, not currently breastfeeding.  Body mass index is 22.82 kg/m².  Body surface area is 1.6 meters squared.    Physical Exam  Abdomen: Incisions well-healed.  No hernias.  Pelvic: Normal external genitalia.  Vulva and vagina with no lesions.  Vaginal cuff well reapproximated with no " dehiscence, granulation tissue, bleeding, drainage or discharge.    Michael Wolff MD, FACOG, FACS  6/24/2024  8:57 AM

## 2024-06-26 ENCOUNTER — OFFICE VISIT (OUTPATIENT)
Dept: SURGICAL ONCOLOGY | Facility: CLINIC | Age: 54
End: 2024-06-26
Payer: COMMERCIAL

## 2024-06-26 VITALS
HEIGHT: 63 IN | TEMPERATURE: 97.3 F | RESPIRATION RATE: 15 BRPM | OXYGEN SATURATION: 97 % | BODY MASS INDEX: 22.5 KG/M2 | SYSTOLIC BLOOD PRESSURE: 118 MMHG | HEART RATE: 97 BPM | WEIGHT: 127 LBS | DIASTOLIC BLOOD PRESSURE: 72 MMHG

## 2024-06-26 DIAGNOSIS — Z98.890 HISTORY OF LUMPECTOMY OF RIGHT BREAST: ICD-10-CM

## 2024-06-26 DIAGNOSIS — Z17.0 MALIGNANT NEOPLASM OF UPPER-INNER QUADRANT OF RIGHT BREAST IN FEMALE, ESTROGEN RECEPTOR POSITIVE (HCC): Primary | ICD-10-CM

## 2024-06-26 DIAGNOSIS — Z12.31 VISIT FOR SCREENING MAMMOGRAM: ICD-10-CM

## 2024-06-26 DIAGNOSIS — C50.211 MALIGNANT NEOPLASM OF UPPER-INNER QUADRANT OF RIGHT BREAST IN FEMALE, ESTROGEN RECEPTOR POSITIVE (HCC): Primary | ICD-10-CM

## 2024-06-26 PROCEDURE — 99213 OFFICE O/P EST LOW 20 MIN: CPT

## 2024-06-26 NOTE — PROGRESS NOTES
Surgical Oncology Follow Up       [address] [facility] SURGICAL ONCOLOGY [place]  [address]  [place] [address]    Denise Hairston  1970  [id number]  [address] [facility] SURGICAL ONCOLOGY [place]  [address]  [place] [address]    Chief Complaint   Patient presents with   • Follow-up       Assessment/Plan:  1. 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 breast in female, estrogen receptor positive (HCC)  - 1 year f/u    2. Visit for screening mammogram  - Mammo screening bilateral w 3d & cad; Future    3. History of lumpectomy of right breast  - Ambulatory referral to Plastic Surgery; Future      Discussion/Summary: Patient is a 54-year-old female presenting today for a 1 year follow-up for right breast cancer diagnosed in May 2018. Pathology revealed invasive ductal carcinoma ER/IN positive, HER2 negative. She underwent genetic testing which was negative. She had a right breast lumpectomy with sentinel node biopsy with Dr. Smalls and completed whole breast radiation therapy. She took tamoxifen for 5 years. She had a bilateral screening mammogram on 5/20/2024 which was BI-RADS 2 category 4 density. Patient and I previously spoke about ABUS and she prefers to forego additional screening with ABUS due to the discomfort associated. I informed her this is ok. There were no concerns on her cbe. Patient expressed frustrations over weight gain/difficulty losing weight, as well as volume loss on the right breast s/p lumpectomy. She exercises 4-5x a week and eats well. She was inquiring about HRT however I informed her this is still a controversial topic as there is research stating it is safe as well as unsafe. I informed her this is typically prescribed by a gyn. She also asked about mastectomies with reconstruction to reduce her risk for breast cancer recurrence as well as fix the asymmetry/volume loss of the right breast. I  informed her that typically our surgeons do not like to remove healthy breast tissue. I am recommending she meet with plastics to discuss fat grafting. I am referring her to Dr. Esquivel. I will order screening mammo for next year. I will see the patient back in 1 year or sooner should the need arise. She was instructed to call with any questions or concerns prior to this time. All questions were answered today.         History of Present Illness:     Oncology History   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 breast in female, estrogen receptor positive (HCC)   5/29/2018 Biopsy    Right breast biopsy  3 o'clock 6 CMFN  Invasive ductal carcinoma  Grade 1  8 mm on ultrasound  No axillary adenopathy on ultrasound  ER 95  SD 90  Her 2 0  Stage IA     5/29/2018 -  Cancer Staged    Staging form: Breast, AJCC 8th Edition  - Clinical stage from 5/29/2018: Stage IA (cT1b, cN0(sn), cM0, G1, ER+, SD+, HER2-) - Signed by CAROLYN Navarrete on 6/26/2024  Stage prefix: Initial diagnosis  Neoadjuvant therapy: No  Method of lymph node assessment: [place] lymph node biopsy  Histologic grading system: 3 grade system  Laterality: Right  Tumor size (mm): 9       6/7/2018 Genetic Testing    BRCA testing  Negative     7/10/2018 Surgery    Right breast lumpectomy with sentinel lymph node biopsy  Invasive ductal carcinoma  9 mm  Grade 1  0/1 lymph nodes  Margins negative  ER 95  SD 90  Her 2 0  Stage IA     7/2018 -  Hormone Therapy      Adjuvant hormonal therapy with tamoxifen      8/13/2018 - 9/11/2018 Radiation    Course: C1    Plan ID Energy Fractions Dose per Fraction (cGy) Dose Correction (cGy) Total Dose Delivered (cGy) Elapsed Days   R Breast e 9E 5 / 5 200 0 1,000 6   Rt Breast 6X 16 / 16 266 0 4,256 22      Treatment dates:  C1: 8/13/2018 - 9/11/2018            -Interval History: Patient is a 54-year-old female presenting today for a 1 year follow-up for right breast cancer diagnosed in May 2018. She is on  obs. She had a bilateral screening mammogram on 5/20/2024 which was BI-RADS 2 category 4 density. Patient expressed frustrations over weight gain/difficulty losing weight, as well as volume loss on the right breast s/p lumpectomy. Patient denies changes on her breast exam. She denies persistent headaches, bone pain, back pain, shortness of breath, or abdominal pain.    Review of Systems:  Review of Systems   Constitutional:  Negative for activity change, appetite change, fatigue and unexpected weight change.   Respiratory:  Negative for cough and shortness of breath.    Cardiovascular:  Negative for chest pain.   Gastrointestinal:  Negative for abdominal pain, diarrhea, nausea and vomiting.   Endocrine: Negative for heat intolerance.   Musculoskeletal:  Negative for arthralgias, back pain and myalgias.   Skin:  Negative for rash.   Neurological:  Negative for weakness and headaches.   Hematological:  Negative for adenopathy.       Patient Active Problem List   Diagnosis   • Benign paroxysmal vertigo   • Seasonal allergies   • 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 breast in female, estrogen receptor positive (HCC)   • Rotator cuff syndrome of right shoulder   • Vitamin D deficiency   • Slow transit constipation   • Vasomotor symptoms due to menopause   • Lateral epicondylitis of right elbow   • Tenosynovitis, de Quervain   • Carpal tunnel syndrome of right wrist   • S/P hysterectomy with oophorectomy     Past Medical History:   Diagnosis Date   • Atypical chest pain 09/18/2021   • Back pain 06/29/2022   • BRCA1 negative    • BRCA2 negative    • Breast cancer (HCC) 05/2018    right breast   • Cancer (HCC) 5/10/2018   • Change in bowel habit 09/18/2021   • Chronic low back pain 11/07/2022   • Colon polyp    • Congestion of right ear 04/27/2021   • Diabetes in pregnancy    • Fibrocystic breast    • Hematoma 12/22/2022   • History of radiation therapy 2018    right breast ca   • Left inguinal pain  11/03/2020   • Need for vaccination 09/14/2020   • Numbness and tingling of left lower extremity 09/14/2020   • Perimenopause 09/18/2021   • Rash 02/23/2016   • Screening for cardiovascular condition 09/14/2020   • Screening for human papillomavirus (HPV) 10/16/2018   • Seasonal allergies    • Use of tamoxifen (Nolvadex) 04/24/2019   • Vertigo      Past Surgical History:   Procedure Laterality Date   • BREAST BIOPSY Right 05/2018    Positive   • BREAST BIOPSY Left     Neg around age 27   • BREAST BIOPSY Left 11/132019    benign   • BREAST BIOPSY Right 11/29/2021   • BREAST LUMPECTOMY Right 07/2018    with radiation   • COLONOSCOPY     • CYSTOSCOPY N/A 6/6/2024    Procedure: CYSTOSCOPY;  Surgeon: Michael Wolff MD;  [facility];  Service: Gynecology Oncology   • EMBOLECTOMY      s/p forceps delivery   • MAMMO NEEDLE LOCALIZATION RIGHT (ALL INC) Right 07/10/2018   • MAMMO STEREOTACTIC BREAST BIOPSY RIGHT (ALL INC) Right 05/29/2018   • RI BX/EXC LYMPH NODE OPEN SUPERFICIAL Right 07/10/2018    Procedure: SENTINEL LYMPH NODE BIOPSY; LYMPHATIC MAPPING WITH BLUE DYE RADIOACTIVE DYE (INJECT AT 0800 BY DR SMALLS IN THE OR);  Surgeon: Demetri Smalls MD;  [facility];  Service: Surgical Oncology   • RI LAPS TOTAL HYSTERECT 250 GM/< W/RMVL TUBE/OVARY N/A 6/6/2024    Procedure: HYSTERECTOMY LAPAROSCOPIC TOTAL (LTH) W/ ROBOTICS, BILATERAL SALPINGO-OOPHORECTOMY, EXAM UNDER ANESTHESIA;  Surgeon: Michael Wolff MD;  [facility];  Service: Gynecology Oncology   • RI PERQ DEVICE PLACEMENT BREAST LOC 1ST LES W/GDNCE Right 07/10/2018    Procedure: BREAST LUMPECTOMY; BREAST NEEDLE LOCALIZATION (NEEDLE LOC AT 0700);  Surgeon: Demetri Smalls MD;  [facility];  Service: Surgical Oncology   • US GUIDED BREAST BIOPSY LEFT COMPLETE Left 11/13/2019   • US GUIDED BREAST BIOPSY RIGHT COMPLETE Right 05/29/2018   • US GUIDED BREAST BIOPSY RIGHT COMPLETE Right 11/29/2021     Family History   Problem Relation Age of  Onset   • No Known Problems Mother    • Prostate cancer Father    • Parkinsonism Father    • No Known Problems Sister    • No Known Problems Daughter    • Colon cancer Maternal Grandmother    • Esophageal cancer Maternal Grandfather    • Breast cancer Maternal Aunt 55   • Ovarian cancer Maternal Aunt    • Cancer Maternal Aunt    • Skin cancer Paternal Aunt      Social History     Socioeconomic History   • Marital status: /Civil Union     Spouse name: Not on file   • Number of children: 2   • Years of education: Not on file   • Highest education level: Not on file   Occupational History   • Not on file   Tobacco Use   • Smoking status: Former     Types: Cigarettes   • Smokeless tobacco: Never   • Tobacco comments:     only smoked socially as teenager for 1 yr   Vaping Use   • Vaping status: Never Used   Substance and Sexual Activity   • Alcohol use: Yes     Alcohol/week: 3.0 standard drinks of alcohol     Types: 3 Cans of beer per week     Comment: Social drinker   • Drug use: Not Currently     Types: Marijuana     Comment: when teenager   • Sexual activity: Yes     Partners: Male     Birth control/protection: Male Sterilization   Other Topics Concern   • Not on file   Social History Narrative   • Not on file     Social Determinants of Health     Financial Resource Strain: Not on file   Food Insecurity: Not on file   Transportation Needs: Not on file   Physical Activity: Not on file   Stress: Not on file   Social Connections: Not on file   Intimate Partner Violence: Not on file   Housing Stability: Not on file       Current Outpatient Medications:   •  loratadine (CLARITIN) 5 MG chewable tablet, Chew 5 mg daily, Disp: , Rfl:   •  venlafaxine (EFFEXOR-XR) 75 mg 24 hr capsule, Take one tablet daily (Patient taking differently: Take 75 mg by mouth daily at bedtime Take one tablet daily), Disp: 90 capsule, Rfl: 3  No Known Allergies  Vitals:    06/26/24 0830   BP: 118/72   Pulse: 97   Resp: 15   Temp: (!) 97.3 °F  (36.3 °C)   SpO2: 97%       Physical Exam  Constitutional:       General: She is not in acute distress.     Appearance: Normal appearance.   Cardiovascular:      Rate and Rhythm: Normal rate and regular rhythm.      Pulses: Normal pulses.      Heart sounds: Normal heart sounds.   Pulmonary:      Effort: Pulmonary effort is normal.      Breath sounds: Normal breath sounds.   Chest:      Chest wall: No mass.   Breasts:     Right: No swelling, bleeding, inverted nipple, mass, nipple discharge, skin change or tenderness.      Left: No swelling, bleeding, inverted nipple, mass, nipple discharge, skin change or tenderness.       Abdominal:      General: Abdomen is flat.      Palpations: Abdomen is soft.   Lymphadenopathy:      Upper Body:      Right upper body: No supraclavicular, axillary or pectoral adenopathy.      Left upper body: No supraclavicular, axillary or pectoral adenopathy.   Skin:     General: Skin is warm.   Neurological:      General: No focal deficit present.      Mental Status: She is alert and oriented to person, place, and time.   Psychiatric:         Mood and Affect: Mood normal.         Behavior: Behavior normal.           Results:    Imaging  No results found.    I reviewed the above imaging data.      Advance Care Planning/Advance Directives:  Discussed disease status, cancer treatment plans and/or cancer treatment goals with the patient.

## 2024-08-22 NOTE — PROGRESS NOTES
"  Hematology/Oncology Outpatient Follow- up Note  Denise Hairston 54 y.o. 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 Anastrozole was prescribed but never started as concerned about side effects.   05/20/24 Mammo Screen: No mammographic evidence of malignancy.    Discussion:   According to studies, extending the adjuvant hormonal therapy and taking AI to complete 10 years of treatment would increase disease free survival but not necessarily the overall survival.         double-blind, placebo-controlled trial to assess the effect of the extended use of letrozole for an additional 5 years: \"The extension of treatment with an adjuvant aromatase inhibitor to 10 years resulted in significantly higher rates of disease-free survival and a lower incidence of contralateral breast cancer than those with placebo, but the rate of overall survival was not higher with the aromatase inhibitor than with placebo\". (ClinicalTrials.gov numbers, [id number] and [id number].)    population-based cohort study : \"Restarting adjuvant hormone therapy was statistically significantly associated with prolonged disease-free survival, with an adjusted hazard ratio of 0.61 (95% confidence interval = 0.43 to 0.87, P = .006) for restarters vs nonrestarters\".         Reference: Stiven W, aPu KE, Jessica F, et al. Treatment Restarting After Discontinuation of Adjuvant Hormone Therapy in Breast Cancer Patients. J Natl Cancer Inst. 2017;109(10):10.1093/jnci/ccw786. doi:10.1093/jnci/nlz419      PLAN  After discussing benefits vs risks and treatment options in context of the above, patient decided on not restarting hormonal " "therapy, and to continue on surveillance only with exam and mammogram which she will continue doing with surgical oncology.   DEXA Bone Scan was ordered 11/2023 but not completed. Given was not on AI , not needed from med oncology perspective.   Continue follow up with surgical oncology, with annual mammogram, next due in 5/2025    To follow up with medical oncology only as needed.           Diagnosis ICD-10-CM Associated Orders   1. 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 breast in female, estrogen receptor positive (HCC)  C50.211     Z17.0           ____________________________________________________________    HPI:    Denise Hairston is 55 yo female with history of breast cancer in 2018. Her maternal aunt had breast & ovarian cancers. Genetic testing in June 2018, including BRCA, was negative.     In 2018, while premenopausal, had abnormal screening Mammo of the right breast, with subsequent biopsy 5/29/18 showed invasive ductal carcinoma grade 1, ER 95%, MT 90%, HER2 0/4 by IHC.    7/10/2018 s/p lumpectomy w sentinel LN biopsy by Dr. Smalls: 9 x 6 mm Invasive carcinoma (ductal, NOS), total one sentinel LN negative, no LVI, ER 95, MT 90, HER2 0. DCIS present. At least Stage IA - pT1b, pN0(sn), cM0, G1.      Established with Dr. Perez who started her on adjuvant Tamoxifen in July 2018.     8/13 - 9/11/2018 adjuvant radiation to right breast,     3/2022 Last reported menstrual cycle ; 8/17/23 FSH 52.2 [Post Menopausal 16.7-113. mIU/mL] & Estradiol 19.7 [Post [place](NHT) 0-30.0 pg/mL].     8/17/23 Anastrozole was prescribed but never started as concerned about side effects.         Interval History:      4/2024 evaluated by Gyn Onc, Dr. Wolff for post menopausal bleed event , had endometrial biopsy \"proliferative.. no atypia or malignancy.\" Underwent robotic THBSO on 6/6/24     Seen and examined today with my attending , Dr. Ashby. Patient noted recovering from the recent THBSO, only with " occasional mild fullness lower abdomen, but denies any bleeding or pain. Denies any B symptoms. Denies any lumps or masses. See above A&P as was dicussed today.       Cancer Staging:  Cancer Staging   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 breast in female, estrogen receptor positive (HCC)  Staging form: Breast, AJCC 8th Edition  - Clinical stage from 5/29/2018: Stage IA (cT1b, cN0(sn), cM0, G1, ER+, NH+, HER2-) - Signed by CAROLYN Navarrete on 6/26/2024  Stage prefix: Initial diagnosis  Neoadjuvant therapy: No  Method of lymph node assessment: [place] lymph node biopsy  Histologic grading system: 3 grade system  Laterality: Right  Tumor size (mm): 9      Molecular Testing:     Previous Hematologic/ Oncologic History:    Oncology History   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 breast in female, estrogen receptor positive (HCC)   5/29/2018 Biopsy    Right breast biopsy  3 o'clock 6 CMFN  Invasive ductal carcinoma  Grade 1  8 mm on ultrasound  No axillary adenopathy on ultrasound  ER 95  NH 90  Her 2 0  Stage IA     5/29/2018 -  Cancer Staged    Staging form: Breast, AJCC 8th Edition  - Clinical stage from 5/29/2018: Stage IA (cT1b, cN0(sn), cM0, G1, ER+, NH+, HER2-) - Signed by CAROLYN Navarrete on 6/26/2024  Stage prefix: Initial diagnosis  Neoadjuvant therapy: No  Method of lymph node assessment: [place] lymph node biopsy  Histologic grading system: 3 grade system  Laterality: Right  Tumor size (mm): 9       6/7/2018 Genetic Testing    BRCA testing  Negative     7/10/2018 Surgery    Right breast lumpectomy with sentinel lymph node biopsy  Invasive ductal carcinoma  9 mm  Grade 1  0/1 lymph nodes  Margins negative  ER 95  NH 90  Her 2 0  Stage IA     7/2018 -  Hormone Therapy      Adjuvant hormonal therapy with tamoxifen      8/13/2018 - 9/11/2018 Radiation    Course: C1    Plan ID Energy Fractions Dose per Fraction (cGy) Dose Correction (cGy) Total Dose Delivered  "(cGy) Elapsed Days   R Breast e 9E 5 / 5 200 0 1,000 6   Rt Breast 6X 16 / 16 266 0 4,256 22      Treatment dates:  C1: 8/13/2018 - 9/11/2018           Current Hematologic/ Oncologic Treatment:       Cycle 1         Test Results:    Imaging: No results found.          Labs:   Lab Results   Component Value Date    WBC 6.30 05/14/2024    HGB 14.5 05/14/2024    HCT 43.0 05/14/2024    MCV 94 05/14/2024     05/14/2024     Lab Results   Component Value Date    K 4.2 05/14/2024     05/14/2024    CO2 30 05/14/2024    BUN 18 05/14/2024    CREATININE 0.89 05/14/2024    GLUF 97 12/02/2022    CALCIUM 9.0 05/14/2024    AST 20 05/14/2024    ALT 18 05/14/2024    ALKPHOS 87 05/14/2024    EGFR 73 05/14/2024         No results found for: \"SPEP\", \"UPEP\"    No results found for: \"PSA\"    No results found for: \"CEA\"    No results found for: \"\"    No results found for: \"AFP\"    No results found for: \"IRON\", \"TIBC\", \"FERRITIN\"    No results found for: \"CDXDJDSS27\"      ROS: Review of Systems  - GENERAL: Negative for any nausea, vomiting, fevers, chills, or weight loss.  - HEENT: Negative for any head/Neck trauma, pain, double/blurry vision, sinusitis, rhinitis, nose bleeding.  - CARDIAC: Negative for any chest pain, palpitation, Dyspnea on exertion, peripheral edema.  - PULMONARY: Negative for any SOB, cough, wheezing.   - GASTROINTESTINAL: occasional mild fullness lower abdomen since after THBSO, gradually improving.  Negative for any abdominal pain, N/V/D/C, blood in stool.   - GENITOURINARY: Negative for any dysuria, hematuria, incontinence.  - NEUROLOGIC: Negative for any muscle weakness, numbness/tingling, memory changes.    - MUSCULOSKELETAL: Negative for any joint pains/swelling, limited ROM.   - INTEGUMENTARY: Negative for any rashes, cuts/ lesions.  - HEMATOLOGIC: Negative for any abnormal bruising, frequent infections or bleeding.      Current Medications: Reviewed  Allergies: Reviewed  PMH/FH/SH:  " Reviewed      Physical Exam:    There is no height or weight on file to calculate BSA.    Wt Readings from Last 3 Encounters:   06/26/24 57.6 kg (127 lb)   06/24/24 58.4 kg (128 lb 12.8 oz)   06/06/24 59 kg (130 lb)        Temp Readings from Last 3 Encounters:   06/26/24 (!) 97.3 °F (36.3 °C) (Temporal)   06/24/24 (!) 97.4 °F (36.3 °C) (Temporal)   06/06/24 98.5 °F (36.9 °C)        BP Readings from Last 3 Encounters:   06/26/24 118/72   06/24/24 144/88   06/06/24 117/60         Pulse Readings from Last 3 Encounters:   06/26/24 97   06/24/24 79   06/06/24 80     @LASTSAO2(3)@      Physical Exam  - GEN: Appears well, alert and oriented x 3, pleasant and cooperative, in no acute distress  - HEENT: Anicteric, mucous membranes moist, PERRL and EOMI   - NECK: No lymphadenopathy, JVD or carotid bruits   - HEART: RRR, normal S1 and S2, no murmurs, clicks, gallops or rubs   - LUNGS: Clear to auscultation bilaterally; no wheezes, rales, or rhonchi  - ABDOMEN: Normal bowel sounds, soft, no tenderness, no distention, no organomegaly or masses felt on exam.   - EXTREMITIES: Peripheral pulses normal; no clubbing, cyanosis, or edema  - NEURO: No focal findings, CN II-XII are grossly intact.   - Musculoskeletal: 5/5 strength, normal ROM, no swollen or erythematous joints.   - SKIN: Normal without suspicious lesions on exposed skin      Goals and Barriers:  Current Goal: Prolong Survival from Cancer.   Barriers: None.      Patient's Capacity to Self Care:  Patient is able to self care.    Florentin Day,    Hematology and Medical Oncology - PGY V  [facility]

## 2024-08-27 ENCOUNTER — OFFICE VISIT (OUTPATIENT)
Dept: HEMATOLOGY ONCOLOGY | Facility: CLINIC | Age: 54
End: 2024-08-27
Payer: COMMERCIAL

## 2024-08-27 VITALS
OXYGEN SATURATION: 97 % | HEIGHT: 63 IN | BODY MASS INDEX: 23.39 KG/M2 | RESPIRATION RATE: 18 BRPM | DIASTOLIC BLOOD PRESSURE: 82 MMHG | TEMPERATURE: 97.6 F | SYSTOLIC BLOOD PRESSURE: 118 MMHG | WEIGHT: 132 LBS | HEART RATE: 85 BPM

## 2024-08-27 DIAGNOSIS — Z17.0 MALIGNANT NEOPLASM OF UPPER-INNER QUADRANT OF RIGHT BREAST IN FEMALE, ESTROGEN RECEPTOR POSITIVE (HCC): Primary | ICD-10-CM

## 2024-08-27 DIAGNOSIS — C50.211 MALIGNANT NEOPLASM OF UPPER-INNER QUADRANT OF RIGHT BREAST IN FEMALE, ESTROGEN RECEPTOR POSITIVE (HCC): Primary | ICD-10-CM

## 2024-08-27 PROCEDURE — 99214 OFFICE O/P EST MOD 30 MIN: CPT | Performed by: INTERNAL MEDICINE

## 2024-10-08 ENCOUNTER — TELEPHONE (OUTPATIENT)
Age: 54
End: 2024-10-08

## 2024-10-08 NOTE — TELEPHONE ENCOUNTER
Pt called in to schedule her flu and covid vaccines. She recently had Covid at the beginning of August, so we were going to schedule the flu vaccine first, then the covid vaccine. With the recommendation of the 2 vaccines being given 2 weeks apart, I didn't have an opening for the pt to get her Covid vaccine.     Pt is currently scheduled for 10/14 for her Flu shot, this is not a preferred time for her due to work. Not sure if there is something else we might be able to offer her. Pt prefers something in the early morning     Please advise, thank you

## 2024-10-17 ENCOUNTER — IMMUNIZATIONS (OUTPATIENT)
Dept: INTERNAL MEDICINE CLINIC | Facility: CLINIC | Age: 54
End: 2024-10-17
Payer: COMMERCIAL

## 2024-10-17 DIAGNOSIS — Z23 NEED FOR INFLUENZA VACCINATION: Primary | ICD-10-CM

## 2024-10-17 PROCEDURE — 90673 RIV3 VACCINE NO PRESERV IM: CPT

## 2024-10-17 PROCEDURE — G0008 ADMIN INFLUENZA VIRUS VAC: HCPCS

## 2024-11-11 ENCOUNTER — CLINICAL SUPPORT (OUTPATIENT)
Dept: INTERNAL MEDICINE CLINIC | Facility: CLINIC | Age: 54
End: 2024-11-11
Payer: COMMERCIAL

## 2024-11-11 DIAGNOSIS — Z23 NEED FOR COVID-19 VACCINE: Primary | ICD-10-CM

## 2024-11-11 PROCEDURE — 91320 SARSCV2 VAC 30MCG TRS-SUC IM: CPT

## 2024-11-11 PROCEDURE — 90480 ADMN SARSCOV2 VAC 1/ONLY CMP: CPT

## 2024-11-29 ENCOUNTER — RA CDI HCC (OUTPATIENT)
Dept: OTHER | Facility: HOSPITAL | Age: 54
End: 2024-11-29

## 2024-12-02 ENCOUNTER — OFFICE VISIT (OUTPATIENT)
Dept: INTERNAL MEDICINE CLINIC | Facility: CLINIC | Age: 54
End: 2024-12-02
Payer: COMMERCIAL

## 2024-12-02 VITALS
HEIGHT: 65 IN | BODY MASS INDEX: 23.26 KG/M2 | DIASTOLIC BLOOD PRESSURE: 80 MMHG | SYSTOLIC BLOOD PRESSURE: 110 MMHG | WEIGHT: 139.6 LBS

## 2024-12-02 DIAGNOSIS — N95.1 VASOMOTOR SYMPTOMS DUE TO MENOPAUSE: ICD-10-CM

## 2024-12-02 DIAGNOSIS — Z00.00 ANNUAL PHYSICAL EXAM: Primary | ICD-10-CM

## 2024-12-02 DIAGNOSIS — Z17.0 MALIGNANT NEOPLASM OF UPPER-INNER QUADRANT OF RIGHT BREAST IN FEMALE, ESTROGEN RECEPTOR POSITIVE (HCC): ICD-10-CM

## 2024-12-02 DIAGNOSIS — R63.5 WEIGHT GAIN: ICD-10-CM

## 2024-12-02 DIAGNOSIS — K59.01 SLOW TRANSIT CONSTIPATION: ICD-10-CM

## 2024-12-02 DIAGNOSIS — C50.211 MALIGNANT NEOPLASM OF UPPER-INNER QUADRANT OF RIGHT BREAST IN FEMALE, ESTROGEN RECEPTOR POSITIVE (HCC): ICD-10-CM

## 2024-12-02 DIAGNOSIS — Z13.220 SCREENING, LIPID: ICD-10-CM

## 2024-12-02 DIAGNOSIS — Z00.00 LABORATORY EXAM ORDERED AS PART OF ROUTINE GENERAL MEDICAL EXAMINATION: ICD-10-CM

## 2024-12-02 PROCEDURE — 99396 PREV VISIT EST AGE 40-64: CPT | Performed by: INTERNAL MEDICINE

## 2024-12-02 NOTE — ASSESSMENT & PLAN NOTE
Worse since TASO  Colonoscopy 2023, 3 y repeat recommended  Start fiber supplement like Metamucil and stool softener

## 2024-12-02 NOTE — PATIENT INSTRUCTIONS
"Patient Education     Routine physical for adults   The Basics   Written by the doctors and editors at [facility]   What is a physical? -- A physical is a routine visit, or \"check-up,\" with your doctor. You might also hear it called a \"wellness visit\" or \"preventive visit.\"  During each visit, the doctor will:   Ask about your physical and mental health   Ask about your habits, behaviors, and lifestyle   Do an exam   Give you vaccines if needed   Talk to you about any medicines you take   Give advice about your health   Answer your questions  Getting regular check-ups is an important part of taking care of your health. It can help your doctor find and treat any problems you have. But it's also important for preventing health problems.  A routine physical is different from a \"sick visit.\" A sick visit is when you see a doctor because of a health concern or problem. Since physicals are scheduled ahead of time, you can think about what you want to ask the doctor.  How often should I get a physical? -- It depends on your age and health. In general, for people age 21 years and older:   If you are younger than 50 years, you might be able to get a physical every 3 years.   If you are 50 years or older, your doctor might recommend a physical every year.  If you have an ongoing health condition, like diabetes or high blood pressure, your doctor will probably want to see you more often.  What happens during a physical? -- In general, each visit will include:   Physical exam - The doctor or nurse will check your height, weight, heart rate, and blood pressure. They will also look at your eyes and ears. They will ask about how you are feeling and whether you have any symptoms that bother you.   Medicines - It's a good idea to bring a list of all the medicines you take to each doctor visit. Your doctor will talk to you about your medicines and answer any questions. Tell them if you are having any side effects that bother you. You " "should also tell them if you are having trouble paying for any of your medicines.   Habits and behaviors - This includes:   Your diet   Your exercise habits   Whether you smoke, drink alcohol, or use drugs   Whether you are sexually active   Whether you feel safe at home  Your doctor will talk to you about things you can do to improve your health and lower your risk of health problems. They will also offer help and support. For example, if you want to quit smoking, they can give you advice and might prescribe medicines. If you want to improve your diet or get more physical activity, they can help you with this, too.   Lab tests, if needed - The tests you get will depend on your age and situation. For example, your doctor might want to check your:   Cholesterol   Blood sugar   Iron level   Vaccines - The recommended vaccines will depend on your age, health, and what vaccines you already had. Vaccines are very important because they can prevent certain serious or deadly infections.   Discussion of screening - \"Screening\" means checking for diseases or other health problems before they cause symptoms. Your doctor can recommend screening based on your age, risk, and preferences. This might include tests to check for:   Cancer, such as breast, prostate, cervical, ovarian, colorectal, prostate, lung, or skin cancer   Sexually transmitted infections, such as chlamydia and gonorrhea   Mental health conditions like depression and anxiety  Your doctor will talk to you about the different types of screening tests. They can help you decide which screenings to have. They can also explain what the results might mean.   Answering questions - The physical is a good time to ask the doctor or nurse questions about your health. If needed, they can refer you to other doctors or specialists, too.  Adults older than 65 years often need other care, too. As you get older, your doctor will talk to you about:   How to prevent falling at " home   Hearing or vision tests   Memory testing   How to take your medicines safely   Making sure that you have the help and support you need at home  All topics are updated as new evidence becomes available and our peer review process is complete.  This topic retrieved from [employer] on: May 02, 2024.  Topic 493945 Version 1.0  Release: 32.4.3 - C32.122  © 2024 UpToDate, Inc. and/or its affiliates. All rights reserved.  Consumer Information Use and Disclaimer   Disclaimer: This generalized information is a limited summary of diagnosis, treatment, and/or medication information. It is not meant to be comprehensive and should be used as a tool to help the user understand and/or assess potential diagnostic and treatment options. It does NOT include all information about conditions, treatments, medications, side effects, or risks that may apply to a specific patient. It is not intended to be medical advice or a substitute for the medical advice, diagnosis, or treatment of a health care provider based on the health care provider's examination and assessment of a patient's specific and unique circumstances. Patients must speak with a health care provider for complete information about their health, medical questions, and treatment options, including any risks or benefits regarding use of medications. This information does not endorse any treatments or medications as safe, effective, or approved for treating a specific patient. UpToDate, Inc. and its affiliates disclaim any warranty or liability relating to this information or the use thereof.The use of this information is governed by the Terms of Use, available at https://www.woltersKagerauwer.com/en/know/clinical-effectiveness-terms. 2024© UpToDate, Inc. and its affiliates and/or licensors. All rights reserved.  Copyright   © 2024 UpToDate, Inc. and/or its affiliates. All rights reserved.

## 2024-12-02 NOTE — PROGRESS NOTES
Adult Annual Physical  Name: Denise Hairston      : 1970      MRN: [id number]  Encounter Provider: Lea Reyes, MD  Encounter Date: 2024   Encounter department: [facility]    Assessment & Plan  Annual physical exam         Screening, lipid    Orders:    Lipid panel    Laboratory exam ordered as part of routine general medical examination    Orders:    CBC and differential    Comprehensive metabolic panel    Weight gain    Orders:    TSH, 3rd generation with Free T4 reflex    Slow transit constipation  Worse since TAHBSO  Colonoscopy , 3 y repeat recommended  Start fiber supplement like Metamucil and stool softener       Vasomotor symptoms due to menopause  Mild improvement with venlafaxine       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 breast in female, estrogen receptor positive (HCC)  Declined tamoxifen  Up to date with mammogram         Immunizations and preventive care screenings were discussed with patient today. Appropriate education was printed on patient's after visit summary.    Counseling:  Continue healthy diet and exercise  Start vitamin D 1000 IU daily         History of Present Illness     Adult Annual Physical:  Patient presents for annual physical.     Diet and Physical Activity:  - Diet/Nutrition: well balanced diet.  - Exercise: 3-4 times a week on average.    Depression Screening:  - PHQ-2 Score: 0    General Health:  - Sleep: sleeps poorly.  - Hearing: normal hearing bilateral ears.  - Vision: wears glasses.  - Dental: regular dental visits.    /GYN Health:  - Follows with GYN: yes.   - Menopause: postmenopausal.     Review of Systems   Constitutional:  Positive for unexpected weight change (weight gain).   Respiratory:  Negative for shortness of breath.    Cardiovascular:  Negative for chest pain, palpitations and leg swelling.   Gastrointestinal:  Positive for abdominal distention and constipation. Negative for blood in stool.   Endocrine:      "   Hot flashes   Neurological:  Negative for dizziness and headaches.         Objective   Ht 5' 4.57\" (1.64 m)   Wt 63.3 kg (139 lb 9.6 oz)   LMP 05/06/2024 (Exact Date)   BMI 23.54 kg/m²     Physical Exam  Constitutional:       General: She is not in acute distress.     Appearance: She is well-developed. She is not ill-appearing, toxic-appearing or diaphoretic.   HENT:      Right Ear: External ear normal. There is no impacted cerumen.      Left Ear: External ear normal. There is no impacted cerumen.   Eyes:      Conjunctiva/sclera: Conjunctivae normal.   Cardiovascular:      Rate and Rhythm: Normal rate and regular rhythm.      Heart sounds: Normal heart sounds. No murmur heard.  Pulmonary:      Effort: Pulmonary effort is normal. No respiratory distress.      Breath sounds: Normal breath sounds. No stridor. No wheezing or rales.   Abdominal:      General: There is no distension.      Palpations: Abdomen is soft. There is no mass.      Tenderness: There is no abdominal tenderness. There is no guarding or rebound.   Musculoskeletal:      Right lower leg: No edema.      Left lower leg: No edema.   Neurological:      Mental Status: She is alert and oriented to person, place, and time.   Psychiatric:         Mood and Affect: Mood normal.         Behavior: Behavior normal.         Thought Content: Thought content normal.         Judgment: Judgment normal.         "

## 2024-12-24 ENCOUNTER — APPOINTMENT (OUTPATIENT)
Dept: LAB | Facility: CLINIC | Age: 54
End: 2024-12-24
Payer: COMMERCIAL

## 2024-12-24 LAB
ALBUMIN SERPL BCG-MCNC: 4.4 G/DL (ref 3.5–5)
ALP SERPL-CCNC: 91 U/L (ref 34–104)
ALT SERPL W P-5'-P-CCNC: 24 U/L (ref 7–52)
ANION GAP SERPL CALCULATED.3IONS-SCNC: 5 MMOL/L (ref 4–13)
AST SERPL W P-5'-P-CCNC: 23 U/L (ref 13–39)
BASOPHILS # BLD AUTO: 0.06 THOUSANDS/ÂΜL (ref 0–0.1)
BASOPHILS NFR BLD AUTO: 1 % (ref 0–1)
BILIRUB SERPL-MCNC: 0.5 MG/DL (ref 0.2–1)
BUN SERPL-MCNC: 13 MG/DL (ref 5–25)
CALCIUM SERPL-MCNC: 9.4 MG/DL (ref 8.4–10.2)
CHLORIDE SERPL-SCNC: 104 MMOL/L (ref 96–108)
CHOLEST SERPL-MCNC: 211 MG/DL (ref ?–200)
CO2 SERPL-SCNC: 31 MMOL/L (ref 21–32)
CREAT SERPL-MCNC: 0.85 MG/DL (ref 0.6–1.3)
EOSINOPHIL # BLD AUTO: 0.21 THOUSAND/ÂΜL (ref 0–0.61)
EOSINOPHIL NFR BLD AUTO: 4 % (ref 0–6)
ERYTHROCYTE [DISTWIDTH] IN BLOOD BY AUTOMATED COUNT: 11.3 % (ref 11.6–15.1)
GFR SERPL CREATININE-BSD FRML MDRD: 77 ML/MIN/1.73SQ M
GLUCOSE P FAST SERPL-MCNC: 101 MG/DL (ref 65–99)
HCT VFR BLD AUTO: 41 % (ref 34.8–46.1)
HDLC SERPL-MCNC: 57 MG/DL
HGB BLD-MCNC: 14 G/DL (ref 11.5–15.4)
IMM GRANULOCYTES # BLD AUTO: 0.02 THOUSAND/UL (ref 0–0.2)
IMM GRANULOCYTES NFR BLD AUTO: 0 % (ref 0–2)
LDLC SERPL CALC-MCNC: 125 MG/DL (ref 0–100)
LYMPHOCYTES # BLD AUTO: 1.47 THOUSANDS/ÂΜL (ref 0.6–4.47)
LYMPHOCYTES NFR BLD AUTO: 25 % (ref 14–44)
MCH RBC QN AUTO: 31.5 PG (ref 26.8–34.3)
MCHC RBC AUTO-ENTMCNC: 34.1 G/DL (ref 31.4–37.4)
MCV RBC AUTO: 92 FL (ref 82–98)
MONOCYTES # BLD AUTO: 0.58 THOUSAND/ÂΜL (ref 0.17–1.22)
MONOCYTES NFR BLD AUTO: 10 % (ref 4–12)
NEUTROPHILS # BLD AUTO: 3.57 THOUSANDS/ÂΜL (ref 1.85–7.62)
NEUTS SEG NFR BLD AUTO: 60 % (ref 43–75)
NONHDLC SERPL-MCNC: 154 MG/DL
NRBC BLD AUTO-RTO: 0 /100 WBCS
PLATELET # BLD AUTO: 261 THOUSANDS/UL (ref 149–390)
PMV BLD AUTO: 10.3 FL (ref 8.9–12.7)
POTASSIUM SERPL-SCNC: 4 MMOL/L (ref 3.5–5.3)
PROT SERPL-MCNC: 7.3 G/DL (ref 6.4–8.4)
RBC # BLD AUTO: 4.45 MILLION/UL (ref 3.81–5.12)
SODIUM SERPL-SCNC: 140 MMOL/L (ref 135–147)
TRIGL SERPL-MCNC: 147 MG/DL (ref ?–150)
TSH SERPL DL<=0.05 MIU/L-ACNC: 2.98 UIU/ML (ref 0.45–4.5)
WBC # BLD AUTO: 5.91 THOUSAND/UL (ref 4.31–10.16)

## 2024-12-30 ENCOUNTER — RESULTS FOLLOW-UP (OUTPATIENT)
Dept: INTERNAL MEDICINE CLINIC | Facility: CLINIC | Age: 54
End: 2024-12-30

## 2025-01-25 DIAGNOSIS — N95.1 VASOMOTOR SYMPTOMS DUE TO MENOPAUSE: ICD-10-CM

## 2025-01-27 RX ORDER — VENLAFAXINE HYDROCHLORIDE 75 MG/1
75 CAPSULE, EXTENDED RELEASE ORAL DAILY
Qty: 30 CAPSULE | Refills: 0 | Status: SHIPPED | OUTPATIENT
Start: 2025-01-27

## 2025-02-07 ENCOUNTER — TELEPHONE (OUTPATIENT)
Dept: HEMATOLOGY ONCOLOGY | Facility: CLINIC | Age: 55
End: 2025-02-07

## 2025-02-07 NOTE — TELEPHONE ENCOUNTER
LM for patient that due to a change in the provider's schedule, her appt. With Krysten Alexander on 7/7/25 will need to be rescheduled.

## 2025-02-12 ENCOUNTER — ANNUAL EXAM (OUTPATIENT)
Dept: OBGYN CLINIC | Facility: CLINIC | Age: 55
End: 2025-02-12
Payer: COMMERCIAL

## 2025-02-12 VITALS — BODY MASS INDEX: 22.4 KG/M2 | DIASTOLIC BLOOD PRESSURE: 88 MMHG | WEIGHT: 132.8 LBS | SYSTOLIC BLOOD PRESSURE: 126 MMHG

## 2025-02-12 DIAGNOSIS — Z12.31 ENCOUNTER FOR SCREENING MAMMOGRAM FOR MALIGNANT NEOPLASM OF BREAST: ICD-10-CM

## 2025-02-12 DIAGNOSIS — Z01.419 WELL WOMAN EXAM WITH ROUTINE GYNECOLOGICAL EXAM: Primary | ICD-10-CM

## 2025-02-12 DIAGNOSIS — C50.211 MALIGNANT NEOPLASM OF UPPER-INNER QUADRANT OF RIGHT BREAST IN FEMALE, ESTROGEN RECEPTOR POSITIVE (HCC): ICD-10-CM

## 2025-02-12 DIAGNOSIS — Z90.710 S/P HYSTERECTOMY WITH OOPHORECTOMY: ICD-10-CM

## 2025-02-12 DIAGNOSIS — N95.1 VASOMOTOR SYMPTOMS DUE TO MENOPAUSE: ICD-10-CM

## 2025-02-12 DIAGNOSIS — Z90.721 S/P HYSTERECTOMY WITH OOPHORECTOMY: ICD-10-CM

## 2025-02-12 DIAGNOSIS — Z17.0 MALIGNANT NEOPLASM OF UPPER-INNER QUADRANT OF RIGHT BREAST IN FEMALE, ESTROGEN RECEPTOR POSITIVE (HCC): ICD-10-CM

## 2025-02-12 PROCEDURE — S0612 ANNUAL GYNECOLOGICAL EXAMINA: HCPCS

## 2025-02-12 RX ORDER — VENLAFAXINE HYDROCHLORIDE 75 MG/1
75 CAPSULE, EXTENDED RELEASE ORAL DAILY
Qty: 90 CAPSULE | Refills: 3 | Status: SHIPPED | OUTPATIENT
Start: 2025-02-12

## 2025-02-12 NOTE — PROGRESS NOTES
"ASSESSMENT & PLAN:   - Patient to continue taking Effexor 75mg QD. She has also been taking DHEA, recommended by Dr.Harper Clay. We discussed Bonafide products.     Diagnoses and all orders for this visit:    Well woman exam with routine gynecological exam    Encounter for screening mammogram for malignant neoplasm of breast  -     Mammo screening bilateral w 3d and cad; Future    Vasomotor symptoms due to menopause  -     venlafaxine (EFFEXOR-XR) 75 mg 24 hr capsule; Take 1 capsule (75 mg total) by mouth daily    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 breast in female, estrogen receptor positive (HCC)    S/P hysterectomy with oophorectomy      The following were reviewed in today's visit: ASCCP guidelines, Gardisil vaccination, STD testing breast self exam, mammography screening ordered, STD testing, menopause, exercise, and healthy diet.    Patient to return to office in yearly for annual exam.     All questions have been answered to her satisfaction.    CC:  Annual Gynecologic Examination  Chief Complaint   Patient presents with    Gynecologic Exam     Annual exam, pap not indicated. Pt would like to discuss menopause, having weight gain.   *Hysterectomy, total 2024*  Pap 2021 neg/HPV neg  Mammo 2024(scheduled 2025)  Colonoscopy 2023 (3 yrs)     HPI: Denise Hairston is a 54 y.o.  who presents for annual gynecologic examination.  She has the following concerns:  none today. She does continue to have hot flashes which \"ebb and flow\". Some days, hot flashes are non-existent, and some days they are frequent. She is typically more affects at night and does sleep with a fan on her. She takes Effexor 75mg QD and is happy on this medication. She did see Dr.Harper Clay in the past who recommended DHEA, she was taking this but was experiencing hair loss so now takes intermittently.   S/p hysterectomy, b/l salpingo-oophorectomy 2024 for PMB and endometrial " hyperplasia. She does have small keloid formation at incision sites of upper abdomen.  She has been struggling with weight gain since her surgery 6/2024 and has been working hard with diet/exercise to combat this. She feels she is carrying most of her weight in her midsection. Patient has lost 7lbs since 12/2024 due to consistent diet and exercise - following a low-fat, low-processed foods diet, trying to increase protein intake. She does work at a sedentary job but tries to stay active daily outside of work.  History Stage 1A invasive ductal carcinoma, ER/MN positive, HER2 negative right breast cancer, s/p right breast lumpectomy, radiation in 2018. She was on tamoxifen x 5 years. She is following with Surgical Oncology who ordered recent mammogram.     Health Maintenance:    Exercise: daily  Breast exams/breast awareness: yes  Last mammogram: 5/20/2024, BIRADS2  Colorectal cancer screening: colonoscopy 2/23/2023, repeat 3 years    Past Medical History:   Diagnosis Date    Atypical chest pain 09/18/2021    Back pain 06/29/2022    BRCA1 negative     BRCA2 negative     Breast cancer (HCC) 05/2018    right breast    Cancer (HCC) 5/10/2018    Change in bowel habit 09/18/2021    Chronic low back pain 11/07/2022    Colon polyp     Congestion of right ear 04/27/2021    Diabetes in pregnancy     Enlarged uterus     Resulted in hysterectomy    Fibrocystic breast     Hematoma 12/22/2022    History of radiation therapy 2018    right breast ca    Hx of abnormal cervical Pap smear     Left inguinal pain 11/03/2020    Need for vaccination 09/14/2020    Numbness and tingling of left lower extremity 09/14/2020    Perimenopause 09/18/2021    Rash 02/23/2016    Screening for cardiovascular condition 09/14/2020    Screening for human papillomavirus (HPV) 10/16/2018    Seasonal allergies     Use of tamoxifen (Nolvadex) 04/24/2019    Uterine cyst     Vertigo      Past Surgical History:   Procedure Laterality Date    BREAST BIOPSY Right  05/2018    Positive    BREAST BIOPSY Left     Neg around age 27    BREAST BIOPSY Left 11/403320    benign    BREAST BIOPSY Right 11/29/2021    BREAST LUMPECTOMY Right 07/2018    with radiation    COLONOSCOPY      CYSTOSCOPY N/A 6/6/2024    Procedure: CYSTOSCOPY;  Surgeon: Michael Wolff MD;  [facility];  Service: Gynecology Oncology    EMBOLECTOMY      s/p forceps delivery    MAMMO NEEDLE LOCALIZATION RIGHT (ALL INC) Right 07/10/2018    MAMMO STEREOTACTIC BREAST BIOPSY RIGHT (ALL INC) Right 05/29/2018    ND BX/EXC LYMPH NODE OPEN SUPERFICIAL Right 07/10/2018    Procedure: SENTINEL LYMPH NODE BIOPSY; LYMPHATIC MAPPING WITH BLUE DYE RADIOACTIVE DYE (INJECT AT 0800 BY DR SMALLS IN THE OR);  Surgeon: Demetri Smalls MD;  [facility];  Service: Surgical Oncology    ND LAPS TOTAL HYSTERECT 250 GM/< W/RMVL TUBE/OVARY N/A 6/6/2024    Procedure: HYSTERECTOMY LAPAROSCOPIC TOTAL (LTH) W/ ROBOTICS, BILATERAL SALPINGO-OOPHORECTOMY, EXAM UNDER ANESTHESIA;  Surgeon: Michael Wolff MD;  [facility];  Service: Gynecology Oncology    ND PERQ DEVICE PLACEMENT BREAST LOC 1ST LES W/GDNCE Right 07/10/2018    Procedure: BREAST LUMPECTOMY; BREAST NEEDLE LOCALIZATION (NEEDLE LOC AT 0700);  Surgeon: Demetri Smalls MD;  [facility];  Service: Surgical Oncology    US GUIDED BREAST BIOPSY LEFT COMPLETE Left 11/13/2019    US GUIDED BREAST BIOPSY RIGHT COMPLETE Right 05/29/2018    US GUIDED BREAST BIOPSY RIGHT COMPLETE Right 11/29/2021     Past OB/Gyn History:   Patient's last menstrual period was 05/06/2024 (exact date).    Menopausal status: postmenopausal  Menopausal symptoms: hot flashes, weight gain     Last Pap: 11/8/2021: no abnormalities  History of abnormal Pap smear: yes, 80's    Patient is currently sexually active.   STD testing: no  Current contraception: status post hysterectomy    Family History  Family History   Problem Relation Age of Onset    No Known Problems Mother     Prostate cancer Father      Parkinsonism Father     No Known Problems Sister     No Known Problems Daughter     Colon cancer Maternal Grandmother     Esophageal cancer Maternal Grandfather     Breast cancer Maternal Aunt 55    Ovarian cancer Maternal Aunt     Cancer Maternal Aunt     Skin cancer Paternal Aunt      Family history of uterine or ovarian cancer: yes, maternal aunt   Family history of breast cancer: yes, maternal aunt   Family history of colon cancer: yes, MGM    Social History:  Social History     Socioeconomic History    Marital status: /Civil Union     Spouse name: Not on file    Number of children: 2    Years of education: Not on file    Highest education level: Not on file   Occupational History    Not on file   Tobacco Use    Smoking status: Former     Types: Cigarettes    Smokeless tobacco: Never    Tobacco comments:     only smoked socially as teenager for 1 yr   Vaping Use    Vaping status: Never Used   Substance and Sexual Activity    Alcohol use: Yes     Alcohol/week: 2.0 standard drinks of alcohol     Types: 2 Glasses of wine per week     Comment: Social drinker    Drug use: Not Currently     Types: Marijuana     Comment: when teenager    Sexual activity: Yes     Partners: Male     Birth control/protection: Male Sterilization, Female Sterilization   Other Topics Concern    Not on file   Social History Narrative    Not on file     Social Drivers of Health     Financial Resource Strain: Not on file   Food Insecurity: Not on file   Transportation Needs: Not on file   Physical Activity: Not on file   Stress: Not on file   Social Connections: Not on file   Intimate Partner Violence: Not on file   Housing Stability: Not on file     Domestic violence screen: negative    Allergies:  No Known Allergies    Medications:    Current Outpatient Medications:     loratadine (CLARITIN) 5 MG chewable tablet, Chew 5 mg daily, Disp: , Rfl:     venlafaxine (EFFEXOR-XR) 75 mg 24 hr capsule, Take 1 capsule (75 mg total) by mouth  daily, Disp: 90 capsule, Rfl: 3    Review of Systems:  Review of Systems   Constitutional:  Negative for chills and fever.   Respiratory:  Negative for shortness of breath.    Cardiovascular:  Negative for chest pain.   Gastrointestinal:  Negative for abdominal pain, constipation and diarrhea.   Endocrine: Positive for heat intolerance.   Genitourinary:  Negative for dysuria, frequency, pelvic pain, vaginal discharge and vaginal pain.   Psychiatric/Behavioral:  Negative for self-injury and suicidal ideas.          Physical Exam:  /88 (BP Location: Left arm, Patient Position: Sitting, Cuff Size: Standard)   Wt 60.2 kg (132 lb 12.8 oz)   LMP 05/06/2024 (Exact Date)   BMI 22.40 kg/m²    Physical Exam  Constitutional:       Appearance: Normal appearance.   Genitourinary:      Vulva and urethral meatus normal.      No labial fusion noted.      Vaginal cuff intact.     No vaginal discharge, erythema or tenderness.      No vaginal prolapse present.       Right Adnexa: absent.     Left Adnexa: absent.     Cervix is absent.      Uterus is absent.   Breasts:     Breasts are symmetrical.      Right: Normal.      Left: Normal.   HENT:      Head: Normocephalic and atraumatic.   Neck:      Thyroid: No thyroid mass or thyroid tenderness.   Cardiovascular:      Rate and Rhythm: Normal rate and regular rhythm.      Heart sounds: Normal heart sounds. No murmur heard.  Pulmonary:      Effort: Pulmonary effort is normal.      Breath sounds: Normal breath sounds. No wheezing.   Chest:       Abdominal:      Palpations: Abdomen is soft. There is no mass.      Tenderness: There is no abdominal tenderness.   Lymphadenopathy:      Cervical: No cervical adenopathy.   Neurological:      General: No focal deficit present.      Mental Status: She is alert and oriented to person, place, and time.   Skin:     General: Skin is warm and dry.   Psychiatric:         Mood and Affect: Mood normal.         Behavior: Behavior normal.   Vitals and  nursing note reviewed.

## 2025-04-10 ENCOUNTER — NURSE TRIAGE (OUTPATIENT)
Age: 55
End: 2025-04-10

## 2025-04-10 DIAGNOSIS — Z90.721 S/P HYSTERECTOMY WITH OOPHORECTOMY: ICD-10-CM

## 2025-04-10 DIAGNOSIS — Z90.710 S/P HYSTERECTOMY WITH OOPHORECTOMY: ICD-10-CM

## 2025-04-10 DIAGNOSIS — R10.2 PELVIC PAIN: Primary | ICD-10-CM

## 2025-04-10 NOTE — TELEPHONE ENCOUNTER
I ordered an ultrasound for her to complete, she can call central scheduling to complete this. I typically will get results back in a few days after completion. In the meantime, can you make sure that she is moving bowels as normal and is not having any issues with urination - burning/pain?

## 2025-04-10 NOTE — TELEPHONE ENCOUNTER
Called patient and let her know u/s was ordered and she can call central scheduling.  She has no urinary symptoms or changes in bowel habits.  No further questions.

## 2025-04-10 NOTE — TELEPHONE ENCOUNTER
"FOLLOW UP: Patient contacted gyn onc regarding pain as she had total hysterectomy 6/6/24. Was told to follow-up with obgyn office for possible ultrasound. Patient inquiring if she should need to be seen in office or if ultrasound could be ordered. Last seen in office for annual exam 2/12/25. Routing to provider.     REASON FOR CONVERSATION: Abdominal Pain    SYMPTOMS: Bilateral lower abdominal pain that comes and goes - occurs at random every few days. Pain ranges from dull ache to 7/10. Amount of time pain lasts varies. Pain occurred today and lasted about 1 hour. States pain is located where ovaries were.     OTHER: Pain has been occurring for the past few weeks.     DISPOSITION: Discuss with Provider and Call Back Patient      Answer Assessment - Initial Assessment Questions  1. LOCATION: \"Where does it hurt?\"       Lower abdomen, bilaterally, where ovaries are located  2. RADIATION: \"Does the pain shoot anywhere else?\" (e.g., chest, back)      denies  3. ONSET: \"When did the pain begin?\" (e.g., minutes, hours or days ago)       A few weeks ago  4. SUDDEN: \"Gradual or sudden onset?\"      Occurs every few days. Pain ranges in severity from dull pain to 7/10.   5. PATTERN \"Does the pain come and go, or is it constant?\"      Comes and goes. Occurs every few days. Lasts for an hour or so each time.  6. SEVERITY: \"How bad is the pain?\"  (e.g., Scale 1-10; mild, moderate, or severe)      Dull ache to 7/10 depending  7. RECURRENT SYMPTOM: \"Have you ever had this type of stomach pain before?\" If Yes, ask: \"When was the last time?\" and \"What happened that time?\"       Ongoing for past several weeks  8. CAUSE: \"What do you think is causing the stomach pain?\"      unknown  9. RELIEVING/AGGRAVATING FACTORS: \"What makes it better or worse?\" (e.g., antacids, bending or twisting motion, bowel movement)      Not assessed  10. OTHER SYMPTOMS: \"Do you have any other symptoms?\" (e.g., back pain, diarrhea, fever, urination pain, " "vomiting)        denies  11. PREGNANCY: \"Is there any chance you are pregnant?\" \"When was your last menstrual period?\"        Total hysterectomy 6/6/24    Protocols used: Abdominal Pain - Female-Adult-[place]    "

## 2025-04-14 ENCOUNTER — HOSPITAL ENCOUNTER (OUTPATIENT)
Dept: ULTRASOUND IMAGING | Facility: HOSPITAL | Age: 55
Discharge: HOME | End: 2025-04-14
Payer: COMMERCIAL

## 2025-04-14 DIAGNOSIS — R10.2 PELVIC PAIN: ICD-10-CM

## 2025-04-14 DIAGNOSIS — Z90.721 S/P HYSTERECTOMY WITH OOPHORECTOMY: ICD-10-CM

## 2025-04-14 DIAGNOSIS — Z90.710 S/P HYSTERECTOMY WITH OOPHORECTOMY: ICD-10-CM

## 2025-04-14 PROCEDURE — 76830 TRANSVAGINAL US NON-OB: CPT

## 2025-04-14 PROCEDURE — 76856 US EXAM PELVIC COMPLETE: CPT

## 2025-04-16 ENCOUNTER — RESULTS FOLLOW-UP (OUTPATIENT)
Dept: OBGYN CLINIC | Facility: CLINIC | Age: 55
End: 2025-04-16

## 2025-04-16 DIAGNOSIS — Z90.710 S/P HYSTERECTOMY WITH OOPHORECTOMY: ICD-10-CM

## 2025-04-16 DIAGNOSIS — Z90.721 S/P HYSTERECTOMY WITH OOPHORECTOMY: ICD-10-CM

## 2025-04-16 DIAGNOSIS — R10.2 PELVIC PAIN: Primary | ICD-10-CM

## 2025-04-17 ENCOUNTER — EVALUATION (OUTPATIENT)
Dept: PHYSICAL THERAPY | Facility: REHABILITATION | Age: 55
End: 2025-04-17
Payer: COMMERCIAL

## 2025-04-17 DIAGNOSIS — Z90.710 S/P HYSTERECTOMY WITH OOPHORECTOMY: ICD-10-CM

## 2025-04-17 DIAGNOSIS — R10.2 PELVIC PAIN: ICD-10-CM

## 2025-04-17 DIAGNOSIS — Z90.721 S/P HYSTERECTOMY WITH OOPHORECTOMY: ICD-10-CM

## 2025-04-17 PROCEDURE — 97530 THERAPEUTIC ACTIVITIES: CPT | Performed by: PHYSICAL THERAPIST

## 2025-04-17 PROCEDURE — 97162 PT EVAL MOD COMPLEX 30 MIN: CPT | Performed by: PHYSICAL THERAPIST

## 2025-04-17 NOTE — PROGRESS NOTES
PT Evaluation     Today's date: 2025  Patient name: Denise Hairston  : 1970  MRN: [id number]  Referring provider: Marya Ordoñez, *  Dx:   Encounter Diagnosis     ICD-10-CM    1. Pelvic pain  R10.2 Ambulatory Referral to Physical Therapy      2. S/P hysterectomy with oophorectomy  Z90.710 Ambulatory Referral to Physical Therapy    Z90.721           Start Time: 1530  Stop Time: 1630  Total time in clinic (min): 60 minutes    Assessment  Impairments: abnormal coordination, abnormal or restricted ROM, activity intolerance, impaired physical strength, lacks appropriate home exercise program, pain with function, poor posture  and poor body mechanics    Assessment details: The patient is a 55 y.o. female with complaints of pelvic pain.  She is currently 10 months s/p Laparoscopic Total Hysterectomy with bilateral Salpingo-Oophorectomy. She also does have a history of breast cancer and underwent radiation and chemotherapy. Other history includes two vaginal births. She presents with good awareness of her pelvic floor muscles and is able to isolate them well. She does have tenderness in her left lower quadrant with palpation. No other pain reproduced with testing. She would benefit from pelvic floor physical therapy to help reduce/manage pain and symptoms, address impairments and maximize function and quality of life upon discharge. She will be given updated HEP throughout episode of care. Thank you for the referral.    Therapeutic activities performed upon examination included education regarding pelvic floor anatomy, explanation of exam technique, explanation of exam findings and discussion of treatment plan as well as expectations of the patient to emphasize the importance of compliance and adherence to physical therapy visits.               Understanding of Dx/Px/POC: good    Goals  ST. The patient will improve pelvic floor muscle strength 1 to 2 grade in 8 to 12 weeks.   2. The patient will  improve pelvic floor muscle endurance to 5 to 10 seconds in 8 to 12 weeks.  3. The patient will demonstrate improved coordination of pelvic floor muscles with the ability to fully relax at rest and in between recruitment in 8 to 10 weeks.   4. The patient will demonstrate good posture with sitting on toilet to promote proper pelvic floor muscle lengthening.   5. The patient will demonstrate good isolation of respiratory diaphragm and good coordination of diaphragm and transverse abdominis in 8 to 10 weeks.   6. The patient will reduce pain by 50% frequency and severity in 8 to 10 weeks.     LT.The patient will be compliant with exercises and self care program to help independently manage symptoms upon discharge.   2. The patient will perform higher level activities and exercise without leaking upon discharge.   3. The patient will improve frequency of bowel movements to once a day to once every other day upon discharge.   4. The patient will minimize straining for evacuation of bowels upon discharge.       Plan  Patient would benefit from: skilled physical therapy  Planned modality interventions: biofeedback    Planned therapy interventions: activity modification, abdominal trunk stabilization, behavior modification, body mechanics training, breathing training, coordination, flexibility, functional ROM exercises, IADL retraining, manual therapy, neuromuscular re-education, patient education, postural training, self care, strengthening, therapeutic activities and therapeutic exercise    Frequency: 1x week  Duration in weeks: 12  Plan of Care beginning date: 2025  Plan of Care expiration date: 7/10/2025  Treatment plan discussed with: patient      PT Pelvic Floor Subjective:   History of Present Illness:   The patient notes that she had a total hysterectomy in 2024. She experienced two episodes of heavy bleeding. She had an ultrasound which revealed thickened uterine walls and an ovarian cyst (which  was benign). She was also experiencing pelvic pain, heaviness, and pressure along with this. She states that she contacted Dr. Edouard's office and they recommended that she see her OBGYN. She was referred for a pelvic ultrasound, which did not show anything suspicious. She was referred for pelvic PT by her OBGYN.    Pain:   Pelvic pain, sometimes bilateral, sometimes unilateral  No triggers  Pain is constant, dull pain, notices that it gets more intense and sharp  Severity:    She notes that she started a new job and returned to work 2 days after her surgery. She had one instance in which she was trying to take a walk and she had some sharp pain abdominally and had to stop and go home. 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  Diet and Exercise:      Exercise frequency: 3-4 times per week    Cycling and free weights  Plays golf with   Co-morbidities:    History of Stage 1A invasive ductal carcinoma, ER/DC positive, HER2 negative right breast cancer,   Right breast lumpectomy, radiation in 2018 and tamoxifen x 5 years.   Follows with Surgical Oncology  OB/ gyn History    Gestational History:     Number of prior pregnancies: 3    Number of term pregnancies: 2    Delivery Type: vaginal delivery      Number of vaginal deliveries: 2    Delivery Complications:  Children are 27 and 25 years old  First delivery was Forceps delivery; she had a hematoma; she had surgery for hematoma from stitches from episiotomy  Second child, her daughter, was one month early    Menstrual History:    LMP potentially in 2022  Two heavy bleeding episodes in 2024 - possibly due to taking Tomoxifen (5 years); part of the reason for enlarged uterus with thickened endometrium    First period at 16 years old - she was an  athlete  Lighter and shorter periods throughout her life    Breast cancer was HER2+    DEHA and some cortisol supplements to help with menopausal symptoms  Bladder Function:     Voiding Difficulties negative for: urgency, frequent urination and incomplete emptying       Voiding Difficulties comments:     Urinary leakage: no urine leakage    Nocturia (episodes per night): 0 and 1    Intake (ounces):     Intake (ounces) comment: Water: 2 glasses  Coffee: 1-2 cups  Green Tea: some day  Gatorade: with creatine (5 mg)(4 times a week)  Bowel Function:     Voiding DIfficulties: painful defecating and constipation      Bowel Function comments:  History of constipation since she was a child  Constipation - possibly due to medication  Takes Fiber Daily  Will take Miralax  Some relief of pain after a bowel movement  Frequency varies: can be several days in between    She does strain and have a harder time emptying if there are several days in between bowel movements  Mostly fully emptying    Steele Stool Scale: type 2, type 3 and type 1    Stool softener use: stool softeners  Sexual Function:     Sexually Active:  Sexually active    Pain during intercourse: No    Pain:     At best pain ratin    At worst pain ratin    Location:  Pain on both sides of her abdomen    Onset:  4-6 months ago    Quality:  Dull ache and sharp    Aggravating factors:  Bowel movements    Alleviating factors: having a bowel movement.    Progression:  Worsening      Objective     Static Posture     Head  Forward.    Shoulders  Rounded.    Lumbar Spine   Increased lordosis.     Pelvis   Anterior pelvic tilt    Neurological Testing     Sensation     Lumbar   Left   Intact: light touch    Right   Intact: light touch    Active Range of Motion     Lumbar   Flexion:  WFL  Extension:  WFL  Left lateral flexion:  WFL  Right lateral flexion:  WFL  Left rotation:  WFL  Right rotation:  WFL    Strength/Myotome Testing     Left Hip   Planes of Motion    Flexion: 4  Abduction: 4  Adduction: 4  External rotation: 4  Internal rotation: 4    Right Hip   Planes of Motion   Flexion: 4  Abduction: 4  Adduction: 4  External rotation: 4  Internal rotation: 4    Left Knee   Flexion: 4  Extension: 4    Right Knee   Flexion: 4  Extension: 4    Left Ankle/Foot   Dorsiflexion: 4  Plantar flexion: 4    Right Ankle/Foot   Dorsiflexion: 4  Plantar flexion: 4      Abdominal Assessment:      Abdominal Assessment: Soft  Tenderness over the left lower abdomen    Skin inspection:   scars present.   Additional skin inspection details: Laparoscopic incisions - well healed; no tenderness noted; scars a little hypertrophied      General Perineum Exam:   perineum intact.   Negative for swelling, descent, gaping introitus and perianal erythema    General perineum exam comments: Pelvic floor verbal consent and written consent signed and in chart    Education provided today:   Time Spent on Patient Education: 15 min    Pelvic floor anatomy and function  Physiology/relationship of abdominal canister and pelvis/pelvic organs/pelvic floor muscles  Diaphragm and Diaphragmatic breathing  Bowel and Bladder anatomy and function      Toileting posture and body mechanics  Constipation Education  Pelvic and chronic pain education    Hormonal changes during and after menopause  Surgical and post op considerations  PT exam and course of treatment    Future Education To be Provided:     Visual Inspection of Perineum:   Excursion of perineal body in cephalad direction with contraction of pelvic floor muscles (PFM): weak  Excursion of perineal body in caudal direction with relaxation of pelvic floor muscles (PFM): weak  Involuntary contraction with coughing: no  PFM Contraction Comments: [place] palpation: no tenderness     Pelvic Clock (external muscle palpation): no tenderness  Sensation: intact    Pelvic Organ Prolapse   Position: hook-lying  At rest: mild  With bearing down: moderate (to the level  of hymenal remnants)  Perineal body inspection: within normal limits        Pelvic Floor Muscle Exam:     Muscle Contraction: well isolated   Breathing pattern with contraction: within normal limits   Pelvic floor muscle relaxation is complete.   100% pelvic floor relaxation        PERFECT Score   Power right: 2/5   Power left: 2/5   Endurance (seconds to max): 5   Repetitions (before fatigue): 6    Perfect Score: No pain or tenderness with pelvic floor muscle palpation      pelvic floor exam consent given by patient    Pelvic exam completed: vaginally      IE RE   [id number]     [id number]     V-Q     PGQ         Precautions: History of cancer  [employer] HEP:     Manuals 4/17                                                                Neuro Re-Ed             Diaphragmatic Breathing             TA ADIM             TA + PFMC                          Biofeedback sEMG             PFMC: Slow Holds             PFMC: Quick Flicks             PFMC + hip abd isometrics             PFMC + hip add isometrics             PFMC + ecc bridge             PFMC in sitting             PFMC seated + tilt             PFMC in standing             PFMC in Quadruped                                                                 Ther Ex             LTR             Child Pose             [place]                                                                              Ther Activity             EDUCATION 15 min            PFMC + reach             PFMC + squat             PFMC + lift             PFMC + sit to stand             PFMC + step up/down             Gait Training                                       Modalities

## 2025-04-21 ENCOUNTER — OFFICE VISIT (OUTPATIENT)
Dept: PHYSICAL THERAPY | Facility: REHABILITATION | Age: 55
End: 2025-04-21
Payer: COMMERCIAL

## 2025-04-21 DIAGNOSIS — Z90.721 S/P HYSTERECTOMY WITH OOPHORECTOMY: ICD-10-CM

## 2025-04-21 DIAGNOSIS — Z90.710 S/P HYSTERECTOMY WITH OOPHORECTOMY: ICD-10-CM

## 2025-04-21 DIAGNOSIS — R10.2 PELVIC PAIN: Primary | ICD-10-CM

## 2025-04-21 PROCEDURE — 97110 THERAPEUTIC EXERCISES: CPT | Performed by: PHYSICAL THERAPIST

## 2025-04-21 PROCEDURE — 97112 NEUROMUSCULAR REEDUCATION: CPT | Performed by: PHYSICAL THERAPIST

## 2025-04-21 PROCEDURE — 97140 MANUAL THERAPY 1/> REGIONS: CPT | Performed by: PHYSICAL THERAPIST

## 2025-04-21 NOTE — PROGRESS NOTES
Daily Note     Today's date: 2025  Patient name: Denise Hairston  : 1970  MRN: [id number]  Referring provider: Marya Ordoñez, *  Dx:   Encounter Diagnosis     ICD-10-CM    1. Pelvic pain  R10.2       2. S/P hysterectomy with oophorectomy  Z90.710     Z90.721           Start Time: 1403  Stop Time: 1453  Total time in clinic (min): 50 minutes    Subjective: Patient reports she notices a dull ache discomfort in the lower abdomen, especially before urinating and having a bowel movement. She has been straining with bowel movements and feeling like she is not fully emptying. She also sometimes feels an urge to have a bowel movement but can't.         Objective: See treatment diary below      Assessment: Tolerated treatment well. Initiated plan of care this visit with a focus on gentle abdominal stretching and pelvic mobility, gentle TA and PFM activation with overall good tolerance. Some tenderness/discomfort noted with gentle myofascial soft tissue mobilization that improved with increased duration. Provided patient with HEP and handout for toileting position/breathing to avoid straining with bowel movements. Patient demonstrated fatigue post treatment, exhibited good technique with therapeutic exercises, and would benefit from continued PT.        Plan: Continue per plan of care.       IE RE   KHANG-18     PFDI-20     V-Q     PGQ         Precautions: History of cancer  [facility] HEP:     Pain bilateral lateral lower abdomen  PFM weakness, mild posterior descent, difficulty emptying bowels and constipation  Manuals            Pelvic diaphragm MFR  Done           ILU massage  nv                                     Neuro Re-Ed             Diaphragmatic Breathing             TA ADIM  10x           TA + PFMC  10x           TA + heel slides  nv           TA + leg ext  nv           TA + 90/90 leg ext  nv           Heel taps             Deadbugs                          Biofeedback sEMG            "  PFMC: Slow Holds             PFMC: Quick Flicks             PFMC + hip abd isometrics             PFMC + hip add isometrics             PFMC + ecc bridge             PFMC in sitting             PFMC seated + tilt             PFMC in standing             PFMC in Quadruped  nv                                                               Ther Ex             LTR  20x ttl           Child Pose  3x30\" alt CC           Cat Cow  3x10 alt CP                                                                            Ther Activity             EDUCATION 15 min Done           Toileting position/breathing  Done           PFMC + reach             PFMC + squat             PFMC + lift             PFMC + sit to stand             PFMC + step up/down             Gait Training                                       Modalities                                             "

## 2025-04-28 ENCOUNTER — OFFICE VISIT (OUTPATIENT)
Dept: PHYSICAL THERAPY | Facility: REHABILITATION | Age: 55
End: 2025-04-28
Payer: COMMERCIAL

## 2025-04-28 DIAGNOSIS — R10.2 PELVIC PAIN: Primary | ICD-10-CM

## 2025-04-28 DIAGNOSIS — Z90.710 S/P HYSTERECTOMY WITH OOPHORECTOMY: ICD-10-CM

## 2025-04-28 DIAGNOSIS — Z90.721 S/P HYSTERECTOMY WITH OOPHORECTOMY: ICD-10-CM

## 2025-04-28 PROCEDURE — 97112 NEUROMUSCULAR REEDUCATION: CPT | Performed by: PHYSICAL THERAPIST

## 2025-04-28 PROCEDURE — 97140 MANUAL THERAPY 1/> REGIONS: CPT | Performed by: PHYSICAL THERAPIST

## 2025-04-28 NOTE — PROGRESS NOTES
Daily Note     Today's date: 2025  Patient name: Denise Hairston  : 1970  MRN: [id number]  Referring provider: Marya Ordoñez, *  Dx:   Encounter Diagnosis     ICD-10-CM    1. Pelvic pain  R10.2       2. S/P hysterectomy with oophorectomy  Z90.710     Z90.721           Start Time: 0850  Stop Time: 0948  Total time in clinic (min): 58 minutes    Subjective: Patient was compliant with HEP. She notices abdominal pain is worse if she has to have a bowel movement or her bladder is full.      Objective: See treatment diary below      Assessment: Tolerated treatment well. Patient exhibited good technique with therapeutic exercises and would benefit from continued PT. Patient reports mild discomfort with gentle abdominal stabilization exercises but able to tolerate. Evident soft tissue restrictions noted to lower abdomen with MFR L>R and associated tenderness. Provided patient with updated HEP for stabilization exercises and ILU massage to perform at home as needed.      Plan: Continue per plan of care.         IE RE   KHANG-18     PFDI-20     V-Q     PGQ         Precautions: History of cancer  [facility] HEP:     Pain bilateral lateral lower abdomen  PFM weakness, mild posterior descent, difficulty emptying bowels and constipation  Manuals           Pelvic diaphragm MFR  Done Done          ILU massage  nv Done          External MFR around bladder   Done          MFR sigmoid   nv          MFR ileocecal valve   nv                       Neuro Re-Ed             Diaphragmatic Breathing             TA ADIM  10x           TA + PFMC  10x 10x          TA + heel slides  nv           TA + leg ext  nv 10x          TA + 90/90 leg ext  nv           Heel taps             Deadbugs                          Biofeedback sEMG             PFMC: Slow Holds             PFMC: Quick Flicks             PFMC + hip abd isometrics   GTB 2x10          PFMC + hip add isometrics   10x          PFMC + ecc bridge            "  PFMC in sitting             PFMC seated + tilt             PFMC in standing             Quadruped TA   10x          PFMC in Quadruped  nv 10x                                                              Ther Ex             LTR  20x ttl           Child Pose  3x30\" alt CC           Cat Cow  3x10 alt CP                                                                            Ther Activity             EDUCATION 15 min Done Done          Toileting position/breathing  Done           PFMC + reach             PFMC + squat             PFMC + lift             PFMC + sit to stand             PFMC + step up/down             Gait Training                                       Modalities                                               "

## 2025-05-05 ENCOUNTER — OFFICE VISIT (OUTPATIENT)
Dept: PHYSICAL THERAPY | Facility: REHABILITATION | Age: 55
End: 2025-05-05
Payer: COMMERCIAL

## 2025-05-05 DIAGNOSIS — Z90.721 S/P HYSTERECTOMY WITH OOPHORECTOMY: ICD-10-CM

## 2025-05-05 DIAGNOSIS — Z90.710 S/P HYSTERECTOMY WITH OOPHORECTOMY: ICD-10-CM

## 2025-05-05 DIAGNOSIS — R10.2 PELVIC PAIN: Primary | ICD-10-CM

## 2025-05-05 PROCEDURE — 97140 MANUAL THERAPY 1/> REGIONS: CPT | Performed by: PHYSICAL THERAPIST

## 2025-05-05 PROCEDURE — 97112 NEUROMUSCULAR REEDUCATION: CPT | Performed by: PHYSICAL THERAPIST

## 2025-05-05 NOTE — PROGRESS NOTES
"Daily Note     Today's date: 2025  Patient name: Denise Hairston  : 1970  MRN: [id number]  Referring provider: Marya Ordoñez, *  Dx:   Encounter Diagnosis     ICD-10-CM    1. Pelvic pain  R10.2       2. S/P hysterectomy with oophorectomy  Z90.710     Z90.721           Start Time: 08  Stop Time: 939  Total time in clinic (min): 48 minutes    Subjective: Patient reports feeling ok and the lower abdominal pain has improved overall and prior to bowel movements. She reports pain towards the front of the hip and groin with walking on the golf [place] over the weekend, especially going up hill.         Objective: See treatment diary below      Assessment: Tolerated treatment well. Initiated additional TE with a focus on hip flexor activation and stretching with overall good tolerance. Evident tension and tenderness to b/l hip flexors L>R with manual therapy. Provided patient with updated HEP. Patient demonstrated fatigue post treatment, exhibited good technique with therapeutic exercises, and would benefit from continued PT.      Plan: Continue per plan of care.         IE RE   KHANG-18     PFDI-20     V-Q     PGQ         Precautions: History of cancer  [facility] HEP:     Pain bilateral lateral lower abdomen  PFM weakness, mild posterior descent, difficulty emptying bowels and constipation  Manuals          Pelvic diaphragm MFR  Done Done          ILU massage  nv Done          External MFR around bladder   Done          MFR sigmoid   nv          MFR ileocecal valve   nv          Hip flexor release/STM    Done b/l         Neuro Re-Ed             Diaphragmatic Breathing             TA ADIM  10x           TA + PFMC  10x 10x          TA + heel slides  nv           TA + leg ext  nv 10x          TA + 90/90 leg ext  nv           Heel taps    3x10         Deadbugs             Hip flxr isos    2x10x5\"         Biofeedback sEMG             PFMC: Slow Holds             PFMC: Quick Flicks           " "  PFMC + hip abd isometrics   GTB 2x10          PFMC + hip add isometrics   10x          PFMC + ecc bridge    2x10         PFMC in sitting             PFMC seated + tilt             PFMC in standing             Quadruped TA   10x +PFMC 10x         PFMC in Quadruped  nv 10x          TB hip flxr marches    [place] 2x10 b/l         Standing OH press with alt marches    3x10                                   Ther Ex             LTR  20x ttl           Child Pose  3x30\" alt CC           Cat Cow  3x10 alt CP                                                                            Ther Activity             EDUCATION 15 min Done Done          Toileting position/breathing  Done           PFMC + reach    nv         PFMC + squat    nv         PFMC + lift    nv         PFMC + sit to stand             PFMC + step up/down             Gait Training                                       Modalities                                                 "

## 2025-05-13 ENCOUNTER — OFFICE VISIT (OUTPATIENT)
Dept: PHYSICAL THERAPY | Facility: REHABILITATION | Age: 55
End: 2025-05-13
Payer: COMMERCIAL

## 2025-05-13 DIAGNOSIS — Z90.721 S/P HYSTERECTOMY WITH OOPHORECTOMY: ICD-10-CM

## 2025-05-13 DIAGNOSIS — R10.2 PELVIC PAIN: Primary | ICD-10-CM

## 2025-05-13 DIAGNOSIS — Z90.710 S/P HYSTERECTOMY WITH OOPHORECTOMY: ICD-10-CM

## 2025-05-13 PROCEDURE — 97140 MANUAL THERAPY 1/> REGIONS: CPT

## 2025-05-13 PROCEDURE — 97530 THERAPEUTIC ACTIVITIES: CPT

## 2025-05-13 NOTE — PROGRESS NOTES
Daily Note     Today's date: 2025  Patient name: Denise Hairston  : 1970  MRN: [id number]  Referring provider: Marya Ordoñez, *  Dx:   Encounter Diagnosis     ICD-10-CM    1. Pelvic pain  R10.2       2. S/P hysterectomy with oophorectomy  Z90.710     Z90.721           Start Time: 830  Stop Time: 920  Total time in clinic (min): 50 minutes    Subjective: Patient reports she was traveling on Saturday where she felt increased abdominal discomfort with sitting in the car and while she was walking. She states she has been completing ILU massage at home with relief noted. She reports no complaints after previous session, compliance with HEP and workouts at gym.       Objective: See treatment diary below      Assessment: Tolerated treatment well. Patient demonstrated fatigue post treatment, exhibited good technique with therapeutic exercises, and would benefit from continued PT Patient tolerated manuals well, tightness noted b/l with hip flexor STM, palpable relaxation felt. Trialed additional PFMC functional TE today with good tolerance, some challenged reports with endurance of PFMC and squatting.       Plan: Continue per plan of care.  Progress treatment as tolerated.          IE RE   KHANG-18     PFDI-20     V-Q     PGQ         Precautions: History of cancer  [facility] HEP:     Pain bilateral lateral lower abdomen  PFM weakness, mild posterior descent, difficulty emptying bowels and constipation  Manuals         Pelvic diaphragm MFR  Done Done          ILU massage  nv Done  Done         External MFR around bladder   Done          MFR sigmoid   nv          MFR ileocecal valve   nv          Hip flexor release/STM    Done b/l Done b/l        Neuro Re-Ed             Diaphragmatic Breathing             TA ADIM  10x           TA + PFMC  10x 10x          TA + heel slides  nv           TA + leg ext  nv 10x          TA + 90/90 leg ext  nv           Heel taps    3x10         Deadbugs    "          Hip flxr isos    2x10x5\"         Biofeedback sEMG             PFMC: Slow Holds             PFMC: Quick Flicks             PFMC + hip abd isometrics   GTB 2x10          PFMC + hip add isometrics   10x          PFMC + ecc bridge    2x10         PFMC in sitting             PFMC seated + tilt             PFMC in standing             Quadruped TA   10x +PFMC 10x         PFMC in Quadruped  nv 10x          TB hip flxr marches    [place] 2x10 b/l         Standing OH press with alt marches    3x10                                   Ther Ex             LTR  20x ttl           Child Pose  3x30\" alt CC           Cat Cow  3x10 alt CP                                                                            Ther Activity             EDUCATION 15 min Done Done          Toileting position/breathing  Done           PFMC + reach    nv 12# 10x        PFMC + squat    nv 12# 10x        PFMC + lift    nv 12# 10x        PFMC + sit to stand             PFMC + step up/down             Gait Training                                       Modalities                                                   "

## 2025-05-19 ENCOUNTER — OFFICE VISIT (OUTPATIENT)
Dept: PHYSICAL THERAPY | Facility: REHABILITATION | Age: 55
End: 2025-05-19
Payer: COMMERCIAL

## 2025-05-19 DIAGNOSIS — R10.2 PELVIC PAIN: Primary | ICD-10-CM

## 2025-05-19 DIAGNOSIS — Z90.710 S/P HYSTERECTOMY WITH OOPHORECTOMY: ICD-10-CM

## 2025-05-19 DIAGNOSIS — Z90.721 S/P HYSTERECTOMY WITH OOPHORECTOMY: ICD-10-CM

## 2025-05-19 PROCEDURE — 97530 THERAPEUTIC ACTIVITIES: CPT | Performed by: PHYSICAL THERAPIST

## 2025-05-19 PROCEDURE — 97140 MANUAL THERAPY 1/> REGIONS: CPT | Performed by: PHYSICAL THERAPIST

## 2025-05-19 NOTE — PROGRESS NOTES
"Daily Note     Today's date: 2025  Patient name: Denise Hairston  : 1970  MRN: [id number]  Referring provider: Marya Ordoñez, *  Dx:   Encounter Diagnosis     ICD-10-CM    1. Pelvic pain  R10.2       2. S/P hysterectomy with oophorectomy  Z90.710     Z90.721           Start Time: 08  Stop Time: 09  Total time in clinic (min): 46 minutes    Subjective: Patient reports feeling about the same. She has pain where her ovaries were randomly and prior to a bowel movement.      Objective: See treatment diary below      Assessment: Denise and PT mutually agree to transition to HEP at this time secondary to gains made in PT and independence with HEP. Encourage patient to discuss with MD supplements/medications to use for management of stool consistency as well as increasing water intake to generally 1/2 body weight in fl oz.  she has been given updated HEP with verbalized understanding and compliance. Denise is encouraged to contact PT with any questions or concerns in the future.         Plan: Plan to discharge with comprehensive HEP for continued and maintained gains made in PT.          IE RE   KHANG-18     PFDI-20     V-Q     PGQ         Precautions: History of cancer  [facility] HEP:     Pain bilateral lateral lower abdomen  PFM weakness, mild posterior descent, difficulty emptying bowels and constipation  Manuals        Pelvic diaphragm MFR  Done Done   Done        ILU massage  nv Done  Done         External MFR around bladder   Done          MFR sigmoid   nv          MFR ileocecal valve   nv   Done        Hip flexor release/STM    Done b/l Done b/l Done b/l       Neuro Re-Ed             Diaphragmatic Breathing             TA ADIM  10x           TA + PFMC  10x 10x          TA + heel slides  nv           TA + leg ext  nv 10x          TA + 90/90 leg ext  nv           Heel taps    3x10         Deadbugs             Hip flxr isos    2x10x5\"         Biofeedback sEMG           " "  PFMC: Slow Holds             PFMC: Quick Flicks             PFMC + hip abd isometrics   GTB 2x10          PFMC + hip add isometrics   10x          PFMC + ecc bridge    2x10         PFMC in sitting             PFMC seated + tilt             PFMC in standing             Quadruped TA   10x +PFMC 10x         PFMC in Quadruped  nv 10x          TB hip flxr marches    [place] 2x10 b/l         Standing OH press with alt marches    3x10                                   Ther Ex             LTR  20x ttl           Child Pose  3x30\" alt CC           Cat Cow  3x10 alt CP           Hip flexor stretch      2' b/l                                                            Ther Activity             EDUCATION 15 min Done Done   Done        Toileting position/breathing  Done           PFMC + reach    nv 12# 10x        PFMC + squat    nv 12# 10x        PFMC + lift    nv 12# 10x        PFMC + sit to stand             PFMC + step up/down             Gait Training                                       Modalities                                                     "

## 2025-05-22 ENCOUNTER — HOSPITAL ENCOUNTER (OUTPATIENT)
Dept: MAMMOGRAPHY | Facility: HOSPITAL | Age: 55
Discharge: HOME | End: 2025-05-22
Payer: COMMERCIAL

## 2025-05-22 VITALS — WEIGHT: 132 LBS | BODY MASS INDEX: 22.53 KG/M2 | HEIGHT: 64 IN

## 2025-05-22 DIAGNOSIS — Z12.31 VISIT FOR SCREENING MAMMOGRAM: ICD-10-CM

## 2025-05-22 PROCEDURE — 77063 BREAST TOMOSYNTHESIS BI: CPT

## 2025-05-22 PROCEDURE — 77067 SCR MAMMO BI INCL CAD: CPT

## 2025-05-27 NOTE — PROGRESS NOTES
Name: Denise Hairston      : 1970      MRN: [id number]  Encounter Provider: Eve Tejada MD  Encounter Date: 2025   Encounter department: [facility]  :  Assessment & Plan  Acute left ankle pain  Intact achiiles tendon on inspection symmetrical b/l  Negative calf squeeze test  Tendonopathy likely  Discussed conservative management. Pt will let us know if any changes.                 History of Present Illness   HPI  Ankle pain  5d ago while doing stretching noticed sudden pain at the back of left ankle radiating up, noticed a small knot on top of achillis tendon  Still able to ambulate, bear weight  Denies foot drop  played golf on Monday   Review of Systems    Objective   /80 (BP Location: Left arm, Patient Position: Sitting, Cuff Size: Standard)   Pulse 77   Wt 60.8 kg (134 lb)   LMP 2024 (Exact Date)   SpO2 96%   BMI 23.36 kg/m²      Physical Exam

## 2025-05-28 ENCOUNTER — OFFICE VISIT (OUTPATIENT)
Dept: INTERNAL MEDICINE CLINIC | Facility: CLINIC | Age: 55
End: 2025-05-28
Payer: COMMERCIAL

## 2025-05-28 VITALS
WEIGHT: 134 LBS | DIASTOLIC BLOOD PRESSURE: 80 MMHG | OXYGEN SATURATION: 96 % | HEART RATE: 77 BPM | BODY MASS INDEX: 23.36 KG/M2 | SYSTOLIC BLOOD PRESSURE: 128 MMHG

## 2025-05-28 DIAGNOSIS — M25.572 ACUTE LEFT ANKLE PAIN: Primary | ICD-10-CM

## 2025-05-28 PROCEDURE — 99213 OFFICE O/P EST LOW 20 MIN: CPT | Performed by: GENERAL ACUTE CARE HOSPITAL

## 2025-05-28 NOTE — ASSESSMENT & PLAN NOTE
Intact achiiles tendon on inspection symmetrical b/l  Negative calf squeeze test  Tendonopathy likely  Discussed conservative management. Pt will let us know if any changes.

## 2025-05-29 ENCOUNTER — TELEPHONE (OUTPATIENT)
Age: 55
End: 2025-05-29

## 2025-06-18 DIAGNOSIS — N95.1 MENOPAUSAL SYMPTOMS: Primary | ICD-10-CM

## 2025-06-19 ENCOUNTER — APPOINTMENT (OUTPATIENT)
Dept: LAB | Facility: CLINIC | Age: 55
End: 2025-06-19
Attending: OBSTETRICS & GYNECOLOGY
Payer: COMMERCIAL

## 2025-06-19 DIAGNOSIS — N95.1 MENOPAUSAL SYMPTOMS: ICD-10-CM

## 2025-06-19 LAB — TESTOST SERPL-MSCNC: 27 NG/DL (ref 0–75)

## 2025-06-19 PROCEDURE — 82627 DEHYDROEPIANDROSTERONE: CPT

## 2025-06-19 PROCEDURE — 84403 ASSAY OF TOTAL TESTOSTERONE: CPT

## 2025-06-19 PROCEDURE — 36415 COLL VENOUS BLD VENIPUNCTURE: CPT

## 2025-06-20 ENCOUNTER — RESULTS FOLLOW-UP (OUTPATIENT)
Age: 55
End: 2025-06-20

## 2025-06-20 LAB — DHEA-S SERPL-MCNC: 105 UG/DL (ref 29.4–220.5)

## 2025-06-25 ENCOUNTER — TELEMEDICINE (OUTPATIENT)
Age: 55
End: 2025-06-25
Payer: COMMERCIAL

## 2025-06-25 DIAGNOSIS — N95.1 MENOPAUSAL SYMPTOMS: Primary | ICD-10-CM

## 2025-06-25 PROCEDURE — 99215 OFFICE O/P EST HI 40 MIN: CPT | Performed by: OBSTETRICS & GYNECOLOGY

## 2025-07-02 NOTE — PROGRESS NOTES
Virtual Regular Visit  Name: Denise Hairston      : 1970      MRN: [id number]  Encounter Provider: Trinity Khan MD  Encounter Date: 2025   Encounter department: [facility] [place] [facility]  :  Assessment & Plan  Menopausal symptoms    Orders:    other medication, see sig,; Medication/product name: testosterone cream  Strength: 10 mg/ml   Sig (include dose, route, frequency): apply 0.5 ml to labia daily    1) These are very classic symptoms resulting from ovarian hormone loss. My mainstay of treating the ER+ breast cancer survivor is with testosterone/AI or [place].  2) Labs done prior to this appt: testosterone in midrange for postmeno women at 27, norm 10-50. When 19 it was , so still room for improvement there.   3) Testosterone supplementation discussed in detail, including lack of FDA approval for women and cost concerns, as insurance will not reimburse. Side effects include: increased libido, increased muscle mass, decreased fat mass, erythrocytosis ( NOT polycythemia rubra), increased energy, acne, increased hair growth or loss.  4) She is most interested in testosterone. Will start with cream at 5 mg/d. Consider spironolactone or Propecia if hair loss is an issue, she is getting thin already. Will let oncology guide AI prescribing. I have worked with Dr. Ashby in past who has been supportive of this, but notify him of our conversation.   5) Email with progress report in a month. Follow up prn results or one year.     This was a 40 minute visit with greater than 50% of time spent in face to face counseling and coordination of care      Denise returns for hormone consult follow up. I saw her last in 10/22  with history of Breast cancer diagnose din . Hot flashes were just starting as she was still perimenopausal at the time. Effexor prescribed by oncology with mild relief. Since then:  1) S/p TLH/BSO in . Now hot flashes are much worse along with :  2) Progressive  weight gain, BMI still 23.36  3) Fatigue.   NO other change in health status since last time, no FHX updates.       Review of Systems   Constitutional:  Positive for fatigue and unexpected weight change.   Endocrine: Positive for heat intolerance.   Genitourinary:  Positive for dyspareunia.   Musculoskeletal: Negative.    Skin: Negative.    Neurological: Negative.    Psychiatric/Behavioral:  Positive for sleep disturbance.          Physical Exam    Administrative Statements   Encounter provider Trinity Khan MD    The Patient is located at Home and in the following state in which I hold an active license [place].    The patient was identified by name and date of birth. Denise Hairston was informed that this is a telemedicine visit and that the visit is being conducted through the Epic Embedded platform. She agrees to proceed..  My office door was closed. No one else was in the room.  She acknowledged consent and understanding of privacy and security of the video platform. The patient has agreed to participate and understands they can discontinue the visit at any time.    I have spent a total time of 40 minutes in caring for this patient on the day of the visit/encounter including Diagnostic results, Impressions, Counseling / Coordination of care, Documenting in the medical record, Reviewing/placing orders in the medical record (including tests, medications, and/or procedures), Obtaining or reviewing history  , and Communicating with other healthcare professionals , not including the time spent for establishing the audio/video connection.

## 2025-07-31 ENCOUNTER — TELEPHONE (OUTPATIENT)
Dept: CARDIAC SURGERY | Facility: CLINIC | Age: 55
End: 2025-07-31

## 2025-07-31 ENCOUNTER — TELEPHONE (OUTPATIENT)
Age: 55
End: 2025-07-31

## 2025-08-11 ENCOUNTER — OFFICE VISIT (OUTPATIENT)
Dept: SURGICAL ONCOLOGY | Facility: CLINIC | Age: 55
End: 2025-08-11
Payer: COMMERCIAL

## 2025-08-12 ENCOUNTER — TELEPHONE (OUTPATIENT)
Dept: OBGYN CLINIC | Facility: OTHER | Age: 55
End: 2025-08-12

## (undated) DEVICE — BUTTON SWITCH PENCIL HOLSTER: Brand: VALLEYLAB

## (undated) DEVICE — TROCAR PORT ACCESS 5 X120MML W/BLDLS OPTICAL TIP AIRSEAL

## (undated) DEVICE — VISUALIZATION SYSTEM: Brand: CLEARIFY

## (undated) DEVICE — OCCLUDER COLPO-PNEUMO

## (undated) DEVICE — LIGHT HANDLE COVER SLEEVE DISP BLUE STELLAR

## (undated) DEVICE — CHLORHEXIDINE 4PCT 4 OZ

## (undated) DEVICE — DRAPE SHEET THREE QUARTER

## (undated) DEVICE — HEAVY DUTY TABLE COVER: Brand: CONVERTORS

## (undated) DEVICE — SUT VICRYL 3-0 SH 27 IN J416H

## (undated) DEVICE — TRAY FOLEY 16FR URIMETER SILICONE SURESTEP

## (undated) DEVICE — BETHLEHEM UNIVERSAL MINOR GEN: Brand: CARDINAL HEALTH

## (undated) DEVICE — INTENDED FOR TISSUE SEPARATION, AND OTHER PROCEDURES THAT REQUIRE A SHARP SURGICAL BLADE TO PUNCTURE OR CUT.: Brand: BARD-PARKER SAFETY BLADES SIZE 15, STERILE

## (undated) DEVICE — ARM DRAPE

## (undated) DEVICE — TOWEL SURG XR DETECT GREEN STRL RFD

## (undated) DEVICE — CAUTERY TIP POLISHER: Brand: DEVON

## (undated) DEVICE — DECANTER: Brand: UNBRANDED

## (undated) DEVICE — CANNULA SEAL

## (undated) DEVICE — TIP COVER ACCESSORY

## (undated) DEVICE — MARGIN MARKER SET

## (undated) DEVICE — PREMIUM DRY TRAY LF: Brand: MEDLINE INDUSTRIES, INC.

## (undated) DEVICE — GLOVE INDICATOR PI UNDERGLOVE SZ 8.5 BLUE

## (undated) DEVICE — VESSEL SEALER EXTEND: Brand: ENDOWRIST

## (undated) DEVICE — MEDI-VAC YANK SUCT HNDL W/TPRD BULBOUS TIP: Brand: CARDINAL HEALTH

## (undated) DEVICE — VIAL DECANTER

## (undated) DEVICE — SUT MONOCRYL 4-0 PS-2 18 IN Y496G

## (undated) DEVICE — TUBING SUCTION 5MM X 12 FT

## (undated) DEVICE — SYRINGE 10ML LL

## (undated) DEVICE — 1820 FOAM BLOCK NEEDLE COUNTER: Brand: DEVON

## (undated) DEVICE — SMOKE EVACUATION TUBING WITH 8 IN INTEGRAL WAND AND SPONGE GUARD: Brand: BUFFALO FILTER

## (undated) DEVICE — LARGE NEEDLE DRIVER: Brand: ENDOWRIST

## (undated) DEVICE — UTERINE MANIPULATOR RUMI 6.7 X 10 CM

## (undated) DEVICE — NEEDLE 25G X 1 1/2

## (undated) DEVICE — PROGRASP FORCEPS: Brand: ENDOWRIST

## (undated) DEVICE — GLOVE SRG BIOGEL 7

## (undated) DEVICE — KIT, BETHLEHEM ROBOTIC PROST: Brand: CARDINAL HEALTH

## (undated) DEVICE — SUT MONOCRYL 4-0 PS-2 27 IN Y426H

## (undated) DEVICE — DRAPE EQUIPMENT RF WAND

## (undated) DEVICE — GLOVE INDICATOR PI UNDERGLOVE SZ 7 BLUE

## (undated) DEVICE — SPONGE 4 X 4 XRAY 16 PLY STRL LF RFD

## (undated) DEVICE — 3M™ STERI-STRIP™ REINFORCED ADHESIVE SKIN CLOSURES, R1547, 1/2 IN X 4 IN (12 MM X 100 MM), 6 STRIPS/ENVELOPE: Brand: 3M™ STERI-STRIP™

## (undated) DEVICE — DRAPE PROBE NEO-PROBE/ULTRASOUND

## (undated) DEVICE — REM POLYHESIVE ADULT PATIENT RETURN ELECTRODE: Brand: VALLEYLAB

## (undated) DEVICE — COLUMN DRAPE

## (undated) DEVICE — SUT VICRYL 0 CT-1 36 IN J946H

## (undated) DEVICE — ADHESIVE SKN CLSR HISTOACRYL FLEX 0.5ML LF

## (undated) DEVICE — SYRINGE 50ML LL

## (undated) DEVICE — CHLORAPREP HI-LITE 26ML ORANGE

## (undated) DEVICE — EXOFIN PRECISION PEN HIGH VISCOSITY TOPICAL SKIN ADHESIVE: Brand: EXOFIN PRECISION PEN, 1G

## (undated) DEVICE — GLOVE PI ULTRA TOUCH SZ.8.5

## (undated) DEVICE — ASTOUND STANDARD SURGICAL GOWN, XL: Brand: CONVERTORS

## (undated) DEVICE — INTENDED FOR TISSUE SEPARATION, AND OTHER PROCEDURES THAT REQUIRE A SHARP SURGICAL BLADE TO PUNCTURE OR CUT.: Brand: BARD-PARKER SAFETY BLADES SIZE 11, STERILE